# Patient Record
Sex: FEMALE | Race: WHITE | NOT HISPANIC OR LATINO | Employment: OTHER | URBAN - METROPOLITAN AREA
[De-identification: names, ages, dates, MRNs, and addresses within clinical notes are randomized per-mention and may not be internally consistent; named-entity substitution may affect disease eponyms.]

---

## 2017-01-20 RX ORDER — LORAZEPAM 0.5 MG/1
TABLET ORAL
Qty: 30 TABLET | Refills: 0 | Status: SHIPPED | OUTPATIENT
Start: 2017-01-20 | End: 2017-04-27 | Stop reason: SDUPTHER

## 2017-02-15 ENCOUNTER — OUTSIDE FACILITY SERVICE (OUTPATIENT)
Dept: FAMILY MEDICINE CLINIC | Facility: CLINIC | Age: 82
End: 2017-02-15

## 2017-02-15 PROCEDURE — 99308 SBSQ NF CARE LOW MDM 20: CPT | Performed by: NURSE PRACTITIONER

## 2017-03-29 RX ORDER — HYDROCODONE BITARTRATE AND ACETAMINOPHEN 5; 325 MG/1; MG/1
1 TABLET ORAL EVERY 12 HOURS
Qty: 60 TABLET | Refills: 0 | Status: SHIPPED | OUTPATIENT
Start: 2017-03-29 | End: 2017-04-27 | Stop reason: SDUPTHER

## 2017-04-06 ENCOUNTER — OUTSIDE FACILITY SERVICE (OUTPATIENT)
Dept: FAMILY MEDICINE CLINIC | Facility: CLINIC | Age: 82
End: 2017-04-06

## 2017-04-06 PROCEDURE — 99308 SBSQ NF CARE LOW MDM 20: CPT | Performed by: FAMILY MEDICINE

## 2017-04-19 ENCOUNTER — OUTSIDE FACILITY SERVICE (OUTPATIENT)
Dept: FAMILY MEDICINE CLINIC | Facility: CLINIC | Age: 82
End: 2017-04-19

## 2017-04-19 PROCEDURE — 99308 SBSQ NF CARE LOW MDM 20: CPT | Performed by: NURSE PRACTITIONER

## 2017-04-28 RX ORDER — HYDROCODONE BITARTRATE AND ACETAMINOPHEN 5; 325 MG/1; MG/1
1 TABLET ORAL EVERY 12 HOURS
Qty: 60 TABLET | Refills: 0 | Status: SHIPPED | OUTPATIENT
Start: 2017-04-28 | End: 2017-05-12

## 2017-04-28 RX ORDER — LORAZEPAM 0.5 MG/1
TABLET ORAL
Qty: 30 TABLET | Refills: 0 | Status: SHIPPED | OUTPATIENT
Start: 2017-04-28 | End: 2017-05-12 | Stop reason: SDUPTHER

## 2017-05-12 RX ORDER — HYDROCODONE BITARTRATE AND ACETAMINOPHEN 5; 325 MG/1; MG/1
TABLET ORAL
Qty: 360 TABLET | Refills: 0 | Status: SHIPPED | OUTPATIENT
Start: 2017-05-12 | End: 2017-07-12 | Stop reason: SDUPTHER

## 2017-05-12 RX ORDER — LORAZEPAM 0.5 MG/1
TABLET ORAL
Qty: 30 TABLET | Refills: 5 | Status: SHIPPED | OUTPATIENT
Start: 2017-05-12 | End: 2017-09-05 | Stop reason: SDUPTHER

## 2017-06-07 ENCOUNTER — OUTSIDE FACILITY SERVICE (OUTPATIENT)
Dept: FAMILY MEDICINE CLINIC | Facility: CLINIC | Age: 82
End: 2017-06-07

## 2017-06-07 PROCEDURE — 99307 SBSQ NF CARE SF MDM 10: CPT | Performed by: NURSE PRACTITIONER

## 2017-06-08 ENCOUNTER — OUTSIDE FACILITY SERVICE (OUTPATIENT)
Dept: FAMILY MEDICINE CLINIC | Facility: CLINIC | Age: 82
End: 2017-06-08

## 2017-06-08 PROCEDURE — 99308 SBSQ NF CARE LOW MDM 20: CPT | Performed by: FAMILY MEDICINE

## 2017-07-04 ENCOUNTER — APPOINTMENT (OUTPATIENT)
Dept: CT IMAGING | Facility: HOSPITAL | Age: 82
End: 2017-07-04

## 2017-07-04 ENCOUNTER — APPOINTMENT (OUTPATIENT)
Dept: GENERAL RADIOLOGY | Facility: HOSPITAL | Age: 82
End: 2017-07-04

## 2017-07-04 ENCOUNTER — HOSPITAL ENCOUNTER (EMERGENCY)
Facility: HOSPITAL | Age: 82
Discharge: HOME | End: 2017-07-04
Attending: FAMILY MEDICINE | Admitting: FAMILY MEDICINE

## 2017-07-04 VITALS
OXYGEN SATURATION: 95 % | WEIGHT: 145 LBS | BODY MASS INDEX: 23.3 KG/M2 | RESPIRATION RATE: 18 BRPM | DIASTOLIC BLOOD PRESSURE: 94 MMHG | HEART RATE: 100 BPM | TEMPERATURE: 97.9 F | HEIGHT: 66 IN | SYSTOLIC BLOOD PRESSURE: 179 MMHG

## 2017-07-04 DIAGNOSIS — R07.89 ATYPICAL CHEST PAIN: Primary | ICD-10-CM

## 2017-07-04 LAB
ALBUMIN SERPL-MCNC: 4.4 G/DL (ref 3.5–5.2)
ALBUMIN/GLOB SERPL: 1.4 G/DL
ALP SERPL-CCNC: 83 U/L (ref 39–117)
ALT SERPL W P-5'-P-CCNC: 23 U/L (ref 1–33)
ANION GAP SERPL CALCULATED.3IONS-SCNC: 13.6 MMOL/L
AST SERPL-CCNC: 25 U/L (ref 1–32)
BACTERIA UR QL AUTO: NORMAL /HPF
BASOPHILS # BLD AUTO: 0.04 10*3/MM3 (ref 0–0.2)
BASOPHILS NFR BLD AUTO: 0.5 % (ref 0–1.5)
BILIRUB SERPL-MCNC: 0.5 MG/DL (ref 0.1–1.2)
BILIRUB UR QL STRIP: NEGATIVE
BUN BLD-MCNC: 14 MG/DL (ref 8–23)
BUN/CREAT SERPL: 15.6 (ref 7–25)
CALCIUM SPEC-SCNC: 10.1 MG/DL (ref 8.6–10.5)
CHLORIDE SERPL-SCNC: 97 MMOL/L (ref 98–107)
CLARITY UR: CLEAR
CO2 SERPL-SCNC: 25.4 MMOL/L (ref 22–29)
COLOR UR: YELLOW
CREAT BLD-MCNC: 0.9 MG/DL (ref 0.57–1)
DEPRECATED RDW RBC AUTO: 44.4 FL (ref 37–54)
EOSINOPHIL # BLD AUTO: 0.45 10*3/MM3 (ref 0–0.7)
EOSINOPHIL NFR BLD AUTO: 5.8 % (ref 0.3–6.2)
ERYTHROCYTE [DISTWIDTH] IN BLOOD BY AUTOMATED COUNT: 12.8 % (ref 11.7–13)
GFR SERPL CREATININE-BSD FRML MDRD: 59 ML/MIN/1.73
GLOBULIN UR ELPH-MCNC: 3.1 GM/DL
GLUCOSE BLD-MCNC: 230 MG/DL (ref 65–99)
GLUCOSE UR STRIP-MCNC: ABNORMAL MG/DL
HCT VFR BLD AUTO: 42.2 % (ref 35.6–45.5)
HGB BLD-MCNC: 14.1 G/DL (ref 11.9–15.5)
HGB UR QL STRIP.AUTO: ABNORMAL
HOLD SPECIMEN: NORMAL
HOLD SPECIMEN: NORMAL
HYALINE CASTS UR QL AUTO: NORMAL /LPF
IMM GRANULOCYTES # BLD: 0.02 10*3/MM3 (ref 0–0.03)
IMM GRANULOCYTES NFR BLD: 0.3 % (ref 0–0.5)
KETONES UR QL STRIP: NEGATIVE
LEUKOCYTE ESTERASE UR QL STRIP.AUTO: NEGATIVE
LYMPHOCYTES # BLD AUTO: 1.91 10*3/MM3 (ref 0.9–4.8)
LYMPHOCYTES NFR BLD AUTO: 24.5 % (ref 19.6–45.3)
MCH RBC QN AUTO: 31.6 PG (ref 26.9–32)
MCHC RBC AUTO-ENTMCNC: 33.4 G/DL (ref 32.4–36.3)
MCV RBC AUTO: 94.6 FL (ref 80.5–98.2)
MONOCYTES # BLD AUTO: 0.55 10*3/MM3 (ref 0.2–1.2)
MONOCYTES NFR BLD AUTO: 7.1 % (ref 5–12)
NEUTROPHILS # BLD AUTO: 4.83 10*3/MM3 (ref 1.9–8.1)
NEUTROPHILS NFR BLD AUTO: 61.8 % (ref 42.7–76)
NITRITE UR QL STRIP: NEGATIVE
PH UR STRIP.AUTO: 5.5 [PH] (ref 5–8)
PLATELET # BLD AUTO: 221 10*3/MM3 (ref 140–500)
PMV BLD AUTO: 9.9 FL (ref 6–12)
POTASSIUM BLD-SCNC: 4.4 MMOL/L (ref 3.5–5.2)
PROT SERPL-MCNC: 7.5 G/DL (ref 6–8.5)
PROT UR QL STRIP: NEGATIVE
RBC # BLD AUTO: 4.46 10*6/MM3 (ref 3.9–5.2)
RBC # UR: NORMAL /HPF
REF LAB TEST METHOD: NORMAL
SODIUM BLD-SCNC: 136 MMOL/L (ref 136–145)
SP GR UR STRIP: 1.01 (ref 1–1.03)
SQUAMOUS #/AREA URNS HPF: NORMAL /HPF
TROPONIN T SERPL-MCNC: 0.02 NG/ML (ref 0–0.03)
UROBILINOGEN UR QL STRIP: ABNORMAL
WBC NRBC COR # BLD: 7.8 10*3/MM3 (ref 4.5–10.7)
WBC UR QL AUTO: NORMAL /HPF
WHOLE BLOOD HOLD SPECIMEN: NORMAL
WHOLE BLOOD HOLD SPECIMEN: NORMAL

## 2017-07-04 PROCEDURE — 81001 URINALYSIS AUTO W/SCOPE: CPT | Performed by: FAMILY MEDICINE

## 2017-07-04 PROCEDURE — 93010 ELECTROCARDIOGRAM REPORT: CPT | Performed by: INTERNAL MEDICINE

## 2017-07-04 PROCEDURE — 85025 COMPLETE CBC W/AUTO DIFF WBC: CPT | Performed by: FAMILY MEDICINE

## 2017-07-04 PROCEDURE — 71020 HC CHEST PA AND LATERAL: CPT

## 2017-07-04 PROCEDURE — 96374 THER/PROPH/DIAG INJ IV PUSH: CPT

## 2017-07-04 PROCEDURE — 99284 EMERGENCY DEPT VISIT MOD MDM: CPT

## 2017-07-04 PROCEDURE — 80053 COMPREHEN METABOLIC PANEL: CPT | Performed by: FAMILY MEDICINE

## 2017-07-04 PROCEDURE — 25010000002 LORAZEPAM PER 2 MG: Performed by: FAMILY MEDICINE

## 2017-07-04 PROCEDURE — 93005 ELECTROCARDIOGRAM TRACING: CPT | Performed by: FAMILY MEDICINE

## 2017-07-04 PROCEDURE — 84484 ASSAY OF TROPONIN QUANT: CPT | Performed by: FAMILY MEDICINE

## 2017-07-04 PROCEDURE — 0 IOPAMIDOL PER 1 ML: Performed by: FAMILY MEDICINE

## 2017-07-04 PROCEDURE — 71275 CT ANGIOGRAPHY CHEST: CPT

## 2017-07-04 RX ORDER — LORAZEPAM 2 MG/ML
0.5 INJECTION INTRAMUSCULAR ONCE
Status: COMPLETED | OUTPATIENT
Start: 2017-07-04 | End: 2017-07-04

## 2017-07-04 RX ORDER — SODIUM CHLORIDE 0.9 % (FLUSH) 0.9 %
10 SYRINGE (ML) INJECTION AS NEEDED
Status: DISCONTINUED | OUTPATIENT
Start: 2017-07-04 | End: 2017-07-05 | Stop reason: HOSPADM

## 2017-07-04 RX ORDER — ASPIRIN 325 MG
325 TABLET ORAL ONCE
Status: COMPLETED | OUTPATIENT
Start: 2017-07-04 | End: 2017-07-04

## 2017-07-04 RX ORDER — FOLIC ACID/VIT BCOMP,C/CU/ZINC 5-1.5-25MG
1 TABLET ORAL DAILY
COMMUNITY
End: 2017-11-06 | Stop reason: HOSPADM

## 2017-07-04 RX ORDER — DONEPEZIL HYDROCHLORIDE 5 MG/1
5 TABLET, FILM COATED ORAL NIGHTLY
COMMUNITY
End: 2017-11-01 | Stop reason: DRUGHIGH

## 2017-07-04 RX ORDER — MIRTAZAPINE 15 MG/1
15 TABLET, FILM COATED ORAL NIGHTLY
COMMUNITY
End: 2018-01-01 | Stop reason: HOSPADM

## 2017-07-04 RX ADMIN — IOPAMIDOL 95 ML: 755 INJECTION, SOLUTION INTRAVENOUS at 20:33

## 2017-07-04 RX ADMIN — LORAZEPAM 0.5 MG: 2 INJECTION INTRAMUSCULAR; INTRAVENOUS at 20:23

## 2017-07-04 RX ADMIN — ASPIRIN 325 MG: 325 TABLET ORAL at 18:06

## 2017-07-04 NOTE — ED PROVIDER NOTES
EMERGENCY DEPARTMENT ENCOUNTER    CHIEF COMPLAINT  Chief Complaint: chest pain  History given by: patient  History limited by: Dementia  Room Number: 28/28  PMD: Richard Stinson MD      HPI:  Pt is a 86 y.o. female who presents complaining of constant chest pain under her left breast for the past several weeks. Pain does not radiate and is nonpleuritic. Pt reports SOB, mild dysuria, and dry cough. Pt denies N/V/D, headache, confusion, and fever. Pt underwent a bypass surgery years ago.     Duration:  Multiple weeks  Onset: gradual  Timing: constant  Location: Under left breast  Radiation: none  Quality: pain  Intensity/Severity: moderate  Progression: unchanged  Associated Symptoms: SOB, mild dysuria, dry cough  Aggravating Factors: uknown  Alleviating Factors: uknown  Previous Episodes: uknown  Treatment before arrival: none reported     PAST MEDICAL HISTORY  Active Ambulatory Problems     Diagnosis Date Noted   • Acute respiratory failure with hypoxia 08/08/2016   • Essential hypertension 08/12/2016     Resolved Ambulatory Problems     Diagnosis Date Noted   • No Resolved Ambulatory Problems     Past Medical History:   Diagnosis Date   • Alzheimer disease    • Atherosclerotic heart disease    • Bronchitis    • CHF (congestive heart failure)    • COPD (chronic obstructive pulmonary disease)    • Dementia    • Depression    • Diabetes mellitus    • GERD (gastroesophageal reflux disease)    • HTN (hypertension)    • Hypokalemia    • Insomnia    • Metabolic encephalopathy    • Neuropathy    • OA (osteoarthritis)    • Pneumonia    • Renal disorder    • TIA (transient ischemic attack)    • Upper respiratory infection        PAST SURGICAL HISTORY  Past Surgical History:   Procedure Laterality Date   • CARDIAC SURGERY         FAMILY HISTORY  History reviewed. No pertinent family history.    SOCIAL HISTORY  Social History     Social History   • Marital status: Single     Spouse name: N/A   • Number of children: 3   • Years  of education: N/A     Occupational History   •       [employer]     Social History Main Topics   • Smoking status: Former Smoker     Quit date: 1980   • Smokeless tobacco: Not on file      Comment: unable to assess   • Alcohol use No   • Drug use: No   • Sexual activity: Defer     Other Topics Concern   • Not on file     Social History Narrative   • No narrative on file       ALLERGIES  Codeine; Latex; Other; Soma [carisoprodol]; and Sulfa antibiotics    REVIEW OF SYSTEMS  Review of Systems   Unable to perform ROS: Dementia   Eyes: Negative.    Respiratory: Positive for cough and shortness of breath.    Cardiovascular: Positive for chest pain.   Genitourinary: Positive for dysuria (mild).       PHYSICAL EXAM  ED Triage Vitals   Temp Heart Rate Resp BP SpO2   07/04/17 1631 07/04/17 1631 07/04/17 1631 07/04/17 1631 07/04/17 1631   97.9 °F (36.6 °C) 89 16 167/94 94 %      Temp src Heart Rate Source Patient Position BP Location FiO2 (%)   07/04/17 1631 -- -- -- --   Tympanic           Physical Exam   Constitutional: She is well-developed, well-nourished, and in no distress. No distress.   HENT:   Head: Normocephalic and atraumatic.   Eyes: EOM are normal. Pupils are equal, round, and reactive to light.   Neck: Normal range of motion. Neck supple.   Cardiovascular: Normal rate, regular rhythm and normal heart sounds.    Pulmonary/Chest: Effort normal. No respiratory distress. She has decreased breath sounds. She has wheezes (mild).   Bilateral breast implants. No tenderness and no sign of cellulitis.    Abdominal: Soft. There is no tenderness. There is no rebound and no guarding.   Musculoskeletal: Normal range of motion. She exhibits no edema.   Neurological: She is alert. She has normal sensation and normal strength.   Skin: Skin is warm and dry. No rash noted.   Old surgical incision on left leg with scattered excoration. Minimal edema   Psychiatric: Mood and affect normal.   Nursing note and vitals  reviewed.      LAB RESULTS  Lab Results (last 24 hours)     Procedure Component Value Units Date/Time    CBC & Differential [[id number]] Collected:  07/04/17 1653    Specimen:  Blood Updated:  07/04/17 1715    Narrative:       The following orders were created for panel order CBC & Differential.  Procedure                               Abnormality         Status                     ---------                               -----------         ------                     CBC Auto Differential[[id number]]        Normal              Final result                 Please view results for these tests on the individual orders.    Comprehensive Metabolic Panel [[id number]]  (Abnormal) Collected:  07/04/17 1653    Specimen:  Blood Updated:  07/04/17 1732     Glucose 230 (H) mg/dL      BUN 14 mg/dL      Creatinine 0.90 mg/dL      Sodium 136 mmol/L      Potassium 4.4 mmol/L      Chloride 97 (L) mmol/L      CO2 25.4 mmol/L      Calcium 10.1 mg/dL      Total Protein 7.5 g/dL      Albumin 4.40 g/dL      ALT (SGPT) 23 U/L      AST (SGOT) 25 U/L      Alkaline Phosphatase 83 U/L      Total Bilirubin 0.5 mg/dL      eGFR Non African Amer 59 (L) mL/min/1.73      Globulin 3.1 gm/dL      A/G Ratio 1.4 g/dL      BUN/Creatinine Ratio 15.6     Anion Gap 13.6 mmol/L     Narrative:       The MDRD GFR formula is only valid for adults with stable renal function between ages 18 and 70.    Troponin [[id number]]  (Normal) Collected:  07/04/17 1653    Specimen:  Blood Updated:  07/04/17 1732     Troponin T 0.017 ng/mL     Narrative:       Troponin T Reference Ranges:  Less than 0.03 ng/mL:    Negative for AMI  0.03 to 0.09 ng/mL:      Indeterminant for AMI  Greater than 0.09 ng/mL: Positive for AMI    CBC Auto Differential [[id number]]  (Normal) Collected:  07/04/17 1653    Specimen:  Blood Updated:  07/04/17 1715     WBC 7.80 10*3/mm3      RBC 4.46 10*6/mm3      Hemoglobin 14.1 g/dL      Hematocrit 42.2 %      MCV 94.6 fL      MCH 31.6 pg      MCHC 33.4 g/dL       RDW 12.8 %      RDW-SD 44.4 fl      MPV 9.9 fL      Platelets 221 10*3/mm3      Neutrophil % 61.8 %      Lymphocyte % 24.5 %      Monocyte % 7.1 %      Eosinophil % 5.8 %      Basophil % 0.5 %      Immature Grans % 0.3 %      Neutrophils, Absolute 4.83 10*3/mm3      Lymphocytes, Absolute 1.91 10*3/mm3      Monocytes, Absolute 0.55 10*3/mm3      Eosinophils, Absolute 0.45 10*3/mm3      Basophils, Absolute 0.04 10*3/mm3      Immature Grans, Absolute 0.02 10*3/mm3     Urinalysis With / Culture If Indicated [[id number]]  (Abnormal) Collected:  07/04/17 1732    Specimen:  Urine from Urine, Catheter Updated:  07/04/17 1756     Color, UA Yellow     Appearance, UA Clear     pH, UA 5.5     Specific Gravity, UA 1.009     Glucose,  mg/dL (1+) (A)     Ketones, UA Negative     Bilirubin, UA Negative     Blood, UA Trace (A)     Protein, UA Negative     Leuk Esterase, UA Negative     Nitrite, UA Negative     Urobilinogen, UA 0.2 E.U./dL    Urinalysis, Microscopic Only [[id number]] Collected:  07/04/17 1732    Specimen:  Urine from Urine, Catheter Updated:  07/04/17 1756     RBC, UA 0-2 /HPF      WBC, UA 0-2 /HPF      Bacteria, UA None Seen /HPF      Squamous Epithelial Cells, UA 0-2 /HPF      Hyaline Casts, UA None Seen /LPF      Methodology Automated Microscopy          I ordered the above labs and reviewed the results    RADIOLOGY  CT Angiogram Chest With Contrast   Final Result   1. Minimal basilar fibrosis, no active airspace disease.   2. Motion limits evaluation for pulmonary embolism, no central or large   proximal embolus       This report was finalized on 7/4/2017 8:59 PM by Michael Tang MD.          XR Chest 2 View   Final Result   No focal pulmonary consolidation. Borderline heart size.   Follow-up as clinical indications persist.       This report was finalized on 7/4/2017 5:32 PM by Dr. Micky Olson MD.               I ordered the above noted radiological studies. Interpreted by radiologist.   Reviewed by me in PACS.       PROCEDURES  Procedures  EKG           EKG time: 1642  Rhythm/Rate: NSR  P waves and TX: 90s  QRS, axis: LBBB, PRWP   ST and T waves: nonspecific ST chances     Interpreted Contemporaneously by me, independently viewed  unchanged compared to prior 9/21/16      PROGRESS AND CONSULTS  ED Course   1635  Ordered EKG, CXR, Troponin and blood work for further evaluation.    1844  Rechecked pt and discussed negative imaging results. Informed pt that I will order CT. Checked blister on left heel which is a mild early decubitus.  Asked nurse for padded footies. Complains of persistent pain under her left breast and wants to go home.    1847  Ordered Chest CT Angiogram to r/o PE.     2006  Rechecked pt. Pt desires to be discharged but I encouraged her to stay so we can obtain a chest CT. .    Her pain is pain is constant, not pleuritic and extends for a long period of time. Her work up here is negative so she can be discharged home.     2017  Ordered Ativan for anxiety. Her pain sounds atypical for cardiac and she insists on discharge.  Cautioned she needs close follow up.    MEDICAL DECISION MAKING  Results were reviewed/discussed with the patient and they were also made aware of online access. Pt also made aware that some labs, such as cultures, will not be resulted during ER visit and follow up with PMD is necessary.     MDM  Number of Diagnoses or Management Options     Amount and/or Complexity of Data Reviewed  Clinical lab tests: ordered and reviewed (Troponin is 0.017)  Tests in the radiology section of CPT®: ordered and reviewed (CXR shows no acute disease  CTA Chest: minimal basilar fibrosis, no active airspace disease. Motion limits evaluation for pulmonary embolism, no central or large proximal embolus  )  Tests in the medicine section of CPT®: ordered and reviewed (See procedure EKG notes)  Decide to obtain previous medical records or to obtain history from someone other than the  patient: yes  Review and summarize past medical records: yes  Independent visualization of images, tracings, or specimens: yes    Patient Progress  Patient progress: stable         DIAGNOSIS  Final diagnoses:   Atypical chest pain       DISPOSITION  DISCHARGE    Patient discharged in stable condition.    Reviewed implications of results, diagnosis, meds, responsibility to follow up, warning signs and symptoms of possible worsening, potential complications and reasons to return to the ED.    Patient/Family voiced understanding of above instructions.    Discussed plan for discharge, as there is no emergent indication for admission.  Pt/family is agreeable and understands need for follow up and repeat testing.  Pt is aware that discharge does not mean that nothing is wrong but it indicates no emergency is present that requires admission and they must continue care with follow-up as given below or physician of their choice.     FOLLOW-UP  Richard Stinson MD  [address] [id number] [phone]      We want you rechecked in 48 hours if not better         Medication List      Stop          SITagliptin 50 MG tablet   Commonly known as:  JANUVIA               Latest Documented Vital Signs:  As of 9:45 PM  BP- 179/94 HR- 100 Temp- 97.9 °F (36.6 °C) (Tympanic) O2 sat- 95%    --  Documentation assistance provided by dayday Myles and Zayra Hawk for Dr. Fuller.  Information recorded by the dayday was done at my direction and has been verified and validated by me.        Zayra Hawk  07/04/17 2119       Ambar Myles  07/04/17 5450       Ronald Fuller MD  07/04/17 5533

## 2017-07-05 NOTE — ED NOTES
Report called to Joana at [facility].  Sent to POD 4 to hold for raphael Cabrera RN  07/04/17 9979

## 2017-07-05 NOTE — ED NOTES
"Called [facility] to notify IV access needed to be removed.  Registered nurse stated \"I am taking it out now\". [facility] was called prior and notified.   Charge nurse notified.      Mady Hall RN  07/04/17 3002    "

## 2017-07-05 NOTE — ED NOTES
Changed pt linens and adult briefs due to incontinence. Pt tolerated well. Pt requested to drink soda. Pt  requested to give it to her.     Mady Hall RN  07/04/17 2332

## 2017-07-05 NOTE — ED NOTES
Pt left with [facility].  Pt able to communicate, pt breathing room air.   Denied any sob or chest pain.     Mady Hall RN  07/04/17 1094    Pt had been discharged by Alida LECHUGA.     Mady Hall RN  07/04/17 3533

## 2017-07-06 ENCOUNTER — OUTSIDE FACILITY SERVICE (OUTPATIENT)
Dept: FAMILY MEDICINE CLINIC | Facility: CLINIC | Age: 82
End: 2017-07-06

## 2017-07-06 PROCEDURE — 99307 SBSQ NF CARE SF MDM 10: CPT | Performed by: FAMILY MEDICINE

## 2017-07-12 RX ORDER — HYDROCODONE BITARTRATE AND ACETAMINOPHEN 5; 325 MG/1; MG/1
TABLET ORAL
Qty: 360 TABLET | Refills: 0 | Status: SHIPPED | OUTPATIENT
Start: 2017-07-12 | End: 2017-09-13 | Stop reason: SDUPTHER

## 2017-07-21 RX ORDER — GABAPENTIN 300 MG/1
300 CAPSULE ORAL 2 TIMES DAILY
Qty: 60 CAPSULE | Refills: 5 | Status: SHIPPED | OUTPATIENT
Start: 2017-07-21 | End: 2017-11-06 | Stop reason: HOSPADM

## 2017-08-09 ENCOUNTER — OUTSIDE FACILITY SERVICE (OUTPATIENT)
Dept: FAMILY MEDICINE CLINIC | Facility: CLINIC | Age: 82
End: 2017-08-09

## 2017-08-09 PROCEDURE — 99308 SBSQ NF CARE LOW MDM 20: CPT | Performed by: NURSE PRACTITIONER

## 2017-09-05 RX ORDER — LORAZEPAM 0.5 MG/1
TABLET ORAL
Qty: 30 TABLET | Refills: 5 | Status: SHIPPED | OUTPATIENT
Start: 2017-09-05 | End: 2017-09-06 | Stop reason: SDUPTHER

## 2017-09-06 RX ORDER — LORAZEPAM 0.5 MG/1
TABLET ORAL
Qty: 30 TABLET | Refills: 5 | Status: SHIPPED | OUTPATIENT
Start: 2017-09-06 | End: 2017-11-01 | Stop reason: SDUPTHER

## 2017-09-07 RX ORDER — LORAZEPAM 2 MG/ML
INJECTION INTRAMUSCULAR
Qty: 60 ML | Refills: 5 | OUTPATIENT
Start: 2017-09-07 | End: 2017-11-01 | Stop reason: SDUPTHER

## 2017-09-13 RX ORDER — HYDROCODONE BITARTRATE AND ACETAMINOPHEN 5; 325 MG/1; MG/1
TABLET ORAL
Qty: 360 TABLET | Refills: 0 | Status: SHIPPED | OUTPATIENT
Start: 2017-09-13 | End: 2017-10-23 | Stop reason: SDUPTHER

## 2017-09-14 ENCOUNTER — OUTSIDE FACILITY SERVICE (OUTPATIENT)
Dept: FAMILY MEDICINE CLINIC | Facility: CLINIC | Age: 82
End: 2017-09-14

## 2017-09-14 PROCEDURE — 99309 SBSQ NF CARE MODERATE MDM 30: CPT | Performed by: NURSE PRACTITIONER

## 2017-09-21 ENCOUNTER — OUTSIDE FACILITY SERVICE (OUTPATIENT)
Dept: FAMILY MEDICINE CLINIC | Facility: CLINIC | Age: 82
End: 2017-09-21

## 2017-09-21 PROCEDURE — 99307 SBSQ NF CARE SF MDM 10: CPT | Performed by: FAMILY MEDICINE

## 2017-09-28 ENCOUNTER — OUTSIDE FACILITY SERVICE (OUTPATIENT)
Dept: FAMILY MEDICINE CLINIC | Facility: CLINIC | Age: 82
End: 2017-09-28

## 2017-09-28 PROCEDURE — 99307 SBSQ NF CARE SF MDM 10: CPT | Performed by: FAMILY MEDICINE

## 2017-10-20 ENCOUNTER — OUTSIDE FACILITY SERVICE (OUTPATIENT)
Dept: FAMILY MEDICINE CLINIC | Facility: CLINIC | Age: 82
End: 2017-10-20

## 2017-10-20 PROCEDURE — 99308 SBSQ NF CARE LOW MDM 20: CPT | Performed by: NURSE PRACTITIONER

## 2017-10-23 RX ORDER — HYDROCODONE BITARTRATE AND ACETAMINOPHEN 5; 325 MG/1; MG/1
TABLET ORAL
Qty: 60 TABLET | Refills: 0 | Status: SHIPPED | OUTPATIENT
Start: 2017-10-23 | End: 2017-11-01 | Stop reason: SDUPTHER

## 2017-11-01 ENCOUNTER — HOSPITAL ENCOUNTER (INPATIENT)
Facility: HOSPITAL | Age: 82
LOS: 5 days | Discharge: NURSING, REHAB OR LONG-TERM CARE | End: 2017-11-06
Attending: FAMILY MEDICINE | Admitting: HOSPITALIST

## 2017-11-01 ENCOUNTER — APPOINTMENT (OUTPATIENT)
Dept: GENERAL RADIOLOGY | Facility: HOSPITAL | Age: 82
End: 2017-11-01

## 2017-11-01 DIAGNOSIS — N39.0 ACUTE UTI: ICD-10-CM

## 2017-11-01 DIAGNOSIS — R77.8 ELEVATED TROPONIN: ICD-10-CM

## 2017-11-01 DIAGNOSIS — R55 SYNCOPE AND COLLAPSE: Primary | ICD-10-CM

## 2017-11-01 DIAGNOSIS — R94.31 ABNORMAL EKG: ICD-10-CM

## 2017-11-01 LAB
ALBUMIN SERPL-MCNC: 3.5 G/DL (ref 3.5–5.2)
ALBUMIN/GLOB SERPL: 1.1 G/DL
ALP SERPL-CCNC: 85 U/L (ref 39–117)
ALT SERPL W P-5'-P-CCNC: 13 U/L (ref 1–33)
ANION GAP SERPL CALCULATED.3IONS-SCNC: 14.4 MMOL/L
AST SERPL-CCNC: 15 U/L (ref 1–32)
BACTERIA UR QL AUTO: ABNORMAL /HPF
BASOPHILS # BLD AUTO: 0.02 10*3/MM3 (ref 0–0.2)
BASOPHILS NFR BLD AUTO: 0.1 % (ref 0–1.5)
BILIRUB SERPL-MCNC: 0.4 MG/DL (ref 0.1–1.2)
BILIRUB UR QL STRIP: NEGATIVE
BUN BLD-MCNC: 17 MG/DL (ref 8–23)
BUN/CREAT SERPL: 14.5 (ref 7–25)
CALCIUM SPEC-SCNC: 9.8 MG/DL (ref 8.6–10.5)
CHLORIDE SERPL-SCNC: 101 MMOL/L (ref 98–107)
CLARITY UR: ABNORMAL
CO2 SERPL-SCNC: 25.6 MMOL/L (ref 22–29)
COLOR UR: YELLOW
CREAT BLD-MCNC: 1.17 MG/DL (ref 0.57–1)
DEPRECATED RDW RBC AUTO: 46.4 FL (ref 37–54)
EOSINOPHIL # BLD AUTO: 0.06 10*3/MM3 (ref 0–0.7)
EOSINOPHIL NFR BLD AUTO: 0.4 % (ref 0.3–6.2)
ERYTHROCYTE [DISTWIDTH] IN BLOOD BY AUTOMATED COUNT: 13.3 % (ref 11.7–13)
GFR SERPL CREATININE-BSD FRML MDRD: 44 ML/MIN/1.73
GLOBULIN UR ELPH-MCNC: 3.1 GM/DL
GLUCOSE BLD-MCNC: 211 MG/DL (ref 65–99)
GLUCOSE BLDC GLUCOMTR-MCNC: 218 MG/DL (ref 70–130)
GLUCOSE BLDC GLUCOMTR-MCNC: 284 MG/DL (ref 70–130)
GLUCOSE UR STRIP-MCNC: ABNORMAL MG/DL
HCT VFR BLD AUTO: 39.9 % (ref 35.6–45.5)
HGB BLD-MCNC: 12.7 G/DL (ref 11.9–15.5)
HGB UR QL STRIP.AUTO: ABNORMAL
HYALINE CASTS UR QL AUTO: ABNORMAL /LPF
IMM GRANULOCYTES # BLD: 0.1 10*3/MM3 (ref 0–0.03)
IMM GRANULOCYTES NFR BLD: 0.6 % (ref 0–0.5)
KETONES UR QL STRIP: NEGATIVE
LEUKOCYTE ESTERASE UR QL STRIP.AUTO: ABNORMAL
LYMPHOCYTES # BLD AUTO: 2.12 10*3/MM3 (ref 0.9–4.8)
LYMPHOCYTES NFR BLD AUTO: 13.5 % (ref 19.6–45.3)
MCH RBC QN AUTO: 30.6 PG (ref 26.9–32)
MCHC RBC AUTO-ENTMCNC: 31.8 G/DL (ref 32.4–36.3)
MCV RBC AUTO: 96.1 FL (ref 80.5–98.2)
MONOCYTES # BLD AUTO: 1.17 10*3/MM3 (ref 0.2–1.2)
MONOCYTES NFR BLD AUTO: 7.4 % (ref 5–12)
NEUTROPHILS # BLD AUTO: 12.27 10*3/MM3 (ref 1.9–8.1)
NEUTROPHILS NFR BLD AUTO: 78 % (ref 42.7–76)
NITRITE UR QL STRIP: POSITIVE
PH UR STRIP.AUTO: 6 [PH] (ref 5–8)
PLATELET # BLD AUTO: 246 10*3/MM3 (ref 140–500)
PMV BLD AUTO: 9.6 FL (ref 6–12)
POTASSIUM BLD-SCNC: 3.8 MMOL/L (ref 3.5–5.2)
PROT SERPL-MCNC: 6.6 G/DL (ref 6–8.5)
PROT UR QL STRIP: NEGATIVE
RBC # BLD AUTO: 4.15 10*6/MM3 (ref 3.9–5.2)
RBC # UR: ABNORMAL /HPF
REF LAB TEST METHOD: ABNORMAL
SODIUM BLD-SCNC: 141 MMOL/L (ref 136–145)
SP GR UR STRIP: 1.01 (ref 1–1.03)
SQUAMOUS #/AREA URNS HPF: ABNORMAL /HPF
TROPONIN T SERPL-MCNC: 0.05 NG/ML (ref 0–0.03)
TROPONIN T SERPL-MCNC: 0.08 NG/ML (ref 0–0.03)
UROBILINOGEN UR QL STRIP: ABNORMAL
WBC NRBC COR # BLD: 15.74 10*3/MM3 (ref 4.5–10.7)
WBC UR QL AUTO: ABNORMAL /HPF

## 2017-11-01 PROCEDURE — 87086 URINE CULTURE/COLONY COUNT: CPT | Performed by: FAMILY MEDICINE

## 2017-11-01 PROCEDURE — 80053 COMPREHEN METABOLIC PANEL: CPT | Performed by: FAMILY MEDICINE

## 2017-11-01 PROCEDURE — 99285 EMERGENCY DEPT VISIT HI MDM: CPT

## 2017-11-01 PROCEDURE — 25010000002 LORAZEPAM PER 2 MG: Performed by: FAMILY MEDICINE

## 2017-11-01 PROCEDURE — 25810000003 SODIUM CHLORIDE 0.9 % WITH KCL 20 MEQ 20-0.9 MEQ/L-% SOLUTION: Performed by: HOSPITALIST

## 2017-11-01 PROCEDURE — 85025 COMPLETE CBC W/AUTO DIFF WBC: CPT | Performed by: FAMILY MEDICINE

## 2017-11-01 PROCEDURE — 63710000001 INSULIN ASPART PER 5 UNITS: Performed by: HOSPITALIST

## 2017-11-01 PROCEDURE — 84484 ASSAY OF TROPONIN QUANT: CPT | Performed by: FAMILY MEDICINE

## 2017-11-01 PROCEDURE — 25010000002 CEFTRIAXONE PER 250 MG: Performed by: HOSPITALIST

## 2017-11-01 PROCEDURE — 82962 GLUCOSE BLOOD TEST: CPT

## 2017-11-01 PROCEDURE — 87186 SC STD MICRODIL/AGAR DIL: CPT | Performed by: FAMILY MEDICINE

## 2017-11-01 PROCEDURE — 93010 ELECTROCARDIOGRAM REPORT: CPT | Performed by: INTERNAL MEDICINE

## 2017-11-01 PROCEDURE — 81001 URINALYSIS AUTO W/SCOPE: CPT | Performed by: FAMILY MEDICINE

## 2017-11-01 PROCEDURE — 84484 ASSAY OF TROPONIN QUANT: CPT | Performed by: HOSPITALIST

## 2017-11-01 PROCEDURE — 71010 HC CHEST PA OR AP: CPT

## 2017-11-01 PROCEDURE — 93005 ELECTROCARDIOGRAM TRACING: CPT | Performed by: FAMILY MEDICINE

## 2017-11-01 RX ORDER — AZELASTINE 1 MG/ML
2 SPRAY, METERED NASAL 2 TIMES DAILY
Status: ON HOLD | COMMUNITY
End: 2017-11-02

## 2017-11-01 RX ORDER — ESCITALOPRAM OXALATE 10 MG/1
20 TABLET ORAL DAILY
COMMUNITY
End: 2018-01-01 | Stop reason: HOSPADM

## 2017-11-01 RX ORDER — ACETAMINOPHEN 500 MG
500 TABLET ORAL EVERY 6 HOURS PRN
Status: ON HOLD | COMMUNITY
End: 2017-11-02

## 2017-11-01 RX ORDER — DEXTROSE MONOHYDRATE 25 G/50ML
25 INJECTION, SOLUTION INTRAVENOUS
Status: DISCONTINUED | OUTPATIENT
Start: 2017-11-01 | End: 2017-11-02

## 2017-11-01 RX ORDER — MENTHOL AND ZINC OXIDE .44; 20.625 G/100G; G/100G
1 OINTMENT TOPICAL
Status: ON HOLD | COMMUNITY
End: 2017-11-02

## 2017-11-01 RX ORDER — DONEPEZIL HYDROCHLORIDE 10 MG/1
10 TABLET, FILM COATED ORAL
COMMUNITY
End: 2018-01-01 | Stop reason: HOSPADM

## 2017-11-01 RX ORDER — LORAZEPAM 2 MG/ML
1 CONCENTRATE ORAL EVERY 12 HOURS PRN
Status: ON HOLD | COMMUNITY
End: 2017-11-02

## 2017-11-01 RX ORDER — ASPIRIN 325 MG
325 TABLET ORAL ONCE
Status: DISCONTINUED | OUTPATIENT
Start: 2017-11-01 | End: 2017-11-02

## 2017-11-01 RX ORDER — SODIUM CHLORIDE 9 MG/ML
125 INJECTION, SOLUTION INTRAVENOUS CONTINUOUS
Status: DISCONTINUED | OUTPATIENT
Start: 2017-11-01 | End: 2017-11-02

## 2017-11-01 RX ORDER — CEFTRIAXONE SODIUM 1 G/50ML
1 INJECTION, SOLUTION INTRAVENOUS EVERY 24 HOURS
Status: DISCONTINUED | OUTPATIENT
Start: 2017-11-01 | End: 2017-11-03

## 2017-11-01 RX ORDER — LORAZEPAM 2 MG/ML
0.25 INJECTION INTRAMUSCULAR ONCE
Status: COMPLETED | OUTPATIENT
Start: 2017-11-01 | End: 2017-11-01

## 2017-11-01 RX ORDER — NICOTINE POLACRILEX 4 MG
15 LOZENGE BUCCAL
Status: DISCONTINUED | OUTPATIENT
Start: 2017-11-01 | End: 2017-11-02

## 2017-11-01 RX ORDER — PANTOPRAZOLE SODIUM 40 MG/1
40 TABLET, DELAYED RELEASE ORAL
Status: DISCONTINUED | OUTPATIENT
Start: 2017-11-02 | End: 2017-11-06 | Stop reason: HOSPADM

## 2017-11-01 RX ORDER — DOCUSATE SODIUM 250 MG
250 CAPSULE ORAL DAILY
COMMUNITY

## 2017-11-01 RX ORDER — HYDROCODONE BITARTRATE AND ACETAMINOPHEN 5; 325 MG/1; MG/1
1 TABLET ORAL DAILY PRN
Status: ON HOLD | COMMUNITY
End: 2017-11-02

## 2017-11-01 RX ORDER — FLUTICASONE PROPIONATE 50 MCG
1 SPRAY, SUSPENSION (ML) NASAL DAILY
COMMUNITY
End: 2017-11-06 | Stop reason: HOSPADM

## 2017-11-01 RX ORDER — MEMANTINE HYDROCHLORIDE 28 MG/1
28 CAPSULE, EXTENDED RELEASE ORAL DAILY
COMMUNITY
End: 2018-01-01 | Stop reason: HOSPADM

## 2017-11-01 RX ORDER — LORAZEPAM 2 MG/ML
0.5 INJECTION INTRAMUSCULAR EVERY 4 HOURS PRN
Status: DISCONTINUED | OUTPATIENT
Start: 2017-11-01 | End: 2017-11-06

## 2017-11-01 RX ORDER — ASPIRIN 81 MG/1
81 TABLET, CHEWABLE ORAL DAILY
Status: DISCONTINUED | OUTPATIENT
Start: 2017-11-02 | End: 2017-11-02

## 2017-11-01 RX ORDER — SODIUM CHLORIDE AND POTASSIUM CHLORIDE 150; 900 MG/100ML; MG/100ML
50 INJECTION, SOLUTION INTRAVENOUS CONTINUOUS
Status: DISCONTINUED | OUTPATIENT
Start: 2017-11-01 | End: 2017-11-03

## 2017-11-01 RX ORDER — LORAZEPAM 0.5 MG/1
0.5 TABLET ORAL
Status: ON HOLD | COMMUNITY
End: 2017-11-02

## 2017-11-01 RX ADMIN — LORAZEPAM 0.25 MG: 2 INJECTION, SOLUTION INTRAMUSCULAR; INTRAVENOUS at 16:53

## 2017-11-01 RX ADMIN — SODIUM CHLORIDE 125 ML/HR: 9 INJECTION, SOLUTION INTRAVENOUS at 13:23

## 2017-11-01 RX ADMIN — METOPROLOL TARTRATE 25 MG: 25 TABLET ORAL at 22:08

## 2017-11-01 RX ADMIN — INSULIN ASPART 4 UNITS: 100 INJECTION, SOLUTION INTRAVENOUS; SUBCUTANEOUS at 22:10

## 2017-11-01 RX ADMIN — SODIUM CHLORIDE 500 ML: 9 INJECTION, SOLUTION INTRAVENOUS at 13:23

## 2017-11-01 RX ADMIN — CEFTRIAXONE SODIUM 1 G: 1 INJECTION, SOLUTION INTRAVENOUS at 22:08

## 2017-11-01 RX ADMIN — POTASSIUM CHLORIDE AND SODIUM CHLORIDE 75 ML/HR: 900; 150 INJECTION, SOLUTION INTRAVENOUS at 23:11

## 2017-11-01 NOTE — ED NOTES
"Pt stated \"I peed on the bed and I want a warm blanket\". RN attempted to change pt adult depends pt refused. Pt stated \"I am too cold no\". Rn attempted to convince pt but pt refused.     Mady Hall RN  11/01/17 1526    "

## 2017-11-01 NOTE — PROGRESS NOTES
Clinical Pharmacy Services: Medication History    Bernardo Cox is a 86 y.o. female presenting to [facility] for   Chief Complaint   Patient presents with   • Syncope     while joy the toilet this morning.        She  has a past medical history of Alzheimer disease; Atherosclerotic heart disease; Bronchitis; CHF (congestive heart failure); COPD (chronic obstructive pulmonary disease); Dementia; Depression; Diabetes mellitus; GERD (gastroesophageal reflux disease); HTN (hypertension); Hypokalemia; Insomnia; Metabolic encephalopathy; Neuropathy; OA (osteoarthritis); Pneumonia; Renal disorder; TIA (transient ischemic attack); and Upper respiratory infection.    Allergies as of 11/01/2017 - Lex as Reviewed 11/01/2017   Allergen Reaction Noted   • Codeine  08/07/2016   • Latex  08/07/2016   • Other  08/07/2016   • Soma [carisoprodol]  07/04/2017   • Sulfa antibiotics  08/07/2016       Medication information was obtained from: [facility]  Pharmacy and Phone Number:     Prior to Admission Medications     Prescriptions Last Dose Informant Patient Reported? Taking?    acetaminophen (TYLENOL) 500 MG tablet  Nursing Home Yes Yes    Take 500 mg by mouth Every 6 (Six) Hours As Needed for Mild Pain .    atorvastatin (LIPITOR) 40 MG tablet  Nursing Home Yes No    Take 40 mg by mouth every night at bedtime.    azelastine (ASTELIN) 0.1 % nasal spray  Nursing Home Yes Yes    2 sprays by Each Nare route 2 (Two) Times a Day. Use in each nostril as directed    B-Complex-C-Biotin-Minerals-FA (FOLBEE PLUS CZ) 5 MG tablet tablet  Nursing Home Yes No    Take 1 tablet by mouth Daily.    budesonide (PULMICORT) 0.5 MG/2ML nebulizer solution  Nursing Home Yes No    Take 0.5 mg by nebulization 2 (two) times a day.    calcium carbonate (TUMS) 500 MG chewable tablet  Nursing Home Yes No    Chew 1 tablet every 6 (six) hours as needed for indigestion or heartburn.    Calcium Carbonate-Vitamin D (CALCIUM-VITAMIN D) 500-200  MG-UNIT tablet per tablet  Nursing Home No No    Take 1 tablet by mouth 2 (two) times a day.    carvedilol (COREG) 25 MG tablet  Nursing Home Yes No    Take 25 mg by mouth 2 (two) times a day with meals. Hold for SBP <100    cloNIDine (CATAPRES) 0.2 MG tablet  Nursing Home No No    Take 1 tablet by mouth daily.    clopidogrel (PLAVIX) 75 MG tablet  Nursing Home Yes No    Take 75 mg by mouth daily.    Cranberry Extract 250 MG tablet  Nursing Home Yes No    Take 1 capsule by mouth daily.    dextromethorphan-guaifenesin (ROBITUSSIN-DM)  MG/5ML syrup  Nursing Home Yes No    Take 10 mL by mouth every 12 (twelve) hours as needed.    diphenhydrAMINE (BENADRYL) 25 mg capsule  Nursing Home Yes No    Take 25 mg by mouth every 6 (six) hours as needed for itching.    docusate sodium (COLACE) 250 MG capsule  Nursing Home Yes Yes    Take 250 mg by mouth Daily.    donepezil (ARICEPT) 10 MG tablet  Nursing Home Yes Yes    Take 10 mg by mouth every night at bedtime.    escitalopram (LEXAPRO) 10 MG tablet  Nursing Home Yes Yes    Take 10 mg by mouth Daily.    fluticasone (FLONASE) 50 MCG/ACT nasal spray  Nursing Home Yes Yes    1 spray by Each Nare route Daily.    furosemide (LASIX) 20 MG tablet  Nursing Home Yes No    Take 20 mg by mouth daily.    gabapentin (NEURONTIN) 300 MG capsule  Nursing Home No No    Take 1 capsule by mouth 2 (Two) Times a Day. [place]    glucagon (GLUCAGEN) 1 MG injection  Nursing Home Yes Yes    Inject 1 mg under the skin 1 (One) Time As Needed for Low Blood Sugar.    HYDROcodone-acetaminophen (NORCO) 5-325 MG per tablet  Nursing Home Yes Yes    Take 1 tablet by mouth Daily As Needed.    insulin aspart (novoLOG) 100 UNIT/ML injection  Nursing Home Yes Yes    Inject  under the skin 4 (Four) Times a Day Before Meals & at Bedtime. Per ssi: 0-200=0, 201-299=5, 300-399=10, 400-500=15    insulin glargine (LANTUS) 100 UNIT/ML injection  Nursing Home Yes No    Inject 30 Units under the skin daily.     ipratropium-albuterol (DUO-NEB) 0.5-2.5 mg/mL nebulizer  Nursing Home Yes No    Take 3 mL by nebulization every 3 (three) hours as needed for wheezing.    loratadine (CLARITIN) 10 MG tablet  Nursing Home Yes No    Take 10 mg by mouth daily.    LORazepam (ATIVAN) 0.5 MG tablet  Nursing Home Yes Yes    Take 0.5 mg by mouth every night at bedtime.    LORazepam (ATIVAN) 2 MG/ML concentrated solution  Nursing Home Yes Yes    Take 1 mg by mouth Every 12 (Twelve) Hours As Needed for Anxiety.    losartan (COZAAR) 50 MG tablet  Nursing Home Yes No    Take 50 mg by mouth daily.    magnesium hydroxide (MILK OF MAGNESIA) 400 MG/5ML suspension  Nursing Home Yes No    Take 30 mL by mouth Every Other Day As Needed.    memantine (NAMENDA XR) 28 MG capsule sustained-release 24 hr extended release capsule  Nursing Home Yes Yes    Take 28 mg by mouth Daily.    menthol-zinc oxide (RISAMINE) 0.44-20.625 % ointment ointment  Nursing Home Yes Yes    Apply 1 application topically Every 1 (One) Hour As Needed (apply to buttocks for redness).    mirtazapine (REMERON) 15 MG tablet  Nursing Home Yes No    Take 15 mg by mouth Every Night.    Multiple Vitamins-Minerals (MULTIVITAMIN & MINERAL PO)  Nursing Home Yes Yes    Take 1 tablet by mouth Daily.    nitroglycerin (NITROSTAT) 0.4 MG SL tablet  Nursing Home Yes No    Place 0.4 mg under the tongue every 5 (five) minutes as needed for chest pain. Take no more than 3 doses in 15 minutes.    polyethylene glycol (MIRALAX) packet  Nursing Home Yes No    Take 17 g by mouth Every 72 (Seventy-Two) Hours As Needed.    potassium chloride (K-DUR,KLOR-CON) 20 MEQ CR tablet  Nursing Home Yes No    Take 20 mEq by mouth 2 (two) times a day.            Medication notes: Removed:  Norvasc, Z-Pack, Ferrous Sulfate, Florastor, Januvia, Veramyst per facility records.  Added:  Astelin, Flonase, Glucagon, Novolog, Risamine    This medication list is complete to the best of my knowledge as of 11/1/2017    Please call  if questions.    Calli Covarrubias, Medication History Technician  11/1/2017 4:49 PM

## 2017-11-01 NOTE — ED TRIAGE NOTES
Ems called to [facility] for syncopal episode while sitting on toilet. Pt. Fell forward and struck her head. Hx of dementia. Alert and oriented x4 at this time. Reports no pain.

## 2017-11-01 NOTE — ED NOTES
"Pt stated \"I passed out because I have diabetes, they took me without my purse, I need my purse\". Pt denies any pain or any injury while syncopal episode.       Mady Hall RN  11/01/17 5862    "

## 2017-11-01 NOTE — ED NOTES
Called [facility]N spoke with Claudia LECHUGA and notified IV team said call when pt gets up to room.     Mady Hall RN  11/01/17 0111

## 2017-11-01 NOTE — ED NOTES
Pt currently eating boxed lunch per MD request. Pt tolerating well.     Mady Hall RN  11/01/17 6072

## 2017-11-01 NOTE — ED NOTES
Report called and given to Claudia LECHUGA on [facility] [phone], updated on pt current status, discussed completed Er orders and admitting diagnosis.   Notified RN that [facility] has been called and still have not been able to speak to pt caretaker RN. Pharmacy has called twice to obtain medication list and has not received fax.   Notified Rn pt pulled IV access out and pt agitated.  RN had no further questions at this time.     Mady Hall RN  11/01/17 3443

## 2017-11-01 NOTE — ED NOTES
Transportation came to get pt but pt refused to go up. Charge nurse in room with pt at this time. Pt placed on gown. Pt talking to charge nurse.     Mady Hall RN  11/01/17 1834

## 2017-11-01 NOTE — ED PROVIDER NOTES
EMERGENCY DEPARTMENT ENCOUNTER    CHIEF COMPLAINT  Chief Complaint: syncope   History given by: pt  History limited by: dementia   Room Number: 32/32  PMD: Richard Stinson MD      HPI:  Pt is a 86 y.o. female who presents via EMS complaining of a syncopal episode that occurred while pt was sitting on the toilet PTA. PT denies injuring herself during syncopal episode and denies any current pain at this time. Pt states she has experienced previous syncopal episodes and has seen her PCP for these episodes. PT has a hx of diabetes and is currently taking Lantis and Novolex for her diabetes. Pt states her blood glucose was low this morning and this is why pt experienced LOC. Pt has a hx of chronic dementia and presents from a nursing facility. Hx is limited due to pt's dementia and there actually was no low blood sugar.    Duration:  PTA  Onset: sudden  Timing: unknown   Location: N/A  Radiation: N/A  Quality: LOC  Intensity/Severity: moderate   Progression: improved, pt is currently conscious   Associated Symptoms: None reported   Aggravating Factors: Pt states it was her low blood glucose that caused her syncope   Alleviating Factors: None reported  Previous Episodes: PT states she has experienced previous similar episodes.   Treatment before arrival: PT is currently taking Novolex and Lantis for pt's diabetes.     PAST MEDICAL HISTORY  Active Ambulatory Problems     Diagnosis Date Noted   • Acute respiratory failure with hypoxia 08/08/2016   • Essential hypertension 08/12/2016     Resolved Ambulatory Problems     Diagnosis Date Noted   • No Resolved Ambulatory Problems     Past Medical History:   Diagnosis Date   • Alzheimer disease    • Atherosclerotic heart disease    • Bronchitis    • CHF (congestive heart failure)    • COPD (chronic obstructive pulmonary disease)    • Dementia    • Depression    • Diabetes mellitus    • GERD (gastroesophageal reflux disease)    • HTN (hypertension)    • Hypokalemia    • Insomnia    •  Metabolic encephalopathy    • Neuropathy    • OA (osteoarthritis)    • Pneumonia    • Renal disorder    • TIA (transient ischemic attack)    • Upper respiratory infection        PAST SURGICAL HISTORY  Past Surgical History:   Procedure Laterality Date   • CARDIAC SURGERY         FAMILY HISTORY  History reviewed. No pertinent family history.    SOCIAL HISTORY  Social History     Social History   • Marital status: Single     Spouse name: N/A   • Number of children: 3   • Years of education: N/A     Occupational History   •       [employer]     Social History Main Topics   • Smoking status: Former Smoker     Quit date: 1980   • Smokeless tobacco: Not on file      Comment: unable to assess   • Alcohol use No   • Drug use: No   • Sexual activity: Defer     Other Topics Concern   • Not on file     Social History Narrative       ALLERGIES  Codeine; Latex; Other; Soma [carisoprodol]; and Sulfa antibiotics    REVIEW OF SYSTEMS  Review of Systems   Unable to perform ROS: Dementia   Neurological: Positive for syncope.       PHYSICAL EXAM  ED Triage Vitals   Temp Heart Rate Resp BP SpO2   11/01/17 1158 11/01/17 1158 11/01/17 1158 11/01/17 1158 11/01/17 1158   96.6 °F (35.9 °C) 73 17 125/59 97 %      Temp src Heart Rate Source Patient Position BP Location FiO2 (%)   11/01/17 1158 11/01/17 1158 11/01/17 1158 11/01/17 1158 --   Tympanic Monitor Sitting Right arm        Physical Exam   Constitutional: She is well-developed, well-nourished, and in no distress. No distress.   HENT:   Head: Normocephalic and atraumatic.   Eyes: EOM are normal. Pupils are equal, round, and reactive to light.   Neck: Normal range of motion. Neck supple.   Cardiovascular: Normal rate, regular rhythm and normal heart sounds.    Pulmonary/Chest: Effort normal and breath sounds normal. No respiratory distress.   Abdominal: Soft. There is no tenderness. There is no rebound and no guarding.   Musculoskeletal: Normal range of motion. She exhibits no edema.    PT's legs and ankles bilaterally are wrapped.    Neurological: She is alert. She has normal sensation and normal strength.   Maybe mildly confused    Skin: Skin is warm and dry. No rash noted.   excoriated lesions to the face    Psychiatric: Mood and affect normal.   Nursing note and vitals reviewed.      LAB RESULTS  Lab Results (last 24 hours)     Procedure Component Value Units Date/Time    POC Glucose Fingerstick [[id number]]  (Abnormal) Collected:  11/01/17 1241    Specimen:  Blood Updated:  11/01/17 1243     Glucose 218 (H) mg/dL     Narrative:       Meter: [id number] : [id number] Siva Mauricioncfarooq Woodall    Comprehensive Metabolic Panel [[id number]]  (Abnormal) Collected:  11/01/17 1320    Specimen:  Blood Updated:  11/01/17 1352     Glucose 211 (H) mg/dL      BUN 17 mg/dL      Creatinine 1.17 (H) mg/dL      Sodium 141 mmol/L      Potassium 3.8 mmol/L      Chloride 101 mmol/L      CO2 25.6 mmol/L      Calcium 9.8 mg/dL      Total Protein 6.6 g/dL      Albumin 3.50 g/dL      ALT (SGPT) 13 U/L      AST (SGOT) 15 U/L      Alkaline Phosphatase 85 U/L      Total Bilirubin 0.4 mg/dL      eGFR Non African Amer 44 (L) mL/min/1.73      Globulin 3.1 gm/dL      A/G Ratio 1.1 g/dL      BUN/Creatinine Ratio 14.5     Anion Gap 14.4 mmol/L     Narrative:       The MDRD GFR formula is only valid for adults with stable renal function between ages 18 and 70.    CBC & Differential [[id number]] Collected:  11/01/17 1320    Specimen:  Blood Updated:  11/01/17 1335    Narrative:       The following orders were created for panel order CBC & Differential.  Procedure                               Abnormality         Status                     ---------                               -----------         ------                     CBC Auto Differential[[id number]]        Abnormal            Final result                 Please view results for these tests on the individual orders.    Troponin [[id number]]  (Abnormal) Collected:  11/01/17  1320    Specimen:  Blood Updated:  11/01/17 1352     Troponin T 0.084 (H) ng/mL     Narrative:       Troponin T Reference Ranges:  Less than 0.03 ng/mL:    Negative for AMI  0.03 to 0.09 ng/mL:      Indeterminant for AMI  Greater than 0.09 ng/mL: Positive for AMI    CBC Auto Differential [[id number]]  (Abnormal) Collected:  11/01/17 1320    Specimen:  Blood Updated:  11/01/17 1335     WBC 15.74 (H) 10*3/mm3      RBC 4.15 10*6/mm3      Hemoglobin 12.7 g/dL      Hematocrit 39.9 %      MCV 96.1 fL      MCH 30.6 pg      MCHC 31.8 (L) g/dL      RDW 13.3 (H) %      RDW-SD 46.4 fl      MPV 9.6 fL      Platelets 246 10*3/mm3      Neutrophil % 78.0 (H) %      Lymphocyte % 13.5 (L) %      Monocyte % 7.4 %      Eosinophil % 0.4 %      Basophil % 0.1 %      Immature Grans % 0.6 (H) %      Neutrophils, Absolute 12.27 (H) 10*3/mm3      Lymphocytes, Absolute 2.12 10*3/mm3      Monocytes, Absolute 1.17 10*3/mm3      Eosinophils, Absolute 0.06 10*3/mm3      Basophils, Absolute 0.02 10*3/mm3      Immature Grans, Absolute 0.10 (H) 10*3/mm3     Urinalysis With / Culture If Indicated - Urine, Catheter [[id number]]  (Abnormal) Collected:  11/01/17 1332    Specimen:  Urine from Urine, Catheter Updated:  11/01/17 1405     Color, UA Yellow     Appearance, UA Cloudy (A)     pH, UA 6.0     Specific Gravity, UA 1.010     Glucose,  mg/dL (Trace) (A)     Ketones, UA Negative     Bilirubin, UA Negative     Blood, UA Trace (A)     Protein, UA Negative     Leuk Esterase, UA Large (3+) (A)     Nitrite, UA Positive (A)     Urobilinogen, UA 0.2 E.U./dL    Urinalysis, Microscopic Only - Urine, Clean Catch [[id number]]  (Abnormal) Collected:  11/01/17 1332    Specimen:  Urine from Urine, Catheter Updated:  11/01/17 1406     RBC, UA 0-2 /HPF      WBC, UA 13-20 (A) /HPF       Wbc clumps noted        Bacteria, UA 2+ (A) /HPF      Squamous Epithelial Cells, UA 13-20 (A) /HPF      Hyaline Casts, UA None Seen /LPF      Methodology Manual Light Microscopy     Urine Culture - Urine, Urine, Clean Catch [[id number]] Collected:  11/01/17 1332    Specimen:  Urine from Urine, Catheter Updated:  11/01/17 1342          I ordered the above labs and reviewed the results    RADIOLOGY  XR Chest 1 View   Final Result   The lungs are well-expanded and clear. The heart is borderline enlarged  with sternal wires from previous cardiac surgery and no acute  abnormality is seen.        I ordered the above noted radiological studies. Interpreted by radiologist. Reviewed by me in PACS.       PROCEDURES  Procedures  EKG           EKG time: 1242  Rhythm/Rate: sinus rhythm, 72  P waves and DC: normal  QRS, axis: NIVCD    ST and T waves: Nonspecific T wave inversion     Interpreted Contemporaneously by me, independently viewed  T waves flipped in V3, V4, and V5 that are new compared to prior 7/4/17      PROGRESS AND CONSULTS  ED Course     1250  Discussed pt's case with Nursing facility. Prodrome light-headedness on the toilet then passed out. PT did not have any trauma. PT was lowered to the floor. PT has never had a seizure. Pt's initial BP was 80 systolic. Pt regained consciousness after a few minutes. PT's morning blood glucose was over 300.     1252  Ordered labs and Chest XR for further evaluation.     1516  [facility]     1524  Rechecked pt,  is currently at bedside. Inquired if pt has experienced any CP recently due to slightly abnormal EKG and slightly elevated heart enzyme levels. Discussed plan to admit pt for treatment of potential UTI and observation. PT understands and agrees to plan, all questions addressed at time.     1527  Dicussed pt's case with Dr. Tello [[facility]] who agrees to admit.     1528  Calling nursing home to inquire if pt is a full code or not.     1535  Nursing facility reports that pt is a DNR.     MEDICAL DECISION MAKING  Results were reviewed/discussed with the patient and they were also made aware of online access. Pt also made aware that  some labs, such as cultures, will not be resulted during ER visit and follow up with PMD is necessary.     MDM  Number of Diagnoses or Management Options     Amount and/or Complexity of Data Reviewed  Clinical lab tests: ordered and reviewed (UA WBC = 13-20. UA Bacteria = 2+, Squamous Epithelium = 13-20, UA Blood = trace, Leuk Esterase UA = large, UA Nitrite = positive, Creatinine = 1.17, WBC = 15.74, troponin = 0.084  )  Tests in the radiology section of CPT®: ordered and reviewed (Chest XR  The lungs are well-expanded and clear. The heart is borderline enlarged with sternal wires from previous cardiac surgery and no acute abnormality is seen.)  Tests in the medicine section of CPT®: ordered and reviewed (Refer to procedure )  Discussion of test results with the performing providers: yes (Dr. Tello ([facility]))  Decide to obtain previous medical records or to obtain history from someone other than the patient: yes  Independent visualization of images, tracings, or specimens: yes           DIAGNOSIS  Final diagnoses:   Syncope and collapse   Acute UTI   Elevated troponin   Abnormal EKG       DISPOSITION  ADMISSION    Discussed treatment plan and reason for admission with pt/family and admitting physician.  Pt/family voiced understanding of the plan for admission for further testing/treatment as needed.         Latest Documented Vital Signs:  As of 3:40 PM  BP- 123/73 HR- 87 Temp- 96.6 °F (35.9 °C) (Tympanic) O2 sat- 97%    --  Documentation assistance provided by dayday Baca for Dr. Fuller.  Information recorded by the scribe was done at my direction and has been verified and validated by me.     Jhoan Baca  11/01/17 1311       Jhoan Baca  11/01/17 1540       Ronald Fuller MD  11/01/17 6982

## 2017-11-01 NOTE — ED NOTES
"Rn went into pt room to medicate pt. Pt had taken heart monitor lead off, pulled IV access out, and was attempting to dress herself. Pt stated \"I don't want to take anything or stay here\". Pt really agitated attempting to get out of bed.   ER MD notified, charge nurse Jakub notified. Pt currently not listening to this RN.     Mady Hall RN  11/01/17 8501    "

## 2017-11-01 NOTE — ED NOTES
Rn attempted to call twice to speak to pt RN at [facility] [place] unsuccessful, This RN spoke with Elaine at [facility], [place].         Mady Hall RN  11/01/17 2651

## 2017-11-02 LAB
ANION GAP SERPL CALCULATED.3IONS-SCNC: 11.1 MMOL/L
BASOPHILS # BLD AUTO: 0.02 10*3/MM3 (ref 0–0.2)
BASOPHILS NFR BLD AUTO: 0.2 % (ref 0–1.5)
BUN BLD-MCNC: 18 MG/DL (ref 8–23)
BUN/CREAT SERPL: 19.6 (ref 7–25)
CALCIUM SPEC-SCNC: 8.9 MG/DL (ref 8.6–10.5)
CHLORIDE SERPL-SCNC: 101 MMOL/L (ref 98–107)
CO2 SERPL-SCNC: 27.9 MMOL/L (ref 22–29)
CREAT BLD-MCNC: 0.92 MG/DL (ref 0.57–1)
DEPRECATED RDW RBC AUTO: 46.8 FL (ref 37–54)
EOSINOPHIL # BLD AUTO: 0.21 10*3/MM3 (ref 0–0.7)
EOSINOPHIL NFR BLD AUTO: 2.2 % (ref 0.3–6.2)
ERYTHROCYTE [DISTWIDTH] IN BLOOD BY AUTOMATED COUNT: 13.1 % (ref 11.7–13)
GFR SERPL CREATININE-BSD FRML MDRD: 58 ML/MIN/1.73
GLUCOSE BLD-MCNC: 210 MG/DL (ref 65–99)
GLUCOSE BLDC GLUCOMTR-MCNC: 161 MG/DL (ref 70–130)
GLUCOSE BLDC GLUCOMTR-MCNC: 220 MG/DL (ref 70–130)
GLUCOSE BLDC GLUCOMTR-MCNC: 304 MG/DL (ref 70–130)
HCT VFR BLD AUTO: 34.9 % (ref 35.6–45.5)
HGB BLD-MCNC: 11 G/DL (ref 11.9–15.5)
IMM GRANULOCYTES # BLD: 0.05 10*3/MM3 (ref 0–0.03)
IMM GRANULOCYTES NFR BLD: 0.5 % (ref 0–0.5)
LYMPHOCYTES # BLD AUTO: 2.72 10*3/MM3 (ref 0.9–4.8)
LYMPHOCYTES NFR BLD AUTO: 28.2 % (ref 19.6–45.3)
MCH RBC QN AUTO: 30.8 PG (ref 26.9–32)
MCHC RBC AUTO-ENTMCNC: 31.5 G/DL (ref 32.4–36.3)
MCV RBC AUTO: 97.8 FL (ref 80.5–98.2)
MONOCYTES # BLD AUTO: 0.88 10*3/MM3 (ref 0.2–1.2)
MONOCYTES NFR BLD AUTO: 9.1 % (ref 5–12)
NEUTROPHILS # BLD AUTO: 5.77 10*3/MM3 (ref 1.9–8.1)
NEUTROPHILS NFR BLD AUTO: 59.8 % (ref 42.7–76)
PLATELET # BLD AUTO: 201 10*3/MM3 (ref 140–500)
PMV BLD AUTO: 9.7 FL (ref 6–12)
POTASSIUM BLD-SCNC: 4.3 MMOL/L (ref 3.5–5.2)
RBC # BLD AUTO: 3.57 10*6/MM3 (ref 3.9–5.2)
SODIUM BLD-SCNC: 140 MMOL/L (ref 136–145)
TROPONIN T SERPL-MCNC: 0.05 NG/ML (ref 0–0.03)
WBC NRBC COR # BLD: 9.65 10*3/MM3 (ref 4.5–10.7)

## 2017-11-02 PROCEDURE — 97161 PT EVAL LOW COMPLEX 20 MIN: CPT

## 2017-11-02 PROCEDURE — 97110 THERAPEUTIC EXERCISES: CPT

## 2017-11-02 PROCEDURE — 99222 1ST HOSP IP/OBS MODERATE 55: CPT | Performed by: INTERNAL MEDICINE

## 2017-11-02 PROCEDURE — 25010000002 CEFTRIAXONE PER 250 MG: Performed by: HOSPITALIST

## 2017-11-02 PROCEDURE — 94640 AIRWAY INHALATION TREATMENT: CPT

## 2017-11-02 PROCEDURE — 82962 GLUCOSE BLOOD TEST: CPT

## 2017-11-02 PROCEDURE — 25810000003 SODIUM CHLORIDE 0.9 % WITH KCL 20 MEQ 20-0.9 MEQ/L-% SOLUTION: Performed by: HOSPITALIST

## 2017-11-02 PROCEDURE — 84484 ASSAY OF TROPONIN QUANT: CPT | Performed by: HOSPITALIST

## 2017-11-02 PROCEDURE — 93010 ELECTROCARDIOGRAM REPORT: CPT | Performed by: INTERNAL MEDICINE

## 2017-11-02 PROCEDURE — 63710000001 INSULIN ASPART PER 5 UNITS: Performed by: HOSPITALIST

## 2017-11-02 PROCEDURE — 93005 ELECTROCARDIOGRAM TRACING: CPT | Performed by: HOSPITALIST

## 2017-11-02 PROCEDURE — 94799 UNLISTED PULMONARY SVC/PX: CPT

## 2017-11-02 PROCEDURE — 25010000002 LORAZEPAM PER 2 MG: Performed by: HOSPITALIST

## 2017-11-02 PROCEDURE — 80048 BASIC METABOLIC PNL TOTAL CA: CPT | Performed by: HOSPITALIST

## 2017-11-02 PROCEDURE — 63710000001 INSULIN DETEMER PER 5 UNITS: Performed by: HOSPITALIST

## 2017-11-02 PROCEDURE — 85025 COMPLETE CBC W/AUTO DIFF WBC: CPT | Performed by: HOSPITALIST

## 2017-11-02 PROCEDURE — 25010000002 ENOXAPARIN PER 10 MG: Performed by: HOSPITALIST

## 2017-11-02 RX ORDER — ASPIRIN 81 MG/1
81 TABLET, CHEWABLE ORAL DAILY
Status: DISCONTINUED | OUTPATIENT
Start: 2017-11-03 | End: 2017-11-06 | Stop reason: HOSPADM

## 2017-11-02 RX ORDER — DOCUSATE SODIUM 250 MG
250 CAPSULE ORAL DAILY
Status: DISCONTINUED | OUTPATIENT
Start: 2017-11-02 | End: 2017-11-06 | Stop reason: HOSPADM

## 2017-11-02 RX ORDER — CLONIDINE HYDROCHLORIDE 0.1 MG/1
0.2 TABLET ORAL DAILY
Status: DISCONTINUED | OUTPATIENT
Start: 2017-11-02 | End: 2017-11-02

## 2017-11-02 RX ORDER — POLYETHYLENE GLYCOL 3350 17 G/17G
17 POWDER, FOR SOLUTION ORAL DAILY
Status: DISCONTINUED | OUTPATIENT
Start: 2017-11-02 | End: 2017-11-06 | Stop reason: HOSPADM

## 2017-11-02 RX ORDER — ATORVASTATIN CALCIUM 10 MG/1
10 TABLET, FILM COATED ORAL NIGHTLY
Status: DISCONTINUED | OUTPATIENT
Start: 2017-11-02 | End: 2017-11-06 | Stop reason: HOSPADM

## 2017-11-02 RX ORDER — IPRATROPIUM BROMIDE AND ALBUTEROL SULFATE 2.5; .5 MG/3ML; MG/3ML
3 SOLUTION RESPIRATORY (INHALATION)
Status: DISCONTINUED | OUTPATIENT
Start: 2017-11-02 | End: 2017-11-06 | Stop reason: HOSPADM

## 2017-11-02 RX ORDER — FOLIC ACID/VIT BCOMP,C/CU/ZINC 5-1.5-25MG
1 TABLET ORAL DAILY
Status: DISCONTINUED | OUTPATIENT
Start: 2017-11-02 | End: 2017-11-02

## 2017-11-02 RX ORDER — ATORVASTATIN CALCIUM 20 MG/1
40 TABLET, FILM COATED ORAL DAILY
Status: DISCONTINUED | OUTPATIENT
Start: 2017-11-02 | End: 2017-11-02

## 2017-11-02 RX ORDER — IPRATROPIUM BROMIDE AND ALBUTEROL SULFATE 2.5; .5 MG/3ML; MG/3ML
3 SOLUTION RESPIRATORY (INHALATION)
Status: DISCONTINUED | OUTPATIENT
Start: 2017-11-02 | End: 2017-11-02

## 2017-11-02 RX ORDER — HYDROCODONE BITARTRATE AND ACETAMINOPHEN 5; 325 MG/1; MG/1
1 TABLET ORAL EVERY 6 HOURS PRN
Status: DISCONTINUED | OUTPATIENT
Start: 2017-11-02 | End: 2017-11-06

## 2017-11-02 RX ORDER — CLOPIDOGREL BISULFATE 75 MG/1
75 TABLET ORAL DAILY
Status: DISCONTINUED | OUTPATIENT
Start: 2017-11-02 | End: 2017-11-06 | Stop reason: HOSPADM

## 2017-11-02 RX ORDER — MEMANTINE HYDROCHLORIDE 10 MG/1
10 TABLET ORAL EVERY 12 HOURS SCHEDULED
Status: DISCONTINUED | OUTPATIENT
Start: 2017-11-02 | End: 2017-11-06 | Stop reason: HOSPADM

## 2017-11-02 RX ORDER — ESCITALOPRAM OXALATE 10 MG/1
10 TABLET ORAL DAILY
Status: DISCONTINUED | OUTPATIENT
Start: 2017-11-02 | End: 2017-11-06 | Stop reason: HOSPADM

## 2017-11-02 RX ORDER — GABAPENTIN 300 MG/1
300 CAPSULE ORAL 2 TIMES DAILY
Status: DISCONTINUED | OUTPATIENT
Start: 2017-11-02 | End: 2017-11-06 | Stop reason: HOSPADM

## 2017-11-02 RX ORDER — FLUTICASONE PROPIONATE 50 MCG
1 SPRAY, SUSPENSION (ML) NASAL DAILY
Status: DISCONTINUED | OUTPATIENT
Start: 2017-11-02 | End: 2017-11-02

## 2017-11-02 RX ORDER — MIRTAZAPINE 15 MG/1
15 TABLET, FILM COATED ORAL NIGHTLY
Status: DISCONTINUED | OUTPATIENT
Start: 2017-11-02 | End: 2017-11-06 | Stop reason: HOSPADM

## 2017-11-02 RX ORDER — ATORVASTATIN CALCIUM 10 MG/1
10 TABLET, FILM COATED ORAL DAILY
Status: DISCONTINUED | OUTPATIENT
Start: 2017-11-02 | End: 2017-11-02

## 2017-11-02 RX ORDER — LORAZEPAM 2 MG/ML
0.25 INJECTION INTRAMUSCULAR ONCE
Status: DISCONTINUED | OUTPATIENT
Start: 2017-11-02 | End: 2017-11-02

## 2017-11-02 RX ORDER — ISOSORBIDE MONONITRATE 30 MG/1
30 TABLET, EXTENDED RELEASE ORAL
Status: DISCONTINUED | OUTPATIENT
Start: 2017-11-02 | End: 2017-11-04

## 2017-11-02 RX ORDER — DONEPEZIL HYDROCHLORIDE 10 MG/1
10 TABLET, FILM COATED ORAL NIGHTLY
Status: DISCONTINUED | OUTPATIENT
Start: 2017-11-02 | End: 2017-11-06 | Stop reason: HOSPADM

## 2017-11-02 RX ORDER — LOSARTAN POTASSIUM 50 MG/1
50 TABLET ORAL DAILY
Status: DISCONTINUED | OUTPATIENT
Start: 2017-11-02 | End: 2017-11-04

## 2017-11-02 RX ORDER — MULTIPLE VITAMINS W/ MINERALS TAB 9MG-400MCG
1 TAB ORAL DAILY
Status: DISCONTINUED | OUTPATIENT
Start: 2017-11-02 | End: 2017-11-02

## 2017-11-02 RX ORDER — CARVEDILOL 25 MG/1
25 TABLET ORAL 2 TIMES DAILY WITH MEALS
Status: DISCONTINUED | OUTPATIENT
Start: 2017-11-02 | End: 2017-11-06 | Stop reason: HOSPADM

## 2017-11-02 RX ORDER — ASPIRIN 81 MG/1
324 TABLET, CHEWABLE ORAL DAILY
Status: DISCONTINUED | OUTPATIENT
Start: 2017-11-03 | End: 2017-11-02

## 2017-11-02 RX ORDER — BUDESONIDE 0.5 MG/2ML
0.5 INHALANT ORAL 2 TIMES DAILY
Status: DISCONTINUED | OUTPATIENT
Start: 2017-11-02 | End: 2017-11-06 | Stop reason: HOSPADM

## 2017-11-02 RX ADMIN — DONEPEZIL HYDROCHLORIDE 10 MG: 10 TABLET, FILM COATED ORAL at 20:55

## 2017-11-02 RX ADMIN — DOCUSATE SODIUM 250 MG: 250 CAPSULE, LIQUID FILLED ORAL at 14:57

## 2017-11-02 RX ADMIN — PANTOPRAZOLE SODIUM 40 MG: 40 TABLET, DELAYED RELEASE ORAL at 06:57

## 2017-11-02 RX ADMIN — HYDROCODONE BITARTRATE AND ACETAMINOPHEN 1 TABLET: 5; 325 TABLET ORAL at 02:45

## 2017-11-02 RX ADMIN — LOSARTAN POTASSIUM 50 MG: 50 TABLET, FILM COATED ORAL at 18:07

## 2017-11-02 RX ADMIN — BUDESONIDE 0.5 MG: 0.5 INHALANT RESPIRATORY (INHALATION) at 19:30

## 2017-11-02 RX ADMIN — MEMANTINE HYDROCHLORIDE 10 MG: 10 TABLET, FILM COATED ORAL at 14:56

## 2017-11-02 RX ADMIN — INSULIN DETEMIR 30 UNITS: 100 INJECTION, SOLUTION SUBCUTANEOUS at 22:34

## 2017-11-02 RX ADMIN — ISOSORBIDE MONONITRATE 30 MG: 30 TABLET ORAL at 14:57

## 2017-11-02 RX ADMIN — METOPROLOL TARTRATE 25 MG: 25 TABLET ORAL at 09:09

## 2017-11-02 RX ADMIN — POLYETHYLENE GLYCOL 3350 17 G: 17 POWDER, FOR SOLUTION ORAL at 13:25

## 2017-11-02 RX ADMIN — ASPIRIN 81 MG: 81 TABLET, CHEWABLE ORAL at 12:40

## 2017-11-02 RX ADMIN — POTASSIUM CHLORIDE AND SODIUM CHLORIDE 75 ML/HR: 900; 150 INJECTION, SOLUTION INTRAVENOUS at 12:29

## 2017-11-02 RX ADMIN — LORAZEPAM 0.5 MG: 2 INJECTION INTRAMUSCULAR; INTRAVENOUS at 17:33

## 2017-11-02 RX ADMIN — INSULIN ASPART 4 UNITS: 100 INJECTION, SOLUTION INTRAVENOUS; SUBCUTANEOUS at 12:23

## 2017-11-02 RX ADMIN — ESCITALOPRAM 10 MG: 10 TABLET, FILM COATED ORAL at 14:57

## 2017-11-02 RX ADMIN — CLOPIDOGREL 75 MG: 75 TABLET, FILM COATED ORAL at 14:56

## 2017-11-02 RX ADMIN — LORAZEPAM 0.5 MG: 2 INJECTION INTRAMUSCULAR; INTRAVENOUS at 04:01

## 2017-11-02 RX ADMIN — IPRATROPIUM BROMIDE AND ALBUTEROL SULFATE 3 ML: .5; 3 SOLUTION RESPIRATORY (INHALATION) at 14:27

## 2017-11-02 RX ADMIN — IPRATROPIUM BROMIDE AND ALBUTEROL SULFATE 3 ML: .5; 3 SOLUTION RESPIRATORY (INHALATION) at 19:30

## 2017-11-02 RX ADMIN — ENOXAPARIN SODIUM 70 MG: 80 INJECTION SUBCUTANEOUS at 16:01

## 2017-11-02 RX ADMIN — INSULIN ASPART 2 UNITS: 100 INJECTION, SOLUTION INTRAVENOUS; SUBCUTANEOUS at 17:20

## 2017-11-02 RX ADMIN — CARVEDILOL 25 MG: 25 TABLET, FILM COATED ORAL at 17:20

## 2017-11-02 RX ADMIN — ATORVASTATIN CALCIUM 10 MG: 10 TABLET, FILM COATED ORAL at 20:55

## 2017-11-02 RX ADMIN — GABAPENTIN 300 MG: 300 CAPSULE ORAL at 17:20

## 2017-11-02 RX ADMIN — CEFTRIAXONE SODIUM 1 G: 1 INJECTION, SOLUTION INTRAVENOUS at 20:55

## 2017-11-02 RX ADMIN — IPRATROPIUM BROMIDE AND ALBUTEROL SULFATE 3 ML: .5; 3 SOLUTION RESPIRATORY (INHALATION) at 23:45

## 2017-11-02 RX ADMIN — INSULIN ASPART 3 UNITS: 100 INJECTION, SOLUTION INTRAVENOUS; SUBCUTANEOUS at 21:04

## 2017-11-02 RX ADMIN — MEMANTINE HYDROCHLORIDE 10 MG: 10 TABLET, FILM COATED ORAL at 20:55

## 2017-11-02 RX ADMIN — HYDROCODONE BITARTRATE AND ACETAMINOPHEN 1 TABLET: 5; 325 TABLET ORAL at 12:40

## 2017-11-02 RX ADMIN — MIRTAZAPINE 15 MG: 15 TABLET, FILM COATED ORAL at 20:55

## 2017-11-02 NOTE — PLAN OF CARE
Problem: Patient Care Overview (Adult)  Goal: Plan of Care Review    11/02/17 1333   Coping/Psychosocial Response Interventions   Plan Of Care Reviewed With patient   Outcome Evaluation   Outcome Summary/Follow up Plan Pt admitted from ECF on 11/1 w/ syncope + collapse; w/ h/o dementia and reporting some R ankle pain during mobility. Able to sit edge of bed w/ maximal assist, declined further activity. Recommend return to ECF/[facility].          Problem: Inpatient Physical Therapy  Goal: Bed Mobility Goal LTG- PT    11/02/17 1333   Bed Mobility PT LTG   Bed Mobility PT LTG, Date Established 11/02/17   Bed Mobility PT LTG, Time to Achieve 1 wk   Bed Mobility PT LTG, Activity Type supine to sit/sit to supine   Bed Mobility PT LTG, Hardeman Level moderate assist (50% patient effort)       Goal: Transfer Training Goal 1 LTG- PT    11/02/17 1333   Transfer Training PT LTG   Transfer Training PT LTG, Date Established 11/02/17   Transfer Training PT LTG, Time to Achieve 1 wk   Transfer Training PT LTG, Activity Type bed to chair /chair to bed   Transfer Training PT LTG, Hardeman Level moderate assist (50% patient effort)

## 2017-11-02 NOTE — PLAN OF CARE
Problem: Patient Care Overview (Adult)  Goal: Plan of Care Review  Outcome: Ongoing (interventions implemented as appropriate)    11/02/17 1518   Coping/Psychosocial Response Interventions   Plan Of Care Reviewed With patient   Outcome Evaluation   Outcome Summary/Follow up Plan c/o right foot pain lortab given with good relief, confused reoiented often, tries to get out of bed wants to go home to [facility], bed alarm in place, scd in place, isolation maintained, consult ordered with ID, will continue to monitor        Goal: Adult Individualization and Mutuality  Outcome: Ongoing (interventions implemented as appropriate)  Goal: Discharge Needs Assessment  Outcome: Ongoing (interventions implemented as appropriate)    Problem: Fall Risk (Adult)  Goal: Identify Related Risk Factors and Signs and Symptoms  Outcome: Ongoing (interventions implemented as appropriate)    Problem: Pain, Acute (Adult)  Goal: Identify Related Risk Factors and Signs and Symptoms  Outcome: Ongoing (interventions implemented as appropriate)  Goal: Acceptable Pain Control/Comfort Level  Outcome: Ongoing (interventions implemented as appropriate)  Goal: Identify Related Risk Factors and Signs and Symptoms  Outcome: Ongoing (interventions implemented as appropriate)  Goal: Acceptable Pain Control/Comfort Level  Outcome: Ongoing (interventions implemented as appropriate)

## 2017-11-02 NOTE — PLAN OF CARE
Problem: Patient Care Overview (Adult)  Goal: Plan of Care Review  Outcome: Ongoing (interventions implemented as appropriate)    11/02/17 0501   Coping/Psychosocial Response Interventions   Plan Of Care Reviewed With patient   Outcome Evaluation   Outcome Summary/Follow up Plan Patient resting well at this time post Ativan dose. Patient woke up ( remained) confuse , trying to get out of the bed and go home   per patient. Reoriented patient . Called Dr. Tello for right foot pain (unable to bear weight) ,order for Norco received. Patient less anxious post Ativan dose. Awaiting cardiology to see patient. SCDS and accumax in use. Fall precautions enforced. Monitored.       Goal: Adult Individualization and Mutuality  Outcome: Ongoing (interventions implemented as appropriate)  Goal: Discharge Needs Assessment  Outcome: Ongoing (interventions implemented as appropriate)    Problem: Fall Risk (Adult)  Goal: Identify Related Risk Factors and Signs and Symptoms  Outcome: Ongoing (interventions implemented as appropriate)  Goal: Absence of Falls  Outcome: Outcome(s) achieved Date Met:  11/02/17

## 2017-11-02 NOTE — NURSING NOTE
Called [facility] to complete admission data but staff on duty said to call later this am because she is unable to answer  the questions for she does not know the patient.

## 2017-11-02 NOTE — NURSING NOTE
Per Day time  RN, patient  refuse tele/heart monitor and pulse oximetry monitoring since she was admitted to  the floor. Patient only oriented to self.

## 2017-11-02 NOTE — NURSING NOTE
Lab called Troponin a little bit down 0.052. Patient resting well no complaints of chest pain. Vitals stable and on O2. Cardiology consulted. Monitored.

## 2017-11-02 NOTE — NURSING NOTE
Informed Dr. Tello that patient wants pain med for right foot pain (patient cant bear weight  Due to pain). Order received.

## 2017-11-02 NOTE — SIGNIFICANT NOTE
11/02/17 1011   Rehab Treatment   Discipline physical therapist   Rehab Evaluation   Evaluation Not Performed patient/family declined evaluation  (Despte repeated encouragement and education, pt refused to get up with PT. She did allow PT to examine painful R ankle, but refused further evaluation at this time. PT will check back at a later time. RN aware.)   Recommendation   PT - Next Appointment 11/02/17

## 2017-11-02 NOTE — PROGRESS NOTES
Discharge Planning Assessment  [facility]     Patient Name: Bernardo Cox  MRN: [id number]  Today's Date: 11/2/2017    Admit Date: 11/1/2017          Discharge Needs Assessment       11/02/17 1704    Living Environment    Lives With facility resident    Living Arrangements extended care facility    Home Accessibility no concerns    Type of Financial/Environmental Concern none    Transportation Available ambulance    Living Environment    Provides Primary Care For no one    Quality Of Family Relationships supportive    Able to Return to Prior Living Arrangements yes    Discharge Needs Assessment    Concerns To Be Addressed discharge planning concerns    Equipment Currently Used at Home walker, rolling    Discharge Facility/Level Of Care Needs nursing facility, skilled    Discharge Disposition skilled nursing facility    Discharge Planning Comments [facility] via ambulance            Discharge Plan       11/02/17 1705    Case Management/Social Work Plan    Plan [facility] via ambulance    Patient/Family In Agreement With Plan yes    Additional Comments CCP awaiting return contact from Irma Keen) to verify pt's bed status at [facility]. CCP spoke with pt's POA (Bereket Licona, [phone]) who states pt is from [facility] where she is a long-term care resident and will return when medically stable. Pt to transport by ambulance; insurance disclaimer given to POA. IMM letter given. Pt is ambulatory via a walker at baseline, but has frequent falls. Pt is also often confused. CCP to follow for bed status and assist with d/c planning needs. Jacklyn Ulloa LCSW        Discharge Placement     Facility/Agency Request Status Selected? Address Phone Number Fax Number    [facility] [place] Pending - Request Sent     [address] [facility] [facility] [address] [phone]                Demographic Summary       11/02/17 1703    Referral Information    Admission Type inpatient     Arrived From admitted as an inpatient    Referral Source nursing;physician    Reason For Consult discharge planning    Record Reviewed medical record    Primary Care Physician Information    Name Richard Stinson MD            Functional Status       11/02/17 9928    Functional Status Current    Ambulation 3-->assistive equipment and person    Transferring 2-->assistive person    Toileting 2-->assistive person    Bathing 2-->assistive person    Dressing 2-->assistive person    Eating 0-->independent    Communication 0-->understands/communicates without difficulty    Swallowing (if score 2 or more for any item, consult Rehab Services) 0-->swallows foods/liquids without difficulty    Functional Status Prior    Ambulation 1-->assistive equipment    Transferring 2-->assistive person    Toileting 2-->assistive person    Bathing 2-->assistive person    Dressing 2-->assistive person    Eating 0-->independent    Communication 0-->understands/communicates without difficulty    Swallowing 0-->swallows foods/liquids without difficulty    Cognitive/Perceptual/Developmental    Current Mental Status/Cognitive Functioning unable to assess            Psychosocial     None            Abuse/Neglect     None            Legal     None            Substance Abuse     None            Patient Forms     None          Jacklyn Ulloa LCSW

## 2017-11-02 NOTE — NURSING NOTE
"Consult received regarding scabs throughout, primarily to forehead and bilateral legs.  Pt is minimally cooperative with exam.  She denies scratching at them, though scabs to have appearance of being picked at.  None have signs of infection.  No topical treatment necessary.  If patient is seen to be picking at skin, she should be encouraged to stop though she reports to me \"I am an old woman and I'll do what I want with my body\".  No pressure injury to coccyx.  Heels are red and blanchable. Accumax pump in place.  Heels should be off loaded at all times.   "

## 2017-11-02 NOTE — H&P
History and physical    Primary care physician  Dr. Stinson    Chief complaint  Syncopal episode    History of present illness  86-year-old white female with history of dementia coronary artery disease congestive heart failure COPD anxiety depression diabetes gastroesophageal reflux disease hypertension hyperlipidemia encephalopathy osteoarthritis who is a nursing home resident brought to the emergency room after the syncopal episode.  Patient denies any chest pain shortness of breath abdominal pain nausea vomiting diarrhea.  Patient workup in ER revealed UTI also have slightly elevated troponin level with no chest pain admitted for management.  No fever chills no shortness of breath no other complaint.      PAST MEDICAL HISTORY    • Alzheimer disease     • Atherosclerotic heart disease     • Bronchitis     • CHF (congestive heart failure)     • COPD (chronic obstructive pulmonary disease)     • Dementia     • Depression     • Diabetes mellitus     • GERD (gastroesophageal reflux disease)     • HTN (hypertension)     • Hypokalemia     • Insomnia     • Metabolic encephalopathy     • Neuropathy     • OA (osteoarthritis)     • Pneumonia     • Renal disorder     • TIA (transient ischemic attack)     • Upper respiratory infection           PAST SURGICAL HISTORY   Surgical History          Past Surgical History:   Procedure Laterality Date   • CARDIAC SURGERY                FAMILY HISTORY  History reviewed. No pertinent family history.     SOCIAL HISTORY   Social History    Social History            Social History   • Marital status: Single       Spouse name: N/A   • Number of children: 3   • Years of education: N/A            Occupational History   •           Ret[employer]             Social History Main Topics   • Smoking status: Former Smoker       Quit date: 1980   • Smokeless tobacco: Not on file         Comment: unable to assess   • Alcohol use No   • Drug use: No   • Sexual activity: Defer           Other Topics  "Concern   • Not on file      Social History Narrative            ALLERGIES  Codeine; Latex; Other; Soma [carisoprodol]; and Sulfa antibiotics    Home medications reviewed     REVIEW OF SYSTEMS  Review of Systems   Unable to perform ROS: Dementia   Neurological: Positive for syncope.         PHYSICAL EXAM  Blood pressure 142/82, pulse 84, temperature 99.4 °F (37.4 °C), temperature source Oral, resp. rate 18, height 66\" (167.6 cm), weight 143 lb (64.9 kg), SpO2 97 %.    Constitutional: She is well-developed, well-nourished, and in no distress. No distress.   HENT:   Head: Normocephalic and atraumatic.   Eyes: EOM are normal. Pupils are equal, round, and reactive to light.   Neck: Normal range of motion. Neck supple.   Cardiovascular: Normal rate, regular rhythm and normal heart sounds.    Pulmonary/Chest: Effort normal and breath sounds normal. No respiratory distress.   Abdominal: Soft. There is no tenderness. There is no rebound and no guarding.   Musculoskeletal: Normal range of motion. She exhibits no edema.   PT's legs and ankles bilaterally are wrapped.    Neurological: She is alert. She has normal sensation and normal strength.   Maybe mildly confused    Skin: Skin is warm and dry. No rash noted.   excoriated lesions to the face    Psychiatric: Mood and affect normal.      LAB RESULTS  Lab Results (last 24 hours)     Procedure Component Value Units Date/Time    CBC & Differential [[id number]] Collected:  11/01/17 1320    Specimen:  Blood Updated:  11/01/17 1335    Narrative:       The following orders were created for panel order CBC & Differential.  Procedure                               Abnormality         Status                     ---------                               -----------         ------                     CBC Auto Differential[[id number]]        Abnormal            Final result                 Please view results for these tests on the individual orders.    CBC Auto Differential [[id number]]  " (Abnormal) Collected:  11/01/17 1320    Specimen:  Blood Updated:  11/01/17 1335     WBC 15.74 (H) 10*3/mm3      RBC 4.15 10*6/mm3      Hemoglobin 12.7 g/dL      Hematocrit 39.9 %      MCV 96.1 fL      MCH 30.6 pg      MCHC 31.8 (L) g/dL      RDW 13.3 (H) %      RDW-SD 46.4 fl      MPV 9.6 fL      Platelets 246 10*3/mm3      Neutrophil % 78.0 (H) %      Lymphocyte % 13.5 (L) %      Monocyte % 7.4 %      Eosinophil % 0.4 %      Basophil % 0.1 %      Immature Grans % 0.6 (H) %      Neutrophils, Absolute 12.27 (H) 10*3/mm3      Lymphocytes, Absolute 2.12 10*3/mm3      Monocytes, Absolute 1.17 10*3/mm3      Eosinophils, Absolute 0.06 10*3/mm3      Basophils, Absolute 0.02 10*3/mm3      Immature Grans, Absolute 0.10 (H) 10*3/mm3     Comprehensive Metabolic Panel [[id number]]  (Abnormal) Collected:  11/01/17 1320    Specimen:  Blood Updated:  11/01/17 1352     Glucose 211 (H) mg/dL      BUN 17 mg/dL      Creatinine 1.17 (H) mg/dL      Sodium 141 mmol/L      Potassium 3.8 mmol/L      Chloride 101 mmol/L      CO2 25.6 mmol/L      Calcium 9.8 mg/dL      Total Protein 6.6 g/dL      Albumin 3.50 g/dL      ALT (SGPT) 13 U/L      AST (SGOT) 15 U/L      Alkaline Phosphatase 85 U/L      Total Bilirubin 0.4 mg/dL      eGFR Non African Amer 44 (L) mL/min/1.73      Globulin 3.1 gm/dL      A/G Ratio 1.1 g/dL      BUN/Creatinine Ratio 14.5     Anion Gap 14.4 mmol/L     Narrative:       The MDRD GFR formula is only valid for adults with stable renal function between ages 18 and 70.    Troponin [[id number]]  (Abnormal) Collected:  11/01/17 1320    Specimen:  Blood Updated:  11/01/17 1352     Troponin T 0.084 (H) ng/mL     Narrative:       Troponin T Reference Ranges:  Less than 0.03 ng/mL:    Negative for AMI  0.03 to 0.09 ng/mL:      Indeterminant for AMI  Greater than 0.09 ng/mL: Positive for AMI    Urinalysis With / Culture If Indicated - Urine, Catheter [[id number]]  (Abnormal) Collected:  11/01/17 1332    Specimen:  Urine from Urine,  Catheter Updated:  11/01/17 1405     Color, UA Yellow     Appearance, UA Cloudy (A)     pH, UA 6.0     Specific Gravity, UA 1.010     Glucose,  mg/dL (Trace) (A)     Ketones, UA Negative     Bilirubin, UA Negative     Blood, UA Trace (A)     Protein, UA Negative     Leuk Esterase, UA Large (3+) (A)     Nitrite, UA Positive (A)     Urobilinogen, UA 0.2 E.U./dL    Urinalysis, Microscopic Only - Urine, Clean Catch [[id number]]  (Abnormal) Collected:  11/01/17 1332    Specimen:  Urine from Urine, Catheter Updated:  11/01/17 1406     RBC, UA 0-2 /HPF      WBC, UA 13-20 (A) /HPF       Wbc clumps noted        Bacteria, UA 2+ (A) /HPF      Squamous Epithelial Cells, UA 13-20 (A) /HPF      Hyaline Casts, UA None Seen /LPF      Methodology Manual Light Microscopy    POC Glucose Fingerstick [[id number]]  (Abnormal) Collected:  11/01/17 2153    Specimen:  Blood Updated:  11/01/17 2155     Glucose 284 (H) mg/dL     Narrative:       Meter: [id number] : [id number] Randy Martinez RN    Troponin [[id number]]  (Abnormal) Collected:  11/01/17 2105    Specimen:  Blood Updated:  11/01/17 2203     Troponin T 0.054 (H) ng/mL     Narrative:       Troponin T Reference Ranges:  Less than 0.03 ng/mL:    Negative for AMI  0.03 to 0.09 ng/mL:      Indeterminant for AMI  Greater than 0.09 ng/mL: Positive for AMI    CBC & Differential [[id number]] Collected:  11/02/17 0302    Specimen:  Blood Updated:  11/02/17 0333    Narrative:       The following orders were created for panel order CBC & Differential.  Procedure                               Abnormality         Status                     ---------                               -----------         ------                     CBC Auto Differential[[id number]]        Abnormal            Final result                 Please view results for these tests on the individual orders.    CBC Auto Differential [[id number]]  (Abnormal) Collected:  11/02/17 0302    Specimen:  Blood Updated:  11/02/17 0333      WBC 9.65 10*3/mm3      RBC 3.57 (L) 10*6/mm3      Hemoglobin 11.0 (L) g/dL      Hematocrit 34.9 (L) %      MCV 97.8 fL      MCH 30.8 pg      MCHC 31.5 (L) g/dL      RDW 13.1 (H) %      RDW-SD 46.8 fl      MPV 9.7 fL      Platelets 201 10*3/mm3      Neutrophil % 59.8 %      Lymphocyte % 28.2 %      Monocyte % 9.1 %      Eosinophil % 2.2 %      Basophil % 0.2 %      Immature Grans % 0.5 %      Neutrophils, Absolute 5.77 10*3/mm3      Lymphocytes, Absolute 2.72 10*3/mm3      Monocytes, Absolute 0.88 10*3/mm3      Eosinophils, Absolute 0.21 10*3/mm3      Basophils, Absolute 0.02 10*3/mm3      Immature Grans, Absolute 0.05 (H) 10*3/mm3     Basic Metabolic Panel [[id number]]  (Abnormal) Collected:  11/02/17 0302    Specimen:  Blood Updated:  11/02/17 0356     Glucose 210 (H) mg/dL      BUN 18 mg/dL      Creatinine 0.92 mg/dL      Sodium 140 mmol/L      Potassium 4.3 mmol/L      Chloride 101 mmol/L      CO2 27.9 mmol/L      Calcium 8.9 mg/dL      eGFR Non African Amer 58 (L) mL/min/1.73      BUN/Creatinine Ratio 19.6     Anion Gap 11.1 mmol/L     Narrative:       The MDRD GFR formula is only valid for adults with stable renal function between ages 18 and 70.    Troponin [[id number]]  (Abnormal) Collected:  11/02/17 0302    Specimen:  Blood Updated:  11/02/17 0418     Troponin T 0.052 (H) ng/mL     Narrative:       Troponin T Reference Ranges:  Less than 0.03 ng/mL:    Negative for AMI  0.03 to 0.09 ng/mL:      Indeterminant for AMI  Greater than 0.09 ng/mL: Positive for AMI    Urine Culture - Urine, Urine, Clean Catch [[id number]]  (Abnormal) Collected:  11/01/17 1332    Specimen:  Urine from Urine, Catheter Updated:  11/02/17 0752     Urine Culture --      >100,000 CFU/mL Escherichia coli (A)    POC Glucose Fingerstick [[id number]]  (Abnormal) Collected:  11/02/17 0956    Specimen:  Blood Updated:  11/02/17 0959     Glucose 304 (H) mg/dL     Narrative:       Meter: [id number] : [id number] Kay         Imaging Results (last 72 hours)     Procedure Component Value Units Date/Time    XR Chest 1 View [[id number]] Collected:  11/01/17 1433     Updated:  11/01/17 1441    Narrative:       ONE VIEW PORTABLE CHEST     HISTORY: Syncope. Chest pain.     The lungs are well-expanded and clear. The heart is borderline enlarged  with sternal wires from previous cardiac surgery and no acute  abnormality is seen.     This report was finalized on 11/1/2017 2:38 PM by Dr. Kwadwo Plummer MD.           .  EKG                                                  Rhythm/Rate: sinus rhythm, 72  P waves and DC: normal  QRS, axis: NIVCD    ST and T waves: Nonspecific T wave inversion       Current Facility-Administered Medications:   •  [START ON 11/3/2017] aspirin chewable tablet 324 mg, 324 mg, Oral, Daily, Elvin Tello MD  •  atorvastatin (LIPITOR) tablet 10 mg, 10 mg, Oral, Daily, Elvin Tello MD  •  budesonide (PULMICORT) nebulizer solution 0.5 mg, 0.5 mg, Nebulization, BID, Elvin Tello MD  •  calcium-vitamin D 500-200 MG-UNIT tablet 500 mg, 500 mg, Oral, BID, Elvin Tello MD  •  carvedilol (COREG) tablet 25 mg, 25 mg, Oral, BID With Meals, Elvin Tello MD  •  cefTRIAXone (ROCEPHIN) IVPB 1 g, 1 g, Intravenous, Q24H, Elvin Tello MD, Last Rate: 100 mL/hr at 11/01/17 2208, 1 g at 11/01/17 2208  •  clopidogrel (PLAVIX) tablet 75 mg, 75 mg, Oral, Daily, Elvin Tello MD  •  docusate sodium (COLACE) capsule 250 mg, 250 mg, Oral, Daily, Elvin Tello MD  •  donepezil (ARICEPT) tablet 10 mg, 10 mg, Oral, Nightly, Elvin Tello MD  •  escitalopram (LEXAPRO) tablet 10 mg, 10 mg, Oral, Daily, Elvin Tello MD  •  gabapentin (NEURONTIN) capsule 300 mg, 300 mg, Oral, BID, Elvin Tello MD  •  HYDROcodone-acetaminophen (NORCO) 5-325 MG per tablet 1 tablet, 1 tablet, Oral, Q6H PRN, Elvin Tello MD, 1 tablet at 11/02/17 1240  •  Influenza Vac Subunit Quad (FLUCELVAX) injection 0.5 mL, 0.5 mL, Intramuscular, During Hospitalization, Elvin Tello MD  •   insulin aspart (novoLOG) injection 0-7 Units, 0-7 Units, Subcutaneous, 4x Daily With Meals & Nightly, Elvin Tello MD, 4 Units at 11/02/17 1223  •  insulin detemir (LEVEMIR) injection 30 Units, 30 Units, Subcutaneous, Nightly, Elvin Tello MD  •  ipratropium-albuterol (DUO-NEB) nebulizer solution 3 mL, 3 mL, Nebulization, Q3H PRN, Elvin Tello MD  •  isosorbide mononitrate (IMDUR) 24 hr tablet 30 mg, 30 mg, Oral, Q24H, Elvin Tello MD  •  LORazepam (ATIVAN) injection 0.5 mg, 0.5 mg, Intravenous, Q4H PRN, Elvin Tello MD, 0.5 mg at 11/02/17 0401  •  losartan (COZAAR) tablet 50 mg, 50 mg, Oral, Daily, Elvin Tello MD  •  memantine (NAMENDA) tablet 10 mg, 10 mg, Oral, Q12H, Elvin Tello MD  •  mirtazapine (REMERON) tablet 15 mg, 15 mg, Oral, Nightly, Elvin Tello MD  •  multivitamin with minerals 1 tablet, 1 tablet, Oral, Daily, Elvin Tello MD  •  pantoprazole (PROTONIX) EC tablet 40 mg, 40 mg, Oral, Q AM, Elvin Tello MD, 40 mg at 11/02/17 0657  •  polyethylene glycol (MIRALAX) powder 17 g, 17 g, Oral, Daily, Elvin Tello MD  •  sodium chloride 0.9 % with KCl 20 mEq/L infusion, 75 mL/hr, Intravenous, Continuous, Elvin Tello MD, Last Rate: 75 mL/hr at 11/02/17 1229, 75 mL/hr at 11/02/17 1229     ASSESSMENT  Syncopal episode questionable etiology  Acute UTI with Escherichia coli  Indeterminant troponin level with known coronary artery disease but no chest pain consistent with non-ST elevation MI  COPD  History of congestive heart failure  Hypertension  Hyperlipidemia  Anxiety disorder  Depression  Dementia  Gastroesophageal reflux disease  Uncontrolled insulin-dependent diabetes mellitus mellitus  DNR    PLAN  Admit  IV antibiotics  Serial enzymes and EKG  Cardiology consult  Oxygen nitroglycerin aspirin beta blocker and Lovenox  Continue home medications and adjust doses and stop unnecessary medications  Supportive care  Stress ulcer DVT prophylaxis  Check 2-D echo  DNR  Follow closely further recommendation  according to hospital course    QUEENIE WRIGHT MD

## 2017-11-02 NOTE — NURSING NOTE
Protonix rescheduled. Patient sleeping right now post ativan dose after patient trying   To get out bed , because she wants to go home,anxious (patient was confuse).

## 2017-11-02 NOTE — PROGRESS NOTES
"Pharmacy Consult - Lovenox    [facility] Cox has been consulted for pharmacy to dose enoxaparin for full anticoagulation, NSTEMI  per Dr Ho's request.     Relevant clinical data and objective history reviewed:  86 y.o. female 66\" (167.6 cm) 143 lb (64.9 kg)       Past Medical History:   Diagnosis Date   • Alzheimer disease    • Atherosclerotic heart disease    • Bronchitis    • CHF (congestive heart failure)     EF = 25%   • COPD (chronic obstructive pulmonary disease)    • Dementia    • Depression    • Diabetes mellitus    • GERD (gastroesophageal reflux disease)    • HTN (hypertension)    • Hypokalemia    • Insomnia    • Metabolic encephalopathy    • Neuropathy    • OA (osteoarthritis)    • Pneumonia    • Renal disorder     stage three   • TIA (transient ischemic attack)    • Upper respiratory infection      is allergic to codeine; latex; other; soma [carisoprodol]; and sulfa antibiotics.    Lab Results   Component Value Date    WBC 9.65 11/02/2017    HGB 11.0 (L) 11/02/2017    HCT 34.9 (L) 11/02/2017    MCV 97.8 11/02/2017     11/02/2017     Lab Results   Component Value Date    GLUCOSE 210 (H) 11/02/2017    CALCIUM 8.9 11/02/2017     11/02/2017    K 4.3 11/02/2017    CO2 27.9 11/02/2017     11/02/2017    BUN 18 11/02/2017    CREATININE 0.92 11/02/2017    EGFRIFAFRI  09/21/2016      Comment:      <15 Indicative of kidney failure.    EGFRIFNONA 58 (L) 11/02/2017    BCR 19.6 11/02/2017    ANIONGAP 11.1 11/02/2017       Estimated Creatinine Clearance: 45 mL/min (by C-G formula based on Cr of 0.92).         Assessment/Plan    Will start patient on 70 mg (1 mg/kg) subcutaneous every 12 hours, adjusted for renal function. Labs to be ordered: scr . Pharmacy will continue to follow.     Suzanne Hernandez [facility]    "

## 2017-11-02 NOTE — CONSULTS
[facility]  Consult Note                                                                              11/2/2017  Elvin Tello MD    Patient Identification:  Bernardo Cox:   86 y.o.  female  4/26/1931     Date of Admission:11/1/2017    CC: Consulted for elevated troponin and syncope.     History of Present Illness: Ms. Cox is a 86 year old female that last seen Dr. Jones in 2013. Patient has a documented history of CHF, LBBB, syncope, and ICMO, CAD/MI/PCI -> s/p CABG times 6- 1995 (LIMA to the LAD and SVG's to the LAD, RCA, PDA, OM1 and OM2) then BILLY to SVG to the LAD. She also has a history of dementia, DM, HTN, HLD, TIA and PVD. Patient last cardiac cath was in 2010 that showed a normal LVSF, atretic LIMA (known), 4/4 SVG patent and 90 % in the SVG to the LAD. Patient received a 4.0 x 12 Promus BILLY- post-dilated to 4.5. His last echo was in 2014 that showed an EF of 50 % with mild MR. Patient has not been seen since 2013. Resides in a nursing facility.      She was in the ED on 7/4 with constant chest pain under the left breast. BP was 167/94, HR 89 (SR). Troponin 0.017. CTA of the chest negative for PE. Patient was discharged home with no follow up.     Patient presented to the ED yesterday with the complaint of syncope while sitting on the toilet. Upon presentation to the ED his BP was 125/89, HR 73 (SR), afebrile and SPO2 97 % RA.   Chest xray showed no acute process. ED workup found patient is positive for UTI and has an elevated troponin (0.084->0.054->0.052). Patient was admitted for further eval and treat. Patient is unable to tell me any history of why she is here. She feels she is still at the nursing facility. All she c/o is being cold. Denies any CP or SOA.               Past Medical History:  Past Medical History:   Diagnosis Date   • Alzheimer disease    • Atherosclerotic heart disease    • Bronchitis    • CHF (congestive heart failure)     EF = 25%   • COPD (chronic obstructive pulmonary  disease)    • Dementia    • Depression    • Diabetes mellitus    • GERD (gastroesophageal reflux disease)    • HTN (hypertension)    • Hypokalemia    • Insomnia    • Metabolic encephalopathy    • Neuropathy    • OA (osteoarthritis)    • Pneumonia    • Renal disorder     stage three   • TIA (transient ischemic attack)    • Upper respiratory infection        Past Surgical History:  Past Surgical History:   Procedure Laterality Date   • CARDIAC SURGERY         Allergies:  Allergies   Allergen Reactions   • Codeine    • Latex    • Other      Plastic tape, canned fish   • Soma [Carisoprodol]    • Sulfa Antibiotics        Home Meds:  Prescriptions Prior to Admission   Medication Sig Dispense Refill Last Dose   • acetaminophen (TYLENOL) 500 MG tablet Take 500 mg by mouth Every 6 (Six) Hours As Needed for Mild Pain .      • azelastine (ASTELIN) 0.1 % nasal spray 2 sprays by Each Nare route 2 (Two) Times a Day. Use in each nostril as directed      • docusate sodium (COLACE) 250 MG capsule Take 250 mg by mouth Daily.      • donepezil (ARICEPT) 10 MG tablet Take 10 mg by mouth every night at bedtime.      • escitalopram (LEXAPRO) 10 MG tablet Take 10 mg by mouth Daily.      • fluticasone (FLONASE) 50 MCG/ACT nasal spray 1 spray by Each Nare route Daily.      • glucagon (GLUCAGEN) 1 MG injection Inject 1 mg under the skin 1 (One) Time As Needed for Low Blood Sugar.      • HYDROcodone-acetaminophen (NORCO) 5-325 MG per tablet Take 1 tablet by mouth Daily As Needed.      • insulin aspart (novoLOG) 100 UNIT/ML injection Inject  under the skin 4 (Four) Times a Day Before Meals & at Bedtime. Per ssi: 0-200=0, 201-299=5, 300-399=10, 400-500=15      • LORazepam (ATIVAN) 0.5 MG tablet Take 0.5 mg by mouth every night at bedtime.      • LORazepam (ATIVAN) 2 MG/ML concentrated solution Take 1 mg by mouth Every 12 (Twelve) Hours As Needed for Anxiety.      • memantine (NAMENDA XR) 28 MG capsule sustained-release 24 hr extended release  capsule Take 28 mg by mouth Daily.      • menthol-zinc oxide (RISAMINE) 0.44-20.625 % ointment ointment Apply 1 application topically Every 1 (One) Hour As Needed (apply to buttocks for redness).      • Multiple Vitamins-Minerals (MULTIVITAMIN & MINERAL PO) Take 1 tablet by mouth Daily.      • atorvastatin (LIPITOR) 40 MG tablet Take 40 mg by mouth every night at bedtime.   Unknown at Unknown time   • B-Complex-C-Biotin-Minerals-FA (FOLBEE PLUS CZ) 5 MG tablet tablet Take 1 tablet by mouth Daily.      • budesonide (PULMICORT) 0.5 MG/2ML nebulizer solution Take 0.5 mg by nebulization 2 (two) times a day.      • calcium carbonate (TUMS) 500 MG chewable tablet Chew 1 tablet every 6 (six) hours as needed for indigestion or heartburn.      • Calcium Carbonate-Vitamin D (CALCIUM-VITAMIN D) 500-200 MG-UNIT tablet per tablet Take 1 tablet by mouth 2 (two) times a day.      • carvedilol (COREG) 25 MG tablet Take 25 mg by mouth 2 (two) times a day with meals. Hold for SBP <100      • cloNIDine (CATAPRES) 0.2 MG tablet Take 1 tablet by mouth daily.      • clopidogrel (PLAVIX) 75 MG tablet Take 75 mg by mouth daily.      • Cranberry Extract 250 MG tablet Take 1 capsule by mouth daily.      • dextromethorphan-guaifenesin (ROBITUSSIN-DM)  MG/5ML syrup Take 10 mL by mouth every 12 (twelve) hours as needed.      • diphenhydrAMINE (BENADRYL) 25 mg capsule Take 25 mg by mouth every 6 (six) hours as needed for itching.      • furosemide (LASIX) 20 MG tablet Take 20 mg by mouth daily.   Unknown at Unknown time   • gabapentin (NEURONTIN) 300 MG capsule Take 1 capsule by mouth 2 (Two) Times a Day. [place] 60 capsule 5    • insulin glargine (LANTUS) 100 UNIT/ML injection Inject 30 Units under the skin daily.   Unknown at Unknown time   • ipratropium-albuterol (DUO-NEB) 0.5-2.5 mg/mL nebulizer Take 3 mL by nebulization every 3 (three) hours as needed for wheezing.      • loratadine (CLARITIN) 10 MG tablet Take 10 mg by mouth daily.    Unknown at Unknown time   • losartan (COZAAR) 50 MG tablet Take 50 mg by mouth daily.   Unknown at Unknown time   • magnesium hydroxide (MILK OF MAGNESIA) 400 MG/5ML suspension Take 30 mL by mouth Every Other Day As Needed.   Unknown at Unknown time   • mirtazapine (REMERON) 15 MG tablet Take 15 mg by mouth Every Night.      • nitroglycerin (NITROSTAT) 0.4 MG SL tablet Place 0.4 mg under the tongue every 5 (five) minutes as needed for chest pain. Take no more than 3 doses in 15 minutes.      • polyethylene glycol (MIRALAX) packet Take 17 g by mouth Every 72 (Seventy-Two) Hours As Needed.      • potassium chloride (K-DUR,KLOR-CON) 20 MEQ CR tablet Take 20 mEq by mouth 2 (two) times a day.          Current Meds  Scheduled Meds:  aspirin 81 mg Oral Daily   aspirin 325 mg Oral Once   ceftriaxone 1 g Intravenous Q24H   insulin aspart 0-7 Units Subcutaneous 4x Daily With Meals & Nightly   metoprolol tartrate 25 mg Oral Q12H   nitroglycerin 0.5 inch Topical Once   pantoprazole 40 mg Oral Q AM     Continuous Infusions:  sodium chloride 125 mL/hr Last Rate: Stopped (11/01/17 1551)   sodium chloride 0.9 % with KCl 20 mEq 75 mL/hr Last Rate: 75 mL/hr (11/01/17 2311)     PRN Meds:.dextrose  •  dextrose  •  glucagon (human recombinant)  •  HYDROcodone-acetaminophen  •  influenza vaccine  •  LORazepam    Social History:   Social History     Social History   • Marital status: Single     Spouse name: N/A   • Number of children: 3   • Years of education: N/A     Occupational History   •       [employer]     Social History Main Topics   • Smoking status: Former Smoker     Quit date: 1980   • Smokeless tobacco: Not on file      Comment: unable to assess   • Alcohol use No   • Drug use: No   • Sexual activity: Defer     Other Topics Concern   • Not on file     Social History Narrative       Family History:  History reviewed. No pertinent family history.    REVIEW OF SYSTEMS:   CONSTITUTIONAL: No weight loss, fever, chills, weakness  "or fatigue.   HEENT: Eyes: No visual loss, blurred vision, double vision or yellow sclerae. Ears, Nose, Throat: No hearing loss, sneezing, congestion, runny nose or sore throat.   SKIN: No rash or itching.     RESPIRATORY: No shortness of breath, hemoptysis, cough or sputum.   GASTROINTESTINAL: No anorexia, nausea, vomiting or diarrhea. No abdominal pain, bright red blood per rectum or melena.  GENITOURINARY: No burning on urination, hematuria or increased frequency.  NEUROLOGICAL: No headache, dizziness, syncope, paralysis, ataxia, numbness or tingling in the extremities. No change in bowel or bladder control.   MUSCULOSKELETAL: No muscle, back pain, joint pain or stiffness.   HEMATOLOGIC: No anemia, bleeding or bruising.   LYMPHATICS: No enlarged nodes. No history of splenectomy.   PSYCHIATRIC: No history of depression, anxiety, hallucinations.   ENDOCRINOLOGIC: No reports of sweating, cold or heat intolerance. No polyuria or polydipsia.     Physical Exam    /82 (BP Location: Left arm, Patient Position: Lying)  Pulse 84  Temp 99.4 °F (37.4 °C) (Oral)   Resp 18  Ht 66\" (167.6 cm)  Wt 143 lb (64.9 kg)  SpO2 97%  BMI 23.08 kg/m2    General Appearance Well developed, cooperative and well nourished and no acute distress   Head Normocephalic, without abnormality, atraumatic   Ears Ears appear intact with no abnormalities noted   Throat No oral lesions, no thrush, oral mucosa moist   Neck No adenopathy, supple, trachea midline, no thyromegaly, no carotid bruit, no JVD   Back No skin lesions, erythema or scars, no tenderness to percussion or palptaion,range of motion is normal   Lungs Clear to auscultation,respirations regular, even and unlabored   Heart Regular rhythm and normal rate, normal S1 and S2, no murmur, no gallop, no rub, no click   Chest wall No abnormalities observed   Abdomen Normal bowel sounds, no masses, no hepatomegaly,    Extremities Moves all extremities well, no edema, no cyanosis, no " redness   Pulses Pulses palpable and equal bilaterally. Normal radial, carotid, femoral, dorsalis pedis and posterior tibial pulses bilaterally. Normal abdominal aorta   Skin No bleeding, bruising or rash   Psyhiatric Alert and oriented x 3, normal mood and affect      Results from last 7 days  Lab Units 11/02/17  0302 11/01/17  1320   SODIUM mmol/L 140 141   POTASSIUM mmol/L 4.3 3.8   CHLORIDE mmol/L 101 101   CO2 mmol/L 27.9 25.6   BUN mg/dL 18 17   CREATININE mg/dL 0.92 1.17*   CALCIUM mg/dL 8.9 9.8   BILIRUBIN mg/dL  --  0.4   ALK PHOS U/L  --  85   ALT (SGPT) U/L  --  13   AST (SGOT) U/L  --  15   GLUCOSE mg/dL 210* 211*       Results from last 7 days  Lab Units 11/02/17  0302 11/01/17  2105 11/01/17  1320   TROPONIN T ng/mL 0.052* 0.054* 0.084*     )  Results from last 7 days  Lab Units 11/02/17  0302 11/01/17  1320   WBC 10*3/mm3 9.65 15.74*   HEMOGLOBIN g/dL 11.0* 12.7   HEMATOCRIT % 34.9* 39.9   PLATELETS 10*3/mm3 201 246                  Previous:         I personally viewed and interpreted the patient's EKG/Telemetry data    Assessment and Plan  1.  Syncope.  I talked to the nurse at the facility and it is unclear if she truly had syncope or was just fell to the floor.  Apparently when the nursing staff found her she was awake.  She has been very agitated here but when I was with her she was able to give a history of syncope though she attributes this to her diabetes.  She denies any chest pain or palpitations.  Continue telemetry and check an echocardiogram.  Troponin is elevated but given comorbidities would treat medically.  2.  Elevated troponin, as above  3.  Urinary tract infection on ceftriaxone  4.  Coronary artery disease with history of coronary bypass grafting and stenting  5.  Dementia  6.  Rash with questionable scabies.  On isolation    Mini Fields  11/2/2017  11:50 AM    60min spent in reviewing records, discussion and examination of the patient and discussion with other members of the  patient's medical team.     Dictated utilizing Dragon dictation

## 2017-11-03 ENCOUNTER — APPOINTMENT (OUTPATIENT)
Dept: CARDIOLOGY | Facility: HOSPITAL | Age: 82
End: 2017-11-03
Attending: HOSPITALIST

## 2017-11-03 LAB
ALBUMIN SERPL-MCNC: 2.9 G/DL (ref 3.5–5.2)
ALBUMIN/GLOB SERPL: 1.1 G/DL
ALP SERPL-CCNC: 67 U/L (ref 39–117)
ALT SERPL W P-5'-P-CCNC: 11 U/L (ref 1–33)
ANION GAP SERPL CALCULATED.3IONS-SCNC: 10.4 MMOL/L
AST SERPL-CCNC: 13 U/L (ref 1–32)
BACTERIA SPEC AEROBE CULT: ABNORMAL
BACTERIA SPEC AEROBE CULT: ABNORMAL
BASOPHILS # BLD AUTO: 0.04 10*3/MM3 (ref 0–0.2)
BASOPHILS NFR BLD AUTO: 0.5 % (ref 0–1.5)
BH CV ECHO MEAS - ACS: 1.6 CM
BH CV ECHO MEAS - AO MEAN PG (FULL): 7 MMHG
BH CV ECHO MEAS - AO MEAN PG: 9 MMHG
BH CV ECHO MEAS - AO ROOT AREA (BSA CORRECTED): 1.7
BH CV ECHO MEAS - AO ROOT AREA: 6.6 CM^2
BH CV ECHO MEAS - AO ROOT DIAM: 2.9 CM
BH CV ECHO MEAS - AO V2 MEAN: 138 CM/SEC
BH CV ECHO MEAS - AO V2 VTI: 41.2 CM
BH CV ECHO MEAS - AVA(I,A): 1.5 CM^2
BH CV ECHO MEAS - AVA(I,D): 1.5 CM^2
BH CV ECHO MEAS - BSA(HAYCOCK): 1.7 M^2
BH CV ECHO MEAS - BSA: 1.7 M^2
BH CV ECHO MEAS - BZI_BMI: 23.1 KILOGRAMS/M^2
BH CV ECHO MEAS - BZI_METRIC_HEIGHT: 167.6 CM
BH CV ECHO MEAS - BZI_METRIC_WEIGHT: 64.9 KG
BH CV ECHO MEAS - CONTRAST EF (2CH): 57 ML/M^2
BH CV ECHO MEAS - CONTRAST EF 4CH: 56.6 ML/M^2
BH CV ECHO MEAS - EDV(CUBED): 103.8 ML
BH CV ECHO MEAS - EDV(MOD-SP2): 142 ML
BH CV ECHO MEAS - EDV(MOD-SP4): 99 ML
BH CV ECHO MEAS - EDV(TEICH): 102.4 ML
BH CV ECHO MEAS - EF(CUBED): 51.2 %
BH CV ECHO MEAS - EF(MOD-SP2): 57 %
BH CV ECHO MEAS - EF(MOD-SP4): 56.6 %
BH CV ECHO MEAS - EF(TEICH): 43.2 %
BH CV ECHO MEAS - ESV(CUBED): 50.7 ML
BH CV ECHO MEAS - ESV(MOD-SP2): 61 ML
BH CV ECHO MEAS - ESV(MOD-SP4): 43 ML
BH CV ECHO MEAS - ESV(TEICH): 58.1 ML
BH CV ECHO MEAS - FS: 21.3 %
BH CV ECHO MEAS - IVS/LVPW: 1.1
BH CV ECHO MEAS - IVSD: 1.2 CM
BH CV ECHO MEAS - LAT PEAK E' VEL: 6.6 CM/SEC
BH CV ECHO MEAS - LV DIASTOLIC VOL/BSA (35-75): 57.1 ML/M^2
BH CV ECHO MEAS - LV MASS(C)D: 199.6 GRAMS
BH CV ECHO MEAS - LV MASS(C)DI: 115.1 GRAMS/M^2
BH CV ECHO MEAS - LV MEAN PG: 2 MMHG
BH CV ECHO MEAS - LV SYSTOLIC VOL/BSA (12-30): 24.8 ML/M^2
BH CV ECHO MEAS - LV V1 MEAN: 61.2 CM/SEC
BH CV ECHO MEAS - LV V1 VTI: 20 CM
BH CV ECHO MEAS - LVIDD: 4.7 CM
BH CV ECHO MEAS - LVIDS: 3.7 CM
BH CV ECHO MEAS - LVLD AP2: 7.8 CM
BH CV ECHO MEAS - LVLD AP4: 7.6 CM
BH CV ECHO MEAS - LVLS AP2: 6.9 CM
BH CV ECHO MEAS - LVLS AP4: 7 CM
BH CV ECHO MEAS - LVOT AREA (M): 3.1 CM^2
BH CV ECHO MEAS - LVOT AREA: 3.1 CM^2
BH CV ECHO MEAS - LVOT DIAM: 2 CM
BH CV ECHO MEAS - LVPWD: 1.1 CM
BH CV ECHO MEAS - MED PEAK E' VEL: 3.8 CM/SEC
BH CV ECHO MEAS - MR ALIAS VEL: 30.8 CM/SEC
BH CV ECHO MEAS - MR ERO: 0.11 CM^2
BH CV ECHO MEAS - MR FLOW RATE: 48.4 CM^3/SEC
BH CV ECHO MEAS - MR MAX PG: 83.5 MMHG
BH CV ECHO MEAS - MR MAX VEL: 456.7 CM/SEC
BH CV ECHO MEAS - MR PISA RADIUS: 0.5 CM
BH CV ECHO MEAS - MR PISA: 1.6 CM^2
BH CV ECHO MEAS - MV A DUR: 0.16 SEC
BH CV ECHO MEAS - MV A MAX VEL: 92.8 CM/SEC
BH CV ECHO MEAS - MV DEC SLOPE: 639 CM/SEC^2
BH CV ECHO MEAS - MV DEC TIME: 0.13 SEC
BH CV ECHO MEAS - MV E MAX VEL: 128 CM/SEC
BH CV ECHO MEAS - MV E/A: 1.4
BH CV ECHO MEAS - MV MEAN PG: 4 MMHG
BH CV ECHO MEAS - MV P1/2T MAX VEL: 145 CM/SEC
BH CV ECHO MEAS - MV P1/2T: 66.5 MSEC
BH CV ECHO MEAS - MV V2 MEAN: 87.8 CM/SEC
BH CV ECHO MEAS - MV V2 VTI: 30.5 CM
BH CV ECHO MEAS - MVA P1/2T LCG: 1.5 CM^2
BH CV ECHO MEAS - MVA(P1/2T): 3.3 CM^2
BH CV ECHO MEAS - MVA(VTI): 2.1 CM^2
BH CV ECHO MEAS - PA ACC SLOPE: 783 CM/SEC^2
BH CV ECHO MEAS - PA ACC TIME: 0.12 SEC
BH CV ECHO MEAS - PA MAX PG (FULL): 2.1 MMHG
BH CV ECHO MEAS - PA MAX PG: 3.5 MMHG
BH CV ECHO MEAS - PA PR(ACCEL): 26.8 MMHG
BH CV ECHO MEAS - PA V2 MAX: 93.7 CM/SEC
BH CV ECHO MEAS - PULM A REVS DUR: 0.14 SEC
BH CV ECHO MEAS - PULM A REVS VEL: 22.2 CM/SEC
BH CV ECHO MEAS - PULM DIAS VEL: 46.4 CM/SEC
BH CV ECHO MEAS - PULM S/D: 0.63
BH CV ECHO MEAS - PULM SYS VEL: 29.1 CM/SEC
BH CV ECHO MEAS - PVA(V,A): 2.6 CM^2
BH CV ECHO MEAS - PVA(V,D): 2.6 CM^2
BH CV ECHO MEAS - QP/QS: 0.79
BH CV ECHO MEAS - RAP SYSTOLE: 3 MMHG
BH CV ECHO MEAS - RV MAX PG: 1.4 MMHG
BH CV ECHO MEAS - RV MEAN PG: 1 MMHG
BH CV ECHO MEAS - RV V1 MAX: 59.4 CM/SEC
BH CV ECHO MEAS - RV V1 MEAN: 43.8 CM/SEC
BH CV ECHO MEAS - RV V1 VTI: 12 CM
BH CV ECHO MEAS - RVOT AREA: 4.2 CM^2
BH CV ECHO MEAS - RVOT DIAM: 2.3 CM
BH CV ECHO MEAS - RVSP: 20 MMHG
BH CV ECHO MEAS - SI(AO): 156.9 ML/M^2
BH CV ECHO MEAS - SI(CUBED): 30.7 ML/M^2
BH CV ECHO MEAS - SI(LVOT): 36.2 ML/M^2
BH CV ECHO MEAS - SI(MOD-SP2): 46.7 ML/M^2
BH CV ECHO MEAS - SI(MOD-SP4): 32.3 ML/M^2
BH CV ECHO MEAS - SI(TEICH): 25.5 ML/M^2
BH CV ECHO MEAS - SV(AO): 272.1 ML
BH CV ECHO MEAS - SV(CUBED): 53.2 ML
BH CV ECHO MEAS - SV(LVOT): 62.8 ML
BH CV ECHO MEAS - SV(MOD-SP2): 81 ML
BH CV ECHO MEAS - SV(MOD-SP4): 56 ML
BH CV ECHO MEAS - SV(RVOT): 49.9 ML
BH CV ECHO MEAS - SV(TEICH): 44.2 ML
BH CV ECHO MEAS - TAPSE (>1.6): 1.3 CM2
BH CV ECHO MEAS - TR MAX VEL: 204 CM/SEC
BH CV XLRA - RV BASE: 3.2 CM
BH CV XLRA - RV LENGTH: 6.4 CM
BH CV XLRA - RV MID: 2.5 CM
BH CV XLRA - TDI S': 7 CM/SEC
BILIRUB SERPL-MCNC: 0.2 MG/DL (ref 0.1–1.2)
BUN BLD-MCNC: 12 MG/DL (ref 8–23)
BUN/CREAT SERPL: 15.2 (ref 7–25)
CALCIUM SPEC-SCNC: 8.5 MG/DL (ref 8.6–10.5)
CHLORIDE SERPL-SCNC: 104 MMOL/L (ref 98–107)
CHOLEST SERPL-MCNC: 102 MG/DL (ref 0–200)
CO2 SERPL-SCNC: 26.6 MMOL/L (ref 22–29)
CREAT BLD-MCNC: 0.79 MG/DL (ref 0.57–1)
DEPRECATED RDW RBC AUTO: 48.3 FL (ref 37–54)
E/E' RATIO: 26.5
EOSINOPHIL # BLD AUTO: 0.32 10*3/MM3 (ref 0–0.7)
EOSINOPHIL NFR BLD AUTO: 3.7 % (ref 0.3–6.2)
ERYTHROCYTE [DISTWIDTH] IN BLOOD BY AUTOMATED COUNT: 13.4 % (ref 11.7–13)
GFR SERPL CREATININE-BSD FRML MDRD: 69 ML/MIN/1.73
GLOBULIN UR ELPH-MCNC: 2.7 GM/DL
GLUCOSE BLD-MCNC: 103 MG/DL (ref 65–99)
GLUCOSE BLD-MCNC: 61 MG/DL (ref 65–99)
GLUCOSE BLDC GLUCOMTR-MCNC: 110 MG/DL (ref 70–130)
GLUCOSE BLDC GLUCOMTR-MCNC: 127 MG/DL (ref 70–130)
GLUCOSE BLDC GLUCOMTR-MCNC: 218 MG/DL (ref 70–130)
GLUCOSE BLDC GLUCOMTR-MCNC: 226 MG/DL (ref 70–130)
GLUCOSE BLDC GLUCOMTR-MCNC: 270 MG/DL (ref 70–130)
GLUCOSE BLDC GLUCOMTR-MCNC: 56 MG/DL (ref 70–130)
GLUCOSE BLDC GLUCOMTR-MCNC: 66 MG/DL (ref 70–130)
GLUCOSE BLDC GLUCOMTR-MCNC: 67 MG/DL (ref 70–130)
HBA1C MFR BLD: 8.2 % (ref 4.8–5.6)
HCT VFR BLD AUTO: 35.7 % (ref 35.6–45.5)
HDLC SERPL-MCNC: 48 MG/DL (ref 40–60)
HGB BLD-MCNC: 11 G/DL (ref 11.9–15.5)
IMM GRANULOCYTES # BLD: 0.04 10*3/MM3 (ref 0–0.03)
IMM GRANULOCYTES NFR BLD: 0.5 % (ref 0–0.5)
LDLC SERPL CALC-MCNC: 32 MG/DL (ref 0–100)
LDLC/HDLC SERPL: 0.67 {RATIO}
LEFT ATRIUM VOLUME INDEX: 52 ML/M2
LV EF 2D ECHO EST: 57 %
LYMPHOCYTES # BLD AUTO: 1.41 10*3/MM3 (ref 0.9–4.8)
LYMPHOCYTES NFR BLD AUTO: 16.3 % (ref 19.6–45.3)
MAXIMAL PREDICTED HEART RATE: 134 BPM
MCH RBC QN AUTO: 30.3 PG (ref 26.9–32)
MCHC RBC AUTO-ENTMCNC: 30.8 G/DL (ref 32.4–36.3)
MCV RBC AUTO: 98.3 FL (ref 80.5–98.2)
MONOCYTES # BLD AUTO: 0.73 10*3/MM3 (ref 0.2–1.2)
MONOCYTES NFR BLD AUTO: 8.4 % (ref 5–12)
NEUTROPHILS # BLD AUTO: 6.11 10*3/MM3 (ref 1.9–8.1)
NEUTROPHILS NFR BLD AUTO: 70.6 % (ref 42.7–76)
NT-PROBNP SERPL-MCNC: ABNORMAL PG/ML (ref 0–1800)
PLATELET # BLD AUTO: 206 10*3/MM3 (ref 140–500)
PMV BLD AUTO: 9.8 FL (ref 6–12)
POTASSIUM BLD-SCNC: 4.1 MMOL/L (ref 3.5–5.2)
PROT SERPL-MCNC: 5.6 G/DL (ref 6–8.5)
RBC # BLD AUTO: 3.63 10*6/MM3 (ref 3.9–5.2)
SODIUM BLD-SCNC: 141 MMOL/L (ref 136–145)
STRESS TARGET HR: 114 BPM
TRIGL SERPL-MCNC: 109 MG/DL (ref 0–150)
TROPONIN T SERPL-MCNC: 0.02 NG/ML (ref 0–0.03)
TSH SERPL DL<=0.05 MIU/L-ACNC: 0.91 MIU/ML (ref 0.27–4.2)
VLDLC SERPL-MCNC: 21.8 MG/DL (ref 5–40)
WBC NRBC COR # BLD: 8.65 10*3/MM3 (ref 4.5–10.7)

## 2017-11-03 PROCEDURE — 93005 ELECTROCARDIOGRAM TRACING: CPT | Performed by: HOSPITALIST

## 2017-11-03 PROCEDURE — 83036 HEMOGLOBIN GLYCOSYLATED A1C: CPT | Performed by: HOSPITALIST

## 2017-11-03 PROCEDURE — 25010000002 PERFLUTREN (DEFINITY) 8.476 MG IN SODIUM CHLORIDE 0.9 % 10 ML INJECTION: Performed by: HOSPITALIST

## 2017-11-03 PROCEDURE — 84484 ASSAY OF TROPONIN QUANT: CPT | Performed by: HOSPITALIST

## 2017-11-03 PROCEDURE — 25010000002 LORAZEPAM PER 2 MG: Performed by: HOSPITALIST

## 2017-11-03 PROCEDURE — 94799 UNLISTED PULMONARY SVC/PX: CPT

## 2017-11-03 PROCEDURE — 82962 GLUCOSE BLOOD TEST: CPT

## 2017-11-03 PROCEDURE — 99233 SBSQ HOSP IP/OBS HIGH 50: CPT | Performed by: INTERNAL MEDICINE

## 2017-11-03 PROCEDURE — 93306 TTE W/DOPPLER COMPLETE: CPT | Performed by: INTERNAL MEDICINE

## 2017-11-03 PROCEDURE — 80061 LIPID PANEL: CPT | Performed by: HOSPITALIST

## 2017-11-03 PROCEDURE — 93010 ELECTROCARDIOGRAM REPORT: CPT | Performed by: INTERNAL MEDICINE

## 2017-11-03 PROCEDURE — 99223 1ST HOSP IP/OBS HIGH 75: CPT | Performed by: INTERNAL MEDICINE

## 2017-11-03 PROCEDURE — 25010000002 FUROSEMIDE PER 20 MG: Performed by: INTERNAL MEDICINE

## 2017-11-03 PROCEDURE — 80053 COMPREHEN METABOLIC PANEL: CPT | Performed by: HOSPITALIST

## 2017-11-03 PROCEDURE — 83880 ASSAY OF NATRIURETIC PEPTIDE: CPT | Performed by: HOSPITALIST

## 2017-11-03 PROCEDURE — 82947 ASSAY GLUCOSE BLOOD QUANT: CPT | Performed by: HOSPITALIST

## 2017-11-03 PROCEDURE — 63710000001 INSULIN ASPART PER 5 UNITS: Performed by: HOSPITALIST

## 2017-11-03 PROCEDURE — 93306 TTE W/DOPPLER COMPLETE: CPT

## 2017-11-03 PROCEDURE — 25010000002 ENOXAPARIN PER 10 MG: Performed by: HOSPITALIST

## 2017-11-03 PROCEDURE — 85025 COMPLETE CBC W/AUTO DIFF WBC: CPT | Performed by: HOSPITALIST

## 2017-11-03 PROCEDURE — 84443 ASSAY THYROID STIM HORMONE: CPT | Performed by: HOSPITALIST

## 2017-11-03 RX ORDER — FUROSEMIDE 10 MG/ML
40 INJECTION INTRAMUSCULAR; INTRAVENOUS ONCE
Status: COMPLETED | OUTPATIENT
Start: 2017-11-03 | End: 2017-11-03

## 2017-11-03 RX ORDER — FUROSEMIDE 10 MG/ML
40 INJECTION INTRAMUSCULAR; INTRAVENOUS ONCE
Status: DISCONTINUED | OUTPATIENT
Start: 2017-11-03 | End: 2017-11-03

## 2017-11-03 RX ORDER — LORAZEPAM 0.5 MG/1
TABLET ORAL
Qty: 15 TABLET | Refills: 5 | OUTPATIENT
Start: 2017-11-03 | End: 2017-11-06 | Stop reason: HOSPADM

## 2017-11-03 RX ADMIN — FUROSEMIDE 40 MG: 10 INJECTION, SOLUTION INTRAMUSCULAR; INTRAVENOUS at 09:09

## 2017-11-03 RX ADMIN — LORAZEPAM 0.5 MG: 2 INJECTION INTRAMUSCULAR; INTRAVENOUS at 21:04

## 2017-11-03 RX ADMIN — LORAZEPAM 0.5 MG: 2 INJECTION INTRAMUSCULAR; INTRAVENOUS at 09:26

## 2017-11-03 RX ADMIN — GABAPENTIN 300 MG: 300 CAPSULE ORAL at 08:00

## 2017-11-03 RX ADMIN — PERFLUTREN 2 ML: 6.52 INJECTION, SUSPENSION INTRAVENOUS at 12:51

## 2017-11-03 RX ADMIN — ENOXAPARIN SODIUM 70 MG: 80 INJECTION SUBCUTANEOUS at 13:06

## 2017-11-03 RX ADMIN — DOCUSATE SODIUM 250 MG: 250 CAPSULE, LIQUID FILLED ORAL at 08:00

## 2017-11-03 RX ADMIN — CARVEDILOL 25 MG: 25 TABLET, FILM COATED ORAL at 07:54

## 2017-11-03 RX ADMIN — ISOSORBIDE MONONITRATE 30 MG: 30 TABLET ORAL at 08:01

## 2017-11-03 RX ADMIN — MEMANTINE HYDROCHLORIDE 10 MG: 10 TABLET, FILM COATED ORAL at 08:00

## 2017-11-03 RX ADMIN — LORAZEPAM 0.5 MG: 2 INJECTION INTRAMUSCULAR; INTRAVENOUS at 17:18

## 2017-11-03 RX ADMIN — LOSARTAN POTASSIUM 50 MG: 50 TABLET, FILM COATED ORAL at 08:01

## 2017-11-03 RX ADMIN — ENOXAPARIN SODIUM 70 MG: 80 INJECTION SUBCUTANEOUS at 01:05

## 2017-11-03 RX ADMIN — CLOPIDOGREL 75 MG: 75 TABLET, FILM COATED ORAL at 08:00

## 2017-11-03 RX ADMIN — INSULIN ASPART 3 UNITS: 100 INJECTION, SOLUTION INTRAVENOUS; SUBCUTANEOUS at 17:18

## 2017-11-03 RX ADMIN — ASPIRIN 81 MG: 81 TABLET, CHEWABLE ORAL at 08:01

## 2017-11-03 RX ADMIN — POLYETHYLENE GLYCOL 3350 17 G: 17 POWDER, FOR SOLUTION ORAL at 08:00

## 2017-11-03 RX ADMIN — GABAPENTIN 300 MG: 300 CAPSULE ORAL at 17:18

## 2017-11-03 RX ADMIN — BUDESONIDE 0.5 MG: 0.5 INHALANT RESPIRATORY (INHALATION) at 07:58

## 2017-11-03 RX ADMIN — PANTOPRAZOLE SODIUM 40 MG: 40 TABLET, DELAYED RELEASE ORAL at 05:16

## 2017-11-03 RX ADMIN — ESCITALOPRAM 10 MG: 10 TABLET, FILM COATED ORAL at 08:01

## 2017-11-03 RX ADMIN — INSULIN ASPART 4 UNITS: 100 INJECTION, SOLUTION INTRAVENOUS; SUBCUTANEOUS at 13:06

## 2017-11-03 RX ADMIN — CARVEDILOL 25 MG: 25 TABLET, FILM COATED ORAL at 17:17

## 2017-11-03 RX ADMIN — IPRATROPIUM BROMIDE AND ALBUTEROL SULFATE 3 ML: .5; 3 SOLUTION RESPIRATORY (INHALATION) at 07:58

## 2017-11-03 NOTE — PLAN OF CARE
Problem: Patient Care Overview (Adult)  Goal: Plan of Care Review  Outcome: Ongoing (interventions implemented as appropriate)    11/03/17 7032   Coping/Psychosocial Response Interventions   Plan Of Care Reviewed With patient   Outcome Evaluation   Outcome Summary/Follow up Plan no c/o, c/o anxiety ativan given with good relief, confused reoiented often, tries to get out of bed to go home to [facility], bed alarm in place, isolation for esbl and ecoli, scd in place, will continue to monitor   Patient Care Overview   Progress improving       Goal: Adult Individualization and Mutuality  Outcome: Ongoing (interventions implemented as appropriate)  Goal: Discharge Needs Assessment  Outcome: Ongoing (interventions implemented as appropriate)    Problem: Fall Risk (Adult)  Goal: Identify Related Risk Factors and Signs and Symptoms  Outcome: Ongoing (interventions implemented as appropriate)    Problem: Pain, Acute (Adult)  Goal: Identify Related Risk Factors and Signs and Symptoms  Outcome: Ongoing (interventions implemented as appropriate)  Goal: Acceptable Pain Control/Comfort Level  Outcome: Ongoing (interventions implemented as appropriate)  Goal: Identify Related Risk Factors and Signs and Symptoms  Outcome: Ongoing (interventions implemented as appropriate)  Goal: Acceptable Pain Control/Comfort Level  Outcome: Ongoing (interventions implemented as appropriate)    Problem: Pressure Ulcer Risk (Gilles Scale) (Adult,Obstetrics,Pediatric)  Goal: Identify Related Risk Factors and Signs and Symptoms  Outcome: Ongoing (interventions implemented as appropriate)  Goal: Skin Integrity  Outcome: Ongoing (interventions implemented as appropriate)

## 2017-11-03 NOTE — PROGRESS NOTES
Discharge Planning Assessment  [facility]     Patient Name: Bernardo Cox  MRN: [id number]  Today's Date: 11/3/2017    Admit Date: 11/1/2017    Discharge Plan       11/03/17 1557    Case Management/Social Work Plan    Plan [facility]     Additional Comments Called and spoke with Ronaldo, pt is from a LTC medicaid bed with a bedhold. Pkt on chart. CCP to follow.         Discharge Placement     Facility/Agency Request Status Selected? Address Phone Number Fax Number    [facility] Accepted    Yes [address] [place] [facility] [id number] [phone]                Sarah Sibley RN

## 2017-11-03 NOTE — CONSULTS
"Referring Provider: Elvin Tello MD  [address] [address]    Reason for Consultation: scabies    History of present illness:  Mrs Cox is a 87 YO who I am asked to evaluate and give opinion for possible scabies. History is obtained from the patient, RN, and review of the old medical records which I summarize/synthesize as follows: She says she passed out at home. Other notes state it was while urinating. She denies dysuria, suprapubic pain or fever. She says she has passed out previously without an underlying cause found. In the ER she was found to have an elevated BNP and due to syncope cardiology was consulted. A TTE is pending. A UA was done that showed 13-20 WBCs and 13-20 squamous cells. She was started on ceftriaxone. Her RN noticed some excoriations on her legs and the possibility of scabies was mentioned. Patient has no crusted areas. She says the areas do not itch. She does not know how the appeared. She thinks she probably does pick at them though. She has not seen a dermatologist for this problem. They are not painful.    PMH:  Alzheimers dementia  sCHF with EF 25%  COPD  Depression  DM2  GERD  HTN  CKD 3  TIA  CAD s/p CABG    Social History:  \"I worked at a hospital\"  Recently moved from [place] to [place]    Family History:  Mom: \" of old age\"    Allergies:    1. Sulfa - \"I don't remember\"    Medications:    Current Facility-Administered Medications:   •  aspirin chewable tablet 81 mg, 81 mg, Oral, Daily, Elvin Tello MD, 81 mg at 17 0801  •  atorvastatin (LIPITOR) tablet 10 mg, 10 mg, Oral, Nightly, Elvin Tello MD, 10 mg at 17  •  budesonide (PULMICORT) nebulizer solution 0.5 mg, 0.5 mg, Nebulization, BID, Elvin Tello MD, 0.5 mg at 17 075  •  carvedilol (COREG) tablet 25 mg, 25 mg, Oral, BID With Meals, Elvin Tello MD, 25 mg at 17 0758  •  cefTRIAXone (ROCEPHIN) IVPB 1 g, 1 g, Intravenous, Q24H, Elvin MD Elisha, Last Rate: 100 mL/hr at 17 " 2055, 1 g at 11/02/17 2055  •  clopidogrel (PLAVIX) tablet 75 mg, 75 mg, Oral, Daily, Elvin Tello MD, 75 mg at 11/03/17 0800  •  docusate sodium (COLACE) capsule 250 mg, 250 mg, Oral, Daily, Elvin Tello MD, 250 mg at 11/03/17 0800  •  donepezil (ARICEPT) tablet 10 mg, 10 mg, Oral, Nightly, Elvin Tello MD, 10 mg at 11/02/17 2055  •  enoxaparin (LOVENOX) syringe 70 mg, 70 mg, Subcutaneous, Q12H, Elvin Tello MD, 70 mg at 11/03/17 0105  •  escitalopram (LEXAPRO) tablet 10 mg, 10 mg, Oral, Daily, Elvin Tello MD, 10 mg at 11/03/17 0801  •  gabapentin (NEURONTIN) capsule 300 mg, 300 mg, Oral, BID, Elvin Tello MD, 300 mg at 11/03/17 0800  •  HYDROcodone-acetaminophen (NORCO) 5-325 MG per tablet 1 tablet, 1 tablet, Oral, Q6H PRN, Elvin Tello MD, 1 tablet at 11/02/17 1240  •  Influenza Vac Subunit Quad (FLUCELVAX) injection 0.5 mL, 0.5 mL, Intramuscular, During Hospitalization, Elvin Tello MD  •  insulin aspart (novoLOG) injection 0-7 Units, 0-7 Units, Subcutaneous, 4x Daily With Meals & Nightly, Elvin Tello MD, 3 Units at 11/02/17 2104  •  ipratropium-albuterol (DUO-NEB) nebulizer solution 3 mL, 3 mL, Nebulization, Q4H - RT, Elvin Tello MD, 3 mL at 11/03/17 0758  •  isosorbide mononitrate (IMDUR) 24 hr tablet 30 mg, 30 mg, Oral, Q24H, Elvin Tello MD, 30 mg at 11/03/17 0801  •  LORazepam (ATIVAN) injection 0.5 mg, 0.5 mg, Intravenous, Q4H PRN, Elvin Tello MD, 0.5 mg at 11/03/17 0926  •  losartan (COZAAR) tablet 50 mg, 50 mg, Oral, Daily, Elvin Tello MD, 50 mg at 11/03/17 0801  •  memantine (NAMENDA) tablet 10 mg, 10 mg, Oral, Q12H, Elvin Tello MD, 10 mg at 11/03/17 0800  •  mirtazapine (REMERON) tablet 15 mg, 15 mg, Oral, Nightly, Elvin Tello MD, 15 mg at 11/02/17 2055  •  pantoprazole (PROTONIX) EC tablet 40 mg, 40 mg, Oral, Q AM, Elvin Tello MD, 40 mg at 11/03/17 0516  •  Pharmacy to Dose enoxaparin (LOVENOX), , Does not apply, Continuous PRN, Elvin Tello MD  •  polyethylene glycol (MIRALAX) powder 17 g, 17  g, Oral, Daily, Elvin Tello MD, 17 g at 11/03/17 0800      Review of Systems  All systems were reviewed and are negative unless otherwise stated above in the HPI    Objective   Vital Signs   Temp:  [97.2 °F (36.2 °C)-99.4 °F (37.4 °C)] 97.2 °F (36.2 °C)  Heart Rate:  [63-99] 94  Resp:  [16-18] 16  BP: (142-209)/() 195/88    Physical Exam:   General: awake, alert, chronically ill appearing  Head: small laceration/excoriation on scalp  Eyes: PERRL, EOMI, no scleral icterus, ++ conjunctival pallor, no conjunctival hemorrhages.   ENT: MM dry, OP clear, no thrush. Poor dentition.   Neck: Supple, no visible thyromegaly  Cardiovascular: sternotomy healed, NR, RR, 1/6 BLAYNE at RUSB; trace LE edema  Respiratory: Lungs are clear to ascultation bilaterally, no rales or wheezing; normal work of breathing on ambient air  GI: Abdomen is soft, non-tender, non-distended, normal bowel sounds in all four quadrants; no hepatosplenomegaly, no masses palpated  : no Murguia catheter present  Musculoskeletal: no joint abnormalities, normal musculature  Skin: few excoriations and scabs on LEs; non-pruritic; nothing in webs of fingers or toe; no crusted lesions  Neurological: Alert and oriented x 3, cranial nerves 2-12 grossly intact, motor strength 4/5 in all four extremities  Psychiatric: Normal mood and affect   Lymph: no pre-auricular, post-auricular, submandibular, cervical, supraclavicular  LAD  Vasc: no cyanosis; PIV w/o erythema    Labs:     Lab Results   Component Value Date    WBC 8.65 11/03/2017    HGB 11.0 (L) 11/03/2017    HCT 35.7 11/03/2017    MCV 98.3 (H) 11/03/2017     11/03/2017       Lab Results   Component Value Date    GLUCOSE 61 (L) 11/03/2017    BUN 12 11/03/2017    CREATININE 0.79 11/03/2017    EGFRIFNONA 69 11/03/2017    EGFRIFAFRI  09/21/2016      Comment:      <15 Indicative of kidney failure.    BCR 15.2 11/03/2017    CO2 26.6 11/03/2017    CALCIUM 8.5 (L) 11/03/2017    ALBUMIN 2.90 (L) 11/03/2017     LABIL2 1.1 11/03/2017    AST 13 11/03/2017    ALT 11 11/03/2017     Lab Results   Component Value Date    HGBA1C 8.20 (H) 11/03/2017     UA 13-20 WBCs, 13-20 squamous cells  BNP 14,755  TSH 0.9    Microbiology:  11/1 UCx: 100k ESBL (sensitive to amikacin, erta, NTF, pip-tazo, tetracycline)    Radiology (personally reviewed images/report):  CXR negative for PNA    Assessment/Plan   1. Asymptomatic bacteriuria  -asymptomatic bacteriuria is extremely common on octogenarian women  -the patient did not present with urinary symptoms but rather syncope; ideally a UA/UCx would not be checked in this context  -UA only has 13-20 WBCs and 13-20 squamous cells which is a poor sample  -she has no systemic signs of infection  -stop antibiotics so as not to lead to adverse effects such as C diff or breeding further resistance    2. Lower extremity excoriation  -this is not scabies by appearance or by history  -she could see derm as an outpatient as they are the skin experts    3. Dm2 - A1c slightly above goal for age at 8.2%. However, her most recently Bld Glc was 61. Insulin management per primary team    4. Syncope  -cardiology following; TTE pending    5. CAD s/p CABG    Thank you for this consult. Please call ID at [phone] if further questions.

## 2017-11-03 NOTE — SIGNIFICANT NOTE
11/03/17 1239   Rehab Treatment   Discipline occupational therapist   Rehab Evaluation   Evaluation Not Performed patient unavailable for evaluation

## 2017-11-03 NOTE — PROGRESS NOTES
"CC: Syncope    Interval History:   Meds got out of breath even evening and low blood sugar today but it's been intermittently low.    Vital Signs  Temp:  [97.5 °F (36.4 °C)-99.4 °F (37.4 °C)] 97.5 °F (36.4 °C)  Heart Rate:  [63-89] 63  Resp:  [18] 18  BP: (142-166)/(75-84) 166/75    Intake/Output Summary (Last 24 hours) at 11/03/17 0741  Last data filed at 11/03/17 0523   Gross per 24 hour   Intake              820 ml   Output                0 ml   Net              820 ml     Flowsheet Rows         First Filed Value    Admission Height  66\" (167.6 cm) Documented at 11/01/2017 1158    Admission Weight  146 lb (66.2 kg) Documented at 11/01/2017 1158          PHYSICAL EXAM:  General: No acute distress  Resp:NL Rate, unlabored, Decreased in the bases  CV:NL rate and rhythm, NL PMI, Nl S1 and S2, no Mumur, no gallop, no rub, No JVD. Normal pedal pulses  ABD:Nl sounds, no masses or tenderness, nondistended, no quarding or rebound  Neuro: alert,cooperative and Not sure she's really oriented  Extr: No edema or cyanosis, moves all extremities      Results Review:      Results from last 7 days  Lab Units 11/03/17  0510   SODIUM mmol/L 141   POTASSIUM mmol/L 4.1   CHLORIDE mmol/L 104   CO2 mmol/L 26.6   BUN mg/dL 12   CREATININE mg/dL 0.79   GLUCOSE mg/dL 103*   CALCIUM mg/dL 8.5*       Results from last 7 days  Lab Units 11/03/17  0510 11/02/17  0302 11/01/17  2105   TROPONIN T ng/mL 0.023 0.052* 0.054*       Results from last 7 days  Lab Units 11/03/17  0510   WBC 10*3/mm3 8.65   HEMOGLOBIN g/dL 11.0*   HEMATOCRIT % 35.7   PLATELETS 10*3/mm3 206           Results from last 7 days  Lab Units 11/03/17  0510   CHOLESTEROL mg/dL 102           Results from last 7 days  Lab Units 11/03/17  0510   CHOLESTEROL mg/dL 102   TRIGLYCERIDES mg/dL 109   HDL CHOL mg/dL 48     @LABRCNT(bnp)@  I reviewed the patient's new clinical results.  I personally viewed and interpreted the patient's EKG/Telemetry data        Medication Review:   Meds " reviewed      Pharmacy to Dose enoxaparin (LOVENOX)        Assessment/Plan  1.  Syncope. Echocardiogram is pending.  She's had no events on her monitor she's really not a candidate for any type of intervention.  2.  Elevated troponin, as above  3.  Urinary tract infection on ceftriaxone  4.  Coronary artery disease with history of coronary bypass grafting and stenting  5.  Dementia  6.  Rash with questionable scabies.  On isolation  7.  Dyspnea proBNP is markedly elevated renal function is normal.  Again await her echo may need diuretics        Kem Rodriguez MD  11/03/17  7:41 AM

## 2017-11-03 NOTE — PROGRESS NOTES
"Daily progress note    Chief complaint  Doing better  Denies any chest pain    History of present illness  86-year-old white female with history of dementia coronary artery disease congestive heart failure COPD anxiety depression diabetes gastroesophageal reflux disease hypertension hyperlipidemia encephalopathy osteoarthritis who is a nursing home resident brought to the emergency room after the syncopal episode.  Patient denies any chest pain shortness of breath abdominal pain nausea vomiting diarrhea.  Patient workup in ER revealed UTI also have slightly elevated troponin level with no chest pain admitted for management.  No fever chills no shortness of breath no other complaint.     REVIEW OF SYSTEMS  Review of Systems   Unable to perform ROS: Dementia   Neurological: Positive for syncope.      PHYSICAL EXAM  Blood pressure 150/79, pulse 80, temperature 98.5 °F (36.9 °C), resp. rate 16, height 66\" (167.6 cm), weight 143 lb (64.9 kg), SpO2 94 %.    Constitutional: She is well-developed, well-nourished, and in no distress. No distress.   Head: Normocephalic and atraumatic.   Eyes: EOM are normal. Pupils are equal, round, and reactive to light.   Neck: Normal range of motion. Neck supple.   Cardiovascular: Normal rate, regular rhythm and normal heart sounds.    Pulmonary/Chest: Effort normal and breath sounds normal. No respiratory distress.   Abdominal: Soft. There is no tenderness. There is no rebound and no guarding.   Musculoskeletal: Normal range of motion. She exhibits no edema.   PT's legs and ankles bilaterally are wrapped.    Neurological: She is alert. She has normal sensation and normal strength.   Maybe mildly confused    Skin: Skin is warm and dry. No rash noted.   excoriated lesions to the face    Psychiatric: Mood and affect normal.      LAB RESULTS  Lab Results (last 24 hours)     Procedure Component Value Units Date/Time    POC Glucose Fingerstick [[id number]]  (Abnormal) Collected:  11/02/17 1652 "    Specimen:  Blood Updated:  11/02/17 1702     Glucose 161 (H) mg/dL     Narrative:       Meter: [id number] : [id number] Sahil Kay    POC Glucose Fingerstick [[id number]]  (Abnormal) Collected:  11/02/17 2057    Specimen:  Blood Updated:  11/02/17 2059     Glucose 220 (H) mg/dL     Narrative:       Meter: [id number] : [id number] Daisy Elexicia NA    POC Glucose Fingerstick [[id number]]  (Abnormal) Collected:  11/03/17 0336    Specimen:  Blood Updated:  11/03/17 0338     Glucose 67 (L) mg/dL     Narrative:       Meter: [id number] : [id number] Daisy Elexicia NA    POC Glucose Fingerstick [[id number]]  (Normal) Collected:  11/03/17 0420    Specimen:  Blood Updated:  11/03/17 0421     Glucose 110 mg/dL     Narrative:       Meter: [id number] : [id number] Daisy Bocanegraxicia NA    Hemoglobin A1c [[id number]]  (Abnormal) Collected:  11/03/17 0510    Specimen:  Blood Updated:  11/03/17 0605     Hemoglobin A1C 8.20 (H) %     Narrative:       Hemoglobin A1C Ranges:    Increased Risk for Diabetes  5.7% to 6.4%  Diabetes                     >= 6.5%  Diabetic Goal                < 7.0%    CBC & Differential [[id number]] Collected:  11/03/17 0510    Specimen:  Blood Updated:  11/03/17 0607    Narrative:       The following orders were created for panel order CBC & Differential.  Procedure                               Abnormality         Status                     ---------                               -----------         ------                     CBC Auto Differential[[id number]]        Abnormal            Final result                 Please view results for these tests on the individual orders.    CBC Auto Differential [[id number]]  (Abnormal) Collected:  11/03/17 0510    Specimen:  Blood Updated:  11/03/17 0607     WBC 8.65 10*3/mm3      RBC 3.63 (L) 10*6/mm3      Hemoglobin 11.0 (L) g/dL      Hematocrit 35.7 %      MCV 98.3 (H) fL      MCH 30.3 pg      MCHC 30.8 (L) g/dL      RDW 13.4 (H) %      RDW-SD 48.3 fl       MPV 9.8 fL      Platelets 206 10*3/mm3      Neutrophil % 70.6 %      Lymphocyte % 16.3 (L) %      Monocyte % 8.4 %      Eosinophil % 3.7 %      Basophil % 0.5 %      Immature Grans % 0.5 %      Neutrophils, Absolute 6.11 10*3/mm3      Lymphocytes, Absolute 1.41 10*3/mm3      Monocytes, Absolute 0.73 10*3/mm3      Eosinophils, Absolute 0.32 10*3/mm3      Basophils, Absolute 0.04 10*3/mm3      Immature Grans, Absolute 0.04 (H) 10*3/mm3     Lipid Panel [[id number]] Collected:  11/03/17 0510    Specimen:  Blood Updated:  11/03/17 0627     Total Cholesterol 102 mg/dL      Triglycerides 109 mg/dL      HDL Cholesterol 48 mg/dL      LDL Cholesterol  32 mg/dL      VLDL Cholesterol 21.8 mg/dL      LDL/HDL Ratio 0.67    Narrative:       Cholesterol Reference Ranges  (U.S. Department of Health and Human Services ATP III Classifications)    Desirable          <200 mg/dL  Borderline High    200-239 mg/dL  High Risk          >240 mg/dL      Triglyceride Reference Ranges  (U.S. Department of Health and Human Services ATP III Classifications)    Normal           <150 mg/dL  Borderline High  150-199 mg/dL  High             200-499 mg/dL  Very High        >500 mg/dL    HDL Reference Ranges  (U.S. Department of Health and Human Services ATP III Classifcations)    Low     <40 mg/dl (major risk factor for CHD)  High    >60 mg/dl ('negative' risk factor for CHD)        LDL Reference Ranges  (U.S. Department of Health and Human Services ATP III Classifcations)    Optimal          <100 mg/dL  Near Optimal     100-129 mg/dL  Borderline High  130-159 mg/dL  High             160-189 mg/dL  Very High        >189 mg/dL    BNP [[id number]]  (Abnormal) Collected:  11/03/17 0510    Specimen:  Blood Updated:  11/03/17 0637     proBNP 76555.0 (H) pg/mL     Narrative:       Among patients with dyspnea, NT-proBNP is highly sensitive for the detection of acute congestive heart failure. In addition NT-proBNP of <300 pg/ml effectively rules out acute  congestive heart failure with 99% negative predictive value.    TSH [[id number]]  (Normal) Collected:  11/03/17 0510    Specimen:  Blood Updated:  11/03/17 0637     TSH 0.912 mIU/mL     Troponin [[id number]]  (Normal) Collected:  11/03/17 0510    Specimen:  Blood Updated:  11/03/17 0637     Troponin T 0.023 ng/mL     Narrative:       Troponin T Reference Ranges:  Less than 0.03 ng/mL:    Negative for AMI  0.03 to 0.09 ng/mL:      Indeterminant for AMI  Greater than 0.09 ng/mL: Positive for AMI    Comprehensive Metabolic Panel [[id number]]  (Abnormal) Collected:  11/03/17 0510    Specimen:  Blood Updated:  11/03/17 0641     Glucose 103 (H) mg/dL      BUN 12 mg/dL      Creatinine 0.79 mg/dL      Sodium 141 mmol/L      Potassium 4.1 mmol/L      Chloride 104 mmol/L      CO2 26.6 mmol/L      Calcium 8.5 (L) mg/dL      Total Protein 5.6 (L) g/dL      Albumin 2.90 (L) g/dL      ALT (SGPT) 11 U/L      AST (SGOT) 13 U/L      Alkaline Phosphatase 67 U/L      Total Bilirubin 0.2 mg/dL      eGFR Non African Amer 69 mL/min/1.73      Globulin 2.7 gm/dL      A/G Ratio 1.1 g/dL      BUN/Creatinine Ratio 15.2     Anion Gap 10.4 mmol/L     Narrative:       The MDRD GFR formula is only valid for adults with stable renal function between ages 18 and 70.    Urine Culture - Urine, Urine, Clean Catch [[id number]]  (Abnormal)  (Susceptibility) Collected:  11/01/17 1332    Specimen:  Urine from Urine, Catheter Updated:  11/03/17 0646     Urine Culture --      >100,000 CFU/mL Escherichia coli ESBL (C)        Consider infectious disease consult.  Susceptibility results may not correlate to clinical outcomes.       Susceptibility      Escherichia coli ESBL     ARTHUR     Amikacin <=2 ug/ml Susceptible     Ampicillin >=32 ug/ml Resistant     Ampicillin + Sulbactam >=32 ug/ml Resistant     Cefazolin >=64 ug/ml Resistant     Cefepime 2 ug/ml Resistant     Ceftriaxone 32 ug/ml Resistant     Ciprofloxacin >=4 ug/ml Resistant     Ertapenem <=0.5 ug/ml  Susceptible     Gentamicin >=16 ug/ml Resistant     Levofloxacin >=8 ug/ml Resistant     Nitrofurantoin <=16 ug/ml Susceptible     Piperacillin + Tazobactam <=4 ug/ml Susceptible     Tetracycline <=1 ug/ml Susceptible     Trimethoprim + Sulfamethoxazole >=320 ug/ml Resistant                    POC Glucose Fingerstick [[id number]]  (Abnormal) Collected:  11/03/17 0730    Specimen:  Blood Updated:  11/03/17 0733     Glucose 56 (L) mg/dL     Narrative:       Meter: [id number] : [id number] Josette Thuy G    POC Glucose Fingerstick [[id number]]  (Abnormal) Collected:  11/03/17 0746    Specimen:  Blood Updated:  11/03/17 0749     Glucose 66 (L) mg/dL     Narrative:       Meter: [id number] : [id number] Josette Thuy G    POC Glucose Fingerstick [[id number]]  (Normal) Collected:  11/03/17 0815    Specimen:  Blood Updated:  11/03/17 0816     Glucose 127 mg/dL     Narrative:       Meter: [id number] : [id number] Josette Thuy G    Glucose, Random [[id number]]  (Abnormal) Collected:  11/03/17 0737    Specimen:  Blood Updated:  11/03/17 0948     Glucose 61 (L) mg/dL     POC Glucose Fingerstick [[id number]]  (Abnormal) Collected:  11/03/17 1256    Specimen:  Blood Updated:  11/03/17 1257     Glucose 270 (H) mg/dL     Narrative:       Meter: [id number] : [id number] Josette Thuy G        Imaging Results (last 72 hours)     Procedure Component Value Units Date/Time    XR Chest 1 View [[id number]] Collected:  11/01/17 1433     Updated:  11/01/17 1441    Narrative:       ONE VIEW PORTABLE CHEST     HISTORY: Syncope. Chest pain.     The lungs are well-expanded and clear. The heart is borderline enlarged  with sternal wires from previous cardiac surgery and no acute  abnormality is seen.     This report was finalized on 11/1/2017 2:38 PM by Dr. Kwadwo Plummer MD.           .  EKG                                                  Rhythm/Rate: sinus rhythm, 72  P waves and MA: normal  QRS, axis: NIVCD    ST and T waves:  Nonspecific T wave inversion       Current Facility-Administered Medications:   •  aspirin chewable tablet 81 mg, 81 mg, Oral, Daily, Elvin Tello MD, 81 mg at 11/03/17 0801  •  atorvastatin (LIPITOR) tablet 10 mg, 10 mg, Oral, Nightly, Elvin Tello MD, 10 mg at 11/02/17 2055  •  budesonide (PULMICORT) nebulizer solution 0.5 mg, 0.5 mg, Nebulization, BID, Elvin Tello MD, 0.5 mg at 11/03/17 0758  •  carvedilol (COREG) tablet 25 mg, 25 mg, Oral, BID With Meals, Elvin Tello MD, 25 mg at 11/03/17 0754  •  clopidogrel (PLAVIX) tablet 75 mg, 75 mg, Oral, Daily, Elvin Tello MD, 75 mg at 11/03/17 0800  •  docusate sodium (COLACE) capsule 250 mg, 250 mg, Oral, Daily, Elvin Tello MD, 250 mg at 11/03/17 0800  •  donepezil (ARICEPT) tablet 10 mg, 10 mg, Oral, Nightly, Elvin Tello MD, 10 mg at 11/02/17 2055  •  enoxaparin (LOVENOX) syringe 70 mg, 70 mg, Subcutaneous, Q12H, Elvin Tello MD, 70 mg at 11/03/17 1306  •  escitalopram (LEXAPRO) tablet 10 mg, 10 mg, Oral, Daily, Elvin Tello MD, 10 mg at 11/03/17 0801  •  gabapentin (NEURONTIN) capsule 300 mg, 300 mg, Oral, BID, Elvin Tello MD, 300 mg at 11/03/17 0800  •  HYDROcodone-acetaminophen (NORCO) 5-325 MG per tablet 1 tablet, 1 tablet, Oral, Q6H PRN, Elvin Tello MD, 1 tablet at 11/02/17 1240  •  Influenza Vac Subunit Quad (FLUCELVAX) injection 0.5 mL, 0.5 mL, Intramuscular, During Hospitalization, Elvin Tello MD  •  insulin aspart (novoLOG) injection 0-7 Units, 0-7 Units, Subcutaneous, 4x Daily With Meals & Nightly, Elvin Tello MD, 4 Units at 11/03/17 1306  •  ipratropium-albuterol (DUO-NEB) nebulizer solution 3 mL, 3 mL, Nebulization, Q4H - RT, Elvin Tello MD, 3 mL at 11/03/17 0758  •  isosorbide mononitrate (IMDUR) 24 hr tablet 30 mg, 30 mg, Oral, Q24H, Elvin Tello MD, 30 mg at 11/03/17 0801  •  LORazepam (ATIVAN) injection 0.5 mg, 0.5 mg, Intravenous, Q4H PRN, Elvin Tello MD, 0.5 mg at 11/03/17 0926  •  losartan (COZAAR) tablet 50 mg, 50 mg, Oral, Daily, Elvin  MD Elisha, 50 mg at 11/03/17 0801  •  memantine (NAMENDA) tablet 10 mg, 10 mg, Oral, Q12H, Queenie Tello MD, 10 mg at 11/03/17 0800  •  mirtazapine (REMERON) tablet 15 mg, 15 mg, Oral, Nightly, Queenie Tello MD, 15 mg at 11/02/17 2055  •  pantoprazole (PROTONIX) EC tablet 40 mg, 40 mg, Oral, Q AM, Queenie Tello MD, 40 mg at 11/03/17 0516  •  Pharmacy to Dose enoxaparin (LOVENOX), , Does not apply, Continuous PRN, Queenie Tello MD  •  polyethylene glycol (MIRALAX) powder 17 g, 17 g, Oral, Daily, Queenie Tello MD, 17 g at 11/03/17 0800     ASSESSMENT  Syncopal episode questionable etiology  UTI with ESBL Escherichia coli colonization NO treatment  Indeterminant troponin level with known coronary artery disease but no chest pain   COPD  History of congestive heart failure  Hypertension  Hyperlipidemia  Anxiety disorder  Depression  Dementia  Gastroesophageal reflux disease  Uncontrolled insulin-dependent diabetes mellitus mellitus  DNR    PLAN  CPM  Off antibiotics  Cardiology consult appreciated  Oxygen nitroglycerin aspirin beta blocker and Lovenox  Continue home medications   Supportive care  Stress ulcer DVT prophylaxis  DNR  Follow closely further recommendation according to hospital course    QUEENIE TELLO MD

## 2017-11-04 LAB
ANION GAP SERPL CALCULATED.3IONS-SCNC: 11.9 MMOL/L
BASOPHILS # BLD AUTO: 0.02 10*3/MM3 (ref 0–0.2)
BASOPHILS NFR BLD AUTO: 0.3 % (ref 0–1.5)
BUN BLD-MCNC: 13 MG/DL (ref 8–23)
BUN/CREAT SERPL: 16.5 (ref 7–25)
CALCIUM SPEC-SCNC: 8.6 MG/DL (ref 8.6–10.5)
CHLORIDE SERPL-SCNC: 96 MMOL/L (ref 98–107)
CO2 SERPL-SCNC: 29.1 MMOL/L (ref 22–29)
CREAT BLD-MCNC: 0.79 MG/DL (ref 0.57–1)
DEPRECATED RDW RBC AUTO: 45 FL (ref 37–54)
EOSINOPHIL # BLD AUTO: 0.25 10*3/MM3 (ref 0–0.7)
EOSINOPHIL NFR BLD AUTO: 3.9 % (ref 0.3–6.2)
ERYTHROCYTE [DISTWIDTH] IN BLOOD BY AUTOMATED COUNT: 12.9 % (ref 11.7–13)
GFR SERPL CREATININE-BSD FRML MDRD: 69 ML/MIN/1.73
GLUCOSE BLD-MCNC: 344 MG/DL (ref 65–99)
GLUCOSE BLDC GLUCOMTR-MCNC: 298 MG/DL (ref 70–130)
GLUCOSE BLDC GLUCOMTR-MCNC: 347 MG/DL (ref 70–130)
GLUCOSE BLDC GLUCOMTR-MCNC: 349 MG/DL (ref 70–130)
GLUCOSE BLDC GLUCOMTR-MCNC: 367 MG/DL (ref 70–130)
HCT VFR BLD AUTO: 38.1 % (ref 35.6–45.5)
HGB BLD-MCNC: 12 G/DL (ref 11.9–15.5)
IMM GRANULOCYTES # BLD: 0.02 10*3/MM3 (ref 0–0.03)
IMM GRANULOCYTES NFR BLD: 0.3 % (ref 0–0.5)
LYMPHOCYTES # BLD AUTO: 1.79 10*3/MM3 (ref 0.9–4.8)
LYMPHOCYTES NFR BLD AUTO: 27.9 % (ref 19.6–45.3)
MCH RBC QN AUTO: 30.2 PG (ref 26.9–32)
MCHC RBC AUTO-ENTMCNC: 31.5 G/DL (ref 32.4–36.3)
MCV RBC AUTO: 96 FL (ref 80.5–98.2)
MONOCYTES # BLD AUTO: 0.61 10*3/MM3 (ref 0.2–1.2)
MONOCYTES NFR BLD AUTO: 9.5 % (ref 5–12)
NEUTROPHILS # BLD AUTO: 3.72 10*3/MM3 (ref 1.9–8.1)
NEUTROPHILS NFR BLD AUTO: 58.1 % (ref 42.7–76)
PLATELET # BLD AUTO: 202 10*3/MM3 (ref 140–500)
PMV BLD AUTO: 9.6 FL (ref 6–12)
POTASSIUM BLD-SCNC: 4.3 MMOL/L (ref 3.5–5.2)
RBC # BLD AUTO: 3.97 10*6/MM3 (ref 3.9–5.2)
SODIUM BLD-SCNC: 137 MMOL/L (ref 136–145)
WBC NRBC COR # BLD: 6.41 10*3/MM3 (ref 4.5–10.7)

## 2017-11-04 PROCEDURE — 63710000001 INSULIN ASPART PER 5 UNITS: Performed by: HOSPITALIST

## 2017-11-04 PROCEDURE — 99232 SBSQ HOSP IP/OBS MODERATE 35: CPT | Performed by: INTERNAL MEDICINE

## 2017-11-04 PROCEDURE — 94799 UNLISTED PULMONARY SVC/PX: CPT

## 2017-11-04 PROCEDURE — 80048 BASIC METABOLIC PNL TOTAL CA: CPT | Performed by: HOSPITALIST

## 2017-11-04 PROCEDURE — 85025 COMPLETE CBC W/AUTO DIFF WBC: CPT | Performed by: HOSPITALIST

## 2017-11-04 PROCEDURE — 63710000001 INSULIN DETEMER PER 5 UNITS: Performed by: HOSPITALIST

## 2017-11-04 PROCEDURE — 25010000002 ENOXAPARIN PER 10 MG: Performed by: HOSPITALIST

## 2017-11-04 PROCEDURE — 82962 GLUCOSE BLOOD TEST: CPT

## 2017-11-04 RX ORDER — LOSARTAN POTASSIUM 50 MG/1
100 TABLET ORAL DAILY
Status: DISCONTINUED | OUTPATIENT
Start: 2017-11-05 | End: 2017-11-06 | Stop reason: HOSPADM

## 2017-11-04 RX ADMIN — DONEPEZIL HYDROCHLORIDE 10 MG: 10 TABLET, FILM COATED ORAL at 21:47

## 2017-11-04 RX ADMIN — INSULIN ASPART 4 UNITS: 100 INJECTION, SOLUTION INTRAVENOUS; SUBCUTANEOUS at 21:47

## 2017-11-04 RX ADMIN — IPRATROPIUM BROMIDE AND ALBUTEROL SULFATE 3 ML: .5; 3 SOLUTION RESPIRATORY (INHALATION) at 03:20

## 2017-11-04 RX ADMIN — PANTOPRAZOLE SODIUM 40 MG: 40 TABLET, DELAYED RELEASE ORAL at 05:47

## 2017-11-04 RX ADMIN — DOCUSATE SODIUM 250 MG: 250 CAPSULE, LIQUID FILLED ORAL at 08:20

## 2017-11-04 RX ADMIN — MIRTAZAPINE 15 MG: 15 TABLET, FILM COATED ORAL at 21:47

## 2017-11-04 RX ADMIN — INSULIN ASPART 5 UNITS: 100 INJECTION, SOLUTION INTRAVENOUS; SUBCUTANEOUS at 08:18

## 2017-11-04 RX ADMIN — CLOPIDOGREL 75 MG: 75 TABLET, FILM COATED ORAL at 08:19

## 2017-11-04 RX ADMIN — POLYETHYLENE GLYCOL 3350 17 G: 17 POWDER, FOR SOLUTION ORAL at 08:17

## 2017-11-04 RX ADMIN — ESCITALOPRAM 10 MG: 10 TABLET, FILM COATED ORAL at 08:19

## 2017-11-04 RX ADMIN — ENOXAPARIN SODIUM 70 MG: 80 INJECTION SUBCUTANEOUS at 05:17

## 2017-11-04 RX ADMIN — MEMANTINE HYDROCHLORIDE 10 MG: 10 TABLET, FILM COATED ORAL at 21:47

## 2017-11-04 RX ADMIN — GABAPENTIN 300 MG: 300 CAPSULE ORAL at 17:41

## 2017-11-04 RX ADMIN — HYDROCODONE BITARTRATE AND ACETAMINOPHEN 1 TABLET: 5; 325 TABLET ORAL at 05:47

## 2017-11-04 RX ADMIN — IPRATROPIUM BROMIDE AND ALBUTEROL SULFATE 3 ML: .5; 3 SOLUTION RESPIRATORY (INHALATION) at 07:53

## 2017-11-04 RX ADMIN — CARVEDILOL 25 MG: 25 TABLET, FILM COATED ORAL at 17:41

## 2017-11-04 RX ADMIN — LOSARTAN POTASSIUM 50 MG: 50 TABLET, FILM COATED ORAL at 08:19

## 2017-11-04 RX ADMIN — CARVEDILOL 25 MG: 25 TABLET, FILM COATED ORAL at 08:20

## 2017-11-04 RX ADMIN — MEMANTINE HYDROCHLORIDE 10 MG: 10 TABLET, FILM COATED ORAL at 08:20

## 2017-11-04 RX ADMIN — INSULIN DETEMIR 10 UNITS: 100 INJECTION, SOLUTION SUBCUTANEOUS at 21:44

## 2017-11-04 RX ADMIN — GABAPENTIN 300 MG: 300 CAPSULE ORAL at 08:20

## 2017-11-04 RX ADMIN — BUDESONIDE 0.5 MG: 0.5 INHALANT RESPIRATORY (INHALATION) at 07:53

## 2017-11-04 RX ADMIN — IPRATROPIUM BROMIDE AND ALBUTEROL SULFATE 3 ML: .5; 3 SOLUTION RESPIRATORY (INHALATION) at 15:45

## 2017-11-04 RX ADMIN — ENOXAPARIN SODIUM 70 MG: 80 INJECTION SUBCUTANEOUS at 13:50

## 2017-11-04 RX ADMIN — ISOSORBIDE MONONITRATE 30 MG: 30 TABLET ORAL at 08:19

## 2017-11-04 RX ADMIN — INSULIN ASPART 5 UNITS: 100 INJECTION, SOLUTION INTRAVENOUS; SUBCUTANEOUS at 11:48

## 2017-11-04 RX ADMIN — ATORVASTATIN CALCIUM 10 MG: 10 TABLET, FILM COATED ORAL at 21:47

## 2017-11-04 RX ADMIN — ASPIRIN 81 MG: 81 TABLET, CHEWABLE ORAL at 08:19

## 2017-11-04 RX ADMIN — INSULIN ASPART 6 UNITS: 100 INJECTION, SOLUTION INTRAVENOUS; SUBCUTANEOUS at 17:40

## 2017-11-04 NOTE — PLAN OF CARE
Problem: Patient Care Overview (Adult)  Goal: Plan of Care Review  Outcome: Ongoing (interventions implemented as appropriate)    11/04/17 0319   Coping/Psychosocial Response Interventions   Plan Of Care Reviewed With patient   Outcome Evaluation   Outcome Summary/Follow up Plan pt remains disoriented x4, mood varies; refused all medications earlier in shift due to confusion, paranoia despite efforts to reattempt,reorient,& different staff; pt attempted to get oob numerous times during the beginning of the shift; required one dose of prn iv ativan which was effective and pt has been resting well; retimed 2am lovenox to 6am due to sleeping soundly & risk of agitation & previous combative behavior with insulin injection which was not given due to refusal/combative behavior; scattered abrasions open due to self infliction causing bleeding; applied JARRETT and bandaides to few open areas as pt allowed; accumax with pump in place to bed; refusing telemetry, pulse ox probe,& SCD's as pt consistently removed earlier in shift; plan for return to [facility] snf when appropriate.   Patient Care Overview   Progress improving       Goal: Adult Individualization and Mutuality  Outcome: Ongoing (interventions implemented as appropriate)    Problem: Fall Risk (Adult)  Goal: Identify Related Risk Factors and Signs and Symptoms  Outcome: Outcome(s) achieved Date Met:  11/04/17  Goal: Absence of Falls  Outcome: Ongoing (interventions implemented as appropriate)    Problem: Pain, Acute (Adult)  Goal: Identify Related Risk Factors and Signs and Symptoms  Outcome: Outcome(s) achieved Date Met:  11/04/17  Goal: Acceptable Pain Control/Comfort Level  Outcome: Ongoing (interventions implemented as appropriate)    Problem: Pressure Ulcer Risk (Gilles Scale) (Adult,Obstetrics,Pediatric)  Goal: Identify Related Risk Factors and Signs and Symptoms  Outcome: Outcome(s) achieved Date Met:  11/04/17  Goal: Skin Integrity  Outcome: Ongoing  (interventions implemented as appropriate)    Problem: Skin Integrity Impairment, Risk/Actual (Adult)  Goal: Identify Related Risk Factors and Signs and Symptoms  Outcome: Outcome(s) achieved Date Met:  11/04/17  Goal: Skin Integrity/Wound Healing  Outcome: Ongoing (interventions implemented as appropriate)

## 2017-11-04 NOTE — PROGRESS NOTES
"Daily progress note    Chief complaint  Doing better  No new complaints    History of present illness  86-year-old white female with history of dementia coronary artery disease congestive heart failure COPD anxiety depression diabetes gastroesophageal reflux disease hypertension hyperlipidemia encephalopathy osteoarthritis who is a nursing home resident brought to the emergency room after the syncopal episode.  Patient denies any chest pain shortness of breath abdominal pain nausea vomiting diarrhea.  Patient workup in ER revealed UTI also have slightly elevated troponin level with no chest pain admitted for management.  No fever chills no shortness of breath no other complaint.     REVIEW OF SYSTEMS  Review of Systems   Unable to perform ROS: Dementia   Neurological: Positive for syncope.      PHYSICAL EXAM  Blood pressure 163/81, pulse 74, temperature 96.9 °F (36.1 °C), temperature source Oral, resp. rate 16, height 66\" (167.6 cm), weight 143 lb 12.8 oz (65.2 kg), SpO2 94 %.    Constitutional: She is well-developed, well-nourished, and in no distress. No distress.   Head: Normocephalic and atraumatic.   Eyes: EOM are normal. Pupils are equal, round, and reactive to light.   Neck: Normal range of motion. Neck supple.   Cardiovascular: Normal rate, regular rhythm and normal heart sounds.    Pulmonary/Chest: Effort normal and breath sounds normal. No respiratory distress.   Abdominal: Soft. There is no tenderness. There is no rebound and no guarding.   Musculoskeletal: Normal range of motion. She exhibits no edema.   PT's legs and ankles bilaterally are wrapped.    Neurological: She is alert. She has normal sensation and normal strength.   Maybe mildly confused    Skin: Skin is warm and dry. No rash noted.   excoriated lesions to the face    Psychiatric: Mood and affect normal.      LAB RESULTS  Lab Results (last 24 hours)     Procedure Component Value Units Date/Time    POC Glucose Fingerstick [[id number]]  " (Abnormal) Collected:  11/03/17 1622    Specimen:  Blood Updated:  11/03/17 1623     Glucose 226 (H) mg/dL     Narrative:       Meter: [id number] : [id number] [employer] G    POC Glucose Fingerstick [[id number]]  (Abnormal) Collected:  11/03/17 1928    Specimen:  Blood Updated:  11/03/17 1929     Glucose 218 (H) mg/dL     Narrative:       Meter: [id number] : [id number] Christi LOPEZ    CBC & Differential [[id number]] Collected:  11/04/17 0518    Specimen:  Blood Updated:  11/04/17 0543    Narrative:       The following orders were created for panel order CBC & Differential.  Procedure                               Abnormality         Status                     ---------                               -----------         ------                     CBC Auto Differential[[id number]]        Abnormal            Final result                 Please view results for these tests on the individual orders.    CBC Auto Differential [[id number]]  (Abnormal) Collected:  11/04/17 0518    Specimen:  Blood Updated:  11/04/17 0543     WBC 6.41 10*3/mm3      RBC 3.97 10*6/mm3      Hemoglobin 12.0 g/dL      Hematocrit 38.1 %      MCV 96.0 fL      MCH 30.2 pg      MCHC 31.5 (L) g/dL      RDW 12.9 %      RDW-SD 45.0 fl      MPV 9.6 fL      Platelets 202 10*3/mm3      Neutrophil % 58.1 %      Lymphocyte % 27.9 %      Monocyte % 9.5 %      Eosinophil % 3.9 %      Basophil % 0.3 %      Immature Grans % 0.3 %      Neutrophils, Absolute 3.72 10*3/mm3      Lymphocytes, Absolute 1.79 10*3/mm3      Monocytes, Absolute 0.61 10*3/mm3      Eosinophils, Absolute 0.25 10*3/mm3      Basophils, Absolute 0.02 10*3/mm3      Immature Grans, Absolute 0.02 10*3/mm3     Basic Metabolic Panel [[id number]]  (Abnormal) Collected:  11/04/17 0518    Specimen:  Blood Updated:  11/04/17 0617     Glucose 344 (H) mg/dL      BUN 13 mg/dL      Creatinine 0.79 mg/dL      Sodium 137 mmol/L      Potassium 4.3 mmol/L      Chloride 96 (L) mmol/L      CO2 29.1 (H)  mmol/L      Calcium 8.6 mg/dL      eGFR Non African Amer 69 mL/min/1.73      BUN/Creatinine Ratio 16.5     Anion Gap 11.9 mmol/L     Narrative:       The MDRD GFR formula is only valid for adults with stable renal function between ages 18 and 70.    POC Glucose Fingerstick [[id number]]  (Abnormal) Collected:  11/04/17 0734    Specimen:  Blood Updated:  11/04/17 0738     Glucose 349 (H) mg/dL     Narrative:       Meter: [id number] : [id number] Gonzales Akbar    POC Glucose Fingerstick [[id number]]  (Abnormal) Collected:  11/04/17 1046    Specimen:  Blood Updated:  11/04/17 1048     Glucose 347 (H) mg/dL     Narrative:       Meter: [id number] : [id number] Gonzales Akbar        Imaging Results (last 72 hours)     Procedure Component Value Units Date/Time    XR Chest 1 View [[id number]] Collected:  11/01/17 1433     Updated:  11/01/17 1441    Narrative:       ONE VIEW PORTABLE CHEST     HISTORY: Syncope. Chest pain.     The lungs are well-expanded and clear. The heart is borderline enlarged  with sternal wires from previous cardiac surgery and no acute  abnormality is seen.     This report was finalized on 11/1/2017 2:38 PM by Dr. Kwadwo Plummer MD.           .  EKG                                                  Rhythm/Rate: sinus rhythm, 72  P waves and NE: normal  QRS, axis: NIVCD    ST and T waves: Nonspecific T wave inversion       Current Facility-Administered Medications:   •  aspirin chewable tablet 81 mg, 81 mg, Oral, Daily, Elvin Tello MD, 81 mg at 11/04/17 0819  •  atorvastatin (LIPITOR) tablet 10 mg, 10 mg, Oral, Nightly, Elvin Tello MD, 10 mg at 11/02/17 2055  •  budesonide (PULMICORT) nebulizer solution 0.5 mg, 0.5 mg, Nebulization, BID, Elvin Tello MD, 0.5 mg at 11/04/17 0753  •  carvedilol (COREG) tablet 25 mg, 25 mg, Oral, BID With Meals, Elvin Tello MD, 25 mg at 11/04/17 0820  •  clopidogrel (PLAVIX) tablet 75 mg, 75 mg, Oral, Daily, Elvin MD Elisha, 75 mg at 11/04/17 0819  •   docusate sodium (COLACE) capsule 250 mg, 250 mg, Oral, Daily, Elvin Tello MD, 250 mg at 11/04/17 0820  •  donepezil (ARICEPT) tablet 10 mg, 10 mg, Oral, Nightly, Elvin Tello MD, 10 mg at 11/02/17 2055  •  enoxaparin (LOVENOX) syringe 70 mg, 70 mg, Subcutaneous, Q12H, Elvin Tello MD, 70 mg at 11/04/17 1350  •  escitalopram (LEXAPRO) tablet 10 mg, 10 mg, Oral, Daily, Elvin Tello MD, 10 mg at 11/04/17 0819  •  gabapentin (NEURONTIN) capsule 300 mg, 300 mg, Oral, BID, Elvin Tello MD, 300 mg at 11/04/17 0820  •  HYDROcodone-acetaminophen (NORCO) 5-325 MG per tablet 1 tablet, 1 tablet, Oral, Q6H PRN, Elvin Tello MD, 1 tablet at 11/04/17 0547  •  Influenza Vac Subunit Quad (FLUCELVAX) injection 0.5 mL, 0.5 mL, Intramuscular, During Hospitalization, Elvin Tello MD  •  insulin aspart (novoLOG) injection 0-7 Units, 0-7 Units, Subcutaneous, 4x Daily With Meals & Nightly, Elvin Tello MD, 5 Units at 11/04/17 1148  •  ipratropium-albuterol (DUO-NEB) nebulizer solution 3 mL, 3 mL, Nebulization, Q4H - RT, Elvin Tello MD, 3 mL at 11/04/17 0753  •  isosorbide mononitrate (IMDUR) 24 hr tablet 30 mg, 30 mg, Oral, Q24H, Elvin Tello MD, 30 mg at 11/04/17 0819  •  LORazepam (ATIVAN) injection 0.5 mg, 0.5 mg, Intravenous, Q4H PRN, Elvin Tello MD, 0.5 mg at 11/03/17 2104  •  losartan (COZAAR) tablet 50 mg, 50 mg, Oral, Daily, Elvin Tello MD, 50 mg at 11/04/17 0819  •  memantine (NAMENDA) tablet 10 mg, 10 mg, Oral, Q12H, Elvin Tello MD, 10 mg at 11/04/17 0820  •  mirtazapine (REMERON) tablet 15 mg, 15 mg, Oral, Nightly, Elvin Tello MD, 15 mg at 11/02/17 2055  •  pantoprazole (PROTONIX) EC tablet 40 mg, 40 mg, Oral, Q AM, Elvin Tello MD, 40 mg at 11/04/17 0547  •  Pharmacy to Dose enoxaparin (LOVENOX), , Does not apply, Continuous PRN, Elvin Tello MD  •  polyethylene glycol (MIRALAX) powder 17 g, 17 g, Oral, Daily, Elvin Tello MD, 17 g at 11/04/17 0817     ASSESSMENT  Syncopal episode questionable etiology  UTI with ESBL  Escherichia coli colonization NO treatment  Indeterminant troponin level with known coronary artery disease but NO ACS  COPD  History of congestive heart failure  Hypertension  Hyperlipidemia  Anxiety disorder  Depression  Dementia  Gastroesophageal reflux disease  Uncontrolled insulin-dependent diabetes mellitus mellitus  DNR    PLAN  CPM  Off antibiotics  Adjust medications  Supportive care  Stress ulcer DVT prophylaxis  DNR  PT/OT  Discharge soon    QUEENIE WRIGHT MD

## 2017-11-04 NOTE — PLAN OF CARE
Problem: Patient Care Overview (Adult)  Goal: Plan of Care Review  Outcome: Ongoing (interventions implemented as appropriate)    11/04/17 5400   Coping/Psychosocial Response Interventions   Plan Of Care Reviewed With patient   Outcome Evaluation   Outcome Summary/Follow up Plan disoriented and anxious at times, paranioa at times noted, multiple scabbed areas that she pickes at and causes to bleed at times, blood sugars remained high this shift, covered per sliding scale, refuses telemetry at times and scds all the time          Problem: Fall Risk (Adult)  Goal: Absence of Falls  Outcome: Ongoing (interventions implemented as appropriate)    Problem: Pain, Acute (Adult)  Goal: Acceptable Pain Control/Comfort Level  Outcome: Ongoing (interventions implemented as appropriate)  Goal: Identify Related Risk Factors and Signs and Symptoms  Outcome: Ongoing (interventions implemented as appropriate)  Goal: Acceptable Pain Control/Comfort Level  Outcome: Ongoing (interventions implemented as appropriate)

## 2017-11-04 NOTE — PROGRESS NOTES
"CC: Syncope    Interval History:   Unable to give a history.    Vital Signs  Temp:  [96.9 °F (36.1 °C)-98.9 °F (37.2 °C)] 96.9 °F (36.1 °C)  Heart Rate:  [76-94] 84  Resp:  [16-20] 16  BP: (146-195)/(58-88) 163/81    Intake/Output Summary (Last 24 hours) at 11/04/17 0741  Last data filed at 11/04/17 0548   Gross per 24 hour   Intake              820 ml   Output                0 ml   Net              820 ml     Flowsheet Rows         First Filed Value    Admission Height  66\" (167.6 cm) Documented at 11/01/2017 1158    Admission Weight  146 lb (66.2 kg) Documented at 11/01/2017 1158          PHYSICAL EXAM:  General: No acute distress  Resp:NL Rate, unlabored, Decreased in the bases  CV:NL rate and rhythm, NL PMI, Nl S1 and S2, no Mumur, no gallop, no rub, No JVD. Normal pedal pulses  ABD:Nl sounds, no masses or tenderness, nondistended, no quarding or rebound  Neuro: alert,cooperative and Not sure she's really oriented  Extr: No edema or cyanosis, moves all extremities      Results Review:      Results from last 7 days  Lab Units 11/04/17  0518   SODIUM mmol/L 137   POTASSIUM mmol/L 4.3   CHLORIDE mmol/L 96*   CO2 mmol/L 29.1*   BUN mg/dL 13   CREATININE mg/dL 0.79   GLUCOSE mg/dL 344*   CALCIUM mg/dL 8.6       Results from last 7 days  Lab Units 11/03/17  0510 11/02/17  0302 11/01/17  2105   TROPONIN T ng/mL 0.023 0.052* 0.054*       Results from last 7 days  Lab Units 11/04/17  0518   WBC 10*3/mm3 6.41   HEMOGLOBIN g/dL 12.0   HEMATOCRIT % 38.1   PLATELETS 10*3/mm3 202           Results from last 7 days  Lab Units 11/03/17  0510   CHOLESTEROL mg/dL 102           Results from last 7 days  Lab Units 11/03/17  0510   CHOLESTEROL mg/dL 102   TRIGLYCERIDES mg/dL 109   HDL CHOL mg/dL 48     @LABRCNT(bnp)@  I reviewed the patient's new clinical results.  I personally viewed and interpreted the patient's EKG/Telemetry data        Medication Review:   Meds reviewed      Pharmacy to Dose enoxaparin (LOVENOX)  "       Assessment/Plan  1.  Syncope. Echocardiogram Preserved ejection fraction but segmental wall motion abnormality however not sure that any type of aggressive or further evaluation would be in the patient's best interest we'll continue the same.  We will see when necessary  2. history of coronary artery disease with coronary bypass grafting and stenting.  As above   5.  Dementia  6.  Rash with questionable scabies.  On isolation  7.   acute diastolic heart failure would continue low-dose diuretics.  As above we will see when necessary         Kem Rodriguez MD  11/04/17  7:41 AM

## 2017-11-05 LAB
ANION GAP SERPL CALCULATED.3IONS-SCNC: 13.2 MMOL/L
BUN BLD-MCNC: 11 MG/DL (ref 8–23)
BUN/CREAT SERPL: 14.1 (ref 7–25)
CALCIUM SPEC-SCNC: 8.8 MG/DL (ref 8.6–10.5)
CHLORIDE SERPL-SCNC: 96 MMOL/L (ref 98–107)
CO2 SERPL-SCNC: 27.8 MMOL/L (ref 22–29)
CREAT BLD-MCNC: 0.78 MG/DL (ref 0.57–1)
GFR SERPL CREATININE-BSD FRML MDRD: 70 ML/MIN/1.73
GLUCOSE BLD-MCNC: 332 MG/DL (ref 65–99)
GLUCOSE BLDC GLUCOMTR-MCNC: 335 MG/DL (ref 70–130)
GLUCOSE BLDC GLUCOMTR-MCNC: 335 MG/DL (ref 70–130)
GLUCOSE BLDC GLUCOMTR-MCNC: 337 MG/DL (ref 70–130)
GLUCOSE BLDC GLUCOMTR-MCNC: 395 MG/DL (ref 70–130)
POTASSIUM BLD-SCNC: 4.4 MMOL/L (ref 3.5–5.2)
SODIUM BLD-SCNC: 137 MMOL/L (ref 136–145)

## 2017-11-05 PROCEDURE — 94799 UNLISTED PULMONARY SVC/PX: CPT

## 2017-11-05 PROCEDURE — 63710000001 INSULIN ASPART PER 5 UNITS: Performed by: HOSPITALIST

## 2017-11-05 PROCEDURE — 82962 GLUCOSE BLOOD TEST: CPT

## 2017-11-05 PROCEDURE — 25010000002 LORAZEPAM PER 2 MG: Performed by: HOSPITALIST

## 2017-11-05 PROCEDURE — 25010000002 ENOXAPARIN PER 10 MG: Performed by: HOSPITALIST

## 2017-11-05 PROCEDURE — 80048 BASIC METABOLIC PNL TOTAL CA: CPT | Performed by: HOSPITALIST

## 2017-11-05 PROCEDURE — 63710000001 INSULIN DETEMER PER 5 UNITS: Performed by: HOSPITALIST

## 2017-11-05 RX ADMIN — LOSARTAN POTASSIUM 100 MG: 50 TABLET, FILM COATED ORAL at 09:19

## 2017-11-05 RX ADMIN — GABAPENTIN 300 MG: 300 CAPSULE ORAL at 17:46

## 2017-11-05 RX ADMIN — IPRATROPIUM BROMIDE AND ALBUTEROL SULFATE 3 ML: .5; 3 SOLUTION RESPIRATORY (INHALATION) at 20:02

## 2017-11-05 RX ADMIN — INSULIN DETEMIR 20 UNITS: 100 INJECTION, SOLUTION SUBCUTANEOUS at 21:01

## 2017-11-05 RX ADMIN — MEMANTINE HYDROCHLORIDE 10 MG: 10 TABLET, FILM COATED ORAL at 21:00

## 2017-11-05 RX ADMIN — INSULIN ASPART 5 UNITS: 100 INJECTION, SOLUTION INTRAVENOUS; SUBCUTANEOUS at 09:20

## 2017-11-05 RX ADMIN — ATORVASTATIN CALCIUM 10 MG: 10 TABLET, FILM COATED ORAL at 21:00

## 2017-11-05 RX ADMIN — HYDROCODONE BITARTRATE AND ACETAMINOPHEN 1 TABLET: 5; 325 TABLET ORAL at 21:03

## 2017-11-05 RX ADMIN — ENOXAPARIN SODIUM 40 MG: 40 INJECTION SUBCUTANEOUS at 15:24

## 2017-11-05 RX ADMIN — ASPIRIN 81 MG: 81 TABLET, CHEWABLE ORAL at 09:20

## 2017-11-05 RX ADMIN — POLYETHYLENE GLYCOL 3350 17 G: 17 POWDER, FOR SOLUTION ORAL at 11:46

## 2017-11-05 RX ADMIN — INSULIN ASPART 5 UNITS: 100 INJECTION, SOLUTION INTRAVENOUS; SUBCUTANEOUS at 11:46

## 2017-11-05 RX ADMIN — IPRATROPIUM BROMIDE AND ALBUTEROL SULFATE 3 ML: .5; 3 SOLUTION RESPIRATORY (INHALATION) at 07:08

## 2017-11-05 RX ADMIN — ESCITALOPRAM 10 MG: 10 TABLET, FILM COATED ORAL at 09:20

## 2017-11-05 RX ADMIN — HYDROCODONE BITARTRATE AND ACETAMINOPHEN 1 TABLET: 5; 325 TABLET ORAL at 15:24

## 2017-11-05 RX ADMIN — INSULIN ASPART 5 UNITS: 100 INJECTION, SOLUTION INTRAVENOUS; SUBCUTANEOUS at 17:46

## 2017-11-05 RX ADMIN — GABAPENTIN 300 MG: 300 CAPSULE ORAL at 09:19

## 2017-11-05 RX ADMIN — BUDESONIDE 0.5 MG: 0.5 INHALANT RESPIRATORY (INHALATION) at 07:08

## 2017-11-05 RX ADMIN — HYDROCODONE BITARTRATE AND ACETAMINOPHEN 1 TABLET: 5; 325 TABLET ORAL at 09:36

## 2017-11-05 RX ADMIN — INSULIN ASPART 6 UNITS: 100 INJECTION, SOLUTION INTRAVENOUS; SUBCUTANEOUS at 21:00

## 2017-11-05 RX ADMIN — CARVEDILOL 25 MG: 25 TABLET, FILM COATED ORAL at 17:46

## 2017-11-05 RX ADMIN — DONEPEZIL HYDROCHLORIDE 10 MG: 10 TABLET, FILM COATED ORAL at 21:00

## 2017-11-05 RX ADMIN — BUDESONIDE 0.5 MG: 0.5 INHALANT RESPIRATORY (INHALATION) at 20:02

## 2017-11-05 RX ADMIN — IPRATROPIUM BROMIDE AND ALBUTEROL SULFATE 3 ML: .5; 3 SOLUTION RESPIRATORY (INHALATION) at 14:53

## 2017-11-05 RX ADMIN — CARVEDILOL 25 MG: 25 TABLET, FILM COATED ORAL at 09:20

## 2017-11-05 RX ADMIN — MEMANTINE HYDROCHLORIDE 10 MG: 10 TABLET, FILM COATED ORAL at 09:20

## 2017-11-05 RX ADMIN — LORAZEPAM 0.5 MG: 2 INJECTION INTRAMUSCULAR; INTRAVENOUS at 22:19

## 2017-11-05 RX ADMIN — CLOPIDOGREL 75 MG: 75 TABLET, FILM COATED ORAL at 09:20

## 2017-11-05 RX ADMIN — MIRTAZAPINE 15 MG: 15 TABLET, FILM COATED ORAL at 21:00

## 2017-11-05 RX ADMIN — DOCUSATE SODIUM 250 MG: 250 CAPSULE, LIQUID FILLED ORAL at 09:20

## 2017-11-05 NOTE — PROGRESS NOTES
"Daily progress note    Chief complaint  Doing better  No new complaints    History of present illness  86-year-old white female with history of dementia coronary artery disease congestive heart failure COPD anxiety depression diabetes gastroesophageal reflux disease hypertension hyperlipidemia encephalopathy osteoarthritis who is a nursing home resident brought to the emergency room after the syncopal episode.  Patient denies any chest pain shortness of breath abdominal pain nausea vomiting diarrhea.  Patient workup in ER revealed UTI also have slightly elevated troponin level with no chest pain admitted for management.  No fever chills no shortness of breath no other complaint.     REVIEW OF SYSTEMS  Review of Systems   Unable to perform ROS: Dementia   Neurological: Positive for syncope.      PHYSICAL EXAM  Blood pressure 110/70, pulse 51, temperature 98.2 °F (36.8 °C), temperature source Oral, resp. rate 20, height 66\" (167.6 cm), weight 142 lb 9.6 oz (64.7 kg), SpO2 94 %.    Constitutional: She is well-developed, well-nourished, and in no distress. No distress.   Head: Normocephalic and atraumatic.   Eyes: EOM are normal. Pupils are equal, round, and reactive to light.   Neck: Normal range of motion. Neck supple.   Cardiovascular: Normal rate, regular rhythm and normal heart sounds.    Pulmonary/Chest: Effort normal and breath sounds normal. No respiratory distress.   Abdominal: Soft. There is no tenderness. There is no rebound and no guarding.   Musculoskeletal: Normal range of motion. She exhibits no edema.   PT's legs and ankles bilaterally are wrapped.    Neurological: She is alert. She has normal sensation and normal strength.   Maybe mildly confused    Skin: Skin is warm and dry. No rash noted.   excoriated lesions to the face    Psychiatric: Mood and affect normal.      LAB RESULTS  Lab Results (last 24 hours)     Procedure Component Value Units Date/Time    POC Glucose Fingerstick [[id number]]  (Abnormal) " Collected:  11/04/17 1609    Specimen:  Blood Updated:  11/04/17 1613     Glucose 367 (H) mg/dL     Narrative:       Meter: [id number] : [id number] Angelique Mccurdy    POC Glucose Fingerstick [[id number]]  (Abnormal) Collected:  11/04/17 2122    Specimen:  Blood Updated:  11/04/17 2125     Glucose 298 (H) mg/dL     Narrative:       INFORMED Meter: [id number] : [id number] Elisha Eloise NA    Basic Metabolic Panel [[id number]]  (Abnormal) Collected:  11/05/17 0540    Specimen:  Blood Updated:  11/05/17 0618     Glucose 332 (H) mg/dL      BUN 11 mg/dL      Creatinine 0.78 mg/dL      Sodium 137 mmol/L      Potassium 4.4 mmol/L      Chloride 96 (L) mmol/L      CO2 27.8 mmol/L      Calcium 8.8 mg/dL      eGFR Non African Amer 70 mL/min/1.73      BUN/Creatinine Ratio 14.1     Anion Gap 13.2 mmol/L     Narrative:       The MDRD GFR formula is only valid for adults with stable renal function between ages 18 and 70.    POC Glucose Fingerstick [[id number]]  (Abnormal) Collected:  11/05/17 0731    Specimen:  Blood Updated:  11/05/17 0738     Glucose 335 (H) mg/dL     Narrative:       Meter: [id number] : [id number] Omayra MASTERS    POC Glucose Fingerstick [[id number]]  (Abnormal) Collected:  11/05/17 1116    Specimen:  Blood Updated:  11/05/17 1123     Glucose 337 (H) mg/dL     Narrative:       Meter: [id number] : [id number] Omayra MASTERS        Imaging Results (last 72 hours)     Procedure Component Value Units Date/Time    XR Chest 1 View [[id number]] Collected:  11/01/17 1433     Updated:  11/01/17 1441    Narrative:       ONE VIEW PORTABLE CHEST     HISTORY: Syncope. Chest pain.     The lungs are well-expanded and clear. The heart is borderline enlarged  with sternal wires from previous cardiac surgery and no acute  abnormality is seen.     This report was finalized on 11/1/2017 2:38 PM by Dr. Kwadwo Plummer MD.           .  EKG                                                  Rhythm/Rate: sinus  rhythm, 72  P waves and [place]: normal  QRS, axis: NIVCD    ST and T waves: Nonspecific T wave inversion       Current Facility-Administered Medications:   •  aspirin chewable tablet 81 mg, 81 mg, Oral, Daily, Elvin Tello MD, 81 mg at 11/05/17 0920  •  atorvastatin (LIPITOR) tablet 10 mg, 10 mg, Oral, Nightly, Elvin Tello MD, 10 mg at 11/04/17 2147  •  budesonide (PULMICORT) nebulizer solution 0.5 mg, 0.5 mg, Nebulization, BID, Elvin Tello MD, 0.5 mg at 11/05/17 0708  •  carvedilol (COREG) tablet 25 mg, 25 mg, Oral, BID With Meals, Elvin Tello MD, 25 mg at 11/05/17 0920  •  clopidogrel (PLAVIX) tablet 75 mg, 75 mg, Oral, Daily, Elvin Tello MD, 75 mg at 11/05/17 0920  •  docusate sodium (COLACE) capsule 250 mg, 250 mg, Oral, Daily, Elvin Tello MD, 250 mg at 11/05/17 0920  •  donepezil (ARICEPT) tablet 10 mg, 10 mg, Oral, Nightly, Elvin Tello MD, 10 mg at 11/04/17 2147  •  enoxaparin (LOVENOX) syringe 40 mg, 40 mg, Subcutaneous, Q24H, Elvin Tello MD  •  escitalopram (LEXAPRO) tablet 10 mg, 10 mg, Oral, Daily, Elvin Tello MD, 10 mg at 11/05/17 0920  •  gabapentin (NEURONTIN) capsule 300 mg, 300 mg, Oral, BID, Elvin Tello MD, 300 mg at 11/05/17 0919  •  HYDROcodone-acetaminophen (NORCO) 5-325 MG per tablet 1 tablet, 1 tablet, Oral, Q6H PRN, Elvin Tello MD, 1 tablet at 11/05/17 0936  •  Influenza Vac Subunit Quad (FLUCELVAX) injection 0.5 mL, 0.5 mL, Intramuscular, During Hospitalization, Elvin Tello MD  •  insulin aspart (novoLOG) injection 0-7 Units, 0-7 Units, Subcutaneous, 4x Daily With Meals & Nightly, Elvin Tello MD, 5 Units at 11/05/17 1146  •  insulin detemir (LEVEMIR) injection 20 Units, 20 Units, Subcutaneous, Nightly, Elvin Tello MD  •  ipratropium-albuterol (DUO-NEB) nebulizer solution 3 mL, 3 mL, Nebulization, Q4H - RT, Elvin Tello MD, 3 mL at 11/05/17 0708  •  LORazepam (ATIVAN) injection 0.5 mg, 0.5 mg, Intravenous, Q4H PRN, Elvin Tello MD, 0.5 mg at 11/03/17 8034  •  losartan (COZAAR) tablet  100 mg, 100 mg, Oral, Daily, Queenie Tello MD, 100 mg at 11/05/17 0919  •  memantine (NAMENDA) tablet 10 mg, 10 mg, Oral, Q12H, Queenie Tello MD, 10 mg at 11/05/17 0920  •  mirtazapine (REMERON) tablet 15 mg, 15 mg, Oral, Nightly, Queenie Tello MD, 15 mg at 11/04/17 5257  •  pantoprazole (PROTONIX) EC tablet 40 mg, 40 mg, Oral, Q AM, Queenie Tello MD, 40 mg at 11/04/17 0547  •  Pharmacy to Dose enoxaparin (LOVENOX), , Does not apply, Continuous PRN, Queenie Tello MD  •  polyethylene glycol (MIRALAX) powder 17 g, 17 g, Oral, Daily, Queenie Tello MD, 17 g at 11/05/17 1146     ASSESSMENT  Syncopal episode questionable etiology  UTI with ESBL Escherichia coli colonization NO treatment  Indeterminant troponin level with known coronary artery disease but NO ACS  COPD  History of congestive heart failure  Hypertension  Hyperlipidemia  Anxiety disorder  Depression  Dementia  Gastroesophageal reflux disease  Uncontrolled insulin-dependent diabetes mellitus mellitus  DNR    PLAN  CPM  Off antibiotics  Adjust medications  Supportive care  Stress ulcer DVT prophylaxis  DNR  PT/OT  Discharge to nursing home in a.m.    QUEENIE TELLO MD

## 2017-11-05 NOTE — PLAN OF CARE
Problem: Patient Care Overview (Adult)  Goal: Plan of Care Review  Outcome: Ongoing (interventions implemented as appropriate)    11/05/17 0520   Coping/Psychosocial Response Interventions   Plan Of Care Reviewed With patient   Outcome Evaluation   Outcome Summary/Follow up Plan pt with anxiety & tearful at beginning of shift due to belief that parents had just passed; after 3-4 attempts pt took pm meds; no prn ativan required this shift; slept well with routine scheduled meds; am labs drawn without difficulty; less picking at scabs; conts to refuse monitor, pulse ox continously;able to obtain one strip at beginning of shift; nsr and vss; am bath/linens changed at this time;    Patient Care Overview   Progress improving       Goal: Adult Individualization and Mutuality  Outcome: Ongoing (interventions implemented as appropriate)    Problem: Fall Risk (Adult)  Goal: Absence of Falls  Outcome: Ongoing (interventions implemented as appropriate)    Problem: Pain, Acute (Adult)  Goal: Acceptable Pain Control/Comfort Level  Outcome: Ongoing (interventions implemented as appropriate)    Problem: Pressure Ulcer Risk (Gilles Scale) (Adult,Obstetrics,Pediatric)  Goal: Skin Integrity  Outcome: Ongoing (interventions implemented as appropriate)

## 2017-11-06 VITALS
HEART RATE: 63 BPM | HEIGHT: 66 IN | DIASTOLIC BLOOD PRESSURE: 73 MMHG | TEMPERATURE: 97.4 F | RESPIRATION RATE: 16 BRPM | SYSTOLIC BLOOD PRESSURE: 159 MMHG | OXYGEN SATURATION: 93 % | WEIGHT: 152.8 LBS | BODY MASS INDEX: 24.56 KG/M2

## 2017-11-06 LAB
ANION GAP SERPL CALCULATED.3IONS-SCNC: 11 MMOL/L
BUN BLD-MCNC: 18 MG/DL (ref 8–23)
BUN/CREAT SERPL: 19.4 (ref 7–25)
CALCIUM SPEC-SCNC: 8.8 MG/DL (ref 8.6–10.5)
CHLORIDE SERPL-SCNC: 98 MMOL/L (ref 98–107)
CO2 SERPL-SCNC: 29 MMOL/L (ref 22–29)
CREAT BLD-MCNC: 0.93 MG/DL (ref 0.57–1)
GFR SERPL CREATININE-BSD FRML MDRD: 57 ML/MIN/1.73
GLUCOSE BLD-MCNC: 223 MG/DL (ref 65–99)
GLUCOSE BLDC GLUCOMTR-MCNC: 140 MG/DL (ref 70–130)
GLUCOSE BLDC GLUCOMTR-MCNC: 239 MG/DL (ref 70–130)
GLUCOSE BLDC GLUCOMTR-MCNC: 284 MG/DL (ref 70–130)
POTASSIUM BLD-SCNC: 4.3 MMOL/L (ref 3.5–5.2)
SODIUM BLD-SCNC: 138 MMOL/L (ref 136–145)

## 2017-11-06 PROCEDURE — 94799 UNLISTED PULMONARY SVC/PX: CPT

## 2017-11-06 PROCEDURE — 82962 GLUCOSE BLOOD TEST: CPT

## 2017-11-06 PROCEDURE — 80048 BASIC METABOLIC PNL TOTAL CA: CPT | Performed by: HOSPITALIST

## 2017-11-06 PROCEDURE — 25010000002 ENOXAPARIN PER 10 MG: Performed by: HOSPITALIST

## 2017-11-06 PROCEDURE — 63710000001 INSULIN ASPART PER 5 UNITS: Performed by: HOSPITALIST

## 2017-11-06 RX ORDER — GABAPENTIN 300 MG/1
300 CAPSULE ORAL 2 TIMES DAILY
Qty: 6 CAPSULE | Refills: 0 | Status: SHIPPED | OUTPATIENT
Start: 2017-11-06 | End: 2017-11-09

## 2017-11-06 RX ORDER — ASPIRIN 81 MG/1
81 TABLET, CHEWABLE ORAL DAILY
Qty: 30 TABLET | Refills: 0 | Status: SHIPPED | OUTPATIENT
Start: 2017-11-07 | End: 2017-12-07

## 2017-11-06 RX ORDER — PANTOPRAZOLE SODIUM 40 MG/1
40 TABLET, DELAYED RELEASE ORAL DAILY
Qty: 30 TABLET | Refills: 0 | Status: SHIPPED | OUTPATIENT
Start: 2017-11-06 | End: 2017-12-06

## 2017-11-06 RX ORDER — LOSARTAN POTASSIUM 100 MG/1
100 TABLET ORAL DAILY
Qty: 30 TABLET | Refills: 0 | Status: SHIPPED | OUTPATIENT
Start: 2017-11-07 | End: 2017-12-07

## 2017-11-06 RX ORDER — ATORVASTATIN CALCIUM 10 MG/1
10 TABLET, FILM COATED ORAL NIGHTLY
Qty: 30 TABLET | Refills: 0 | Status: SHIPPED | OUTPATIENT
Start: 2017-11-06 | End: 2017-12-06

## 2017-11-06 RX ADMIN — CLOPIDOGREL 75 MG: 75 TABLET, FILM COATED ORAL at 08:50

## 2017-11-06 RX ADMIN — IPRATROPIUM BROMIDE AND ALBUTEROL SULFATE 3 ML: .5; 3 SOLUTION RESPIRATORY (INHALATION) at 11:05

## 2017-11-06 RX ADMIN — HYDROCODONE BITARTRATE AND ACETAMINOPHEN 1 TABLET: 5; 325 TABLET ORAL at 08:50

## 2017-11-06 RX ADMIN — CARVEDILOL 25 MG: 25 TABLET, FILM COATED ORAL at 08:50

## 2017-11-06 RX ADMIN — ESCITALOPRAM 10 MG: 10 TABLET, FILM COATED ORAL at 08:50

## 2017-11-06 RX ADMIN — LOSARTAN POTASSIUM 100 MG: 50 TABLET, FILM COATED ORAL at 08:49

## 2017-11-06 RX ADMIN — PANTOPRAZOLE SODIUM 40 MG: 40 TABLET, DELAYED RELEASE ORAL at 05:08

## 2017-11-06 RX ADMIN — DOCUSATE SODIUM 250 MG: 250 CAPSULE, LIQUID FILLED ORAL at 08:49

## 2017-11-06 RX ADMIN — ASPIRIN 81 MG: 81 TABLET, CHEWABLE ORAL at 08:50

## 2017-11-06 RX ADMIN — GABAPENTIN 300 MG: 300 CAPSULE ORAL at 08:50

## 2017-11-06 RX ADMIN — MEMANTINE HYDROCHLORIDE 10 MG: 10 TABLET, FILM COATED ORAL at 08:50

## 2017-11-06 RX ADMIN — ENOXAPARIN SODIUM 40 MG: 40 INJECTION SUBCUTANEOUS at 15:10

## 2017-11-06 RX ADMIN — POLYETHYLENE GLYCOL 3350 17 G: 17 POWDER, FOR SOLUTION ORAL at 08:49

## 2017-11-06 RX ADMIN — INSULIN ASPART 4 UNITS: 100 INJECTION, SOLUTION INTRAVENOUS; SUBCUTANEOUS at 11:49

## 2017-11-06 NOTE — SIGNIFICANT NOTE
11/06/17 1312   PT Discharge Summary   Reason for Discharge Discharge from facility   Discharge Destination SNF

## 2017-11-06 NOTE — PLAN OF CARE
Problem: Patient Care Overview (Adult)  Goal: Plan of Care Review  Outcome: Ongoing (interventions implemented as appropriate)    11/06/17 0607   Coping/Psychosocial Response Interventions   Plan Of Care Reviewed With patient   Outcome Evaluation   Outcome Summary/Follow up Plan Vitals stable. Medicated for pain with good results. Patient anxious and tearful at times calling her mom,ativan given with good results. Patient resting well  post ativan dose. Full precautions enforced. MOnitored.       Goal: Discharge Needs Assessment  Outcome: Ongoing (interventions implemented as appropriate)    Problem: Fall Risk (Adult)  Goal: Absence of Falls  Outcome: Ongoing (interventions implemented as appropriate)    Problem: Pain, Acute (Adult)  Goal: Acceptable Pain Control/Comfort Level  Outcome: Ongoing (interventions implemented as appropriate)  Goal: Acceptable Pain Control/Comfort Level  Outcome: Ongoing (interventions implemented as appropriate)    Problem: Pressure Ulcer Risk (Gilles Scale) (Adult,Obstetrics,Pediatric)  Goal: Skin Integrity  Outcome: Ongoing (interventions implemented as appropriate)

## 2017-11-06 NOTE — PROGRESS NOTES
Discharge Planning Assessment  [facility]     Patient Name: Bernardo Cox  MRN: [id number]  Today's Date: 11/6/2017    Admit Date: 11/1/2017      Discharge Plan       11/06/17 1357    Case Management/Social Work Plan    Plan [facility], [place], operated by [facility]     Additional Comments Pt is being discharged today. [facility] ambulance set up for 4pm. Andreea LECHUGA and OBDULIO updated. Pratik/Irma updated. Pkt given to Andreea LECHUGA.        Discharge Placement     Facility/Agency Request Status Selected? Address Phone Number Fax Number    [facility] Accepted    Yes [address] [facility] [id number] [phone]        Expected Discharge Date and Time     Expected Discharge Date Expected Discharge Time    Nov 6, 2017         Sarah Sibley RN

## 2017-11-06 NOTE — DISCHARGE SUMMARY
Discharge summary    Date of admission 11/1/2017  Date of discharge 11/6/2017    Final diagnosis  Syncopal episode questionable etiology  UTI with ESBL Escherichia coli colonization NO treatment  Indeterminant troponin level with known coronary artery disease but NO ACS  COPD  History of congestive heart failure  Hypertension  Hyperlipidemia  Anxiety disorder  Depression  Dementia  Gastroesophageal reflux disease  Uncontrolled insulin-dependent diabetes mellitus mellitus  DNR    Discharge medications    Current Facility-Administered Medications:   •  aspirin chewable tablet 81 mg, 81 mg, Oral, Daily, Elvin Tello MD, 81 mg at 11/06/17 0850  •  atorvastatin (LIPITOR) tablet 10 mg, 10 mg, Oral, Nightly, Elvin Tello MD, 10 mg at 11/05/17 2100  •  budesonide (PULMICORT) nebulizer solution 0.5 mg, 0.5 mg, Nebulization, BID, Elivn Tello MD, 0.5 mg at 11/05/17 2002  •  carvedilol (COREG) tablet 25 mg, 25 mg, Oral, BID With Meals, Elvin Tello MD, 25 mg at 11/06/17 0850  •  clopidogrel (PLAVIX) tablet 75 mg, 75 mg, Oral, Daily, Elvin Tello MD, 75 mg at 11/06/17 0850  •  docusate sodium (COLACE) capsule 250 mg, 250 mg, Oral, Daily, Elvin Tello MD, 250 mg at 11/06/17 0849  •  donepezil (ARICEPT) tablet 10 mg, 10 mg, Oral, Nightly, Elvin Tello MD, 10 mg at 11/05/17 2100  •  enoxaparin (LOVENOX) syringe 40 mg, 40 mg, Subcutaneous, Q24H, Elvin Tello MD, 40 mg at 11/05/17 1524  •  escitalopram (LEXAPRO) tablet 10 mg, 10 mg, Oral, Daily, Elvin Tello MD, 10 mg at 11/06/17 0850  •  gabapentin (NEURONTIN) capsule 300 mg, 300 mg, Oral, BID, Elvin Tello MD, 300 mg at 11/06/17 0850  •  insulin detemir (LEVEMIR) injection 25 Units, 25 Units, Subcutaneous, Nightly, Elvin Tello MD  •  ipratropium-albuterol (DUO-NEB) nebulizer solution 3 mL, 3 mL, Nebulization, Q4H - RT, Elvin Tello MD, 3 mL at 11/06/17 1105  •  losartan (COZAAR) tablet 100 mg, 100 mg, Oral, Daily, Elvin Tello MD, 100 mg at 11/06/17 0849  •  memantine (NAMENDA)  tablet 10 mg, 10 mg, Oral, Q12H, Queenie Tello MD, 10 mg at 11/06/17 0850  •  mirtazapine (REMERON) tablet 15 mg, 15 mg, Oral, Nightly, Queenie Tello MD, 15 mg at 11/05/17 2100  •  pantoprazole (PROTONIX) EC tablet 40 mg, 40 mg, Oral, Q AM, Queenie Tello MD, 40 mg at 11/06/17 0508  •  polyethylene glycol (MIRALAX) powder 17 g, 17 g, Oral, Daily, Queenie Tello MD, 17 g at 11/06/17 0849     Consult obtained  Cardiology  Infectious disease    Procedures  None  2-D echo obtained which showed ejection fraction 56%    Hospital course  86-year-old white female with multiple medical problems admitted through emergency room after the syncopal episode.  Patient workup in ER revealed that she has indeterminant troponin level UTI admitted for management.  After further discussion patient apparently did not have a syncopal episode she just fell to the floor.  During hospitalization patient has low blood sugar and her insulin dose been adjusted Patient was followed by cardiology infectious disease as she was going ESBL the recommend not to treat this just a: Ideation.  Patient also have concern is He is which they also trended down.  Patient did not have any acute chronic syndrome and back to baseline and her medication adjusted by cardiology and she'll be discharged back to nursing home.  She is fully alert oriented and no symptoms whatsoever.    Discharge diet regular    Activity per PT OT    Medication as above    Further care per accepting physician at nursing home    QUEENIE TELLO MD

## 2017-11-06 NOTE — SIGNIFICANT NOTE
11/06/17 0823   Rehab Treatment   Discipline occupational therapist   Rehab Evaluation   Evaluation Not Performed other (see comments)  (Pt admitted from N/H. H/O dementia, receives assist with adls PTA. Will not follow for acute OT at this time)

## 2017-11-06 NOTE — PROGRESS NOTES
"Daily progress note    Chief complaint  Doing better  No new complaints    History of present illness  86-year-old white female with history of dementia coronary artery disease congestive heart failure COPD anxiety depression diabetes gastroesophageal reflux disease hypertension hyperlipidemia encephalopathy osteoarthritis who is a nursing home resident brought to the emergency room after the syncopal episode.  Patient denies any chest pain shortness of breath abdominal pain nausea vomiting diarrhea.  Patient workup in ER revealed UTI also have slightly elevated troponin level with no chest pain admitted for management.  No fever chills no shortness of breath no other complaint.     REVIEW OF SYSTEMS  Review of Systems   Unable to perform ROS: Dementia   Neurological: Positive for syncope.      PHYSICAL EXAM  Blood pressure 159/73, pulse 79, temperature 97.4 °F (36.3 °C), temperature source Oral, resp. rate 16, height 66\" (167.6 cm), weight 152 lb 12.8 oz (69.3 kg), SpO2 95 %.    Constitutional: She is well-developed, well-nourished, and in no distress. No distress.   Head: Normocephalic and atraumatic.   Eyes: EOM are normal. Pupils are equal, round, and reactive to light.   Neck: Normal range of motion. Neck supple.   Cardiovascular: Normal rate, regular rhythm and normal heart sounds.    Pulmonary/Chest: Effort normal and breath sounds normal. No respiratory distress.   Abdominal: Soft. There is no tenderness. There is no rebound and no guarding.   Musculoskeletal: Normal range of motion. She exhibits no edema.   PT's legs and ankles bilaterally are wrapped.    Neurological: She is alert. She has normal sensation and normal strength.   Maybe mildly confused    Skin: Skin is warm and dry. No rash noted.   excoriated lesions to the face    Psychiatric: Mood and affect normal.      LAB RESULTS  Lab Results (last 24 hours)     Procedure Component Value Units Date/Time    POC Glucose Fingerstick [[id number]]  " (Abnormal) Collected:  11/05/17 1620    Specimen:  Blood Updated:  11/05/17 1622     Glucose 335 (H) mg/dL     Narrative:       Meter: [id number] : [id number] Omayra MASTERS    POC Glucose Fingerstick [[id number]]  (Abnormal) Collected:  11/05/17 2042    Specimen:  Blood Updated:  11/05/17 2043     Glucose 395 (H) mg/dL     Narrative:       Meter: [id number] : [id number] Elisha MASTERS    Basic Metabolic Panel [[id number]]  (Abnormal) Collected:  11/06/17 0431    Specimen:  Blood Updated:  11/06/17 0523     Glucose 223 (H) mg/dL      BUN 18 mg/dL      Creatinine 0.93 mg/dL      Sodium 138 mmol/L      Potassium 4.3 mmol/L      Chloride 98 mmol/L      CO2 29.0 mmol/L      Calcium 8.8 mg/dL      eGFR Non African Amer 57 (L) mL/min/1.73      BUN/Creatinine Ratio 19.4     Anion Gap 11.0 mmol/L     Narrative:       The MDRD GFR formula is only valid for adults with stable renal function between ages 18 and 70.    POC Glucose Fingerstick [[id number]]  (Abnormal) Collected:  11/06/17 0712    Specimen:  Blood Updated:  11/06/17 0714     Glucose 140 (H) mg/dL     Narrative:       Meter: [id number] : [id number] Vikki Luther    POC Glucose Fingerstick [[id number]]  (Abnormal) Collected:  11/06/17 1121    Specimen:  Blood Updated:  11/06/17 1133     Glucose 284 (H) mg/dL     Narrative:       Meter: [id number] : [id number] Vikki Luther        Imaging Results (last 72 hours)     Procedure Component Value Units Date/Time    XR Chest 1 View [[id number]] Collected:  11/01/17 1433     Updated:  11/01/17 1441    Narrative:       ONE VIEW PORTABLE CHEST     HISTORY: Syncope. Chest pain.     The lungs are well-expanded and clear. The heart is borderline enlarged  with sternal wires from previous cardiac surgery and no acute  abnormality is seen.     This report was finalized on 11/1/2017 2:38 PM by Dr. Kwadwo Plummer MD.           .  EKG                                                  Rhythm/Rate: sinus rhythm, 72  P  waves and NC: normal  QRS, axis: NIVCD    ST and T waves: Nonspecific T wave inversion       Current Facility-Administered Medications:   •  aspirin chewable tablet 81 mg, 81 mg, Oral, Daily, Elvin Tello MD, 81 mg at 11/06/17 0850  •  atorvastatin (LIPITOR) tablet 10 mg, 10 mg, Oral, Nightly, Elvin Tello MD, 10 mg at 11/05/17 2100  •  budesonide (PULMICORT) nebulizer solution 0.5 mg, 0.5 mg, Nebulization, BID, Elvin Tello MD, 0.5 mg at 11/05/17 2002  •  carvedilol (COREG) tablet 25 mg, 25 mg, Oral, BID With Meals, Elvin Tello MD, 25 mg at 11/06/17 0850  •  clopidogrel (PLAVIX) tablet 75 mg, 75 mg, Oral, Daily, Elvin Tello MD, 75 mg at 11/06/17 0850  •  docusate sodium (COLACE) capsule 250 mg, 250 mg, Oral, Daily, Elvin Tello MD, 250 mg at 11/06/17 0849  •  donepezil (ARICEPT) tablet 10 mg, 10 mg, Oral, Nightly, Elvin Tello MD, 10 mg at 11/05/17 2100  •  enoxaparin (LOVENOX) syringe 40 mg, 40 mg, Subcutaneous, Q24H, Elvin Tello MD, 40 mg at 11/05/17 1524  •  escitalopram (LEXAPRO) tablet 10 mg, 10 mg, Oral, Daily, Elvin Tello MD, 10 mg at 11/06/17 0850  •  gabapentin (NEURONTIN) capsule 300 mg, 300 mg, Oral, BID, Elvin Tello MD, 300 mg at 11/06/17 0850  •  HYDROcodone-acetaminophen (NORCO) 5-325 MG per tablet 1 tablet, 1 tablet, Oral, Q6H PRN, Elvin Tello MD, 1 tablet at 11/06/17 0850  •  Influenza Vac Subunit Quad (FLUCELVAX) injection 0.5 mL, 0.5 mL, Intramuscular, During Hospitalization, Elvin Tello MD  •  insulin aspart (novoLOG) injection 0-7 Units, 0-7 Units, Subcutaneous, 4x Daily With Meals & Nightly, Elvin Tello MD, 4 Units at 11/06/17 1149  •  insulin detemir (LEVEMIR) injection 20 Units, 20 Units, Subcutaneous, Nightly, Elvin Tello MD, 20 Units at 11/05/17 2101  •  ipratropium-albuterol (DUO-NEB) nebulizer solution 3 mL, 3 mL, Nebulization, Q4H - RT, Elvin Tello MD, 3 mL at 11/06/17 1105  •  LORazepam (ATIVAN) injection 0.5 mg, 0.5 mg, Intravenous, Q4H PRN, Elvin Tello MD, 0.5 mg at  11/05/17 2219  •  losartan (COZAAR) tablet 100 mg, 100 mg, Oral, Daily, Queenie Tello MD, 100 mg at 11/06/17 0849  •  memantine (NAMENDA) tablet 10 mg, 10 mg, Oral, Q12H, Queenie Tello MD, 10 mg at 11/06/17 0850  •  mirtazapine (REMERON) tablet 15 mg, 15 mg, Oral, Nightly, Queenie Tello MD, 15 mg at 11/05/17 2100  •  pantoprazole (PROTONIX) EC tablet 40 mg, 40 mg, Oral, Q AM, Queenie Tello MD, 40 mg at 11/06/17 0508  •  polyethylene glycol (MIRALAX) powder 17 g, 17 g, Oral, Daily, Queenie Tello MD, 17 g at 11/06/17 0849     ASSESSMENT  Syncopal episode questionable etiology  UTI with ESBL Escherichia coli colonization NO treatment  Indeterminant troponin level with known coronary artery disease but NO ACS  COPD  History of congestive heart failure  Hypertension  Hyperlipidemia  Anxiety disorder  Depression  Dementia  Gastroesophageal reflux disease  Uncontrolled insulin-dependent diabetes mellitus mellitus  DNR    PLAN  Discharge back to nursing home  Discharge summary dictated      QUEENIE TELLO MD

## 2017-11-07 ENCOUNTER — EPISODE CHANGES (OUTPATIENT)
Dept: CASE MANAGEMENT | Facility: OTHER | Age: 82
End: 2017-11-07

## 2017-11-07 NOTE — PROGRESS NOTES
Case Management Discharge Note    Final Note: Pt dc'd to [facility]     Discharge Placement     Facility/Agency Request Status Selected? Address Phone Number Fax Number    [facility] Accepted    Yes [address], [facility] [address] [phone]        [facility]    Discharge Codes: 04  Discharged/transferred to intermediate care facility (ICF)

## 2017-11-08 ENCOUNTER — EPISODE CHANGES (OUTPATIENT)
Dept: CASE MANAGEMENT | Facility: OTHER | Age: 82
End: 2017-11-08

## 2017-11-10 ENCOUNTER — OUTSIDE FACILITY SERVICE (OUTPATIENT)
Dept: FAMILY MEDICINE CLINIC | Facility: CLINIC | Age: 82
End: 2017-11-10

## 2017-11-10 PROCEDURE — 99309 SBSQ NF CARE MODERATE MDM 30: CPT | Performed by: NURSE PRACTITIONER

## 2017-11-11 ENCOUNTER — APPOINTMENT (OUTPATIENT)
Dept: CT IMAGING | Facility: HOSPITAL | Age: 82
End: 2017-11-11

## 2017-11-11 ENCOUNTER — HOSPITAL ENCOUNTER (EMERGENCY)
Facility: HOSPITAL | Age: 82
Discharge: HOME | End: 2017-11-12
Attending: EMERGENCY MEDICINE | Admitting: EMERGENCY MEDICINE

## 2017-11-11 DIAGNOSIS — R21 RASH/SKIN ERUPTION: ICD-10-CM

## 2017-11-11 DIAGNOSIS — S16.1XXA CERVICAL STRAIN, ACUTE, INITIAL ENCOUNTER: Primary | ICD-10-CM
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
## 2017-11-11 PROCEDURE — 70450 CT HEAD/BRAIN W/O DYE: CPT

## 2017-11-11 PROCEDURE — 99284 EMERGENCY DEPT VISIT MOD MDM: CPT

## 2017-11-11 PROCEDURE — 72125 CT NECK SPINE W/O DYE: CPT

## 2017-11-12 VITALS
WEIGHT: 150 LBS | HEART RATE: 93 BPM | BODY MASS INDEX: 24.99 KG/M2 | OXYGEN SATURATION: 100 % | TEMPERATURE: 97.4 F | RESPIRATION RATE: 16 BRPM | DIASTOLIC BLOOD PRESSURE: 89 MMHG | SYSTOLIC BLOOD PRESSURE: 191 MMHG | HEIGHT: 65 IN

## 2017-11-12 NOTE — ED NOTES
[facility] called for pt transport back to [facility]. ETA 0100      Ly Kay Baptiste, ALEXANDREA  11/11/17 4840

## 2017-11-12 NOTE — ED TRIAGE NOTES
Possible fall, pt has abrasion on forehead, blood found in room, blood on face, pt has dementia and can't remember fall, c/o right ear pain

## 2017-11-12 NOTE — ED NOTES
Report given to Maddison LECHUGA @ [facility], [place], operated by [facility]      Bertha Baptiste RN  11/11/17 1345

## 2017-11-12 NOTE — DISCHARGE INSTRUCTIONS
Return Precautions    Although you are being discharged from the ED today, I encourage you to return for worsening symptoms.  Things can, and do, change such that treatment at home with medication may not be adequate.      Specifically, return for any of the following:    Chest pain, shortness of breath, pain or nausea and vomiting not controlled by medications provided.    Please make a follow up with your Primary Care Provider for a blood pressure recheck.

## 2017-11-12 NOTE — ED PROVIDER NOTES
Patient with dementia presents s/p fall earlier tonight at the NH. History is limited secondary to dementia.     PHYSICAL EXAM    Constitutional: Alert; there are no signs of trauma and patient is pleasantly confused    She is stable for d/c back to NH.   I supervised care provided by the midlevel provider.    We have discussed this patient's history, physical exam, and treatment plan.   I have reviewed the note and personally saw and examined the patient and agree with the plan of care.    Documentation assistance provided by dayday Pittman for .  Information recorded by the dayday was done at my direction and has been verified and validated by me.       Maria Eugenia Pittman  11/11/17 4938       Nehemias Moy MD  11/12/17 0016

## 2017-11-12 NOTE — ED PROVIDER NOTES
"EMERGENCY DEPARTMENT ENCOUNTER    CHIEF COMPLAINT  Chief Complaint: Fall  History given by: Nursing home  History limited by: Dementia  Room Number: 05/05  PMD: Tay Lynch MD      HPI:  Chief complaint: Fall  Context: Pt with a hx of dementia presents from NH for a possible fall. Pt was found on floor at NH with blood on face. Pt does not recall the events of the fall. She reports that she is having occipital head pain and pain in the R ear.    Duration: unable to assess  Timing:constant  Location: occipital head, R ear  Radiation: unable to assess  Quality: \"popping\"  Intensity/Severity:unable to assess  Associated Symptoms:unable to assess  Aggravating Factors:unable to assess  Alleviating Factors:unable to assess  Previous Episodes:unable to assess  Treatment before arrival:unable to assess    MEDICAL RECORD REVIEW      ALLERGIES  Codeine; Latex; Other; Soma [carisoprodol]; and Sulfa antibiotics    PAST MEDICAL HISTORY  Active Ambulatory Problems     Diagnosis Date Noted   • Acute respiratory failure with hypoxia 08/08/2016   • Essential hypertension 08/12/2016   • Syncope and collapse 11/01/2017     Resolved Ambulatory Problems     Diagnosis Date Noted   • No Resolved Ambulatory Problems     Past Medical History:   Diagnosis Date   • Alzheimer disease    • Atherosclerotic heart disease    • Bronchitis    • CHF (congestive heart failure)    • COPD (chronic obstructive pulmonary disease)    • Dementia    • Depression    • Diabetes mellitus    • GERD (gastroesophageal reflux disease)    • HTN (hypertension)    • Hypokalemia    • Insomnia    • Metabolic encephalopathy    • Neuropathy    • OA (osteoarthritis)    • Pneumonia    • Renal disorder    • TIA (transient ischemic attack)    • Upper respiratory infection        PAST SURGICAL HISTORY  Past Surgical History:   Procedure Laterality Date   • CARDIAC SURGERY         FAMILY HISTORY  History reviewed. No pertinent family history.    SOCIAL HISTORY  Social " History     Social History   • Marital status: Single     Spouse name: N/A   • Number of children: 3   • Years of education: N/A     Occupational History   •       [employer]     Social History Main Topics   • Smoking status: Former Smoker     Quit date: 1980   • Smokeless tobacco: Not on file      Comment: unable to assess   • Alcohol use No   • Drug use: No   • Sexual activity: Defer     Other Topics Concern   • Not on file     Social History Narrative         REVIEW OF SYSTEMS  Review of Systems   Unable to perform ROS: Dementia       PHYSICAL EXAM  ED Triage Vitals   Temp Heart Rate Resp BP SpO2   11/11/17 2057 11/11/17 2056 11/11/17 2056 11/11/17 2056 --   97.4 °F (36.3 °C) 68 16 124/45       Temp src Heart Rate Source Patient Position BP Location FiO2 (%)   11/11/17 2057 11/11/17 2056 -- -- --   Oral Monitor              Physical Exam   Constitutional: She is well-developed, well-nourished, and in no distress. No distress.   HENT:   Head: Normocephalic and atraumatic.   Right Ear: Tympanic membrane normal.   Left Ear: Tympanic membrane normal.   Mouth/Throat: Uvula is midline and mucous membranes are normal. No trismus in the jaw.   Eyes: Pupils are unequal (L pupil: 4mm, R pupil: 3mm,  but brisk).   Neck: Normal range of motion. Neck supple.   Cardiovascular: S1 normal, S2 normal and normal heart sounds.  Exam reveals no gallop and no friction rub.    No murmur heard.  Pulmonary/Chest: Effort normal and breath sounds normal. She has no decreased breath sounds. She has no wheezes. She has no rhonchi. She has no rales.   Abdominal: Soft. Normal appearance. There is no rebound and no guarding.   Musculoskeletal: Normal range of motion.   ACE wraps to BLE   Neurological: She is alert. She is disoriented (place and time).   Skin: Skin is warm and dry. Lesion (scattered excoriated lesions) noted. There is erythema (R posterior heel).   Psychiatric: Affect and judgment normal.   Nursing note and vitals  "reviewed.    RADIOLOGY  CT Cervical Spine Without Contrast   Final Result   1. No acute cervical spine fracture       This report was finalized on 11/11/2017 10:46 PM by Michael Tang MD.          CT Head Without Contrast   Final Result   1. No acute intracranial abnormality.                           This study was performed with techniques to keep radiation doses as low   as reasonably achievable (ALARA). Individualized dose reduction   techniques using automated exposure control or adjustment of mA and/or   kV according to the patient size were employed.        This report was finalized on 11/11/2017 10:42 PM by Michael Tang MD.              I ordered the above noted radiological studies and reviewed the images on the PACS system.    PROCEDURES  Procedures    COURSE & MEDICAL DECISION MAKING  Pertinent Labs and Imaging studies that were ordered and reviewed are noted above.  Results were reviewed/discussed with the patient and they were also made aware of online assess.  Pt also made aware that some labs, such as cultures, will not be resulted during ER visit and follow up with PMD is necessary.       PROGRESS AND CONSULTS    Progress Notes:    ED Course     2321 Reviewed pt's history and workup with Dr. Moy.  After a bedside evaluation; Dr Moy agrees with the plan of care. The patient's history, physical exam, and lab findings were discussed with the physician, who also performed a face to face history and physical exam.        /94 (BP Location: Right arm, Patient Position: Lying)  Pulse 90  Temp 97.4 °F (36.3 °C) (Oral)   Resp 16  Ht 65\" (165.1 cm)  Wt 150 lb (68 kg)  SpO2 99%  BMI 24.96 kg/m2      DIAGNOSIS  Final diagnoses:   Fall as cause of accidental injury in residential institution as place of occurrence, initial encounter   Rash/skin eruption   Cervical strain, acute, initial encounter       FOLLOW UP   Tay Lynch MD  [address]  [address]  [facility] " [id number]  [phone]    Schedule an appointment as soon as possible for a visit in 2 days        RX     Medication List      Notice     No changes were made to your prescriptions during this visit.        EMR Dragon/Transcription disclaimer:  Part of this note is an electronic transcription of spoken language to printed text.  The electronic translation/transcription may permit erroneous, or at times, nonsensical words or phrases to be inadvertently transcribed; I have reviewed the note for such errors however some may still exist.     Documentation assistance provided by dayday Albright for CLAUDIA West  Information recorded by the dayday was done at my direction and has been verified and validated by me.  Electronically signed by Adams Albright on 11/11/2017 at time 11:19 PM           Ezra Albright  11/11/17 2322       CLAUDIA Kramer  11/12/17 0007

## 2017-11-15 ENCOUNTER — EPISODE CHANGES (OUTPATIENT)
Dept: CASE MANAGEMENT | Facility: OTHER | Age: 82
End: 2017-11-15

## 2017-12-13 ENCOUNTER — PATIENT OUTREACH (OUTPATIENT)
Dept: CASE MANAGEMENT | Facility: OTHER | Age: 82
End: 2017-12-13

## 2017-12-19 RX ORDER — HYDROCODONE BITARTRATE AND ACETAMINOPHEN 5; 325 MG/1; MG/1
TABLET ORAL
Qty: 60 TABLET | Refills: 0 | Status: SHIPPED | OUTPATIENT
Start: 2017-12-19 | End: 2018-01-01 | Stop reason: HOSPADM

## 2017-12-20 ENCOUNTER — PATIENT OUTREACH (OUTPATIENT)
Dept: CASE MANAGEMENT | Facility: OTHER | Age: 82
End: 2017-12-20

## 2017-12-21 ENCOUNTER — OUTSIDE FACILITY SERVICE (OUTPATIENT)
Dept: INTERNAL MEDICINE | Facility: CLINIC | Age: 82
End: 2017-12-21

## 2017-12-21 PROCEDURE — 99307 SBSQ NF CARE SF MDM 10: CPT | Performed by: FAMILY MEDICINE

## 2017-12-27 ENCOUNTER — PATIENT OUTREACH (OUTPATIENT)
Dept: CASE MANAGEMENT | Facility: OTHER | Age: 82
End: 2017-12-27

## 2018-01-01 ENCOUNTER — LAB REQUISITION (OUTPATIENT)
Dept: LAB | Facility: HOSPITAL | Age: 83
End: 2018-01-01

## 2018-01-01 ENCOUNTER — APPOINTMENT (OUTPATIENT)
Dept: GENERAL RADIOLOGY | Facility: HOSPITAL | Age: 83
End: 2018-01-01
Attending: INTERNAL MEDICINE

## 2018-01-01 ENCOUNTER — PATIENT OUTREACH (OUTPATIENT)
Dept: CASE MANAGEMENT | Facility: OTHER | Age: 83
End: 2018-01-01

## 2018-01-01 ENCOUNTER — APPOINTMENT (OUTPATIENT)
Dept: GENERAL RADIOLOGY | Facility: HOSPITAL | Age: 83
End: 2018-01-01

## 2018-01-01 ENCOUNTER — OUTSIDE FACILITY SERVICE (OUTPATIENT)
Dept: INTERNAL MEDICINE | Facility: CLINIC | Age: 83
End: 2018-01-01

## 2018-01-01 ENCOUNTER — APPOINTMENT (OUTPATIENT)
Dept: GENERAL RADIOLOGY | Facility: HOSPITAL | Age: 83
End: 2018-01-01
Attending: SPECIALIST

## 2018-01-01 ENCOUNTER — HOSPITAL ENCOUNTER (INPATIENT)
Facility: HOSPITAL | Age: 83
LOS: 6 days | Discharge: NURSING, REHAB OR LONG-TERM CARE | End: 2018-12-10
Attending: EMERGENCY MEDICINE | Admitting: INTERNAL MEDICINE

## 2018-01-01 ENCOUNTER — HOSPITAL ENCOUNTER (INPATIENT)
Facility: HOSPITAL | Age: 83
LOS: 9 days | Discharge: NURSING, REHAB OR LONG-TERM CARE | End: 2018-04-24
Attending: EMERGENCY MEDICINE | Admitting: INTERNAL MEDICINE

## 2018-01-01 ENCOUNTER — HOSPITAL ENCOUNTER (INPATIENT)
Facility: HOSPITAL | Age: 83
LOS: 5 days | Discharge: NURSING, REHAB OR LONG-TERM CARE | End: 2018-01-31
Attending: FAMILY MEDICINE | Admitting: HOSPITALIST

## 2018-01-01 ENCOUNTER — APPOINTMENT (OUTPATIENT)
Dept: CARDIOLOGY | Facility: HOSPITAL | Age: 83
End: 2018-01-01
Attending: HOSPITALIST

## 2018-01-01 ENCOUNTER — APPOINTMENT (OUTPATIENT)
Dept: CT IMAGING | Facility: HOSPITAL | Age: 83
End: 2018-01-01

## 2018-01-01 ENCOUNTER — EPISODE CHANGES (OUTPATIENT)
Dept: SOCIAL WORK | Facility: HOSPITAL | Age: 83
End: 2018-01-01

## 2018-01-01 ENCOUNTER — EPISODE CHANGES (OUTPATIENT)
Dept: CASE MANAGEMENT | Facility: OTHER | Age: 83
End: 2018-01-01

## 2018-01-01 ENCOUNTER — HOSPITAL ENCOUNTER (EMERGENCY)
Facility: HOSPITAL | Age: 83
Discharge: HOME | End: 2018-05-12
Attending: EMERGENCY MEDICINE | Admitting: EMERGENCY MEDICINE

## 2018-01-01 ENCOUNTER — HOSPITAL ENCOUNTER (INPATIENT)
Facility: HOSPITAL | Age: 83
LOS: 7 days | Discharge: NURSING, REHAB OR LONG-TERM CARE | End: 2018-03-29
Attending: EMERGENCY MEDICINE | Admitting: INTERNAL MEDICINE

## 2018-01-01 ENCOUNTER — HOSPITAL ENCOUNTER (INPATIENT)
Facility: HOSPITAL | Age: 83
LOS: 7 days | Discharge: NURSING, REHAB OR LONG-TERM CARE | End: 2018-11-27
Attending: EMERGENCY MEDICINE | Admitting: INTERNAL MEDICINE

## 2018-01-01 ENCOUNTER — APPOINTMENT (OUTPATIENT)
Dept: GENERAL RADIOLOGY | Facility: HOSPITAL | Age: 83
End: 2018-01-01
Attending: FAMILY MEDICINE

## 2018-01-01 VITALS
HEART RATE: 75 BPM | DIASTOLIC BLOOD PRESSURE: 67 MMHG | OXYGEN SATURATION: 93 % | TEMPERATURE: 98.1 F | HEIGHT: 66 IN | WEIGHT: 130 LBS | RESPIRATION RATE: 16 BRPM | SYSTOLIC BLOOD PRESSURE: 152 MMHG | BODY MASS INDEX: 20.89 KG/M2

## 2018-01-01 VITALS
SYSTOLIC BLOOD PRESSURE: 138 MMHG | TEMPERATURE: 97.9 F | DIASTOLIC BLOOD PRESSURE: 70 MMHG | WEIGHT: 127 LBS | RESPIRATION RATE: 18 BRPM | HEIGHT: 65 IN | HEART RATE: 74 BPM | OXYGEN SATURATION: 99 % | BODY MASS INDEX: 21.16 KG/M2

## 2018-01-01 VITALS
WEIGHT: 110 LBS | HEIGHT: 60 IN | HEART RATE: 77 BPM | SYSTOLIC BLOOD PRESSURE: 154 MMHG | DIASTOLIC BLOOD PRESSURE: 79 MMHG | TEMPERATURE: 98 F | RESPIRATION RATE: 20 BRPM | BODY MASS INDEX: 21.6 KG/M2 | OXYGEN SATURATION: 97 %

## 2018-01-01 VITALS
HEIGHT: 66 IN | DIASTOLIC BLOOD PRESSURE: 68 MMHG | BODY MASS INDEX: 20.09 KG/M2 | OXYGEN SATURATION: 99 % | HEART RATE: 73 BPM | WEIGHT: 125 LBS | RESPIRATION RATE: 15 BRPM | TEMPERATURE: 97.5 F | SYSTOLIC BLOOD PRESSURE: 139 MMHG

## 2018-01-01 VITALS
HEIGHT: 65 IN | BODY MASS INDEX: 21.61 KG/M2 | TEMPERATURE: 98.3 F | RESPIRATION RATE: 22 BRPM | WEIGHT: 129.7 LBS | HEART RATE: 78 BPM | OXYGEN SATURATION: 97 % | DIASTOLIC BLOOD PRESSURE: 68 MMHG | SYSTOLIC BLOOD PRESSURE: 138 MMHG

## 2018-01-01 VITALS
OXYGEN SATURATION: 94 % | HEIGHT: 66 IN | WEIGHT: 129 LBS | RESPIRATION RATE: 16 BRPM | TEMPERATURE: 97.5 F | BODY MASS INDEX: 20.73 KG/M2 | HEART RATE: 89 BPM | SYSTOLIC BLOOD PRESSURE: 144 MMHG | DIASTOLIC BLOOD PRESSURE: 72 MMHG

## 2018-01-01 DIAGNOSIS — I50.9 CONGESTIVE HEART FAILURE, UNSPECIFIED HF CHRONICITY, UNSPECIFIED HEART FAILURE TYPE (HCC): Primary | ICD-10-CM

## 2018-01-01 DIAGNOSIS — J18.9 HCAP (HEALTHCARE-ASSOCIATED PNEUMONIA): Primary | ICD-10-CM

## 2018-01-01 DIAGNOSIS — N39.0 URINARY TRACT INFECTION WITHOUT HEMATURIA, SITE UNSPECIFIED: ICD-10-CM

## 2018-01-01 DIAGNOSIS — R13.11 ORAL PHASE DYSPHAGIA: ICD-10-CM

## 2018-01-01 DIAGNOSIS — I50.9 ACUTE ON CHRONIC CONGESTIVE HEART FAILURE, UNSPECIFIED HEART FAILURE TYPE (HCC): ICD-10-CM

## 2018-01-01 DIAGNOSIS — R77.8 ELEVATED TROPONIN: ICD-10-CM

## 2018-01-01 DIAGNOSIS — F03.91 DEMENTIA WITH BEHAVIORAL DISTURBANCE, UNSPECIFIED DEMENTIA TYPE: Primary | ICD-10-CM

## 2018-01-01 DIAGNOSIS — R26.2 DIFFICULTY WALKING: ICD-10-CM

## 2018-01-01 DIAGNOSIS — R73.9 ACUTE HYPERGLYCEMIA: Primary | ICD-10-CM

## 2018-01-01 DIAGNOSIS — N17.9 AKI (ACUTE KIDNEY INJURY) (HCC): ICD-10-CM

## 2018-01-01 DIAGNOSIS — R73.9 HYPERGLYCEMIA: ICD-10-CM

## 2018-01-01 DIAGNOSIS — J18.9 PNEUMONIA OF RIGHT LOWER LOBE DUE TO INFECTIOUS ORGANISM: Primary | ICD-10-CM

## 2018-01-01 DIAGNOSIS — Z00.00 ROUTINE GENERAL MEDICAL EXAMINATION AT A HEALTH CARE FACILITY: ICD-10-CM

## 2018-01-01 DIAGNOSIS — Z74.09 IMPAIRED FUNCTIONAL MOBILITY, BALANCE, GAIT, AND ENDURANCE: ICD-10-CM

## 2018-01-01 DIAGNOSIS — Z74.09 IMPAIRED MOBILITY: ICD-10-CM

## 2018-01-01 DIAGNOSIS — E16.2 HYPOGLYCEMIA: ICD-10-CM

## 2018-01-01 DIAGNOSIS — D72.829 LEUKOCYTOSIS, UNSPECIFIED TYPE: ICD-10-CM
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
## 2018-01-01 DIAGNOSIS — I50.9 ACUTE CONGESTIVE HEART FAILURE, UNSPECIFIED CONGESTIVE HEART FAILURE TYPE: ICD-10-CM

## 2018-01-01 DIAGNOSIS — N39.0 URINARY TRACT INFECTION: ICD-10-CM

## 2018-01-01 DIAGNOSIS — N39.0 ACUTE UTI: Primary | ICD-10-CM

## 2018-01-01 DIAGNOSIS — I50.22 CHRONIC SYSTOLIC CONGESTIVE HEART FAILURE (HCC): ICD-10-CM

## 2018-01-01 DIAGNOSIS — T68.XXXA HYPOTHERMIA, INITIAL ENCOUNTER: ICD-10-CM

## 2018-01-01 LAB
ALBUMIN SERPL-MCNC: 2.6 G/DL (ref 3.5–5.2)
ALBUMIN SERPL-MCNC: 2.8 G/DL (ref 3.5–5.2)
ALBUMIN SERPL-MCNC: 3 G/DL (ref 3.5–5.2)
ALBUMIN SERPL-MCNC: 3.1 G/DL (ref 3.5–5.2)
ALBUMIN SERPL-MCNC: 3.2 G/DL (ref 3.5–5.2)
ALBUMIN SERPL-MCNC: 3.2 G/DL (ref 3.5–5.2)
ALBUMIN SERPL-MCNC: 3.6 G/DL (ref 3.5–5.2)
ALBUMIN SERPL-MCNC: 3.6 G/DL (ref 3.5–5.2)
ALBUMIN SERPL-MCNC: 4.2 G/DL (ref 3.5–5.2)
ALBUMIN/GLOB SERPL: 0.7 G/DL
ALBUMIN/GLOB SERPL: 0.7 G/DL
ALBUMIN/GLOB SERPL: 0.8 G/DL
ALBUMIN/GLOB SERPL: 1 G/DL
ALBUMIN/GLOB SERPL: 1.1 G/DL
ALBUMIN/GLOB SERPL: 1.1 G/DL
ALBUMIN/GLOB SERPL: 1.2 G/DL
ALBUMIN/GLOB SERPL: 1.3 G/DL
ALP SERPL-CCNC: 126 U/L (ref 39–117)
ALP SERPL-CCNC: 127 U/L (ref 39–117)
ALP SERPL-CCNC: 151 U/L (ref 39–117)
ALP SERPL-CCNC: 62 U/L (ref 39–117)
ALP SERPL-CCNC: 69 U/L (ref 39–117)
ALP SERPL-CCNC: 76 U/L (ref 39–117)
ALP SERPL-CCNC: 81 U/L (ref 39–117)
ALP SERPL-CCNC: 81 U/L (ref 39–117)
ALT SERPL W P-5'-P-CCNC: 10 U/L (ref 1–33)
ALT SERPL W P-5'-P-CCNC: 10 U/L (ref 1–33)
ALT SERPL W P-5'-P-CCNC: 11 U/L (ref 1–33)
ALT SERPL W P-5'-P-CCNC: 11 U/L (ref 1–33)
ALT SERPL W P-5'-P-CCNC: 18 U/L (ref 1–33)
ALT SERPL W P-5'-P-CCNC: 6 U/L (ref 1–33)
ALT SERPL W P-5'-P-CCNC: 9 U/L (ref 1–33)
ALT SERPL W P-5'-P-CCNC: 9 U/L (ref 1–33)
AMORPH URATE CRY URNS QL MICRO: ABNORMAL /HPF
ANION GAP SERPL CALCULATED.3IONS-SCNC: 10.6 MMOL/L
ANION GAP SERPL CALCULATED.3IONS-SCNC: 10.7 MMOL/L
ANION GAP SERPL CALCULATED.3IONS-SCNC: 11 MMOL/L
ANION GAP SERPL CALCULATED.3IONS-SCNC: 11 MMOL/L
ANION GAP SERPL CALCULATED.3IONS-SCNC: 11.6 MMOL/L
ANION GAP SERPL CALCULATED.3IONS-SCNC: 11.7 MMOL/L
ANION GAP SERPL CALCULATED.3IONS-SCNC: 11.9 MMOL/L
ANION GAP SERPL CALCULATED.3IONS-SCNC: 11.9 MMOL/L
ANION GAP SERPL CALCULATED.3IONS-SCNC: 12 MMOL/L
ANION GAP SERPL CALCULATED.3IONS-SCNC: 12 MMOL/L
ANION GAP SERPL CALCULATED.3IONS-SCNC: 12.2 MMOL/L
ANION GAP SERPL CALCULATED.3IONS-SCNC: 12.3 MMOL/L
ANION GAP SERPL CALCULATED.3IONS-SCNC: 12.3 MMOL/L
ANION GAP SERPL CALCULATED.3IONS-SCNC: 12.4 MMOL/L
ANION GAP SERPL CALCULATED.3IONS-SCNC: 12.6 MMOL/L
ANION GAP SERPL CALCULATED.3IONS-SCNC: 12.7 MMOL/L
ANION GAP SERPL CALCULATED.3IONS-SCNC: 12.9 MMOL/L
ANION GAP SERPL CALCULATED.3IONS-SCNC: 13.1 MMOL/L
ANION GAP SERPL CALCULATED.3IONS-SCNC: 13.1 MMOL/L
ANION GAP SERPL CALCULATED.3IONS-SCNC: 13.2 MMOL/L
ANION GAP SERPL CALCULATED.3IONS-SCNC: 13.3 MMOL/L
ANION GAP SERPL CALCULATED.3IONS-SCNC: 13.4 MMOL/L
ANION GAP SERPL CALCULATED.3IONS-SCNC: 13.5 MMOL/L
ANION GAP SERPL CALCULATED.3IONS-SCNC: 13.5 MMOL/L
ANION GAP SERPL CALCULATED.3IONS-SCNC: 13.9 MMOL/L
ANION GAP SERPL CALCULATED.3IONS-SCNC: 14 MMOL/L
ANION GAP SERPL CALCULATED.3IONS-SCNC: 14.2 MMOL/L
ANION GAP SERPL CALCULATED.3IONS-SCNC: 14.3 MMOL/L
ANION GAP SERPL CALCULATED.3IONS-SCNC: 14.6 MMOL/L
ANION GAP SERPL CALCULATED.3IONS-SCNC: 15.4 MMOL/L
ANION GAP SERPL CALCULATED.3IONS-SCNC: 15.5 MMOL/L
ANION GAP SERPL CALCULATED.3IONS-SCNC: 15.6 MMOL/L
ANION GAP SERPL CALCULATED.3IONS-SCNC: 15.8 MMOL/L
ANION GAP SERPL CALCULATED.3IONS-SCNC: 16.4 MMOL/L
ANION GAP SERPL CALCULATED.3IONS-SCNC: 16.7 MMOL/L
ANION GAP SERPL CALCULATED.3IONS-SCNC: 17.1 MMOL/L
ANION GAP SERPL CALCULATED.3IONS-SCNC: 17.7 MMOL/L
ANION GAP SERPL CALCULATED.3IONS-SCNC: 18 MMOL/L
ANION GAP SERPL CALCULATED.3IONS-SCNC: 21 MMOL/L
ANION GAP SERPL CALCULATED.3IONS-SCNC: 9.5 MMOL/L
ANION GAP SERPL CALCULATED.3IONS-SCNC: 9.7 MMOL/L
AORTIC DIMENSIONLESS INDEX: 0.5 (DI)
APTT PPP: 31.7 SECONDS (ref 22.7–35.4)
AST SERPL-CCNC: 10 U/L (ref 1–32)
AST SERPL-CCNC: 10 U/L (ref 1–32)
AST SERPL-CCNC: 11 U/L (ref 1–32)
AST SERPL-CCNC: 12 U/L (ref 1–32)
AST SERPL-CCNC: 14 U/L (ref 1–32)
AST SERPL-CCNC: 16 U/L (ref 1–32)
AST SERPL-CCNC: 24 U/L (ref 1–32)
AST SERPL-CCNC: 8 U/L (ref 1–32)
B PERT DNA SPEC QL NAA+PROBE: NOT DETECTED
BACTERIA BLD CULT: ABNORMAL
BACTERIA SPEC AEROBE CULT: ABNORMAL
BACTERIA SPEC AEROBE CULT: NORMAL
BACTERIA UR QL AUTO: ABNORMAL /HPF
BACTERIA UR QL AUTO: NORMAL /HPF
BASOPHILS # BLD AUTO: 0.01 10*3/MM3 (ref 0–0.2)
BASOPHILS # BLD AUTO: 0.02 10*3/MM3 (ref 0–0.2)
BASOPHILS # BLD AUTO: 0.03 10*3/MM3 (ref 0–0.2)
BASOPHILS # BLD AUTO: 0.03 10*3/MM3 (ref 0–0.2)
BASOPHILS # BLD AUTO: 0.04 10*3/MM3 (ref 0–0.2)
BASOPHILS # BLD AUTO: 0.05 10*3/MM3 (ref 0–0.2)
BASOPHILS NFR BLD AUTO: 0.1 % (ref 0–1.5)
BASOPHILS NFR BLD AUTO: 0.2 % (ref 0–1.5)
BASOPHILS NFR BLD AUTO: 0.3 % (ref 0–1.5)
BASOPHILS NFR BLD AUTO: 0.4 % (ref 0–1.5)
BASOPHILS NFR BLD AUTO: 0.6 % (ref 0–1.5)
BASOPHILS NFR BLD AUTO: 0.7 % (ref 0–1.5)
BH CV ECHO MEAS - ACS: 1.4 CM
BH CV ECHO MEAS - AO MEAN PG (FULL): 6 MMHG
BH CV ECHO MEAS - AO MEAN PG: 10 MMHG
BH CV ECHO MEAS - AO ROOT AREA (BSA CORRECTED): 1.6
BH CV ECHO MEAS - AO ROOT AREA: 5.3 CM^2
BH CV ECHO MEAS - AO ROOT DIAM: 2.6 CM
BH CV ECHO MEAS - AO V2 MEAN: 152 CM/SEC
BH CV ECHO MEAS - AO V2 VTI: 49.6 CM
BH CV ECHO MEAS - AVA(I,A): 1.7 CM^2
BH CV ECHO MEAS - AVA(I,D): 1.7 CM^2
BH CV ECHO MEAS - BSA(HAYCOCK): 1.6 M^2
BH CV ECHO MEAS - BSA: 1.7 M^2
BH CV ECHO MEAS - BZI_BMI: 20.8 KILOGRAMS/M^2
BH CV ECHO MEAS - BZI_METRIC_HEIGHT: 167.6 CM
BH CV ECHO MEAS - BZI_METRIC_WEIGHT: 58.5 KG
BH CV ECHO MEAS - CONTRAST EF (2CH): 55.5 ML/M^2
BH CV ECHO MEAS - CONTRAST EF 4CH: 66.7 ML/M^2
BH CV ECHO MEAS - EDV(CUBED): 91.1 ML
BH CV ECHO MEAS - EDV(MOD-SP2): 110 ML
BH CV ECHO MEAS - EDV(MOD-SP4): 126 ML
BH CV ECHO MEAS - EDV(TEICH): 92.4 ML
BH CV ECHO MEAS - EF(CUBED): 60.6 %
BH CV ECHO MEAS - EF(MOD-SP2): 55.5 %
BH CV ECHO MEAS - EF(MOD-SP4): 66.7 %
BH CV ECHO MEAS - EF(TEICH): 52.3 %
BH CV ECHO MEAS - ESV(CUBED): 35.9 ML
BH CV ECHO MEAS - ESV(MOD-SP2): 49 ML
BH CV ECHO MEAS - ESV(MOD-SP4): 42 ML
BH CV ECHO MEAS - ESV(TEICH): 44.1 ML
BH CV ECHO MEAS - FS: 26.7 %
BH CV ECHO MEAS - IVS/LVPW: 1
BH CV ECHO MEAS - IVSD: 1.1 CM
BH CV ECHO MEAS - LA DIMENSION: 4.6 CM
BH CV ECHO MEAS - LA/AO: 1.8
BH CV ECHO MEAS - LAT PEAK E' VEL: 11.1 CM/SEC
BH CV ECHO MEAS - LV DIASTOLIC VOL/BSA (35-75): 75.9 ML/M^2
BH CV ECHO MEAS - LV MASS(C)D: 175 GRAMS
BH CV ECHO MEAS - LV MASS(C)DI: 105.4 GRAMS/M^2
BH CV ECHO MEAS - LV MEAN PG: 4 MMHG
BH CV ECHO MEAS - LV SYSTOLIC VOL/BSA (12-30): 25.3 ML/M^2
BH CV ECHO MEAS - LV V1 MEAN: 91.3 CM/SEC
BH CV ECHO MEAS - LV V1 VTI: 26.3 CM
BH CV ECHO MEAS - LVIDD: 4.5 CM
BH CV ECHO MEAS - LVIDS: 3.3 CM
BH CV ECHO MEAS - LVLD AP2: 7.6 CM
BH CV ECHO MEAS - LVLD AP4: 7.2 CM
BH CV ECHO MEAS - LVLS AP2: 6.9 CM
BH CV ECHO MEAS - LVLS AP4: 6.7 CM
BH CV ECHO MEAS - LVOT AREA (M): 3.1 CM^2
BH CV ECHO MEAS - LVOT AREA: 3.1 CM^2
BH CV ECHO MEAS - LVOT DIAM: 2 CM
BH CV ECHO MEAS - LVPWD: 1.1 CM
BH CV ECHO MEAS - MED PEAK E' VEL: 6.3 CM/SEC
BH CV ECHO MEAS - MR ALIAS VEL: 30.8 CM/SEC
BH CV ECHO MEAS - MR ERO: 0.15 CM^2
BH CV ECHO MEAS - MR FLOW RATE: 94.8 CM^3/SEC
BH CV ECHO MEAS - MR MAX PG: 154 MMHG
BH CV ECHO MEAS - MR MAX VEL: 617.7 CM/SEC
BH CV ECHO MEAS - MR PISA RADIUS: 0.7 CM
BH CV ECHO MEAS - MR PISA: 3.1 CM^2
BH CV ECHO MEAS - MV A DUR: 0.14 SEC
BH CV ECHO MEAS - MV A MAX VEL: 90.5 CM/SEC
BH CV ECHO MEAS - MV DEC SLOPE: 810 CM/SEC^2
BH CV ECHO MEAS - MV DEC TIME: 0.1 SEC
BH CV ECHO MEAS - MV E MAX VEL: 165 CM/SEC
BH CV ECHO MEAS - MV E/A: 1.8
BH CV ECHO MEAS - MV MEAN PG: 6 MMHG
BH CV ECHO MEAS - MV P1/2T MAX VEL: 191 CM/SEC
BH CV ECHO MEAS - MV P1/2T: 69.1 MSEC
BH CV ECHO MEAS - MV V2 MEAN: 114 CM/SEC
BH CV ECHO MEAS - MV V2 VTI: 31.8 CM
BH CV ECHO MEAS - MVA P1/2T LCG: 1.2 CM^2
BH CV ECHO MEAS - MVA(P1/2T): 3.2 CM^2
BH CV ECHO MEAS - MVA(VTI): 2.6 CM^2
BH CV ECHO MEAS - PA ACC SLOPE: 588 CM/SEC^2
BH CV ECHO MEAS - PA ACC TIME: 0.15 SEC
BH CV ECHO MEAS - PA MAX PG (FULL): 2 MMHG
BH CV ECHO MEAS - PA MAX PG: 3.2 MMHG
BH CV ECHO MEAS - PA PR(ACCEL): 11.1 MMHG
BH CV ECHO MEAS - PA V2 MAX: 90.1 CM/SEC
BH CV ECHO MEAS - PULM A REVS DUR: 0.12 SEC
BH CV ECHO MEAS - PULM A REVS VEL: 28.6 CM/SEC
BH CV ECHO MEAS - PULM DIAS VEL: 45.9 CM/SEC
BH CV ECHO MEAS - PULM S/D: 0.92
BH CV ECHO MEAS - PULM SYS VEL: 42.4 CM/SEC
BH CV ECHO MEAS - PVA(V,A): 1.9 CM^2
BH CV ECHO MEAS - PVA(V,D): 1.9 CM^2
BH CV ECHO MEAS - QP/QS: 0.48
BH CV ECHO MEAS - RAP SYSTOLE: 3 MMHG
BH CV ECHO MEAS - RV MAX PG: 1.2 MMHG
BH CV ECHO MEAS - RV MEAN PG: 1 MMHG
BH CV ECHO MEAS - RV V1 MAX: 55.6 CM/SEC
BH CV ECHO MEAS - RV V1 MEAN: 43.2 CM/SEC
BH CV ECHO MEAS - RV V1 VTI: 12.6 CM
BH CV ECHO MEAS - RVOT AREA: 3.1 CM^2
BH CV ECHO MEAS - RVOT DIAM: 2 CM
BH CV ECHO MEAS - RVSP: 38.8 MMHG
BH CV ECHO MEAS - SI(AO): 158.6 ML/M^2
BH CV ECHO MEAS - SI(CUBED): 33.2 ML/M^2
BH CV ECHO MEAS - SI(LVOT): 49.8 ML/M^2
BH CV ECHO MEAS - SI(MOD-SP2): 36.7 ML/M^2
BH CV ECHO MEAS - SI(MOD-SP4): 50.6 ML/M^2
BH CV ECHO MEAS - SI(TEICH): 29.1 ML/M^2
BH CV ECHO MEAS - SV(AO): 263.3 ML
BH CV ECHO MEAS - SV(CUBED): 55.2 ML
BH CV ECHO MEAS - SV(LVOT): 82.6 ML
BH CV ECHO MEAS - SV(MOD-SP2): 61 ML
BH CV ECHO MEAS - SV(MOD-SP4): 84 ML
BH CV ECHO MEAS - SV(RVOT): 39.6 ML
BH CV ECHO MEAS - SV(TEICH): 48.3 ML
BH CV ECHO MEAS - TAPSE (>1.6): 1.3 CM2
BH CV ECHO MEAS - TR MAX VEL: 299 CM/SEC
BH CV XLRA - RV BASE: 3 CM
BH CV XLRA - RV LENGTH: 6.5 CM
BH CV XLRA - RV MID: 2.2 CM
BH CV XLRA - TDI S': 6.4 CM/SEC
BILIRUB SERPL-MCNC: 0.4 MG/DL (ref 0.1–1.2)
BILIRUB SERPL-MCNC: 0.4 MG/DL (ref 0.1–1.2)
BILIRUB SERPL-MCNC: 0.5 MG/DL (ref 0.1–1.2)
BILIRUB SERPL-MCNC: 0.6 MG/DL (ref 0.1–1.2)
BILIRUB SERPL-MCNC: 0.8 MG/DL (ref 0.1–1.2)
BILIRUB UR QL STRIP: NEGATIVE
BUN BLD-MCNC: 10 MG/DL (ref 8–23)
BUN BLD-MCNC: 11 MG/DL (ref 8–23)
BUN BLD-MCNC: 12 MG/DL (ref 8–23)
BUN BLD-MCNC: 13 MG/DL (ref 8–23)
BUN BLD-MCNC: 14 MG/DL (ref 8–23)
BUN BLD-MCNC: 15 MG/DL (ref 8–23)
BUN BLD-MCNC: 15 MG/DL (ref 8–23)
BUN BLD-MCNC: 16 MG/DL (ref 8–23)
BUN BLD-MCNC: 17 MG/DL (ref 8–23)
BUN BLD-MCNC: 18 MG/DL (ref 8–23)
BUN BLD-MCNC: 19 MG/DL (ref 8–23)
BUN BLD-MCNC: 23 MG/DL (ref 8–23)
BUN BLD-MCNC: 23 MG/DL (ref 8–23)
BUN BLD-MCNC: 27 MG/DL (ref 8–23)
BUN BLD-MCNC: 28 MG/DL (ref 8–23)
BUN BLD-MCNC: 30 MG/DL (ref 8–23)
BUN BLD-MCNC: 34 MG/DL (ref 8–23)
BUN/CREAT SERPL: 10.2 (ref 7–25)
BUN/CREAT SERPL: 10.5 (ref 7–25)
BUN/CREAT SERPL: 11.1 (ref 7–25)
BUN/CREAT SERPL: 11.3 (ref 7–25)
BUN/CREAT SERPL: 12.6 (ref 7–25)
BUN/CREAT SERPL: 13 (ref 7–25)
BUN/CREAT SERPL: 13.4 (ref 7–25)
BUN/CREAT SERPL: 13.5 (ref 7–25)
BUN/CREAT SERPL: 13.6 (ref 7–25)
BUN/CREAT SERPL: 13.9 (ref 7–25)
BUN/CREAT SERPL: 14 (ref 7–25)
BUN/CREAT SERPL: 14.5 (ref 7–25)
BUN/CREAT SERPL: 14.6 (ref 7–25)
BUN/CREAT SERPL: 14.7 (ref 7–25)
BUN/CREAT SERPL: 14.7 (ref 7–25)
BUN/CREAT SERPL: 15.1 (ref 7–25)
BUN/CREAT SERPL: 15.3 (ref 7–25)
BUN/CREAT SERPL: 16.3 (ref 7–25)
BUN/CREAT SERPL: 16.5 (ref 7–25)
BUN/CREAT SERPL: 16.7 (ref 7–25)
BUN/CREAT SERPL: 17.1 (ref 7–25)
BUN/CREAT SERPL: 17.6 (ref 7–25)
BUN/CREAT SERPL: 17.9 (ref 7–25)
BUN/CREAT SERPL: 18.5 (ref 7–25)
BUN/CREAT SERPL: 19 (ref 7–25)
BUN/CREAT SERPL: 20 (ref 7–25)
BUN/CREAT SERPL: 20.2 (ref 7–25)
BUN/CREAT SERPL: 20.7 (ref 7–25)
BUN/CREAT SERPL: 21 (ref 7–25)
BUN/CREAT SERPL: 21.9 (ref 7–25)
BUN/CREAT SERPL: 22 (ref 7–25)
BUN/CREAT SERPL: 22.5 (ref 7–25)
BUN/CREAT SERPL: 23 (ref 7–25)
BUN/CREAT SERPL: 23.9 (ref 7–25)
BUN/CREAT SERPL: 23.9 (ref 7–25)
BUN/CREAT SERPL: 24.1 (ref 7–25)
BUN/CREAT SERPL: 24.3 (ref 7–25)
BUN/CREAT SERPL: 25.8 (ref 7–25)
BUN/CREAT SERPL: 26.8 (ref 7–25)
BUN/CREAT SERPL: 27.6 (ref 7–25)
BUN/CREAT SERPL: 29.2 (ref 7–25)
C DIFF TOX GENS STL QL NAA+PROBE: NEGATIVE
C DIFF TOX GENS STL QL NAA+PROBE: NEGATIVE
C PNEUM DNA NPH QL NAA+NON-PROBE: NOT DETECTED
CALCIUM SPEC-SCNC: 8.1 MG/DL (ref 8.6–10.5)
CALCIUM SPEC-SCNC: 8.1 MG/DL (ref 8.6–10.5)
CALCIUM SPEC-SCNC: 8.2 MG/DL (ref 8.6–10.5)
CALCIUM SPEC-SCNC: 8.3 MG/DL (ref 8.6–10.5)
CALCIUM SPEC-SCNC: 8.3 MG/DL (ref 8.6–10.5)
CALCIUM SPEC-SCNC: 8.5 MG/DL (ref 8.6–10.5)
CALCIUM SPEC-SCNC: 8.5 MG/DL (ref 8.6–10.5)
CALCIUM SPEC-SCNC: 8.6 MG/DL (ref 8.6–10.5)
CALCIUM SPEC-SCNC: 8.7 MG/DL (ref 8.6–10.5)
CALCIUM SPEC-SCNC: 8.8 MG/DL (ref 8.6–10.5)
CALCIUM SPEC-SCNC: 8.9 MG/DL (ref 8.6–10.5)
CALCIUM SPEC-SCNC: 9 MG/DL (ref 8.6–10.5)
CALCIUM SPEC-SCNC: 9.1 MG/DL (ref 8.6–10.5)
CALCIUM SPEC-SCNC: 9.2 MG/DL (ref 8.6–10.5)
CALCIUM SPEC-SCNC: 9.3 MG/DL (ref 8.6–10.5)
CALCIUM SPEC-SCNC: 9.4 MG/DL (ref 8.6–10.5)
CALCIUM SPEC-SCNC: 9.6 MG/DL (ref 8.6–10.5)
CALCIUM SPEC-SCNC: 9.7 MG/DL (ref 8.6–10.5)
CALCIUM SPEC-SCNC: 9.9 MG/DL (ref 8.6–10.5)
CHLORIDE SERPL-SCNC: 100 MMOL/L (ref 98–107)
CHLORIDE SERPL-SCNC: 100 MMOL/L (ref 98–107)
CHLORIDE SERPL-SCNC: 102 MMOL/L (ref 98–107)
CHLORIDE SERPL-SCNC: 103 MMOL/L (ref 98–107)
CHLORIDE SERPL-SCNC: 103 MMOL/L (ref 98–107)
CHLORIDE SERPL-SCNC: 105 MMOL/L (ref 98–107)
CHLORIDE SERPL-SCNC: 87 MMOL/L (ref 98–107)
CHLORIDE SERPL-SCNC: 88 MMOL/L (ref 98–107)
CHLORIDE SERPL-SCNC: 90 MMOL/L (ref 98–107)
CHLORIDE SERPL-SCNC: 91 MMOL/L (ref 98–107)
CHLORIDE SERPL-SCNC: 92 MMOL/L (ref 98–107)
CHLORIDE SERPL-SCNC: 93 MMOL/L (ref 98–107)
CHLORIDE SERPL-SCNC: 94 MMOL/L (ref 98–107)
CHLORIDE SERPL-SCNC: 95 MMOL/L (ref 98–107)
CHLORIDE SERPL-SCNC: 95 MMOL/L (ref 98–107)
CHLORIDE SERPL-SCNC: 96 MMOL/L (ref 98–107)
CHLORIDE SERPL-SCNC: 97 MMOL/L (ref 98–107)
CHLORIDE SERPL-SCNC: 98 MMOL/L (ref 98–107)
CHLORIDE SERPL-SCNC: 98 MMOL/L (ref 98–107)
CHLORIDE SERPL-SCNC: 99 MMOL/L (ref 98–107)
CHOLEST SERPL-MCNC: 148 MG/DL (ref 0–200)
CK SERPL-CCNC: 28 U/L (ref 20–180)
CLARITY UR: ABNORMAL
CLARITY UR: CLEAR
CO2 SERPL-SCNC: 23.5 MMOL/L (ref 22–29)
CO2 SERPL-SCNC: 23.8 MMOL/L (ref 22–29)
CO2 SERPL-SCNC: 24 MMOL/L (ref 22–29)
CO2 SERPL-SCNC: 24 MMOL/L (ref 22–29)
CO2 SERPL-SCNC: 24.5 MMOL/L (ref 22–29)
CO2 SERPL-SCNC: 25 MMOL/L (ref 22–29)
CO2 SERPL-SCNC: 25.1 MMOL/L (ref 22–29)
CO2 SERPL-SCNC: 25.1 MMOL/L (ref 22–29)
CO2 SERPL-SCNC: 25.5 MMOL/L (ref 22–29)
CO2 SERPL-SCNC: 25.9 MMOL/L (ref 22–29)
CO2 SERPL-SCNC: 26.1 MMOL/L (ref 22–29)
CO2 SERPL-SCNC: 26.3 MMOL/L (ref 22–29)
CO2 SERPL-SCNC: 26.3 MMOL/L (ref 22–29)
CO2 SERPL-SCNC: 26.4 MMOL/L (ref 22–29)
CO2 SERPL-SCNC: 26.6 MMOL/L (ref 22–29)
CO2 SERPL-SCNC: 26.6 MMOL/L (ref 22–29)
CO2 SERPL-SCNC: 26.7 MMOL/L (ref 22–29)
CO2 SERPL-SCNC: 27.2 MMOL/L (ref 22–29)
CO2 SERPL-SCNC: 27.4 MMOL/L (ref 22–29)
CO2 SERPL-SCNC: 27.8 MMOL/L (ref 22–29)
CO2 SERPL-SCNC: 28 MMOL/L (ref 22–29)
CO2 SERPL-SCNC: 28.4 MMOL/L (ref 22–29)
CO2 SERPL-SCNC: 28.6 MMOL/L (ref 22–29)
CO2 SERPL-SCNC: 28.7 MMOL/L (ref 22–29)
CO2 SERPL-SCNC: 28.9 MMOL/L (ref 22–29)
CO2 SERPL-SCNC: 29.3 MMOL/L (ref 22–29)
CO2 SERPL-SCNC: 29.3 MMOL/L (ref 22–29)
CO2 SERPL-SCNC: 29.4 MMOL/L (ref 22–29)
CO2 SERPL-SCNC: 29.6 MMOL/L (ref 22–29)
CO2 SERPL-SCNC: 29.8 MMOL/L (ref 22–29)
CO2 SERPL-SCNC: 30 MMOL/L (ref 22–29)
CO2 SERPL-SCNC: 30.5 MMOL/L (ref 22–29)
CO2 SERPL-SCNC: 30.7 MMOL/L (ref 22–29)
CO2 SERPL-SCNC: 31 MMOL/L (ref 22–29)
CO2 SERPL-SCNC: 31 MMOL/L (ref 22–29)
CO2 SERPL-SCNC: 31.3 MMOL/L (ref 22–29)
CO2 SERPL-SCNC: 31.3 MMOL/L (ref 22–29)
CO2 SERPL-SCNC: 32.1 MMOL/L (ref 22–29)
CO2 SERPL-SCNC: 32.7 MMOL/L (ref 22–29)
CO2 SERPL-SCNC: 32.9 MMOL/L (ref 22–29)
CO2 SERPL-SCNC: 33.4 MMOL/L (ref 22–29)
COLOR UR: ABNORMAL
COLOR UR: YELLOW
CREAT BLD-MCNC: 0.71 MG/DL (ref 0.57–1)
CREAT BLD-MCNC: 0.71 MG/DL (ref 0.57–1)
CREAT BLD-MCNC: 0.74 MG/DL (ref 0.57–1)
CREAT BLD-MCNC: 0.74 MG/DL (ref 0.57–1)
CREAT BLD-MCNC: 0.76 MG/DL (ref 0.57–1)
CREAT BLD-MCNC: 0.76 MG/DL (ref 0.57–1)
CREAT BLD-MCNC: 0.8 MG/DL (ref 0.57–1)
CREAT BLD-MCNC: 0.8 MG/DL (ref 0.57–1)
CREAT BLD-MCNC: 0.81 MG/DL (ref 0.57–1)
CREAT BLD-MCNC: 0.82 MG/DL (ref 0.57–1)
CREAT BLD-MCNC: 0.82 MG/DL (ref 0.57–1)
CREAT BLD-MCNC: 0.84 MG/DL (ref 0.57–1)
CREAT BLD-MCNC: 0.85 MG/DL (ref 0.57–1)
CREAT BLD-MCNC: 0.87 MG/DL (ref 0.57–1)
CREAT BLD-MCNC: 0.89 MG/DL (ref 0.57–1)
CREAT BLD-MCNC: 0.89 MG/DL (ref 0.57–1)
CREAT BLD-MCNC: 0.9 MG/DL (ref 0.57–1)
CREAT BLD-MCNC: 0.91 MG/DL (ref 0.57–1)
CREAT BLD-MCNC: 0.92 MG/DL (ref 0.57–1)
CREAT BLD-MCNC: 0.92 MG/DL (ref 0.57–1)
CREAT BLD-MCNC: 0.93 MG/DL (ref 0.57–1)
CREAT BLD-MCNC: 0.95 MG/DL (ref 0.57–1)
CREAT BLD-MCNC: 0.96 MG/DL (ref 0.57–1)
CREAT BLD-MCNC: 0.96 MG/DL (ref 0.57–1)
CREAT BLD-MCNC: 0.98 MG/DL (ref 0.57–1)
CREAT BLD-MCNC: 1 MG/DL (ref 0.57–1)
CREAT BLD-MCNC: 1.01 MG/DL (ref 0.57–1)
CREAT BLD-MCNC: 1.02 MG/DL (ref 0.57–1)
CREAT BLD-MCNC: 1.03 MG/DL (ref 0.57–1)
CREAT BLD-MCNC: 1.05 MG/DL (ref 0.57–1)
CREAT BLD-MCNC: 1.05 MG/DL (ref 0.57–1)
CREAT BLD-MCNC: 1.09 MG/DL (ref 0.57–1)
CREAT BLD-MCNC: 1.12 MG/DL (ref 0.57–1)
CREAT BLD-MCNC: 1.14 MG/DL (ref 0.57–1)
CREAT BLD-MCNC: 1.15 MG/DL (ref 0.57–1)
CREAT BLD-MCNC: 1.16 MG/DL (ref 0.57–1)
CREAT BLD-MCNC: 1.22 MG/DL (ref 0.57–1)
CREAT BLD-MCNC: 1.23 MG/DL (ref 0.57–1)
CREAT BLD-MCNC: 1.23 MG/DL (ref 0.57–1)
CREAT BLD-MCNC: 1.35 MG/DL (ref 0.57–1)
CREAT BLD-MCNC: 1.62 MG/DL (ref 0.57–1)
D-LACTATE SERPL-SCNC: 1.3 MMOL/L (ref 0.5–2)
D-LACTATE SERPL-SCNC: 1.5 MMOL/L (ref 0.5–2)
D-LACTATE SERPL-SCNC: 1.6 MMOL/L (ref 0.5–2)
DEPRECATED RDW RBC AUTO: 43 FL (ref 37–54)
DEPRECATED RDW RBC AUTO: 44 FL (ref 37–54)
DEPRECATED RDW RBC AUTO: 44.6 FL (ref 37–54)
DEPRECATED RDW RBC AUTO: 45 FL (ref 37–54)
DEPRECATED RDW RBC AUTO: 45.1 FL (ref 37–54)
DEPRECATED RDW RBC AUTO: 45.3 FL (ref 37–54)
DEPRECATED RDW RBC AUTO: 45.6 FL (ref 37–54)
DEPRECATED RDW RBC AUTO: 45.7 FL (ref 37–54)
DEPRECATED RDW RBC AUTO: 45.7 FL (ref 37–54)
DEPRECATED RDW RBC AUTO: 45.9 FL (ref 37–54)
DEPRECATED RDW RBC AUTO: 46 FL (ref 37–54)
DEPRECATED RDW RBC AUTO: 46 FL (ref 37–54)
DEPRECATED RDW RBC AUTO: 46.2 FL (ref 37–54)
DEPRECATED RDW RBC AUTO: 46.3 FL (ref 37–54)
DEPRECATED RDW RBC AUTO: 46.5 FL (ref 37–54)
DEPRECATED RDW RBC AUTO: 46.7 FL (ref 37–54)
DEPRECATED RDW RBC AUTO: 47.2 FL (ref 37–54)
DEPRECATED RDW RBC AUTO: 47.2 FL (ref 37–54)
DEPRECATED RDW RBC AUTO: 47.3 FL (ref 37–54)
DEPRECATED RDW RBC AUTO: 47.3 FL (ref 37–54)
DEPRECATED RDW RBC AUTO: 47.4 FL (ref 37–54)
DEPRECATED RDW RBC AUTO: 48.8 FL (ref 37–54)
DEPRECATED RDW RBC AUTO: 50.5 FL (ref 37–54)
DEPRECATED RDW RBC AUTO: 50.8 FL (ref 37–54)
DEPRECATED RDW RBC AUTO: 53.2 FL (ref 37–54)
DEPRECATED RDW RBC AUTO: 53.7 FL (ref 37–54)
DEPRECATED RDW RBC AUTO: 55.2 FL (ref 37–54)
DEPRECATED RDW RBC AUTO: 55.5 FL (ref 37–54)
DEPRECATED RDW RBC AUTO: 55.7 FL (ref 37–54)
DEPRECATED RDW RBC AUTO: 56.9 FL (ref 37–54)
E/E' RATIO: 20.5
EOSINOPHIL # BLD AUTO: 0.01 10*3/MM3 (ref 0–0.7)
EOSINOPHIL # BLD AUTO: 0.03 10*3/MM3 (ref 0–0.7)
EOSINOPHIL # BLD AUTO: 0.04 10*3/MM3 (ref 0–0.7)
EOSINOPHIL # BLD AUTO: 0.05 10*3/MM3 (ref 0–0.7)
EOSINOPHIL # BLD AUTO: 0.07 10*3/MM3 (ref 0–0.7)
EOSINOPHIL # BLD AUTO: 0.08 10*3/MM3 (ref 0–0.7)
EOSINOPHIL # BLD AUTO: 0.11 10*3/MM3 (ref 0–0.7)
EOSINOPHIL # BLD AUTO: 0.12 10*3/MM3 (ref 0–0.7)
EOSINOPHIL # BLD AUTO: 0.13 10*3/MM3 (ref 0–0.7)
EOSINOPHIL # BLD AUTO: 0.15 10*3/MM3 (ref 0–0.7)
EOSINOPHIL # BLD AUTO: 0.19 10*3/MM3 (ref 0–0.7)
EOSINOPHIL # BLD AUTO: 0.22 10*3/MM3 (ref 0–0.7)
EOSINOPHIL # BLD AUTO: 0.23 10*3/MM3 (ref 0–0.7)
EOSINOPHIL # BLD AUTO: 0.31 10*3/MM3 (ref 0–0.7)
EOSINOPHIL # BLD AUTO: 0.34 10*3/MM3 (ref 0–0.7)
EOSINOPHIL # BLD AUTO: 0.36 10*3/MM3 (ref 0–0.7)
EOSINOPHIL NFR BLD AUTO: 0.1 % (ref 0.3–6.2)
EOSINOPHIL NFR BLD AUTO: 0.3 % (ref 0.3–6.2)
EOSINOPHIL NFR BLD AUTO: 0.5 % (ref 0.3–6.2)
EOSINOPHIL NFR BLD AUTO: 0.6 % (ref 0.3–6.2)
EOSINOPHIL NFR BLD AUTO: 0.7 % (ref 0.3–6.2)
EOSINOPHIL NFR BLD AUTO: 0.8 % (ref 0.3–6.2)
EOSINOPHIL NFR BLD AUTO: 1.2 % (ref 0.3–6.2)
EOSINOPHIL NFR BLD AUTO: 1.4 % (ref 0.3–6.2)
EOSINOPHIL NFR BLD AUTO: 1.9 % (ref 0.3–6.2)
EOSINOPHIL NFR BLD AUTO: 2 % (ref 0.3–6.2)
EOSINOPHIL NFR BLD AUTO: 2.4 % (ref 0.3–6.2)
EOSINOPHIL NFR BLD AUTO: 2.4 % (ref 0.3–6.2)
EOSINOPHIL NFR BLD AUTO: 3.1 % (ref 0.3–6.2)
EOSINOPHIL NFR BLD AUTO: 3.2 % (ref 0.3–6.2)
EOSINOPHIL NFR BLD AUTO: 4.2 % (ref 0.3–6.2)
EOSINOPHIL NFR BLD AUTO: 6.5 % (ref 0.3–6.2)
ERYTHROCYTE [DISTWIDTH] IN BLOOD BY AUTOMATED COUNT: 13.1 % (ref 11.7–13)
ERYTHROCYTE [DISTWIDTH] IN BLOOD BY AUTOMATED COUNT: 13.2 % (ref 11.7–13)
ERYTHROCYTE [DISTWIDTH] IN BLOOD BY AUTOMATED COUNT: 13.3 % (ref 11.7–13)
ERYTHROCYTE [DISTWIDTH] IN BLOOD BY AUTOMATED COUNT: 13.4 % (ref 11.7–13)
ERYTHROCYTE [DISTWIDTH] IN BLOOD BY AUTOMATED COUNT: 13.5 % (ref 11.7–13)
ERYTHROCYTE [DISTWIDTH] IN BLOOD BY AUTOMATED COUNT: 13.6 % (ref 11.7–13)
ERYTHROCYTE [DISTWIDTH] IN BLOOD BY AUTOMATED COUNT: 13.7 % (ref 11.7–13)
ERYTHROCYTE [DISTWIDTH] IN BLOOD BY AUTOMATED COUNT: 13.8 % (ref 11.7–13)
ERYTHROCYTE [DISTWIDTH] IN BLOOD BY AUTOMATED COUNT: 13.8 % (ref 11.7–13)
ERYTHROCYTE [DISTWIDTH] IN BLOOD BY AUTOMATED COUNT: 13.9 % (ref 11.7–13)
ERYTHROCYTE [DISTWIDTH] IN BLOOD BY AUTOMATED COUNT: 14.2 % (ref 11.7–13)
ERYTHROCYTE [DISTWIDTH] IN BLOOD BY AUTOMATED COUNT: 14.5 % (ref 11.7–13)
ERYTHROCYTE [DISTWIDTH] IN BLOOD BY AUTOMATED COUNT: 14.6 % (ref 11.7–13)
ERYTHROCYTE [DISTWIDTH] IN BLOOD BY AUTOMATED COUNT: 14.7 % (ref 11.7–13)
ERYTHROCYTE [DISTWIDTH] IN BLOOD BY AUTOMATED COUNT: 14.9 % (ref 11.7–13)
ERYTHROCYTE [DISTWIDTH] IN BLOOD BY AUTOMATED COUNT: 15.9 % (ref 11.7–13)
ERYTHROCYTE [DISTWIDTH] IN BLOOD BY AUTOMATED COUNT: 16.1 % (ref 11.7–13)
ERYTHROCYTE [DISTWIDTH] IN BLOOD BY AUTOMATED COUNT: 17 % (ref 11.7–13)
ERYTHROCYTE [DISTWIDTH] IN BLOOD BY AUTOMATED COUNT: 17.2 % (ref 11.7–13)
ERYTHROCYTE [DISTWIDTH] IN BLOOD BY AUTOMATED COUNT: 17.3 % (ref 11.7–13)
ERYTHROCYTE [DISTWIDTH] IN BLOOD BY AUTOMATED COUNT: 17.3 % (ref 11.7–13)
ERYTHROCYTE [DISTWIDTH] IN BLOOD BY AUTOMATED COUNT: 17.4 % (ref 11.7–13)
ERYTHROCYTE [DISTWIDTH] IN BLOOD BY AUTOMATED COUNT: 17.7 % (ref 11.7–13)
ERYTHROCYTE [DISTWIDTH] IN BLOOD BY AUTOMATED COUNT: 17.7 % (ref 11.7–13)
FLUAV H1 2009 PAND RNA NPH QL NAA+PROBE: NOT DETECTED
FLUAV H1 HA GENE NPH QL NAA+PROBE: NOT DETECTED
FLUAV H3 RNA NPH QL NAA+PROBE: NOT DETECTED
FLUAV SUBTYP SPEC NAA+PROBE: NOT DETECTED
FLUBV RNA ISLT QL NAA+PROBE: NOT DETECTED
GFR SERPL CREATININE-BSD FRML MDRD: 30 ML/MIN/1.73
GFR SERPL CREATININE-BSD FRML MDRD: 37 ML/MIN/1.73
GFR SERPL CREATININE-BSD FRML MDRD: 41 ML/MIN/1.73
GFR SERPL CREATININE-BSD FRML MDRD: 41 ML/MIN/1.73
GFR SERPL CREATININE-BSD FRML MDRD: 42 ML/MIN/1.73
GFR SERPL CREATININE-BSD FRML MDRD: 44 ML/MIN/1.73
GFR SERPL CREATININE-BSD FRML MDRD: 45 ML/MIN/1.73
GFR SERPL CREATININE-BSD FRML MDRD: 45 ML/MIN/1.73
GFR SERPL CREATININE-BSD FRML MDRD: 46 ML/MIN/1.73
GFR SERPL CREATININE-BSD FRML MDRD: 47 ML/MIN/1.73
GFR SERPL CREATININE-BSD FRML MDRD: 50 ML/MIN/1.73
GFR SERPL CREATININE-BSD FRML MDRD: 50 ML/MIN/1.73
GFR SERPL CREATININE-BSD FRML MDRD: 51 ML/MIN/1.73
GFR SERPL CREATININE-BSD FRML MDRD: 51 ML/MIN/1.73
GFR SERPL CREATININE-BSD FRML MDRD: 52 ML/MIN/1.73
GFR SERPL CREATININE-BSD FRML MDRD: 52 ML/MIN/1.73
GFR SERPL CREATININE-BSD FRML MDRD: 54 ML/MIN/1.73
GFR SERPL CREATININE-BSD FRML MDRD: 55 ML/MIN/1.73
GFR SERPL CREATININE-BSD FRML MDRD: 55 ML/MIN/1.73
GFR SERPL CREATININE-BSD FRML MDRD: 56 ML/MIN/1.73
GFR SERPL CREATININE-BSD FRML MDRD: 57 ML/MIN/1.73
GFR SERPL CREATININE-BSD FRML MDRD: 58 ML/MIN/1.73
GFR SERPL CREATININE-BSD FRML MDRD: 58 ML/MIN/1.73
GFR SERPL CREATININE-BSD FRML MDRD: 59 ML/MIN/1.73
GFR SERPL CREATININE-BSD FRML MDRD: 59 ML/MIN/1.73
GFR SERPL CREATININE-BSD FRML MDRD: 60 ML/MIN/1.73
GFR SERPL CREATININE-BSD FRML MDRD: 60 ML/MIN/1.73
GFR SERPL CREATININE-BSD FRML MDRD: 62 ML/MIN/1.73
GFR SERPL CREATININE-BSD FRML MDRD: 63 ML/MIN/1.73
GFR SERPL CREATININE-BSD FRML MDRD: 64 ML/MIN/1.73
GFR SERPL CREATININE-BSD FRML MDRD: 66 ML/MIN/1.73
GFR SERPL CREATININE-BSD FRML MDRD: 66 ML/MIN/1.73
GFR SERPL CREATININE-BSD FRML MDRD: 67 ML/MIN/1.73
GFR SERPL CREATININE-BSD FRML MDRD: 68 ML/MIN/1.73
GFR SERPL CREATININE-BSD FRML MDRD: 68 ML/MIN/1.73
GFR SERPL CREATININE-BSD FRML MDRD: 72 ML/MIN/1.73
GFR SERPL CREATININE-BSD FRML MDRD: 72 ML/MIN/1.73
GFR SERPL CREATININE-BSD FRML MDRD: 74 ML/MIN/1.73
GFR SERPL CREATININE-BSD FRML MDRD: 74 ML/MIN/1.73
GFR SERPL CREATININE-BSD FRML MDRD: 78 ML/MIN/1.73
GFR SERPL CREATININE-BSD FRML MDRD: 78 ML/MIN/1.73
GLOBULIN UR ELPH-MCNC: 2.9 GM/DL
GLOBULIN UR ELPH-MCNC: 2.9 GM/DL
GLOBULIN UR ELPH-MCNC: 3.3 GM/DL
GLOBULIN UR ELPH-MCNC: 3.3 GM/DL
GLOBULIN UR ELPH-MCNC: 3.4 GM/DL
GLOBULIN UR ELPH-MCNC: 3.5 GM/DL
GLOBULIN UR ELPH-MCNC: 3.7 GM/DL
GLOBULIN UR ELPH-MCNC: 3.8 GM/DL
GLUCOSE BLD-MCNC: 108 MG/DL (ref 65–99)
GLUCOSE BLD-MCNC: 116 MG/DL (ref 65–99)
GLUCOSE BLD-MCNC: 120 MG/DL (ref 65–99)
GLUCOSE BLD-MCNC: 124 MG/DL (ref 65–99)
GLUCOSE BLD-MCNC: 127 MG/DL (ref 65–99)
GLUCOSE BLD-MCNC: 140 MG/DL (ref 65–99)
GLUCOSE BLD-MCNC: 144 MG/DL (ref 65–99)
GLUCOSE BLD-MCNC: 152 MG/DL (ref 65–99)
GLUCOSE BLD-MCNC: 160 MG/DL (ref 65–99)
GLUCOSE BLD-MCNC: 166 MG/DL (ref 65–99)
GLUCOSE BLD-MCNC: 175 MG/DL (ref 65–99)
GLUCOSE BLD-MCNC: 176 MG/DL (ref 65–99)
GLUCOSE BLD-MCNC: 178 MG/DL (ref 65–99)
GLUCOSE BLD-MCNC: 186 MG/DL (ref 65–99)
GLUCOSE BLD-MCNC: 208 MG/DL (ref 65–99)
GLUCOSE BLD-MCNC: 209 MG/DL (ref 65–99)
GLUCOSE BLD-MCNC: 210 MG/DL (ref 65–99)
GLUCOSE BLD-MCNC: 217 MG/DL (ref 65–99)
GLUCOSE BLD-MCNC: 221 MG/DL (ref 65–99)
GLUCOSE BLD-MCNC: 233 MG/DL (ref 65–99)
GLUCOSE BLD-MCNC: 240 MG/DL (ref 65–99)
GLUCOSE BLD-MCNC: 246 MG/DL (ref 65–99)
GLUCOSE BLD-MCNC: 254 MG/DL (ref 65–99)
GLUCOSE BLD-MCNC: 283 MG/DL (ref 65–99)
GLUCOSE BLD-MCNC: 285 MG/DL (ref 65–99)
GLUCOSE BLD-MCNC: 285 MG/DL (ref 65–99)
GLUCOSE BLD-MCNC: 288 MG/DL (ref 65–99)
GLUCOSE BLD-MCNC: 290 MG/DL (ref 65–99)
GLUCOSE BLD-MCNC: 301 MG/DL (ref 65–99)
GLUCOSE BLD-MCNC: 305 MG/DL (ref 65–99)
GLUCOSE BLD-MCNC: 312 MG/DL (ref 65–99)
GLUCOSE BLD-MCNC: 318 MG/DL (ref 65–99)
GLUCOSE BLD-MCNC: 37 MG/DL (ref 65–99)
GLUCOSE BLD-MCNC: 380 MG/DL (ref 65–99)
GLUCOSE BLD-MCNC: 422 MG/DL (ref 65–99)
GLUCOSE BLD-MCNC: 474 MG/DL (ref 65–99)
GLUCOSE BLD-MCNC: 476 MG/DL (ref 65–99)
GLUCOSE BLD-MCNC: 50 MG/DL (ref 65–99)
GLUCOSE BLD-MCNC: 505 MG/DL (ref 65–99)
GLUCOSE BLD-MCNC: 65 MG/DL (ref 65–99)
GLUCOSE BLD-MCNC: 81 MG/DL (ref 65–99)
GLUCOSE BLD-MCNC: 94 MG/DL (ref 65–99)
GLUCOSE BLDC GLUCOMTR-MCNC: 100 MG/DL (ref 70–130)
GLUCOSE BLDC GLUCOMTR-MCNC: 106 MG/DL (ref 70–130)
GLUCOSE BLDC GLUCOMTR-MCNC: 114 MG/DL (ref 70–130)
GLUCOSE BLDC GLUCOMTR-MCNC: 114 MG/DL (ref 70–130)
GLUCOSE BLDC GLUCOMTR-MCNC: 119 MG/DL (ref 70–130)
GLUCOSE BLDC GLUCOMTR-MCNC: 119 MG/DL (ref 70–130)
GLUCOSE BLDC GLUCOMTR-MCNC: 120 MG/DL (ref 70–130)
GLUCOSE BLDC GLUCOMTR-MCNC: 122 MG/DL (ref 70–130)
GLUCOSE BLDC GLUCOMTR-MCNC: 123 MG/DL (ref 70–130)
GLUCOSE BLDC GLUCOMTR-MCNC: 123 MG/DL (ref 70–130)
GLUCOSE BLDC GLUCOMTR-MCNC: 126 MG/DL (ref 70–130)
GLUCOSE BLDC GLUCOMTR-MCNC: 127 MG/DL (ref 70–130)
GLUCOSE BLDC GLUCOMTR-MCNC: 128 MG/DL (ref 70–130)
GLUCOSE BLDC GLUCOMTR-MCNC: 130 MG/DL (ref 70–130)
GLUCOSE BLDC GLUCOMTR-MCNC: 140 MG/DL (ref 70–130)
GLUCOSE BLDC GLUCOMTR-MCNC: 141 MG/DL (ref 70–130)
GLUCOSE BLDC GLUCOMTR-MCNC: 145 MG/DL (ref 70–130)
GLUCOSE BLDC GLUCOMTR-MCNC: 146 MG/DL (ref 70–130)
GLUCOSE BLDC GLUCOMTR-MCNC: 148 MG/DL (ref 70–130)
GLUCOSE BLDC GLUCOMTR-MCNC: 149 MG/DL (ref 70–130)
GLUCOSE BLDC GLUCOMTR-MCNC: 150 MG/DL (ref 70–130)
GLUCOSE BLDC GLUCOMTR-MCNC: 153 MG/DL (ref 70–130)
GLUCOSE BLDC GLUCOMTR-MCNC: 155 MG/DL (ref 70–130)
GLUCOSE BLDC GLUCOMTR-MCNC: 155 MG/DL (ref 70–130)
GLUCOSE BLDC GLUCOMTR-MCNC: 161 MG/DL (ref 70–130)
GLUCOSE BLDC GLUCOMTR-MCNC: 162 MG/DL (ref 70–130)
GLUCOSE BLDC GLUCOMTR-MCNC: 164 MG/DL (ref 70–130)
GLUCOSE BLDC GLUCOMTR-MCNC: 171 MG/DL (ref 70–130)
GLUCOSE BLDC GLUCOMTR-MCNC: 175 MG/DL (ref 70–130)
GLUCOSE BLDC GLUCOMTR-MCNC: 176 MG/DL (ref 70–130)
GLUCOSE BLDC GLUCOMTR-MCNC: 182 MG/DL (ref 70–130)
GLUCOSE BLDC GLUCOMTR-MCNC: 184 MG/DL (ref 70–130)
GLUCOSE BLDC GLUCOMTR-MCNC: 184 MG/DL (ref 70–130)
GLUCOSE BLDC GLUCOMTR-MCNC: 185 MG/DL (ref 70–130)
GLUCOSE BLDC GLUCOMTR-MCNC: 187 MG/DL (ref 70–130)
GLUCOSE BLDC GLUCOMTR-MCNC: 188 MG/DL (ref 70–130)
GLUCOSE BLDC GLUCOMTR-MCNC: 189 MG/DL (ref 70–130)
GLUCOSE BLDC GLUCOMTR-MCNC: 190 MG/DL (ref 70–130)
GLUCOSE BLDC GLUCOMTR-MCNC: 191 MG/DL (ref 70–130)
GLUCOSE BLDC GLUCOMTR-MCNC: 191 MG/DL (ref 70–130)
GLUCOSE BLDC GLUCOMTR-MCNC: 194 MG/DL (ref 70–130)
GLUCOSE BLDC GLUCOMTR-MCNC: 196 MG/DL (ref 70–130)
GLUCOSE BLDC GLUCOMTR-MCNC: 198 MG/DL (ref 70–130)
GLUCOSE BLDC GLUCOMTR-MCNC: 198 MG/DL (ref 70–130)
GLUCOSE BLDC GLUCOMTR-MCNC: 199 MG/DL (ref 70–130)
GLUCOSE BLDC GLUCOMTR-MCNC: 199 MG/DL (ref 70–130)
GLUCOSE BLDC GLUCOMTR-MCNC: 200 MG/DL (ref 70–130)
GLUCOSE BLDC GLUCOMTR-MCNC: 201 MG/DL (ref 70–130)
GLUCOSE BLDC GLUCOMTR-MCNC: 203 MG/DL (ref 70–130)
GLUCOSE BLDC GLUCOMTR-MCNC: 205 MG/DL (ref 70–130)
GLUCOSE BLDC GLUCOMTR-MCNC: 208 MG/DL (ref 70–130)
GLUCOSE BLDC GLUCOMTR-MCNC: 209 MG/DL (ref 70–130)
GLUCOSE BLDC GLUCOMTR-MCNC: 213 MG/DL (ref 70–130)
GLUCOSE BLDC GLUCOMTR-MCNC: 213 MG/DL (ref 70–130)
GLUCOSE BLDC GLUCOMTR-MCNC: 214 MG/DL (ref 70–130)
GLUCOSE BLDC GLUCOMTR-MCNC: 215 MG/DL (ref 70–130)
GLUCOSE BLDC GLUCOMTR-MCNC: 220 MG/DL (ref 70–130)
GLUCOSE BLDC GLUCOMTR-MCNC: 220 MG/DL (ref 70–130)
GLUCOSE BLDC GLUCOMTR-MCNC: 221 MG/DL (ref 70–130)
GLUCOSE BLDC GLUCOMTR-MCNC: 222 MG/DL (ref 70–130)
GLUCOSE BLDC GLUCOMTR-MCNC: 224 MG/DL (ref 70–130)
GLUCOSE BLDC GLUCOMTR-MCNC: 227 MG/DL (ref 70–130)
GLUCOSE BLDC GLUCOMTR-MCNC: 228 MG/DL (ref 70–130)
GLUCOSE BLDC GLUCOMTR-MCNC: 233 MG/DL (ref 70–130)
GLUCOSE BLDC GLUCOMTR-MCNC: 235 MG/DL (ref 70–130)
GLUCOSE BLDC GLUCOMTR-MCNC: 235 MG/DL (ref 70–130)
GLUCOSE BLDC GLUCOMTR-MCNC: 236 MG/DL (ref 70–130)
GLUCOSE BLDC GLUCOMTR-MCNC: 237 MG/DL (ref 70–130)
GLUCOSE BLDC GLUCOMTR-MCNC: 239 MG/DL (ref 70–130)
GLUCOSE BLDC GLUCOMTR-MCNC: 243 MG/DL (ref 70–130)
GLUCOSE BLDC GLUCOMTR-MCNC: 244 MG/DL (ref 70–130)
GLUCOSE BLDC GLUCOMTR-MCNC: 244 MG/DL (ref 70–130)
GLUCOSE BLDC GLUCOMTR-MCNC: 245 MG/DL (ref 70–130)
GLUCOSE BLDC GLUCOMTR-MCNC: 245 MG/DL (ref 70–130)
GLUCOSE BLDC GLUCOMTR-MCNC: 246 MG/DL (ref 70–130)
GLUCOSE BLDC GLUCOMTR-MCNC: 247 MG/DL (ref 70–130)
GLUCOSE BLDC GLUCOMTR-MCNC: 250 MG/DL (ref 70–130)
GLUCOSE BLDC GLUCOMTR-MCNC: 252 MG/DL (ref 70–130)
GLUCOSE BLDC GLUCOMTR-MCNC: 254 MG/DL (ref 70–130)
GLUCOSE BLDC GLUCOMTR-MCNC: 255 MG/DL (ref 70–130)
GLUCOSE BLDC GLUCOMTR-MCNC: 256 MG/DL (ref 70–130)
GLUCOSE BLDC GLUCOMTR-MCNC: 258 MG/DL (ref 70–130)
GLUCOSE BLDC GLUCOMTR-MCNC: 262 MG/DL (ref 70–130)
GLUCOSE BLDC GLUCOMTR-MCNC: 268 MG/DL (ref 70–130)
GLUCOSE BLDC GLUCOMTR-MCNC: 269 MG/DL (ref 70–130)
GLUCOSE BLDC GLUCOMTR-MCNC: 270 MG/DL (ref 70–130)
GLUCOSE BLDC GLUCOMTR-MCNC: 270 MG/DL (ref 70–130)
GLUCOSE BLDC GLUCOMTR-MCNC: 273 MG/DL (ref 70–130)
GLUCOSE BLDC GLUCOMTR-MCNC: 273 MG/DL (ref 70–130)
GLUCOSE BLDC GLUCOMTR-MCNC: 275 MG/DL (ref 70–130)
GLUCOSE BLDC GLUCOMTR-MCNC: 276 MG/DL (ref 70–130)
GLUCOSE BLDC GLUCOMTR-MCNC: 280 MG/DL (ref 70–130)
GLUCOSE BLDC GLUCOMTR-MCNC: 280 MG/DL (ref 70–130)
GLUCOSE BLDC GLUCOMTR-MCNC: 284 MG/DL (ref 70–130)
GLUCOSE BLDC GLUCOMTR-MCNC: 286 MG/DL (ref 70–130)
GLUCOSE BLDC GLUCOMTR-MCNC: 287 MG/DL (ref 70–130)
GLUCOSE BLDC GLUCOMTR-MCNC: 289 MG/DL (ref 70–130)
GLUCOSE BLDC GLUCOMTR-MCNC: 290 MG/DL (ref 70–130)
GLUCOSE BLDC GLUCOMTR-MCNC: 290 MG/DL (ref 70–130)
GLUCOSE BLDC GLUCOMTR-MCNC: 292 MG/DL (ref 70–130)
GLUCOSE BLDC GLUCOMTR-MCNC: 297 MG/DL (ref 70–130)
GLUCOSE BLDC GLUCOMTR-MCNC: 299 MG/DL (ref 70–130)
GLUCOSE BLDC GLUCOMTR-MCNC: 304 MG/DL (ref 70–130)
GLUCOSE BLDC GLUCOMTR-MCNC: 305 MG/DL (ref 70–130)
GLUCOSE BLDC GLUCOMTR-MCNC: 307 MG/DL (ref 70–130)
GLUCOSE BLDC GLUCOMTR-MCNC: 308 MG/DL (ref 70–130)
GLUCOSE BLDC GLUCOMTR-MCNC: 311 MG/DL (ref 70–130)
GLUCOSE BLDC GLUCOMTR-MCNC: 314 MG/DL (ref 70–130)
GLUCOSE BLDC GLUCOMTR-MCNC: 315 MG/DL (ref 70–130)
GLUCOSE BLDC GLUCOMTR-MCNC: 319 MG/DL (ref 70–130)
GLUCOSE BLDC GLUCOMTR-MCNC: 322 MG/DL (ref 70–130)
GLUCOSE BLDC GLUCOMTR-MCNC: 323 MG/DL (ref 70–130)
GLUCOSE BLDC GLUCOMTR-MCNC: 324 MG/DL (ref 70–130)
GLUCOSE BLDC GLUCOMTR-MCNC: 325 MG/DL (ref 70–130)
GLUCOSE BLDC GLUCOMTR-MCNC: 332 MG/DL (ref 70–130)
GLUCOSE BLDC GLUCOMTR-MCNC: 338 MG/DL (ref 70–130)
GLUCOSE BLDC GLUCOMTR-MCNC: 339 MG/DL (ref 70–130)
GLUCOSE BLDC GLUCOMTR-MCNC: 343 MG/DL (ref 70–130)
GLUCOSE BLDC GLUCOMTR-MCNC: 350 MG/DL (ref 70–130)
GLUCOSE BLDC GLUCOMTR-MCNC: 351 MG/DL (ref 70–130)
GLUCOSE BLDC GLUCOMTR-MCNC: 359 MG/DL (ref 70–130)
GLUCOSE BLDC GLUCOMTR-MCNC: 36 MG/DL (ref 70–130)
GLUCOSE BLDC GLUCOMTR-MCNC: 362 MG/DL (ref 70–130)
GLUCOSE BLDC GLUCOMTR-MCNC: 368 MG/DL (ref 70–130)
GLUCOSE BLDC GLUCOMTR-MCNC: 37 MG/DL (ref 70–130)
GLUCOSE BLDC GLUCOMTR-MCNC: 378 MG/DL (ref 70–130)
GLUCOSE BLDC GLUCOMTR-MCNC: 387 MG/DL (ref 70–130)
GLUCOSE BLDC GLUCOMTR-MCNC: 389 MG/DL (ref 70–130)
GLUCOSE BLDC GLUCOMTR-MCNC: 39 MG/DL (ref 70–130)
GLUCOSE BLDC GLUCOMTR-MCNC: 40 MG/DL (ref 70–130)
GLUCOSE BLDC GLUCOMTR-MCNC: 403 MG/DL (ref 70–130)
GLUCOSE BLDC GLUCOMTR-MCNC: 410 MG/DL (ref 70–130)
GLUCOSE BLDC GLUCOMTR-MCNC: 415 MG/DL (ref 70–130)
GLUCOSE BLDC GLUCOMTR-MCNC: 418 MG/DL (ref 70–130)
GLUCOSE BLDC GLUCOMTR-MCNC: 42 MG/DL (ref 70–130)
GLUCOSE BLDC GLUCOMTR-MCNC: 420 MG/DL (ref 70–130)
GLUCOSE BLDC GLUCOMTR-MCNC: 449 MG/DL (ref 70–130)
GLUCOSE BLDC GLUCOMTR-MCNC: 45 MG/DL (ref 70–130)
GLUCOSE BLDC GLUCOMTR-MCNC: 452 MG/DL (ref 70–130)
GLUCOSE BLDC GLUCOMTR-MCNC: 465 MG/DL (ref 70–130)
GLUCOSE BLDC GLUCOMTR-MCNC: 468 MG/DL (ref 70–130)
GLUCOSE BLDC GLUCOMTR-MCNC: 488 MG/DL (ref 70–130)
GLUCOSE BLDC GLUCOMTR-MCNC: 53 MG/DL (ref 70–130)
GLUCOSE BLDC GLUCOMTR-MCNC: 544 MG/DL (ref 70–130)
GLUCOSE BLDC GLUCOMTR-MCNC: 56 MG/DL (ref 70–130)
GLUCOSE BLDC GLUCOMTR-MCNC: 60 MG/DL (ref 70–130)
GLUCOSE BLDC GLUCOMTR-MCNC: 63 MG/DL (ref 70–130)
GLUCOSE BLDC GLUCOMTR-MCNC: 65 MG/DL (ref 70–130)
GLUCOSE BLDC GLUCOMTR-MCNC: 69 MG/DL (ref 70–130)
GLUCOSE BLDC GLUCOMTR-MCNC: 70 MG/DL (ref 70–130)
GLUCOSE BLDC GLUCOMTR-MCNC: 74 MG/DL (ref 70–130)
GLUCOSE BLDC GLUCOMTR-MCNC: 76 MG/DL (ref 70–130)
GLUCOSE BLDC GLUCOMTR-MCNC: 76 MG/DL (ref 70–130)
GLUCOSE BLDC GLUCOMTR-MCNC: 77 MG/DL (ref 70–130)
GLUCOSE BLDC GLUCOMTR-MCNC: 82 MG/DL (ref 70–130)
GLUCOSE BLDC GLUCOMTR-MCNC: 84 MG/DL (ref 70–130)
GLUCOSE BLDC GLUCOMTR-MCNC: 86 MG/DL (ref 70–130)
GLUCOSE BLDC GLUCOMTR-MCNC: 87 MG/DL (ref 70–130)
GLUCOSE BLDC GLUCOMTR-MCNC: 89 MG/DL (ref 70–130)
GLUCOSE BLDC GLUCOMTR-MCNC: 96 MG/DL (ref 70–130)
GLUCOSE BLDC GLUCOMTR-MCNC: 97 MG/DL (ref 70–130)
GLUCOSE UR STRIP-MCNC: ABNORMAL MG/DL
GLUCOSE UR STRIP-MCNC: NEGATIVE MG/DL
GRAM STN SPEC: ABNORMAL
HADV DNA SPEC NAA+PROBE: NOT DETECTED
HBA1C MFR BLD: 7.8 % (ref 4.8–5.6)
HBA1C MFR BLD: 7.9 % (ref 4.8–5.6)
HCOV 229E RNA SPEC QL NAA+PROBE: NOT DETECTED
HCOV HKU1 RNA SPEC QL NAA+PROBE: NOT DETECTED
HCOV NL63 RNA SPEC QL NAA+PROBE: NOT DETECTED
HCOV OC43 RNA SPEC QL NAA+PROBE: NOT DETECTED
HCT VFR BLD AUTO: 31.9 % (ref 35.6–45.5)
HCT VFR BLD AUTO: 32 % (ref 35.6–45.5)
HCT VFR BLD AUTO: 32.2 % (ref 35.6–45.5)
HCT VFR BLD AUTO: 33.1 % (ref 35.6–45.5)
HCT VFR BLD AUTO: 33.5 % (ref 35.6–45.5)
HCT VFR BLD AUTO: 33.5 % (ref 35.6–45.5)
HCT VFR BLD AUTO: 33.8 % (ref 35.6–45.5)
HCT VFR BLD AUTO: 34 % (ref 35.6–45.5)
HCT VFR BLD AUTO: 34.1 % (ref 35.6–45.5)
HCT VFR BLD AUTO: 34.3 % (ref 35.6–45.5)
HCT VFR BLD AUTO: 34.6 % (ref 35.6–45.5)
HCT VFR BLD AUTO: 34.9 % (ref 35.6–45.5)
HCT VFR BLD AUTO: 35 % (ref 35.6–45.5)
HCT VFR BLD AUTO: 35.1 % (ref 35.6–45.5)
HCT VFR BLD AUTO: 35.1 % (ref 35.6–45.5)
HCT VFR BLD AUTO: 35.3 % (ref 35.6–45.5)
HCT VFR BLD AUTO: 36.2 % (ref 35.6–45.5)
HCT VFR BLD AUTO: 36.4 % (ref 35.6–45.5)
HCT VFR BLD AUTO: 36.6 % (ref 35.6–45.5)
HCT VFR BLD AUTO: 36.8 % (ref 35.6–45.5)
HCT VFR BLD AUTO: 36.9 % (ref 35.6–45.5)
HCT VFR BLD AUTO: 37.1 % (ref 35.6–45.5)
HCT VFR BLD AUTO: 37.1 % (ref 35.6–45.5)
HCT VFR BLD AUTO: 38 % (ref 35.6–45.5)
HCT VFR BLD AUTO: 38.7 % (ref 35.6–45.5)
HCT VFR BLD AUTO: 39 % (ref 35.6–45.5)
HCT VFR BLD AUTO: 42.6 % (ref 35.6–45.5)
HCT VFR BLD AUTO: 43.4 % (ref 35.6–45.5)
HDLC SERPL-MCNC: 48 MG/DL (ref 40–60)
HGB BLD-MCNC: 10.1 G/DL (ref 11.9–15.5)
HGB BLD-MCNC: 10.1 G/DL (ref 11.9–15.5)
HGB BLD-MCNC: 10.2 G/DL (ref 11.9–15.5)
HGB BLD-MCNC: 10.2 G/DL (ref 11.9–15.5)
HGB BLD-MCNC: 10.3 G/DL (ref 11.9–15.5)
HGB BLD-MCNC: 10.3 G/DL (ref 11.9–15.5)
HGB BLD-MCNC: 10.4 G/DL (ref 11.9–15.5)
HGB BLD-MCNC: 10.5 G/DL (ref 11.9–15.5)
HGB BLD-MCNC: 10.5 G/DL (ref 11.9–15.5)
HGB BLD-MCNC: 10.6 G/DL (ref 11.9–15.5)
HGB BLD-MCNC: 10.8 G/DL (ref 11.9–15.5)
HGB BLD-MCNC: 10.8 G/DL (ref 11.9–15.5)
HGB BLD-MCNC: 10.9 G/DL (ref 11.9–15.5)
HGB BLD-MCNC: 11 G/DL (ref 11.9–15.5)
HGB BLD-MCNC: 11.1 G/DL (ref 11.9–15.5)
HGB BLD-MCNC: 11.2 G/DL (ref 11.9–15.5)
HGB BLD-MCNC: 11.2 G/DL (ref 11.9–15.5)
HGB BLD-MCNC: 11.4 G/DL (ref 11.9–15.5)
HGB BLD-MCNC: 11.4 G/DL (ref 11.9–15.5)
HGB BLD-MCNC: 11.6 G/DL (ref 11.9–15.5)
HGB BLD-MCNC: 11.9 G/DL (ref 11.9–15.5)
HGB BLD-MCNC: 12.1 G/DL (ref 11.9–15.5)
HGB BLD-MCNC: 13.2 G/DL (ref 11.9–15.5)
HGB BLD-MCNC: 13.6 G/DL (ref 11.9–15.5)
HGB BLD-MCNC: 9.4 G/DL (ref 11.9–15.5)
HGB BLD-MCNC: 9.8 G/DL (ref 11.9–15.5)
HGB BLD-MCNC: 9.9 G/DL (ref 11.9–15.5)
HGB BLD-MCNC: 9.9 G/DL (ref 11.9–15.5)
HGB UR QL STRIP.AUTO: ABNORMAL
HGB UR QL STRIP.AUTO: NEGATIVE
HMPV RNA NPH QL NAA+NON-PROBE: NOT DETECTED
HPIV1 RNA SPEC QL NAA+PROBE: NOT DETECTED
HPIV2 RNA SPEC QL NAA+PROBE: NOT DETECTED
HPIV3 RNA NPH QL NAA+PROBE: NOT DETECTED
HPIV4 P GENE NPH QL NAA+PROBE: NOT DETECTED
HYALINE CASTS UR QL AUTO: ABNORMAL /LPF
HYALINE CASTS UR QL AUTO: NORMAL /LPF
IMM GRANULOCYTES # BLD: 0 10*3/MM3 (ref 0–0.03)
IMM GRANULOCYTES # BLD: 0.02 10*3/MM3 (ref 0–0.03)
IMM GRANULOCYTES # BLD: 0.02 10*3/MM3 (ref 0–0.03)
IMM GRANULOCYTES # BLD: 0.03 10*3/MM3 (ref 0–0.03)
IMM GRANULOCYTES # BLD: 0.03 10*3/MM3 (ref 0–0.03)
IMM GRANULOCYTES # BLD: 0.04 10*3/MM3 (ref 0–0.03)
IMM GRANULOCYTES # BLD: 0.05 10*3/MM3 (ref 0–0.03)
IMM GRANULOCYTES # BLD: 0.07 10*3/MM3 (ref 0–0.03)
IMM GRANULOCYTES # BLD: 0.07 10*3/MM3 (ref 0–0.03)
IMM GRANULOCYTES # BLD: 0.3 10*3/MM3 (ref 0–0.03)
IMM GRANULOCYTES NFR BLD: 0 % (ref 0–0.5)
IMM GRANULOCYTES NFR BLD: 0.3 % (ref 0–0.5)
IMM GRANULOCYTES NFR BLD: 0.4 % (ref 0–0.5)
IMM GRANULOCYTES NFR BLD: 0.5 % (ref 0–0.5)
IMM GRANULOCYTES NFR BLD: 0.6 % (ref 0–0.5)
IMM GRANULOCYTES NFR BLD: 0.7 % (ref 0–0.5)
IMM GRANULOCYTES NFR BLD: 0.9 % (ref 0–0.5)
IMM GRANULOCYTES NFR BLD: 0.9 % (ref 0–0.5)
IMM GRANULOCYTES NFR BLD: 2 % (ref 0–0.5)
INR PPP: 1.08 (ref 0.9–1.1)
KETONES UR QL STRIP: ABNORMAL
KETONES UR QL STRIP: NEGATIVE
LDLC SERPL CALC-MCNC: 84 MG/DL (ref 0–100)
LDLC/HDLC SERPL: 1.75 {RATIO}
LEFT ATRIUM VOLUME INDEX: 30 ML/M2
LEUKOCYTE ESTERASE UR QL STRIP.AUTO: ABNORMAL
LEUKOCYTE ESTERASE UR QL STRIP.AUTO: NEGATIVE
LEUKOCYTE ESTERASE UR QL STRIP.AUTO: NEGATIVE
LIPASE SERPL-CCNC: 15 U/L (ref 13–60)
LYMPHOCYTES # BLD AUTO: 0.52 10*3/MM3 (ref 0.9–4.8)
LYMPHOCYTES # BLD AUTO: 0.94 10*3/MM3 (ref 0.9–4.8)
LYMPHOCYTES # BLD AUTO: 0.96 10*3/MM3 (ref 0.9–4.8)
LYMPHOCYTES # BLD AUTO: 1 10*3/MM3 (ref 0.9–4.8)
LYMPHOCYTES # BLD AUTO: 1.14 10*3/MM3 (ref 0.9–4.8)
LYMPHOCYTES # BLD AUTO: 1.17 10*3/MM3 (ref 0.9–4.8)
LYMPHOCYTES # BLD AUTO: 1.21 10*3/MM3 (ref 0.9–4.8)
LYMPHOCYTES # BLD AUTO: 1.32 10*3/MM3 (ref 0.9–4.8)
LYMPHOCYTES # BLD AUTO: 1.33 10*3/MM3 (ref 0.9–4.8)
LYMPHOCYTES # BLD AUTO: 1.34 10*3/MM3 (ref 0.9–4.8)
LYMPHOCYTES # BLD AUTO: 1.39 10*3/MM3 (ref 0.9–4.8)
LYMPHOCYTES # BLD AUTO: 1.49 10*3/MM3 (ref 0.9–4.8)
LYMPHOCYTES # BLD AUTO: 1.51 10*3/MM3 (ref 0.9–4.8)
LYMPHOCYTES # BLD AUTO: 1.53 10*3/MM3 (ref 0.9–4.8)
LYMPHOCYTES # BLD AUTO: 1.68 10*3/MM3 (ref 0.9–4.8)
LYMPHOCYTES # BLD AUTO: 1.99 10*3/MM3 (ref 0.9–4.8)
LYMPHOCYTES NFR BLD AUTO: 10.5 % (ref 19.6–45.3)
LYMPHOCYTES NFR BLD AUTO: 12.7 % (ref 19.6–45.3)
LYMPHOCYTES NFR BLD AUTO: 12.9 % (ref 19.6–45.3)
LYMPHOCYTES NFR BLD AUTO: 13.1 % (ref 19.6–45.3)
LYMPHOCYTES NFR BLD AUTO: 16.6 % (ref 19.6–45.3)
LYMPHOCYTES NFR BLD AUTO: 18.2 % (ref 19.6–45.3)
LYMPHOCYTES NFR BLD AUTO: 18.3 % (ref 19.6–45.3)
LYMPHOCYTES NFR BLD AUTO: 18.5 % (ref 19.6–45.3)
LYMPHOCYTES NFR BLD AUTO: 18.8 % (ref 19.6–45.3)
LYMPHOCYTES NFR BLD AUTO: 19.2 % (ref 19.6–45.3)
LYMPHOCYTES NFR BLD AUTO: 26.2 % (ref 19.6–45.3)
LYMPHOCYTES NFR BLD AUTO: 27.7 % (ref 19.6–45.3)
LYMPHOCYTES NFR BLD AUTO: 6 % (ref 19.6–45.3)
LYMPHOCYTES NFR BLD AUTO: 6.5 % (ref 19.6–45.3)
LYMPHOCYTES NFR BLD AUTO: 9.5 % (ref 19.6–45.3)
LYMPHOCYTES NFR BLD AUTO: 9.8 % (ref 19.6–45.3)
M PNEUMO IGG SER IA-ACNC: NOT DETECTED
MAGNESIUM SERPL-MCNC: 1.5 MG/DL (ref 1.6–2.4)
MAGNESIUM SERPL-MCNC: 1.6 MG/DL (ref 1.6–2.4)
MAGNESIUM SERPL-MCNC: 1.7 MG/DL (ref 1.6–2.4)
MAGNESIUM SERPL-MCNC: 1.7 MG/DL (ref 1.6–2.4)
MAGNESIUM SERPL-MCNC: 1.9 MG/DL (ref 1.6–2.4)
MAGNESIUM SERPL-MCNC: 2 MG/DL (ref 1.6–2.4)
MAGNESIUM SERPL-MCNC: 2.9 MG/DL (ref 1.6–2.4)
MAXIMAL PREDICTED HEART RATE: 134 BPM
MCH RBC QN AUTO: 24.9 PG (ref 26.9–32)
MCH RBC QN AUTO: 24.9 PG (ref 26.9–32)
MCH RBC QN AUTO: 25 PG (ref 26.9–32)
MCH RBC QN AUTO: 25.1 PG (ref 26.9–32)
MCH RBC QN AUTO: 25.4 PG (ref 26.9–32)
MCH RBC QN AUTO: 25.5 PG (ref 26.9–32)
MCH RBC QN AUTO: 25.6 PG (ref 26.9–32)
MCH RBC QN AUTO: 25.7 PG (ref 26.9–32)
MCH RBC QN AUTO: 25.8 PG (ref 26.9–32)
MCH RBC QN AUTO: 25.8 PG (ref 26.9–32)
MCH RBC QN AUTO: 26.1 PG (ref 26.9–32)
MCH RBC QN AUTO: 28.1 PG (ref 26.9–32)
MCH RBC QN AUTO: 28.2 PG (ref 26.9–32)
MCH RBC QN AUTO: 28.4 PG (ref 26.9–32)
MCH RBC QN AUTO: 28.5 PG (ref 26.9–32)
MCH RBC QN AUTO: 28.5 PG (ref 26.9–32)
MCH RBC QN AUTO: 28.7 PG (ref 26.9–32)
MCH RBC QN AUTO: 28.8 PG (ref 26.9–32)
MCH RBC QN AUTO: 28.9 PG (ref 26.9–32)
MCH RBC QN AUTO: 29 PG (ref 26.9–32)
MCH RBC QN AUTO: 29 PG (ref 26.9–32)
MCH RBC QN AUTO: 29.1 PG (ref 26.9–32)
MCH RBC QN AUTO: 29.2 PG (ref 26.9–32)
MCH RBC QN AUTO: 29.5 PG (ref 26.9–32)
MCH RBC QN AUTO: 29.6 PG (ref 26.9–32)
MCH RBC QN AUTO: 29.6 PG (ref 26.9–32)
MCH RBC QN AUTO: 29.8 PG (ref 26.9–32)
MCH RBC QN AUTO: 29.8 PG (ref 26.9–32)
MCHC RBC AUTO-ENTMCNC: 28.2 G/DL (ref 32.4–36.3)
MCHC RBC AUTO-ENTMCNC: 28.5 G/DL (ref 32.4–36.3)
MCHC RBC AUTO-ENTMCNC: 29.1 G/DL (ref 32.4–36.3)
MCHC RBC AUTO-ENTMCNC: 29.1 G/DL (ref 32.4–36.3)
MCHC RBC AUTO-ENTMCNC: 29.2 G/DL (ref 32.4–36.3)
MCHC RBC AUTO-ENTMCNC: 29.5 G/DL (ref 32.4–36.3)
MCHC RBC AUTO-ENTMCNC: 29.5 G/DL (ref 32.4–36.3)
MCHC RBC AUTO-ENTMCNC: 29.6 G/DL (ref 32.4–36.3)
MCHC RBC AUTO-ENTMCNC: 29.6 G/DL (ref 32.4–36.3)
MCHC RBC AUTO-ENTMCNC: 29.8 G/DL (ref 32.4–36.3)
MCHC RBC AUTO-ENTMCNC: 30.1 G/DL (ref 32.4–36.3)
MCHC RBC AUTO-ENTMCNC: 30.3 G/DL (ref 32.4–36.3)
MCHC RBC AUTO-ENTMCNC: 30.4 G/DL (ref 32.4–36.3)
MCHC RBC AUTO-ENTMCNC: 30.7 G/DL (ref 32.4–36.3)
MCHC RBC AUTO-ENTMCNC: 30.8 G/DL (ref 32.4–36.3)
MCHC RBC AUTO-ENTMCNC: 30.8 G/DL (ref 32.4–36.3)
MCHC RBC AUTO-ENTMCNC: 30.9 G/DL (ref 32.4–36.3)
MCHC RBC AUTO-ENTMCNC: 30.9 G/DL (ref 32.4–36.3)
MCHC RBC AUTO-ENTMCNC: 31 G/DL (ref 32.4–36.3)
MCHC RBC AUTO-ENTMCNC: 31 G/DL (ref 32.4–36.3)
MCHC RBC AUTO-ENTMCNC: 31.1 G/DL (ref 32.4–36.3)
MCHC RBC AUTO-ENTMCNC: 31.3 G/DL (ref 32.4–36.3)
MCHC RBC AUTO-ENTMCNC: 31.7 G/DL (ref 32.4–36.3)
MCHC RBC AUTO-ENTMCNC: 31.8 G/DL (ref 32.4–36.3)
MCHC RBC AUTO-ENTMCNC: 32 G/DL (ref 32.4–36.3)
MCV RBC AUTO: 82.1 FL (ref 80.5–98.2)
MCV RBC AUTO: 82.9 FL (ref 80.5–98.2)
MCV RBC AUTO: 83.1 FL (ref 80.5–98.2)
MCV RBC AUTO: 84.6 FL (ref 80.5–98.2)
MCV RBC AUTO: 84.8 FL (ref 80.5–98.2)
MCV RBC AUTO: 85.5 FL (ref 80.5–98.2)
MCV RBC AUTO: 85.7 FL (ref 80.5–98.2)
MCV RBC AUTO: 86.7 FL (ref 80.5–98.2)
MCV RBC AUTO: 86.9 FL (ref 80.5–98.2)
MCV RBC AUTO: 87.4 FL (ref 80.5–98.2)
MCV RBC AUTO: 88.4 FL (ref 80.5–98.2)
MCV RBC AUTO: 88.6 FL (ref 80.5–98.2)
MCV RBC AUTO: 89.8 FL (ref 80.5–98.2)
MCV RBC AUTO: 90.3 FL (ref 80.5–98.2)
MCV RBC AUTO: 90.6 FL (ref 80.5–98.2)
MCV RBC AUTO: 90.9 FL (ref 80.5–98.2)
MCV RBC AUTO: 92.4 FL (ref 80.5–98.2)
MCV RBC AUTO: 92.5 FL (ref 80.5–98.2)
MCV RBC AUTO: 92.6 FL (ref 80.5–98.2)
MCV RBC AUTO: 92.6 FL (ref 80.5–98.2)
MCV RBC AUTO: 92.9 FL (ref 80.5–98.2)
MCV RBC AUTO: 93.5 FL (ref 80.5–98.2)
MCV RBC AUTO: 93.5 FL (ref 80.5–98.2)
MCV RBC AUTO: 93.7 FL (ref 80.5–98.2)
MCV RBC AUTO: 93.8 FL (ref 80.5–98.2)
MCV RBC AUTO: 94.3 FL (ref 80.5–98.2)
MCV RBC AUTO: 94.4 FL (ref 80.5–98.2)
MCV RBC AUTO: 94.4 FL (ref 80.5–98.2)
MCV RBC AUTO: 95.1 FL (ref 80.5–98.2)
MCV RBC AUTO: 96.3 FL (ref 80.5–98.2)
MCV RBC AUTO: 96.8 FL (ref 80.5–98.2)
MCV RBC AUTO: 98 FL (ref 80.5–98.2)
MONOCYTES # BLD AUTO: 0.29 10*3/MM3 (ref 0.2–1.2)
MONOCYTES # BLD AUTO: 0.34 10*3/MM3 (ref 0.2–1.2)
MONOCYTES # BLD AUTO: 0.48 10*3/MM3 (ref 0.2–1.2)
MONOCYTES # BLD AUTO: 0.56 10*3/MM3 (ref 0.2–1.2)
MONOCYTES # BLD AUTO: 0.57 10*3/MM3 (ref 0.2–1.2)
MONOCYTES # BLD AUTO: 0.66 10*3/MM3 (ref 0.2–1.2)
MONOCYTES # BLD AUTO: 0.66 10*3/MM3 (ref 0.2–1.2)
MONOCYTES # BLD AUTO: 0.71 10*3/MM3 (ref 0.2–1.2)
MONOCYTES # BLD AUTO: 0.75 10*3/MM3 (ref 0.2–1.2)
MONOCYTES # BLD AUTO: 0.75 10*3/MM3 (ref 0.2–1.2)
MONOCYTES # BLD AUTO: 0.82 10*3/MM3 (ref 0.2–1.2)
MONOCYTES # BLD AUTO: 0.83 10*3/MM3 (ref 0.2–1.2)
MONOCYTES # BLD AUTO: 0.92 10*3/MM3 (ref 0.2–1.2)
MONOCYTES # BLD AUTO: 0.95 10*3/MM3 (ref 0.2–1.2)
MONOCYTES NFR BLD AUTO: 10.3 % (ref 5–12)
MONOCYTES NFR BLD AUTO: 11.4 % (ref 5–12)
MONOCYTES NFR BLD AUTO: 12 % (ref 5–12)
MONOCYTES NFR BLD AUTO: 4.2 % (ref 5–12)
MONOCYTES NFR BLD AUTO: 4.3 % (ref 5–12)
MONOCYTES NFR BLD AUTO: 4.5 % (ref 5–12)
MONOCYTES NFR BLD AUTO: 5.3 % (ref 5–12)
MONOCYTES NFR BLD AUTO: 5.5 % (ref 5–12)
MONOCYTES NFR BLD AUTO: 5.5 % (ref 5–12)
MONOCYTES NFR BLD AUTO: 6.1 % (ref 5–12)
MONOCYTES NFR BLD AUTO: 7.1 % (ref 5–12)
MONOCYTES NFR BLD AUTO: 7.6 % (ref 5–12)
MONOCYTES NFR BLD AUTO: 8.3 % (ref 5–12)
MONOCYTES NFR BLD AUTO: 8.9 % (ref 5–12)
MONOCYTES NFR BLD AUTO: 9.1 % (ref 5–12)
MONOCYTES NFR BLD AUTO: 9.4 % (ref 5–12)
NEUTROPHILS # BLD AUTO: 12.59 10*3/MM3 (ref 1.9–8.1)
NEUTROPHILS # BLD AUTO: 12.62 10*3/MM3 (ref 1.9–8.1)
NEUTROPHILS # BLD AUTO: 13.95 10*3/MM3 (ref 1.9–8.1)
NEUTROPHILS # BLD AUTO: 2.88 10*3/MM3 (ref 1.9–8.1)
NEUTROPHILS # BLD AUTO: 4.26 10*3/MM3 (ref 1.9–8.1)
NEUTROPHILS # BLD AUTO: 4.39 10*3/MM3 (ref 1.9–8.1)
NEUTROPHILS # BLD AUTO: 4.42 10*3/MM3 (ref 1.9–8.1)
NEUTROPHILS # BLD AUTO: 4.71 10*3/MM3 (ref 1.9–8.1)
NEUTROPHILS # BLD AUTO: 5.18 10*3/MM3 (ref 1.9–8.1)
NEUTROPHILS # BLD AUTO: 5.26 10*3/MM3 (ref 1.9–8.1)
NEUTROPHILS # BLD AUTO: 5.53 10*3/MM3 (ref 1.9–8.1)
NEUTROPHILS # BLD AUTO: 5.69 10*3/MM3 (ref 1.9–8.1)
NEUTROPHILS # BLD AUTO: 7.52 10*3/MM3 (ref 1.9–8.1)
NEUTROPHILS # BLD AUTO: 8.16 10*3/MM3 (ref 1.9–8.1)
NEUTROPHILS # BLD AUTO: 8.67 10*3/MM3 (ref 1.9–8.1)
NEUTROPHILS # BLD AUTO: 9.35 10*3/MM3 (ref 1.9–8.1)
NEUTROPHILS NFR BLD AUTO: 52.2 % (ref 42.7–76)
NEUTROPHILS NFR BLD AUTO: 65.6 % (ref 42.7–76)
NEUTROPHILS NFR BLD AUTO: 66.3 % (ref 42.7–76)
NEUTROPHILS NFR BLD AUTO: 68.6 % (ref 42.7–76)
NEUTROPHILS NFR BLD AUTO: 68.6 % (ref 42.7–76)
NEUTROPHILS NFR BLD AUTO: 69.8 % (ref 42.7–76)
NEUTROPHILS NFR BLD AUTO: 70.7 % (ref 42.7–76)
NEUTROPHILS NFR BLD AUTO: 71 % (ref 42.7–76)
NEUTROPHILS NFR BLD AUTO: 76.7 % (ref 42.7–76)
NEUTROPHILS NFR BLD AUTO: 80 % (ref 42.7–76)
NEUTROPHILS NFR BLD AUTO: 80.5 % (ref 42.7–76)
NEUTROPHILS NFR BLD AUTO: 82 % (ref 42.7–76)
NEUTROPHILS NFR BLD AUTO: 83.4 % (ref 42.7–76)
NEUTROPHILS NFR BLD AUTO: 83.7 % (ref 42.7–76)
NEUTROPHILS NFR BLD AUTO: 85.4 % (ref 42.7–76)
NEUTROPHILS NFR BLD AUTO: 89 % (ref 42.7–76)
NITRITE UR QL STRIP: NEGATIVE
NITRITE UR QL STRIP: POSITIVE
NITRITE UR QL STRIP: POSITIVE
NT-PROBNP SERPL-MCNC: 7900 PG/ML (ref 0–1800)
NT-PROBNP SERPL-MCNC: 9114 PG/ML (ref 0–1800)
NT-PROBNP SERPL-MCNC: ABNORMAL PG/ML (ref 0–1800)
PH UR STRIP.AUTO: 5.5 [PH] (ref 5–8)
PH UR STRIP.AUTO: 6.5 [PH] (ref 5–8)
PH UR STRIP.AUTO: 7.5 [PH] (ref 5–8)
PH UR STRIP.AUTO: <=5 [PH] (ref 5–8)
PHOSPHATE SERPL-MCNC: 2.7 MG/DL (ref 2.5–4.5)
PHOSPHATE SERPL-MCNC: 3.8 MG/DL (ref 2.5–4.5)
PLATELET # BLD AUTO: 188 10*3/MM3 (ref 140–500)
PLATELET # BLD AUTO: 195 10*3/MM3 (ref 140–500)
PLATELET # BLD AUTO: 198 10*3/MM3 (ref 140–500)
PLATELET # BLD AUTO: 211 10*3/MM3 (ref 140–500)
PLATELET # BLD AUTO: 215 10*3/MM3 (ref 140–500)
PLATELET # BLD AUTO: 231 10*3/MM3 (ref 140–500)
PLATELET # BLD AUTO: 247 10*3/MM3 (ref 140–500)
PLATELET # BLD AUTO: 249 10*3/MM3 (ref 140–500)
PLATELET # BLD AUTO: 249 10*3/MM3 (ref 140–500)
PLATELET # BLD AUTO: 252 10*3/MM3 (ref 140–500)
PLATELET # BLD AUTO: 260 10*3/MM3 (ref 140–500)
PLATELET # BLD AUTO: 271 10*3/MM3 (ref 140–500)
PLATELET # BLD AUTO: 272 10*3/MM3 (ref 140–500)
PLATELET # BLD AUTO: 275 10*3/MM3 (ref 140–500)
PLATELET # BLD AUTO: 278 10*3/MM3 (ref 140–500)
PLATELET # BLD AUTO: 278 10*3/MM3 (ref 140–500)
PLATELET # BLD AUTO: 279 10*3/MM3 (ref 140–500)
PLATELET # BLD AUTO: 283 10*3/MM3 (ref 140–500)
PLATELET # BLD AUTO: 283 10*3/MM3 (ref 140–500)
PLATELET # BLD AUTO: 288 10*3/MM3 (ref 140–500)
PLATELET # BLD AUTO: 290 10*3/MM3 (ref 140–500)
PLATELET # BLD AUTO: 295 10*3/MM3 (ref 140–500)
PLATELET # BLD AUTO: 300 10*3/MM3 (ref 140–500)
PLATELET # BLD AUTO: 314 10*3/MM3 (ref 140–500)
PLATELET # BLD AUTO: 316 10*3/MM3 (ref 140–500)
PLATELET # BLD AUTO: 316 10*3/MM3 (ref 140–500)
PLATELET # BLD AUTO: 325 10*3/MM3 (ref 140–500)
PLATELET # BLD AUTO: 325 10*3/MM3 (ref 140–500)
PLATELET # BLD AUTO: 328 10*3/MM3 (ref 140–500)
PLATELET # BLD AUTO: 337 10*3/MM3 (ref 140–500)
PMV BLD AUTO: 10 FL (ref 6–12)
PMV BLD AUTO: 10.3 FL (ref 6–12)
PMV BLD AUTO: 10.5 FL (ref 6–12)
PMV BLD AUTO: 10.7 FL (ref 6–12)
PMV BLD AUTO: 10.8 FL (ref 6–12)
PMV BLD AUTO: 10.8 FL (ref 6–12)
PMV BLD AUTO: 11.1 FL (ref 6–12)
PMV BLD AUTO: 8.9 FL (ref 6–12)
PMV BLD AUTO: 9 FL (ref 6–12)
PMV BLD AUTO: 9.1 FL (ref 6–12)
PMV BLD AUTO: 9.2 FL (ref 6–12)
PMV BLD AUTO: 9.3 FL (ref 6–12)
PMV BLD AUTO: 9.4 FL (ref 6–12)
PMV BLD AUTO: 9.5 FL (ref 6–12)
PMV BLD AUTO: 9.6 FL (ref 6–12)
PMV BLD AUTO: 9.7 FL (ref 6–12)
PMV BLD AUTO: 9.9 FL (ref 6–12)
PMV BLD AUTO: 9.9 FL (ref 6–12)
POTASSIUM BLD-SCNC: 3 MMOL/L (ref 3.5–5.2)
POTASSIUM BLD-SCNC: 3.1 MMOL/L (ref 3.5–5.2)
POTASSIUM BLD-SCNC: 3.2 MMOL/L (ref 3.5–5.2)
POTASSIUM BLD-SCNC: 3.3 MMOL/L (ref 3.5–5.2)
POTASSIUM BLD-SCNC: 3.4 MMOL/L (ref 3.5–5.2)
POTASSIUM BLD-SCNC: 3.4 MMOL/L (ref 3.5–5.2)
POTASSIUM BLD-SCNC: 3.5 MMOL/L (ref 3.5–5.2)
POTASSIUM BLD-SCNC: 3.6 MMOL/L (ref 3.5–5.2)
POTASSIUM BLD-SCNC: 3.7 MMOL/L (ref 3.5–5.2)
POTASSIUM BLD-SCNC: 3.8 MMOL/L (ref 3.5–5.2)
POTASSIUM BLD-SCNC: 3.9 MMOL/L (ref 3.5–5.2)
POTASSIUM BLD-SCNC: 4 MMOL/L (ref 3.5–5.2)
POTASSIUM BLD-SCNC: 4.1 MMOL/L (ref 3.5–5.2)
POTASSIUM BLD-SCNC: 4.3 MMOL/L (ref 3.5–5.2)
POTASSIUM BLD-SCNC: 4.3 MMOL/L (ref 3.5–5.2)
POTASSIUM BLD-SCNC: 4.4 MMOL/L (ref 3.5–5.2)
POTASSIUM BLD-SCNC: 4.5 MMOL/L (ref 3.5–5.2)
POTASSIUM BLD-SCNC: 4.7 MMOL/L (ref 3.5–5.2)
POTASSIUM BLD-SCNC: 4.8 MMOL/L (ref 3.5–5.2)
POTASSIUM BLD-SCNC: 4.9 MMOL/L (ref 3.5–5.2)
PROCALCITONIN SERPL-MCNC: 0.05 NG/ML (ref 0.1–0.25)
PROCALCITONIN SERPL-MCNC: 0.08 NG/ML (ref 0.1–0.25)
PROCALCITONIN SERPL-MCNC: 0.08 NG/ML (ref 0.1–0.25)
PROCALCITONIN SERPL-MCNC: 0.24 NG/ML (ref 0.1–0.25)
PROT SERPL-MCNC: 6.1 G/DL (ref 6–8.5)
PROT SERPL-MCNC: 6.1 G/DL (ref 6–8.5)
PROT SERPL-MCNC: 6.5 G/DL (ref 6–8.5)
PROT SERPL-MCNC: 6.5 G/DL (ref 6–8.5)
PROT SERPL-MCNC: 6.6 G/DL (ref 6–8.5)
PROT SERPL-MCNC: 6.8 G/DL (ref 6–8.5)
PROT SERPL-MCNC: 7 G/DL (ref 6–8.5)
PROT SERPL-MCNC: 7.5 G/DL (ref 6–8.5)
PROT UR QL STRIP: ABNORMAL
PROT UR QL STRIP: NEGATIVE
PROT UR QL STRIP: NEGATIVE
PROTHROMBIN TIME: 13.9 SECONDS (ref 11.7–14.2)
RBC # BLD AUTO: 3.53 10*6/MM3 (ref 3.9–5.2)
RBC # BLD AUTO: 3.55 10*6/MM3 (ref 3.9–5.2)
RBC # BLD AUTO: 3.64 10*6/MM3 (ref 3.9–5.2)
RBC # BLD AUTO: 3.66 10*6/MM3 (ref 3.9–5.2)
RBC # BLD AUTO: 3.69 10*6/MM3 (ref 3.9–5.2)
RBC # BLD AUTO: 3.73 10*6/MM3 (ref 3.9–5.2)
RBC # BLD AUTO: 3.77 10*6/MM3 (ref 3.9–5.2)
RBC # BLD AUTO: 3.79 10*6/MM3 (ref 3.9–5.2)
RBC # BLD AUTO: 3.8 10*6/MM3 (ref 3.9–5.2)
RBC # BLD AUTO: 3.85 10*6/MM3 (ref 3.9–5.2)
RBC # BLD AUTO: 3.87 10*6/MM3 (ref 3.9–5.2)
RBC # BLD AUTO: 3.87 10*6/MM3 (ref 3.9–5.2)
RBC # BLD AUTO: 3.89 10*6/MM3 (ref 3.9–5.2)
RBC # BLD AUTO: 3.92 10*6/MM3 (ref 3.9–5.2)
RBC # BLD AUTO: 3.93 10*6/MM3 (ref 3.9–5.2)
RBC # BLD AUTO: 3.93 10*6/MM3 (ref 3.9–5.2)
RBC # BLD AUTO: 3.95 10*6/MM3 (ref 3.9–5.2)
RBC # BLD AUTO: 3.96 10*6/MM3 (ref 3.9–5.2)
RBC # BLD AUTO: 3.96 10*6/MM3 (ref 3.9–5.2)
RBC # BLD AUTO: 3.99 10*6/MM3 (ref 3.9–5.2)
RBC # BLD AUTO: 4 10*6/MM3 (ref 3.9–5.2)
RBC # BLD AUTO: 4.02 10*6/MM3 (ref 3.9–5.2)
RBC # BLD AUTO: 4.03 10*6/MM3 (ref 3.9–5.2)
RBC # BLD AUTO: 4.04 10*6/MM3 (ref 3.9–5.2)
RBC # BLD AUTO: 4.08 10*6/MM3 (ref 3.9–5.2)
RBC # BLD AUTO: 4.12 10*6/MM3 (ref 3.9–5.2)
RBC # BLD AUTO: 4.21 10*6/MM3 (ref 3.9–5.2)
RBC # BLD AUTO: 4.22 10*6/MM3 (ref 3.9–5.2)
RBC # BLD AUTO: 4.27 10*6/MM3 (ref 3.9–5.2)
RBC # BLD AUTO: 4.41 10*6/MM3 (ref 3.9–5.2)
RBC # BLD AUTO: 4.54 10*6/MM3 (ref 3.9–5.2)
RBC # BLD AUTO: 4.6 10*6/MM3 (ref 3.9–5.2)
RBC # UR: ABNORMAL /HPF
RBC # UR: NORMAL /HPF
REF LAB TEST METHOD: ABNORMAL
REF LAB TEST METHOD: NORMAL
RHINOVIRUS RNA SPEC NAA+PROBE: NOT DETECTED
RSV RNA NPH QL NAA+NON-PROBE: NOT DETECTED
SODIUM BLD-SCNC: 130 MMOL/L (ref 136–145)
SODIUM BLD-SCNC: 131 MMOL/L (ref 136–145)
SODIUM BLD-SCNC: 131 MMOL/L (ref 136–145)
SODIUM BLD-SCNC: 132 MMOL/L (ref 136–145)
SODIUM BLD-SCNC: 133 MMOL/L (ref 136–145)
SODIUM BLD-SCNC: 133 MMOL/L (ref 136–145)
SODIUM BLD-SCNC: 134 MMOL/L (ref 136–145)
SODIUM BLD-SCNC: 135 MMOL/L (ref 136–145)
SODIUM BLD-SCNC: 136 MMOL/L (ref 136–145)
SODIUM BLD-SCNC: 137 MMOL/L (ref 136–145)
SODIUM BLD-SCNC: 138 MMOL/L (ref 136–145)
SODIUM BLD-SCNC: 139 MMOL/L (ref 136–145)
SODIUM BLD-SCNC: 140 MMOL/L (ref 136–145)
SODIUM BLD-SCNC: 141 MMOL/L (ref 136–145)
SODIUM BLD-SCNC: 141 MMOL/L (ref 136–145)
SODIUM BLD-SCNC: 142 MMOL/L (ref 136–145)
SODIUM BLD-SCNC: 143 MMOL/L (ref 136–145)
SODIUM BLD-SCNC: 144 MMOL/L (ref 136–145)
SODIUM BLD-SCNC: 145 MMOL/L (ref 136–145)
SODIUM BLD-SCNC: 145 MMOL/L (ref 136–145)
SP GR UR STRIP: 1.01 (ref 1–1.03)
SP GR UR STRIP: 1.01 (ref 1–1.03)
SP GR UR STRIP: 1.02 (ref 1–1.03)
SP GR UR STRIP: 1.02 (ref 1–1.03)
SP GR UR STRIP: 1.03 (ref 1–1.03)
SP GR UR STRIP: 1.03 (ref 1–1.03)
SQUAMOUS #/AREA URNS HPF: ABNORMAL /HPF
SQUAMOUS #/AREA URNS HPF: NORMAL /HPF
STRESS TARGET HR: 114 BPM
TRIGL SERPL-MCNC: 79 MG/DL (ref 0–150)
TROPONIN T SERPL-MCNC: 0.01 NG/ML (ref 0–0.03)
TROPONIN T SERPL-MCNC: 0.03 NG/ML (ref 0–0.03)
TROPONIN T SERPL-MCNC: 0.04 NG/ML (ref 0–0.03)
TROPONIN T SERPL-MCNC: <0.01 NG/ML (ref 0–0.03)
TSH SERPL DL<=0.05 MIU/L-ACNC: 0.76 MIU/ML (ref 0.27–4.2)
UROBILINOGEN UR QL STRIP: ABNORMAL
VANCOMYCIN SERPL-MCNC: 17.4 MCG/ML (ref 5–40)
VANCOMYCIN SERPL-MCNC: 18.2 MCG/ML (ref 5–40)
VANCOMYCIN SERPL-MCNC: 20 MCG/ML (ref 5–40)
VANCOMYCIN SERPL-MCNC: 22.6 MCG/ML (ref 5–40)
VANCOMYCIN SERPL-MCNC: 23.6 MCG/ML (ref 5–40)
VANCOMYCIN SERPL-MCNC: 8.7 MCG/ML (ref 5–40)
VANCOMYCIN TROUGH SERPL-MCNC: 30.8 MCG/ML (ref 5–20)
VLDLC SERPL-MCNC: 15.8 MG/DL (ref 5–40)
WBC NRBC COR # BLD: 10.16 10*3/MM3 (ref 4.5–10.7)
WBC NRBC COR # BLD: 10.56 10*3/MM3 (ref 4.5–10.7)
WBC NRBC COR # BLD: 10.63 10*3/MM3 (ref 4.5–10.7)
WBC NRBC COR # BLD: 11.19 10*3/MM3 (ref 4.5–10.7)
WBC NRBC COR # BLD: 11.56 10*3/MM3 (ref 4.5–10.7)
WBC NRBC COR # BLD: 14.78 10*3/MM3 (ref 4.5–10.7)
WBC NRBC COR # BLD: 15.63 10*3/MM3 (ref 4.5–10.7)
WBC NRBC COR # BLD: 15.68 10*3/MM3 (ref 4.5–10.7)
WBC NRBC COR # BLD: 4.39 10*3/MM3 (ref 4.5–10.7)
WBC NRBC COR # BLD: 5.28 10*3/MM3 (ref 4.5–10.7)
WBC NRBC COR # BLD: 5.52 10*3/MM3 (ref 4.5–10.7)
WBC NRBC COR # BLD: 6.28 10*3/MM3 (ref 4.5–10.7)
WBC NRBC COR # BLD: 6.38 10*3/MM3 (ref 4.5–10.7)
WBC NRBC COR # BLD: 6.42 10*3/MM3 (ref 4.5–10.7)
WBC NRBC COR # BLD: 6.52 10*3/MM3 (ref 4.5–10.7)
WBC NRBC COR # BLD: 6.58 10*3/MM3 (ref 4.5–10.7)
WBC NRBC COR # BLD: 6.87 10*3/MM3 (ref 4.5–10.7)
WBC NRBC COR # BLD: 6.87 10*3/MM3 (ref 4.5–10.7)
WBC NRBC COR # BLD: 7.05 10*3/MM3 (ref 4.5–10.7)
WBC NRBC COR # BLD: 7.22 10*3/MM3 (ref 4.5–10.7)
WBC NRBC COR # BLD: 7.31 10*3/MM3 (ref 4.5–10.7)
WBC NRBC COR # BLD: 7.33 10*3/MM3 (ref 4.5–10.7)
WBC NRBC COR # BLD: 7.41 10*3/MM3 (ref 4.5–10.7)
WBC NRBC COR # BLD: 8.01 10*3/MM3 (ref 4.5–10.7)
WBC NRBC COR # BLD: 8.01 10*3/MM3 (ref 4.5–10.7)
WBC NRBC COR # BLD: 8.03 10*3/MM3 (ref 4.5–10.7)
WBC NRBC COR # BLD: 8.06 10*3/MM3 (ref 4.5–10.7)
WBC NRBC COR # BLD: 8.07 10*3/MM3 (ref 4.5–10.7)
WBC NRBC COR # BLD: 8.23 10*3/MM3 (ref 4.5–10.7)
WBC NRBC COR # BLD: 8.71 10*3/MM3 (ref 4.5–10.7)
WBC NRBC COR # BLD: 8.75 10*3/MM3 (ref 4.5–10.7)
WBC NRBC COR # BLD: 9.4 10*3/MM3 (ref 4.5–10.7)
WBC UR QL AUTO: ABNORMAL /HPF
WBC UR QL AUTO: NORMAL /HPF

## 2018-01-01 PROCEDURE — 85027 COMPLETE CBC AUTOMATED: CPT | Performed by: INTERNAL MEDICINE

## 2018-01-01 PROCEDURE — 80048 BASIC METABOLIC PNL TOTAL CA: CPT | Performed by: SPECIALIST

## 2018-01-01 PROCEDURE — 83735 ASSAY OF MAGNESIUM: CPT | Performed by: INTERNAL MEDICINE

## 2018-01-01 PROCEDURE — 25010000002 ENOXAPARIN PER 10 MG: Performed by: INTERNAL MEDICINE

## 2018-01-01 PROCEDURE — 71045 X-RAY EXAM CHEST 1 VIEW: CPT

## 2018-01-01 PROCEDURE — 70450 CT HEAD/BRAIN W/O DYE: CPT

## 2018-01-01 PROCEDURE — 94669 MECHANICAL CHEST WALL OSCILL: CPT

## 2018-01-01 PROCEDURE — 80048 BASIC METABOLIC PNL TOTAL CA: CPT | Performed by: NURSE PRACTITIONER

## 2018-01-01 PROCEDURE — 83880 ASSAY OF NATRIURETIC PEPTIDE: CPT | Performed by: INTERNAL MEDICINE

## 2018-01-01 PROCEDURE — 25010000002 CEFTRIAXONE PER 250 MG: Performed by: INTERNAL MEDICINE

## 2018-01-01 PROCEDURE — 94799 UNLISTED PULMONARY SVC/PX: CPT

## 2018-01-01 PROCEDURE — 82962 GLUCOSE BLOOD TEST: CPT

## 2018-01-01 PROCEDURE — 63710000001 INSULIN GLARGINE PER 5 UNITS: Performed by: SPECIALIST

## 2018-01-01 PROCEDURE — 84132 ASSAY OF SERUM POTASSIUM: CPT | Performed by: INTERNAL MEDICINE

## 2018-01-01 PROCEDURE — 63710000001 INSULIN LISPRO (HUMAN) PER 5 UNITS: Performed by: INTERNAL MEDICINE

## 2018-01-01 PROCEDURE — 99308 SBSQ NF CARE LOW MDM 20: CPT | Performed by: NURSE PRACTITIONER

## 2018-01-01 PROCEDURE — 94640 AIRWAY INHALATION TREATMENT: CPT

## 2018-01-01 PROCEDURE — 63710000001 INSULIN ASPART PER 5 UNITS: Performed by: INTERNAL MEDICINE

## 2018-01-01 PROCEDURE — 87150 DNA/RNA AMPLIFIED PROBE: CPT | Performed by: EMERGENCY MEDICINE

## 2018-01-01 PROCEDURE — 84145 PROCALCITONIN (PCT): CPT | Performed by: EMERGENCY MEDICINE

## 2018-01-01 PROCEDURE — 63710000001 INSULIN DETEMER PER 5 UNITS: Performed by: INTERNAL MEDICINE

## 2018-01-01 PROCEDURE — 85025 COMPLETE CBC W/AUTO DIFF WBC: CPT | Performed by: FAMILY MEDICINE

## 2018-01-01 PROCEDURE — 99215 OFFICE O/P EST HI 40 MIN: CPT | Performed by: INTERNAL MEDICINE

## 2018-01-01 PROCEDURE — 99231 SBSQ HOSP IP/OBS SF/LOW 25: CPT | Performed by: NURSE PRACTITIONER

## 2018-01-01 PROCEDURE — 80202 ASSAY OF VANCOMYCIN: CPT | Performed by: SPECIALIST

## 2018-01-01 PROCEDURE — 80048 BASIC METABOLIC PNL TOTAL CA: CPT | Performed by: INTERNAL MEDICINE

## 2018-01-01 PROCEDURE — 63710000001 INSULIN GLARGINE PER 5 UNITS: Performed by: INTERNAL MEDICINE

## 2018-01-01 PROCEDURE — 93005 ELECTROCARDIOGRAM TRACING: CPT | Performed by: NURSE PRACTITIONER

## 2018-01-01 PROCEDURE — G0008 ADMIN INFLUENZA VIRUS VAC: HCPCS | Performed by: INTERNAL MEDICINE

## 2018-01-01 PROCEDURE — 84484 ASSAY OF TROPONIN QUANT: CPT | Performed by: FAMILY MEDICINE

## 2018-01-01 PROCEDURE — 63710000001 INSULIN ASPART PER 5 UNITS: Performed by: SPECIALIST

## 2018-01-01 PROCEDURE — 74230 X-RAY XM SWLNG FUNCJ C+: CPT

## 2018-01-01 PROCEDURE — 99222 1ST HOSP IP/OBS MODERATE 55: CPT | Performed by: INTERNAL MEDICINE

## 2018-01-01 PROCEDURE — 99233 SBSQ HOSP IP/OBS HIGH 50: CPT | Performed by: NURSE PRACTITIONER

## 2018-01-01 PROCEDURE — 85025 COMPLETE CBC W/AUTO DIFF WBC: CPT | Performed by: SPECIALIST

## 2018-01-01 PROCEDURE — 83605 ASSAY OF LACTIC ACID: CPT | Performed by: FAMILY MEDICINE

## 2018-01-01 PROCEDURE — 87147 CULTURE TYPE IMMUNOLOGIC: CPT | Performed by: EMERGENCY MEDICINE

## 2018-01-01 PROCEDURE — 80202 ASSAY OF VANCOMYCIN: CPT | Performed by: INTERNAL MEDICINE

## 2018-01-01 PROCEDURE — 94668 MNPJ CHEST WALL SBSQ: CPT

## 2018-01-01 PROCEDURE — 84484 ASSAY OF TROPONIN QUANT: CPT | Performed by: EMERGENCY MEDICINE

## 2018-01-01 PROCEDURE — 63710000001 DIPHENHYDRAMINE PER 50 MG: Performed by: SPECIALIST

## 2018-01-01 PROCEDURE — 97110 THERAPEUTIC EXERCISES: CPT

## 2018-01-01 PROCEDURE — 72125 CT NECK SPINE W/O DYE: CPT

## 2018-01-01 PROCEDURE — 25010000002 PIPERACILLIN SOD-TAZOBACTAM PER 1 G: Performed by: SPECIALIST

## 2018-01-01 PROCEDURE — 25010000002 ERTAPENEM PER 500 MG: Performed by: INTERNAL MEDICINE

## 2018-01-01 PROCEDURE — 99285 EMERGENCY DEPT VISIT HI MDM: CPT

## 2018-01-01 PROCEDURE — 25010000002 PIPERACILLIN SOD-TAZOBACTAM PER 1 G: Performed by: INTERNAL MEDICINE

## 2018-01-01 PROCEDURE — 80061 LIPID PANEL: CPT | Performed by: HOSPITALIST

## 2018-01-01 PROCEDURE — 85025 COMPLETE CBC W/AUTO DIFF WBC: CPT | Performed by: EMERGENCY MEDICINE

## 2018-01-01 PROCEDURE — 25010000002 FUROSEMIDE PER 20 MG: Performed by: NURSE PRACTITIONER

## 2018-01-01 PROCEDURE — 25010000002 PIPERACILLIN SOD-TAZOBACTAM PER 1 G: Performed by: EMERGENCY MEDICINE

## 2018-01-01 PROCEDURE — 63710000001 INSULIN DETEMER PER 5 UNITS: Performed by: SPECIALIST

## 2018-01-01 PROCEDURE — 85730 THROMBOPLASTIN TIME PARTIAL: CPT | Performed by: EMERGENCY MEDICINE

## 2018-01-01 PROCEDURE — 84443 ASSAY THYROID STIM HORMONE: CPT | Performed by: HOSPITALIST

## 2018-01-01 PROCEDURE — 71046 X-RAY EXAM CHEST 2 VIEWS: CPT

## 2018-01-01 PROCEDURE — 63710000001 INSULIN DETEMER PER 5 UNITS: Performed by: HOSPITALIST

## 2018-01-01 PROCEDURE — 83735 ASSAY OF MAGNESIUM: CPT | Performed by: SPECIALIST

## 2018-01-01 PROCEDURE — 83880 ASSAY OF NATRIURETIC PEPTIDE: CPT | Performed by: NURSE PRACTITIONER

## 2018-01-01 PROCEDURE — 81001 URINALYSIS AUTO W/SCOPE: CPT

## 2018-01-01 PROCEDURE — 99309 SBSQ NF CARE MODERATE MDM 30: CPT | Performed by: NURSE PRACTITIONER

## 2018-01-01 PROCEDURE — 25010000002 FUROSEMIDE PER 20 MG: Performed by: INTERNAL MEDICINE

## 2018-01-01 PROCEDURE — 25010000002 LORAZEPAM PER 2 MG: Performed by: INTERNAL MEDICINE

## 2018-01-01 PROCEDURE — 87581 M.PNEUMON DNA AMP PROBE: CPT | Performed by: INTERNAL MEDICINE

## 2018-01-01 PROCEDURE — 80069 RENAL FUNCTION PANEL: CPT | Performed by: INTERNAL MEDICINE

## 2018-01-01 PROCEDURE — 25010000002 VANCOMYCIN PER 500 MG: Performed by: EMERGENCY MEDICINE

## 2018-01-01 PROCEDURE — 97110 THERAPEUTIC EXERCISES: CPT | Performed by: OCCUPATIONAL THERAPIST

## 2018-01-01 PROCEDURE — 25010000002 HALOPERIDOL LACTATE PER 5 MG: Performed by: INTERNAL MEDICINE

## 2018-01-01 PROCEDURE — 80053 COMPREHEN METABOLIC PANEL: CPT | Performed by: FAMILY MEDICINE

## 2018-01-01 PROCEDURE — 25010000002 CEFTRIAXONE PER 250 MG: Performed by: FAMILY MEDICINE

## 2018-01-01 PROCEDURE — 25010000002 LEVOFLOXACIN PER 250 MG: Performed by: EMERGENCY MEDICINE

## 2018-01-01 PROCEDURE — 87186 SC STD MICRODIL/AGAR DIL: CPT | Performed by: FAMILY MEDICINE

## 2018-01-01 PROCEDURE — 63710000001 DIPHENHYDRAMINE PER 50 MG: Performed by: INTERNAL MEDICINE

## 2018-01-01 PROCEDURE — 80053 COMPREHEN METABOLIC PANEL: CPT | Performed by: EMERGENCY MEDICINE

## 2018-01-01 PROCEDURE — 80053 COMPREHEN METABOLIC PANEL: CPT | Performed by: HOSPITALIST

## 2018-01-01 PROCEDURE — 85025 COMPLETE CBC W/AUTO DIFF WBC: CPT | Performed by: NURSE PRACTITIONER

## 2018-01-01 PROCEDURE — 92526 ORAL FUNCTION THERAPY: CPT | Performed by: SPEECH-LANGUAGE PATHOLOGIST

## 2018-01-01 PROCEDURE — 87798 DETECT AGENT NOS DNA AMP: CPT | Performed by: INTERNAL MEDICINE

## 2018-01-01 PROCEDURE — 83880 ASSAY OF NATRIURETIC PEPTIDE: CPT | Performed by: HOSPITALIST

## 2018-01-01 PROCEDURE — 25010000002 VANCOMYCIN PER 500 MG: Performed by: INTERNAL MEDICINE

## 2018-01-01 PROCEDURE — G0378 HOSPITAL OBSERVATION PER HR: HCPCS

## 2018-01-01 PROCEDURE — 93005 ELECTROCARDIOGRAM TRACING: CPT | Performed by: INTERNAL MEDICINE

## 2018-01-01 PROCEDURE — 25010000002 VANCOMYCIN PER 500 MG: Performed by: SPECIALIST

## 2018-01-01 PROCEDURE — 63710000001 INSULIN ASPART PER 5 UNITS: Performed by: HOSPITALIST

## 2018-01-01 PROCEDURE — 93010 ELECTROCARDIOGRAM REPORT: CPT | Performed by: INTERNAL MEDICINE

## 2018-01-01 PROCEDURE — 96360 HYDRATION IV INFUSION INIT: CPT

## 2018-01-01 PROCEDURE — 87086 URINE CULTURE/COLONY COUNT: CPT | Performed by: FAMILY MEDICINE

## 2018-01-01 PROCEDURE — 99308 SBSQ NF CARE LOW MDM 20: CPT | Performed by: FAMILY MEDICINE

## 2018-01-01 PROCEDURE — 92611 MOTION FLUOROSCOPY/SWALLOW: CPT | Performed by: SPEECH-LANGUAGE PATHOLOGIST

## 2018-01-01 PROCEDURE — 82550 ASSAY OF CK (CPK): CPT | Performed by: NURSE PRACTITIONER

## 2018-01-01 PROCEDURE — 87086 URINE CULTURE/COLONY COUNT: CPT | Performed by: EMERGENCY MEDICINE

## 2018-01-01 PROCEDURE — 99223 1ST HOSP IP/OBS HIGH 75: CPT | Performed by: INTERNAL MEDICINE

## 2018-01-01 PROCEDURE — 83880 ASSAY OF NATRIURETIC PEPTIDE: CPT | Performed by: EMERGENCY MEDICINE

## 2018-01-01 PROCEDURE — 99232 SBSQ HOSP IP/OBS MODERATE 35: CPT | Performed by: PSYCHIATRY & NEUROLOGY

## 2018-01-01 PROCEDURE — 84484 ASSAY OF TROPONIN QUANT: CPT | Performed by: INTERNAL MEDICINE

## 2018-01-01 PROCEDURE — 92610 EVALUATE SWALLOWING FUNCTION: CPT

## 2018-01-01 PROCEDURE — 99232 SBSQ HOSP IP/OBS MODERATE 35: CPT | Performed by: NURSE PRACTITIONER

## 2018-01-01 PROCEDURE — 99232 SBSQ HOSP IP/OBS MODERATE 35: CPT | Performed by: INTERNAL MEDICINE

## 2018-01-01 PROCEDURE — 87493 C DIFF AMPLIFIED PROBE: CPT | Performed by: INTERNAL MEDICINE

## 2018-01-01 PROCEDURE — 81001 URINALYSIS AUTO W/SCOPE: CPT | Performed by: NURSE PRACTITIONER

## 2018-01-01 PROCEDURE — 84100 ASSAY OF PHOSPHORUS: CPT | Performed by: INTERNAL MEDICINE

## 2018-01-01 PROCEDURE — 96361 HYDRATE IV INFUSION ADD-ON: CPT

## 2018-01-01 PROCEDURE — 97165 OT EVAL LOW COMPLEX 30 MIN: CPT

## 2018-01-01 PROCEDURE — 80048 BASIC METABOLIC PNL TOTAL CA: CPT

## 2018-01-01 PROCEDURE — 84484 ASSAY OF TROPONIN QUANT: CPT | Performed by: NURSE PRACTITIONER

## 2018-01-01 PROCEDURE — 97162 PT EVAL MOD COMPLEX 30 MIN: CPT

## 2018-01-01 PROCEDURE — 73521 X-RAY EXAM HIPS BI 2 VIEWS: CPT

## 2018-01-01 PROCEDURE — 99307 SBSQ NF CARE SF MDM 10: CPT | Performed by: FAMILY MEDICINE

## 2018-01-01 PROCEDURE — 84145 PROCALCITONIN (PCT): CPT | Performed by: NURSE PRACTITIONER

## 2018-01-01 PROCEDURE — 25010000002 FUROSEMIDE PER 20 MG: Performed by: FAMILY MEDICINE

## 2018-01-01 PROCEDURE — 25010000002 MAGNESIUM SULFATE IN D5W 1G/100ML (PREMIX) 1-5 GM/100ML-% SOLUTION: Performed by: SPECIALIST

## 2018-01-01 PROCEDURE — 80053 COMPREHEN METABOLIC PANEL: CPT | Performed by: NURSE PRACTITIONER

## 2018-01-01 PROCEDURE — 25010000002 ONDANSETRON PER 1 MG: Performed by: INTERNAL MEDICINE

## 2018-01-01 PROCEDURE — 87486 CHLMYD PNEUM DNA AMP PROBE: CPT | Performed by: INTERNAL MEDICINE

## 2018-01-01 PROCEDURE — G0439 PPPS, SUBSEQ VISIT: HCPCS | Performed by: NURSE PRACTITIONER

## 2018-01-01 PROCEDURE — 82947 ASSAY GLUCOSE BLOOD QUANT: CPT | Performed by: INTERNAL MEDICINE

## 2018-01-01 PROCEDURE — 80048 BASIC METABOLIC PNL TOTAL CA: CPT | Performed by: HOSPITALIST

## 2018-01-01 PROCEDURE — 36415 COLL VENOUS BLD VENIPUNCTURE: CPT

## 2018-01-01 PROCEDURE — 97166 OT EVAL MOD COMPLEX 45 MIN: CPT | Performed by: OCCUPATIONAL THERAPIST

## 2018-01-01 PROCEDURE — 87086 URINE CULTURE/COLONY COUNT: CPT | Performed by: NURSE PRACTITIONER

## 2018-01-01 PROCEDURE — 99221 1ST HOSP IP/OBS SF/LOW 40: CPT | Performed by: PSYCHIATRY & NEUROLOGY

## 2018-01-01 PROCEDURE — P9612 CATHETERIZE FOR URINE SPEC: HCPCS

## 2018-01-01 PROCEDURE — 85025 COMPLETE CBC W/AUTO DIFF WBC: CPT | Performed by: HOSPITALIST

## 2018-01-01 PROCEDURE — 87186 SC STD MICRODIL/AGAR DIL: CPT | Performed by: NURSE PRACTITIONER

## 2018-01-01 PROCEDURE — 83605 ASSAY OF LACTIC ACID: CPT | Performed by: EMERGENCY MEDICINE

## 2018-01-01 PROCEDURE — 92610 EVALUATE SWALLOWING FUNCTION: CPT | Performed by: SPEECH-LANGUAGE PATHOLOGIST

## 2018-01-01 PROCEDURE — 80053 COMPREHEN METABOLIC PANEL: CPT | Performed by: SPECIALIST

## 2018-01-01 PROCEDURE — 87040 BLOOD CULTURE FOR BACTERIA: CPT | Performed by: EMERGENCY MEDICINE

## 2018-01-01 PROCEDURE — 94667 MNPJ CHEST WALL 1ST: CPT

## 2018-01-01 PROCEDURE — 87088 URINE BACTERIA CULTURE: CPT | Performed by: NURSE PRACTITIONER

## 2018-01-01 PROCEDURE — 25010000002 FUROSEMIDE PER 20 MG: Performed by: HOSPITALIST

## 2018-01-01 PROCEDURE — 83880 ASSAY OF NATRIURETIC PEPTIDE: CPT | Performed by: FAMILY MEDICINE

## 2018-01-01 PROCEDURE — 90661 CCIIV3 VAC ABX FR 0.5 ML IM: CPT | Performed by: INTERNAL MEDICINE

## 2018-01-01 PROCEDURE — 81003 URINALYSIS AUTO W/O SCOPE: CPT | Performed by: EMERGENCY MEDICINE

## 2018-01-01 PROCEDURE — 63710000001 INSULIN LISPRO (HUMAN) PER 5 UNITS: Performed by: SPECIALIST

## 2018-01-01 PROCEDURE — 92611 MOTION FLUOROSCOPY/SWALLOW: CPT

## 2018-01-01 PROCEDURE — 99284 EMERGENCY DEPT VISIT MOD MDM: CPT

## 2018-01-01 PROCEDURE — 87633 RESP VIRUS 12-25 TARGETS: CPT | Performed by: INTERNAL MEDICINE

## 2018-01-01 PROCEDURE — 71250 CT THORAX DX C-: CPT

## 2018-01-01 PROCEDURE — 25010000002 INFLUENZA VAC SUBUNIT QUAD 0.5 ML SUSPENSION PREFILLED SYRINGE: Performed by: INTERNAL MEDICINE

## 2018-01-01 PROCEDURE — 83036 HEMOGLOBIN GLYCOSYLATED A1C: CPT | Performed by: HOSPITALIST

## 2018-01-01 PROCEDURE — 81001 URINALYSIS AUTO W/SCOPE: CPT | Performed by: FAMILY MEDICINE

## 2018-01-01 PROCEDURE — 36415 COLL VENOUS BLD VENIPUNCTURE: CPT | Performed by: FAMILY MEDICINE

## 2018-01-01 PROCEDURE — 87040 BLOOD CULTURE FOR BACTERIA: CPT | Performed by: FAMILY MEDICINE

## 2018-01-01 PROCEDURE — 87186 SC STD MICRODIL/AGAR DIL: CPT | Performed by: EMERGENCY MEDICINE

## 2018-01-01 PROCEDURE — 81001 URINALYSIS AUTO W/SCOPE: CPT | Performed by: EMERGENCY MEDICINE

## 2018-01-01 PROCEDURE — 93306 TTE W/DOPPLER COMPLETE: CPT

## 2018-01-01 PROCEDURE — 93005 ELECTROCARDIOGRAM TRACING: CPT | Performed by: EMERGENCY MEDICINE

## 2018-01-01 PROCEDURE — 85610 PROTHROMBIN TIME: CPT | Performed by: EMERGENCY MEDICINE

## 2018-01-01 PROCEDURE — 25010000002 LEVOFLOXACIN PER 250 MG: Performed by: INTERNAL MEDICINE

## 2018-01-01 PROCEDURE — 97161 PT EVAL LOW COMPLEX 20 MIN: CPT

## 2018-01-01 PROCEDURE — 93306 TTE W/DOPPLER COMPLETE: CPT | Performed by: INTERNAL MEDICINE

## 2018-01-01 PROCEDURE — 93005 ELECTROCARDIOGRAM TRACING: CPT | Performed by: FAMILY MEDICINE

## 2018-01-01 PROCEDURE — 25010000002 VANCOMYCIN 10 G RECONSTITUTED SOLUTION: Performed by: INTERNAL MEDICINE

## 2018-01-01 PROCEDURE — 63710000001 INSULIN REGULAR HUMAN PER 5 UNITS: Performed by: EMERGENCY MEDICINE

## 2018-01-01 PROCEDURE — 25010000002 CEFTRIAXONE PER 250 MG: Performed by: NURSE PRACTITIONER

## 2018-01-01 PROCEDURE — 83036 HEMOGLOBIN GLYCOSYLATED A1C: CPT | Performed by: INTERNAL MEDICINE

## 2018-01-01 PROCEDURE — 85025 COMPLETE CBC W/AUTO DIFF WBC: CPT

## 2018-01-01 PROCEDURE — 25010000003 POTASSIUM CHLORIDE 10 MEQ/100ML SOLUTION: Performed by: NURSE PRACTITIONER

## 2018-01-01 PROCEDURE — 83690 ASSAY OF LIPASE: CPT | Performed by: EMERGENCY MEDICINE

## 2018-01-01 PROCEDURE — 87493 C DIFF AMPLIFIED PROBE: CPT | Performed by: NURSE PRACTITIONER

## 2018-01-01 RX ORDER — ACETAMINOPHEN 500 MG
1000 TABLET ORAL EVERY 6 HOURS PRN
Status: DISCONTINUED | OUTPATIENT
Start: 2018-01-01 | End: 2018-01-01 | Stop reason: HOSPADM

## 2018-01-01 RX ORDER — NICOTINE POLACRILEX 4 MG
15 LOZENGE BUCCAL
Status: DISCONTINUED | OUTPATIENT
Start: 2018-01-01 | End: 2018-01-01 | Stop reason: HOSPADM

## 2018-01-01 RX ORDER — GUAIFENESIN AND DEXTROMETHORPHAN HYDROBROMIDE 100; 10 MG/5ML; MG/5ML
5 SOLUTION ORAL EVERY 12 HOURS
COMMUNITY
End: 2018-01-01 | Stop reason: HOSPADM

## 2018-01-01 RX ORDER — NICOTINE POLACRILEX 4 MG
15 LOZENGE BUCCAL
Status: DISCONTINUED | OUTPATIENT
Start: 2018-01-01 | End: 2018-01-01

## 2018-01-01 RX ORDER — FUROSEMIDE 10 MG/ML
40 INJECTION INTRAMUSCULAR; INTRAVENOUS ONCE
Status: COMPLETED | OUTPATIENT
Start: 2018-01-01 | End: 2018-01-01

## 2018-01-01 RX ORDER — INSULIN GLARGINE 100 [IU]/ML
30 INJECTION, SOLUTION SUBCUTANEOUS NIGHTLY
Status: DISCONTINUED | OUTPATIENT
Start: 2018-01-01 | End: 2018-01-01 | Stop reason: HOSPADM

## 2018-01-01 RX ORDER — ONDANSETRON 4 MG/1
4 TABLET, FILM COATED ORAL EVERY 6 HOURS PRN
Status: DISCONTINUED | OUTPATIENT
Start: 2018-01-01 | End: 2018-01-01 | Stop reason: HOSPADM

## 2018-01-01 RX ORDER — FUROSEMIDE 10 MG/ML
20 INJECTION INTRAMUSCULAR; INTRAVENOUS EVERY 12 HOURS
Status: DISCONTINUED | OUTPATIENT
Start: 2018-01-01 | End: 2018-01-01

## 2018-01-01 RX ORDER — HALOPERIDOL 5 MG/ML
1 INJECTION INTRAMUSCULAR EVERY 6 HOURS PRN
Status: DISCONTINUED | OUTPATIENT
Start: 2018-01-01 | End: 2018-01-01 | Stop reason: HOSPADM

## 2018-01-01 RX ORDER — CALCIUM CARBONATE 200(500)MG
2 TABLET,CHEWABLE ORAL 3 TIMES DAILY PRN
Status: DISCONTINUED | OUTPATIENT
Start: 2018-01-01 | End: 2018-01-01 | Stop reason: HOSPADM

## 2018-01-01 RX ORDER — VANCOMYCIN HYDROCHLORIDE 1 G/200ML
20 INJECTION, SOLUTION INTRAVENOUS ONCE
Status: COMPLETED | OUTPATIENT
Start: 2018-01-01 | End: 2018-01-01

## 2018-01-01 RX ORDER — ESCITALOPRAM OXALATE 10 MG/1
10 TABLET ORAL DAILY
Status: DISCONTINUED | OUTPATIENT
Start: 2018-01-01 | End: 2018-01-01 | Stop reason: HOSPADM

## 2018-01-01 RX ORDER — INSULIN GLARGINE 100 [IU]/ML
12 INJECTION, SOLUTION SUBCUTANEOUS DAILY
Qty: 240 UNITS | Refills: 0 | Status: SHIPPED | OUTPATIENT
Start: 2018-01-01

## 2018-01-01 RX ORDER — BUDESONIDE 0.5 MG/2ML
0.5 INHALANT ORAL
Status: DISCONTINUED | OUTPATIENT
Start: 2018-01-01 | End: 2018-01-01 | Stop reason: HOSPADM

## 2018-01-01 RX ORDER — CLOPIDOGREL BISULFATE 75 MG/1
75 TABLET ORAL DAILY
Status: DISCONTINUED | OUTPATIENT
Start: 2018-01-01 | End: 2018-01-01 | Stop reason: HOSPADM

## 2018-01-01 RX ORDER — LEVOFLOXACIN 5 MG/ML
500 INJECTION, SOLUTION INTRAVENOUS EVERY 24 HOURS
Status: DISCONTINUED | OUTPATIENT
Start: 2018-01-01 | End: 2018-01-01

## 2018-01-01 RX ORDER — DEXTROSE MONOHYDRATE 25 G/50ML
25 INJECTION, SOLUTION INTRAVENOUS
Status: DISCONTINUED | OUTPATIENT
Start: 2018-01-01 | End: 2018-01-01

## 2018-01-01 RX ORDER — FUROSEMIDE 40 MG/1
40 TABLET ORAL DAILY
Status: DISCONTINUED | OUTPATIENT
Start: 2018-01-01 | End: 2018-01-01 | Stop reason: HOSPADM

## 2018-01-01 RX ORDER — VANCOMYCIN HYDROCHLORIDE 1 G/200ML
1000 INJECTION, SOLUTION INTRAVENOUS ONCE
Status: COMPLETED | OUTPATIENT
Start: 2018-01-01 | End: 2018-01-01

## 2018-01-01 RX ORDER — FUROSEMIDE 10 MG/ML
40 INJECTION INTRAMUSCULAR; INTRAVENOUS EVERY 12 HOURS
Status: DISCONTINUED | OUTPATIENT
Start: 2018-01-01 | End: 2018-01-01

## 2018-01-01 RX ORDER — LORAZEPAM 0.5 MG/1
0.5 TABLET ORAL NIGHTLY PRN
Status: DISCONTINUED | OUTPATIENT
Start: 2018-01-01 | End: 2018-01-01 | Stop reason: HOSPADM

## 2018-01-01 RX ORDER — DOCUSATE SODIUM 100 MG/1
100 CAPSULE, LIQUID FILLED ORAL 2 TIMES DAILY
Status: DISCONTINUED | OUTPATIENT
Start: 2018-01-01 | End: 2018-01-01

## 2018-01-01 RX ORDER — DEXTROSE MONOHYDRATE 25 G/50ML
25 INJECTION, SOLUTION INTRAVENOUS
Status: DISCONTINUED | OUTPATIENT
Start: 2018-01-01 | End: 2018-01-01 | Stop reason: HOSPADM

## 2018-01-01 RX ORDER — ACETAMINOPHEN 325 MG/1
650 TABLET ORAL EVERY 4 HOURS PRN
Status: DISCONTINUED | OUTPATIENT
Start: 2018-01-01 | End: 2018-01-01 | Stop reason: HOSPADM

## 2018-01-01 RX ORDER — FAMOTIDINE 40 MG/1
40 TABLET, FILM COATED ORAL DAILY
Status: DISCONTINUED | OUTPATIENT
Start: 2018-01-01 | End: 2018-01-01

## 2018-01-01 RX ORDER — BUDESONIDE 0.5 MG/2ML
0.5 INHALANT ORAL 2 TIMES DAILY
COMMUNITY

## 2018-01-01 RX ORDER — ONDANSETRON 2 MG/ML
4 INJECTION INTRAMUSCULAR; INTRAVENOUS EVERY 6 HOURS PRN
Status: DISCONTINUED | OUTPATIENT
Start: 2018-01-01 | End: 2018-01-01

## 2018-01-01 RX ORDER — PANTOPRAZOLE SODIUM 40 MG/1
40 TABLET, DELAYED RELEASE ORAL DAILY
Status: DISCONTINUED | OUTPATIENT
Start: 2018-01-01 | End: 2018-01-01 | Stop reason: HOSPADM

## 2018-01-01 RX ORDER — FUROSEMIDE 20 MG/1
20 TABLET ORAL DAILY
Status: DISCONTINUED | OUTPATIENT
Start: 2018-01-01 | End: 2018-01-01 | Stop reason: HOSPADM

## 2018-01-01 RX ORDER — POLYETHYLENE GLYCOL 3350 17 G/17G
17 POWDER, FOR SOLUTION ORAL DAILY
Status: DISCONTINUED | OUTPATIENT
Start: 2018-01-01 | End: 2018-01-01

## 2018-01-01 RX ORDER — FUROSEMIDE 40 MG/1
20 TABLET ORAL DAILY
Qty: 30 TABLET | Refills: 3 | Status: SHIPPED | OUTPATIENT
Start: 2018-01-01 | End: 2019-01-01 | Stop reason: SDUPTHER

## 2018-01-01 RX ORDER — DIPHENHYDRAMINE HCL 25 MG
50 CAPSULE ORAL NIGHTLY PRN
Status: DISCONTINUED | OUTPATIENT
Start: 2018-01-01 | End: 2018-01-01 | Stop reason: HOSPADM

## 2018-01-01 RX ORDER — INSULIN GLARGINE 100 [IU]/ML
30 INJECTION, SOLUTION SUBCUTANEOUS NIGHTLY
Status: DISCONTINUED | OUTPATIENT
Start: 2018-01-01 | End: 2018-01-01

## 2018-01-01 RX ORDER — ONDANSETRON 2 MG/ML
4 INJECTION INTRAMUSCULAR; INTRAVENOUS EVERY 6 HOURS PRN
Status: DISCONTINUED | OUTPATIENT
Start: 2018-01-01 | End: 2018-01-01 | Stop reason: HOSPADM

## 2018-01-01 RX ORDER — BUDESONIDE 0.5 MG/2ML
0.5 INHALANT ORAL 2 TIMES DAILY
Status: DISCONTINUED | OUTPATIENT
Start: 2018-01-01 | End: 2018-01-01 | Stop reason: HOSPADM

## 2018-01-01 RX ORDER — DONEPEZIL HYDROCHLORIDE 10 MG/1
10 TABLET, FILM COATED ORAL NIGHTLY
Status: DISCONTINUED | OUTPATIENT
Start: 2018-01-01 | End: 2018-01-01

## 2018-01-01 RX ORDER — PREDNISONE 10 MG/1
10 TABLET ORAL 2 TIMES DAILY
COMMUNITY
Start: 2018-01-01 | End: 2018-01-01 | Stop reason: HOSPADM

## 2018-01-01 RX ORDER — POTASSIUM CHLORIDE 7.45 MG/ML
10 INJECTION INTRAVENOUS
Status: DISCONTINUED | OUTPATIENT
Start: 2018-01-01 | End: 2018-01-01 | Stop reason: HOSPADM

## 2018-01-01 RX ORDER — NITROGLYCERIN 0.4 MG/1
0.4 TABLET SUBLINGUAL
Status: DISCONTINUED | OUTPATIENT
Start: 2018-01-01 | End: 2018-01-01 | Stop reason: HOSPADM

## 2018-01-01 RX ORDER — FUROSEMIDE 40 MG/1
40 TABLET ORAL DAILY
Status: DISCONTINUED | OUTPATIENT
Start: 2018-01-01 | End: 2018-01-01

## 2018-01-01 RX ORDER — IPRATROPIUM BROMIDE AND ALBUTEROL SULFATE 2.5; .5 MG/3ML; MG/3ML
3 SOLUTION RESPIRATORY (INHALATION)
Status: DISCONTINUED | OUTPATIENT
Start: 2018-01-01 | End: 2018-01-01

## 2018-01-01 RX ORDER — CEFTRIAXONE SODIUM 1 G/50ML
1 INJECTION, SOLUTION INTRAVENOUS EVERY 24 HOURS
Status: DISCONTINUED | OUTPATIENT
Start: 2018-01-01 | End: 2018-01-01

## 2018-01-01 RX ORDER — MAGNESIUM SULFATE 1 G/100ML
1 INJECTION INTRAVENOUS ONCE
Status: COMPLETED | OUTPATIENT
Start: 2018-01-01 | End: 2018-01-01

## 2018-01-01 RX ORDER — SODIUM CHLORIDE 0.9 % (FLUSH) 0.9 %
1-10 SYRINGE (ML) INJECTION AS NEEDED
Status: DISCONTINUED | OUTPATIENT
Start: 2018-01-01 | End: 2018-01-01 | Stop reason: HOSPADM

## 2018-01-01 RX ORDER — DONEPEZIL HYDROCHLORIDE 10 MG/1
10 TABLET, FILM COATED ORAL NIGHTLY
Status: DISCONTINUED | OUTPATIENT
Start: 2018-01-01 | End: 2018-01-01 | Stop reason: HOSPADM

## 2018-01-01 RX ORDER — INSULIN GLARGINE 100 [IU]/ML
20 INJECTION, SOLUTION SUBCUTANEOUS NIGHTLY
Status: DISCONTINUED | OUTPATIENT
Start: 2018-01-01 | End: 2018-01-01

## 2018-01-01 RX ORDER — NICOTINE POLACRILEX 4 MG
15 LOZENGE BUCCAL
Status: CANCELLED | OUTPATIENT
Start: 2018-01-01

## 2018-01-01 RX ORDER — SODIUM CHLORIDE 0.9 % (FLUSH) 0.9 %
10 SYRINGE (ML) INJECTION AS NEEDED
Status: DISCONTINUED | OUTPATIENT
Start: 2018-01-01 | End: 2018-01-01 | Stop reason: HOSPADM

## 2018-01-01 RX ORDER — POTASSIUM CHLORIDE 750 MG/1
40 CAPSULE, EXTENDED RELEASE ORAL AS NEEDED
Status: DISCONTINUED | OUTPATIENT
Start: 2018-01-01 | End: 2018-01-01 | Stop reason: HOSPADM

## 2018-01-01 RX ORDER — CARVEDILOL 25 MG/1
25 TABLET ORAL 2 TIMES DAILY WITH MEALS
Status: DISCONTINUED | OUTPATIENT
Start: 2018-01-01 | End: 2018-01-01 | Stop reason: HOSPADM

## 2018-01-01 RX ORDER — DOCUSATE SODIUM 100 MG/1
100 CAPSULE, LIQUID FILLED ORAL 2 TIMES DAILY PRN
Status: DISCONTINUED | OUTPATIENT
Start: 2018-01-01 | End: 2018-01-01 | Stop reason: HOSPADM

## 2018-01-01 RX ORDER — LORAZEPAM 2 MG/ML
0.5 INJECTION INTRAMUSCULAR ONCE
Status: COMPLETED | OUTPATIENT
Start: 2018-01-01 | End: 2018-01-01

## 2018-01-01 RX ORDER — PANTOPRAZOLE SODIUM 40 MG/1
40 TABLET, DELAYED RELEASE ORAL DAILY
Qty: 30 TABLET | Refills: 0 | Status: SHIPPED | OUTPATIENT
Start: 2018-01-01 | End: 2018-01-01

## 2018-01-01 RX ORDER — SODIUM CHLORIDE, SODIUM LACTATE, POTASSIUM CHLORIDE, CALCIUM CHLORIDE 600; 310; 30; 20 MG/100ML; MG/100ML; MG/100ML; MG/100ML
100 INJECTION, SOLUTION INTRAVENOUS CONTINUOUS
Status: DISCONTINUED | OUTPATIENT
Start: 2018-01-01 | End: 2018-01-01

## 2018-01-01 RX ORDER — LANOLIN ALCOHOL/MO/W.PET/CERES
CREAM (GRAM) TOPICAL DAILY
Status: DISCONTINUED | OUTPATIENT
Start: 2018-01-01 | End: 2018-01-01 | Stop reason: HOSPADM

## 2018-01-01 RX ORDER — IPRATROPIUM BROMIDE AND ALBUTEROL SULFATE 2.5; .5 MG/3ML; MG/3ML
3 SOLUTION RESPIRATORY (INHALATION)
Status: DISCONTINUED | OUTPATIENT
Start: 2018-01-01 | End: 2018-01-01 | Stop reason: HOSPADM

## 2018-01-01 RX ORDER — DIPHENHYDRAMINE HYDROCHLORIDE, ZINC ACETATE 2; .1 G/100G; G/100G
CREAM TOPICAL AS NEEDED
COMMUNITY
End: 2018-01-01 | Stop reason: HOSPADM

## 2018-01-01 RX ORDER — SODIUM CHLORIDE FOR INHALATION 7 %
4 VIAL, NEBULIZER (ML) INHALATION
Status: DISCONTINUED | OUTPATIENT
Start: 2018-01-01 | End: 2018-01-01 | Stop reason: HOSPADM

## 2018-01-01 RX ORDER — FUROSEMIDE 40 MG/1
40 TABLET ORAL 2 TIMES DAILY
Status: ON HOLD | COMMUNITY
End: 2018-01-01

## 2018-01-01 RX ORDER — DIPHENHYDRAMINE HCL 25 MG
25 CAPSULE ORAL EVERY 12 HOURS PRN
COMMUNITY
End: 2018-01-01 | Stop reason: HOSPADM

## 2018-01-01 RX ORDER — HYDRALAZINE HYDROCHLORIDE 20 MG/ML
10 INJECTION INTRAMUSCULAR; INTRAVENOUS EVERY 6 HOURS PRN
Status: DISCONTINUED | OUTPATIENT
Start: 2018-01-01 | End: 2018-01-01 | Stop reason: HOSPADM

## 2018-01-01 RX ORDER — MAGNESIUM SULFATE HEPTAHYDRATE 40 MG/ML
4 INJECTION, SOLUTION INTRAVENOUS AS NEEDED
Status: DISCONTINUED | OUTPATIENT
Start: 2018-01-01 | End: 2018-01-01 | Stop reason: HOSPADM

## 2018-01-01 RX ORDER — POTASSIUM CHLORIDE 1500 MG/1
20 TABLET, FILM COATED, EXTENDED RELEASE ORAL DAILY
COMMUNITY

## 2018-01-01 RX ORDER — MIRTAZAPINE 15 MG/1
15 TABLET, FILM COATED ORAL NIGHTLY
Status: DISCONTINUED | OUTPATIENT
Start: 2018-01-01 | End: 2018-01-01 | Stop reason: HOSPADM

## 2018-01-01 RX ORDER — FUROSEMIDE 20 MG/1
20 TABLET ORAL DAILY
Qty: 30 TABLET | Refills: 0 | Status: SHIPPED | OUTPATIENT
Start: 2018-01-01 | End: 2018-01-01 | Stop reason: HOSPADM

## 2018-01-01 RX ORDER — PREDNISONE 10 MG/1
10 TABLET ORAL DAILY
COMMUNITY
Start: 2018-01-01 | End: 2018-01-01 | Stop reason: HOSPADM

## 2018-01-01 RX ORDER — POTASSIUM CHLORIDE 750 MG/1
40 CAPSULE, EXTENDED RELEASE ORAL ONCE
Status: COMPLETED | OUTPATIENT
Start: 2018-01-01 | End: 2018-01-01

## 2018-01-01 RX ORDER — NITROGLYCERIN 0.4 MG/1
0.4 TABLET SUBLINGUAL
Status: DISCONTINUED | OUTPATIENT
Start: 2018-01-01 | End: 2018-01-01

## 2018-01-01 RX ORDER — MAGNESIUM SULFATE HEPTAHYDRATE 40 MG/ML
2 INJECTION, SOLUTION INTRAVENOUS AS NEEDED
Status: DISCONTINUED | OUTPATIENT
Start: 2018-01-01 | End: 2018-01-01 | Stop reason: HOSPADM

## 2018-01-01 RX ORDER — SODIUM CHLORIDE 9 MG/ML
75 INJECTION, SOLUTION INTRAVENOUS CONTINUOUS
Status: DISCONTINUED | OUTPATIENT
Start: 2018-01-01 | End: 2018-01-01

## 2018-01-01 RX ORDER — POTASSIUM CHLORIDE 1.5 G/1.77G
20 POWDER, FOR SOLUTION ORAL 2 TIMES DAILY
Status: DISCONTINUED | OUTPATIENT
Start: 2018-01-01 | End: 2018-01-01 | Stop reason: HOSPADM

## 2018-01-01 RX ORDER — IPRATROPIUM BROMIDE AND ALBUTEROL SULFATE 2.5; .5 MG/3ML; MG/3ML
3 SOLUTION RESPIRATORY (INHALATION) 4 TIMES DAILY
Status: DISCONTINUED | OUTPATIENT
Start: 2018-01-01 | End: 2018-01-01

## 2018-01-01 RX ORDER — POTASSIUM CHLORIDE 1.5 G/1.77G
20 POWDER, FOR SOLUTION ORAL DAILY
Status: DISCONTINUED | OUTPATIENT
Start: 2018-01-01 | End: 2018-01-01

## 2018-01-01 RX ORDER — LEVOFLOXACIN 5 MG/ML
750 INJECTION, SOLUTION INTRAVENOUS ONCE
Status: COMPLETED | OUTPATIENT
Start: 2018-01-01 | End: 2018-01-01

## 2018-01-01 RX ORDER — VANCOMYCIN HYDROCHLORIDE 1 G/200ML
20 INJECTION, SOLUTION INTRAVENOUS EVERY 24 HOURS
Status: DISCONTINUED | OUTPATIENT
Start: 2018-01-01 | End: 2018-01-01

## 2018-01-01 RX ORDER — POLYETHYLENE GLYCOL 3350 17 G/17G
17 POWDER, FOR SOLUTION ORAL DAILY
Status: DISCONTINUED | OUTPATIENT
Start: 2018-01-01 | End: 2018-01-01 | Stop reason: HOSPADM

## 2018-01-01 RX ORDER — POTASSIUM CHLORIDE 7.45 MG/ML
10 INJECTION INTRAVENOUS
Status: DISCONTINUED | OUTPATIENT
Start: 2018-01-01 | End: 2018-01-01

## 2018-01-01 RX ORDER — MEMANTINE HYDROCHLORIDE 10 MG/1
10 TABLET ORAL EVERY 12 HOURS SCHEDULED
Status: DISCONTINUED | OUTPATIENT
Start: 2018-01-01 | End: 2018-01-01 | Stop reason: HOSPADM

## 2018-01-01 RX ORDER — IPRATROPIUM BROMIDE AND ALBUTEROL SULFATE 2.5; .5 MG/3ML; MG/3ML
3 SOLUTION RESPIRATORY (INHALATION)
Status: DISCONTINUED | OUTPATIENT
Start: 2018-01-01 | End: 2018-01-01 | Stop reason: ALTCHOICE

## 2018-01-01 RX ORDER — INSULIN GLARGINE 100 [IU]/ML
10 INJECTION, SOLUTION SUBCUTANEOUS NIGHTLY
Status: DISCONTINUED | OUTPATIENT
Start: 2018-01-01 | End: 2018-01-01 | Stop reason: HOSPADM

## 2018-01-01 RX ORDER — POTASSIUM CHLORIDE 750 MG/1
40 CAPSULE, EXTENDED RELEASE ORAL ONCE
Status: DISCONTINUED | OUTPATIENT
Start: 2018-01-01 | End: 2018-01-01 | Stop reason: HOSPADM

## 2018-01-01 RX ORDER — LEVOFLOXACIN 500 MG/1
500 TABLET, FILM COATED ORAL DAILY
COMMUNITY
Start: 2018-01-01 | End: 2018-01-01 | Stop reason: HOSPADM

## 2018-01-01 RX ORDER — NITROGLYCERIN 0.3 MG/1
0.4 TABLET SUBLINGUAL
Status: ON HOLD | COMMUNITY
End: 2018-01-01

## 2018-01-01 RX ORDER — PANTOPRAZOLE SODIUM 40 MG/1
40 TABLET, DELAYED RELEASE ORAL DAILY
COMMUNITY
End: 2018-01-01 | Stop reason: HOSPADM

## 2018-01-01 RX ORDER — PANTOPRAZOLE SODIUM 40 MG/1
40 TABLET, DELAYED RELEASE ORAL
Status: DISCONTINUED | OUTPATIENT
Start: 2018-01-01 | End: 2018-01-01 | Stop reason: HOSPADM

## 2018-01-01 RX ORDER — DOCUSATE SODIUM 100 MG/1
100 CAPSULE, LIQUID FILLED ORAL DAILY
Status: DISCONTINUED | OUTPATIENT
Start: 2018-01-01 | End: 2018-01-01 | Stop reason: HOSPADM

## 2018-01-01 RX ORDER — IPRATROPIUM BROMIDE AND ALBUTEROL SULFATE 2.5; .5 MG/3ML; MG/3ML
3 SOLUTION RESPIRATORY (INHALATION) EVERY 6 HOURS PRN
Status: DISCONTINUED | OUTPATIENT
Start: 2018-01-01 | End: 2018-01-01

## 2018-01-01 RX ORDER — IPRATROPIUM BROMIDE AND ALBUTEROL SULFATE 2.5; .5 MG/3ML; MG/3ML
3 SOLUTION RESPIRATORY (INHALATION) EVERY 4 HOURS PRN
Status: DISCONTINUED | OUTPATIENT
Start: 2018-01-01 | End: 2018-01-01 | Stop reason: HOSPADM

## 2018-01-01 RX ORDER — GUAIFENESIN 600 MG/1
1200 TABLET, EXTENDED RELEASE ORAL 2 TIMES DAILY
COMMUNITY
End: 2018-01-01 | Stop reason: HOSPADM

## 2018-01-01 RX ORDER — CEFTRIAXONE SODIUM 1 G/50ML
1 INJECTION, SOLUTION INTRAVENOUS EVERY 24 HOURS
Status: DISPENSED | OUTPATIENT
Start: 2018-01-01 | End: 2018-01-01

## 2018-01-01 RX ORDER — MIRTAZAPINE 15 MG/1
15 TABLET, ORALLY DISINTEGRATING ORAL NIGHTLY
Qty: 30 TABLET | Refills: 3 | Status: SHIPPED | OUTPATIENT
Start: 2018-01-01

## 2018-01-01 RX ORDER — GUAIFENESIN 600 MG/1
600 TABLET, EXTENDED RELEASE ORAL EVERY 12 HOURS SCHEDULED
Status: DISCONTINUED | OUTPATIENT
Start: 2018-01-01 | End: 2018-01-01 | Stop reason: HOSPADM

## 2018-01-01 RX ORDER — CEFTRIAXONE SODIUM 1 G/50ML
1 INJECTION, SOLUTION INTRAVENOUS ONCE
Status: COMPLETED | OUTPATIENT
Start: 2018-01-01 | End: 2018-01-01

## 2018-01-01 RX ORDER — NITROGLYCERIN 0.4 MG/1
0.4 TABLET SUBLINGUAL
COMMUNITY

## 2018-01-01 RX ORDER — UREA 10 %
3 LOTION (ML) TOPICAL ONCE
Status: COMPLETED | OUTPATIENT
Start: 2018-01-01 | End: 2018-01-01

## 2018-01-01 RX ORDER — HYDROCODONE BITARTRATE AND ACETAMINOPHEN 5; 325 MG/1; MG/1
1 TABLET ORAL EVERY 4 HOURS PRN
Status: DISCONTINUED | OUTPATIENT
Start: 2018-01-01 | End: 2018-01-01

## 2018-01-01 RX ORDER — ZIPRASIDONE MESYLATE 20 MG/ML
10 INJECTION, POWDER, LYOPHILIZED, FOR SOLUTION INTRAMUSCULAR ONCE
Status: DISCONTINUED | OUTPATIENT
Start: 2018-01-01 | End: 2018-01-01 | Stop reason: HOSPADM

## 2018-01-01 RX ORDER — LORAZEPAM 0.5 MG/1
0.5 TABLET ORAL NIGHTLY PRN
Qty: 30 TABLET | Refills: 0 | Status: SHIPPED | OUTPATIENT
Start: 2018-01-01 | End: 2019-01-01 | Stop reason: SDUPTHER

## 2018-01-01 RX ORDER — ONDANSETRON 2 MG/ML
4 INJECTION INTRAMUSCULAR; INTRAVENOUS EVERY 4 HOURS PRN
Status: DISCONTINUED | OUTPATIENT
Start: 2018-01-01 | End: 2018-01-01 | Stop reason: HOSPADM

## 2018-01-01 RX ORDER — PREDNISONE 10 MG/1
10 TABLET ORAL 3 TIMES DAILY
COMMUNITY
Start: 2018-01-01 | End: 2018-01-01 | Stop reason: HOSPADM

## 2018-01-01 RX ORDER — INSULIN GLARGINE 100 [IU]/ML
8 INJECTION, SOLUTION SUBCUTANEOUS DAILY
Qty: 240 UNITS | Refills: 0 | Status: ON HOLD | OUTPATIENT
Start: 2018-01-01 | End: 2018-01-01 | Stop reason: SDUPTHER

## 2018-01-01 RX ORDER — POTASSIUM CHLORIDE 1.5 G/1.77G
40 POWDER, FOR SOLUTION ORAL AS NEEDED
Status: DISCONTINUED | OUTPATIENT
Start: 2018-01-01 | End: 2018-01-01 | Stop reason: HOSPADM

## 2018-01-01 RX ORDER — DOCUSATE SODIUM 250 MG
250 CAPSULE ORAL DAILY
Status: DISCONTINUED | OUTPATIENT
Start: 2018-01-01 | End: 2018-01-01 | Stop reason: CLARIF

## 2018-01-01 RX ORDER — RIVASTIGMINE 4.6 MG/24H
1 PATCH, EXTENDED RELEASE TRANSDERMAL DAILY
Status: DISCONTINUED | OUTPATIENT
Start: 2018-01-01 | End: 2018-01-01 | Stop reason: HOSPADM

## 2018-01-01 RX ORDER — POTASSIUM CHLORIDE 750 MG/1
20 CAPSULE, EXTENDED RELEASE ORAL DAILY
Status: DISCONTINUED | OUTPATIENT
Start: 2018-01-01 | End: 2018-01-01 | Stop reason: HOSPADM

## 2018-01-01 RX ORDER — HALOPERIDOL 5 MG/ML
2 INJECTION INTRAMUSCULAR ONCE
Status: COMPLETED | OUTPATIENT
Start: 2018-01-01 | End: 2018-01-01

## 2018-01-01 RX ORDER — FUROSEMIDE 20 MG/1
20 TABLET ORAL DAILY
Status: DISCONTINUED | OUTPATIENT
Start: 2018-01-01 | End: 2018-01-01

## 2018-01-01 RX ORDER — FUROSEMIDE 80 MG
80 TABLET ORAL 2 TIMES DAILY
COMMUNITY
End: 2018-01-01 | Stop reason: HOSPADM

## 2018-01-01 RX ORDER — ACETAMINOPHEN 500 MG
1000 TABLET ORAL EVERY 6 HOURS PRN
COMMUNITY

## 2018-01-01 RX ORDER — POTASSIUM CHLORIDE 1.5 G/1.77G
40 POWDER, FOR SOLUTION ORAL ONCE
Status: DISCONTINUED | OUTPATIENT
Start: 2018-01-01 | End: 2018-01-01 | Stop reason: CLARIF

## 2018-01-01 RX ORDER — LEVOFLOXACIN 5 MG/ML
750 INJECTION, SOLUTION INTRAVENOUS EVERY 24 HOURS
Status: DISCONTINUED | OUTPATIENT
Start: 2018-01-01 | End: 2018-01-01 | Stop reason: DRUGHIGH

## 2018-01-01 RX ORDER — MUPIROCIN CALCIUM 20 MG/G
CREAM TOPICAL 2 TIMES DAILY
Status: DISCONTINUED | OUTPATIENT
Start: 2018-01-01 | End: 2018-01-01 | Stop reason: HOSPADM

## 2018-01-01 RX ORDER — MUPIROCIN CALCIUM 20 MG/G
CREAM TOPICAL 2 TIMES DAILY
COMMUNITY
End: 2018-01-01 | Stop reason: HOSPADM

## 2018-01-01 RX ORDER — FUROSEMIDE 10 MG/ML
40 INJECTION INTRAMUSCULAR; INTRAVENOUS ONCE
Status: DISCONTINUED | OUTPATIENT
Start: 2018-01-01 | End: 2018-01-01

## 2018-01-01 RX ORDER — QUETIAPINE FUMARATE 25 MG/1
25 TABLET, FILM COATED ORAL DAILY
COMMUNITY
End: 2018-01-01 | Stop reason: HOSPADM

## 2018-01-01 RX ORDER — DIVALPROEX SODIUM 125 MG/1
125 CAPSULE, COATED PELLETS ORAL EVERY 12 HOURS SCHEDULED
Status: DISCONTINUED | OUTPATIENT
Start: 2018-01-01 | End: 2018-01-01 | Stop reason: HOSPADM

## 2018-01-01 RX ORDER — DIPHENHYDRAMINE HCL 25 MG
25 CAPSULE ORAL EVERY 6 HOURS PRN
Status: DISCONTINUED | OUTPATIENT
Start: 2018-01-01 | End: 2018-01-01 | Stop reason: HOSPADM

## 2018-01-01 RX ORDER — DEXTROSE MONOHYDRATE 25 G/50ML
25 INJECTION, SOLUTION INTRAVENOUS
Status: CANCELLED | OUTPATIENT
Start: 2018-01-01

## 2018-01-01 RX ORDER — FUROSEMIDE 40 MG/1
40 TABLET ORAL DAILY
Qty: 30 TABLET | Refills: 3 | Status: ON HOLD | OUTPATIENT
Start: 2018-01-01 | End: 2018-01-01 | Stop reason: SDUPTHER

## 2018-01-01 RX ORDER — MIRTAZAPINE 15 MG/1
15 TABLET, ORALLY DISINTEGRATING ORAL NIGHTLY
Status: DISCONTINUED | OUTPATIENT
Start: 2018-01-01 | End: 2018-01-01 | Stop reason: HOSPADM

## 2018-01-01 RX ORDER — GUAIFENESIN AND DEXTROMETHORPHAN HYDROBROMIDE 100; 10 MG/5ML; MG/5ML
5 SOLUTION ORAL 4 TIMES DAILY
COMMUNITY
End: 2018-01-01 | Stop reason: HOSPADM

## 2018-01-01 RX ORDER — DOCUSATE SODIUM 100 MG/1
200 CAPSULE, LIQUID FILLED ORAL DAILY
Status: DISCONTINUED | OUTPATIENT
Start: 2018-01-01 | End: 2018-01-01 | Stop reason: HOSPADM

## 2018-01-01 RX ORDER — ESCITALOPRAM OXALATE 10 MG/1
10 TABLET ORAL DAILY
Qty: 30 TABLET | Refills: 3 | Status: SHIPPED | OUTPATIENT
Start: 2018-01-01

## 2018-01-01 RX ORDER — LORAZEPAM 0.5 MG/1
0.5 TABLET ORAL NIGHTLY
Status: DISCONTINUED | OUTPATIENT
Start: 2018-01-01 | End: 2018-01-01

## 2018-01-01 RX ORDER — DIVALPROEX SODIUM 125 MG/1
125 CAPSULE, COATED PELLETS ORAL EVERY 12 HOURS SCHEDULED
Qty: 60 CAPSULE | Refills: 3 | Status: SHIPPED | OUTPATIENT
Start: 2018-01-01

## 2018-01-01 RX ORDER — LISINOPRIL 5 MG/1
5 TABLET ORAL
Status: DISCONTINUED | OUTPATIENT
Start: 2018-01-01 | End: 2018-01-01

## 2018-01-01 RX ORDER — VANCOMYCIN HYDROCHLORIDE 1 G/200ML
1000 INJECTION, SOLUTION INTRAVENOUS EVERY 24 HOURS
Status: DISCONTINUED | OUTPATIENT
Start: 2018-01-01 | End: 2018-01-01 | Stop reason: DRUGHIGH

## 2018-01-01 RX ORDER — SODIUM CHLORIDE 9 MG/ML
125 INJECTION, SOLUTION INTRAVENOUS CONTINUOUS
Status: DISCONTINUED | OUTPATIENT
Start: 2018-01-01 | End: 2018-01-01 | Stop reason: HOSPADM

## 2018-01-01 RX ORDER — CLOTRIMAZOLE AND BETAMETHASONE DIPROPIONATE 10; .64 MG/G; MG/G
CREAM TOPICAL 2 TIMES DAILY
COMMUNITY
End: 2018-01-01 | Stop reason: HOSPADM

## 2018-01-01 RX ORDER — MEMANTINE HYDROCHLORIDE 10 MG/1
10 TABLET ORAL EVERY 12 HOURS SCHEDULED
Status: DISCONTINUED | OUTPATIENT
Start: 2018-01-01 | End: 2018-01-01

## 2018-01-01 RX ORDER — NITROGLYCERIN 0.4 MG/1
TABLET SUBLINGUAL
Status: COMPLETED
Start: 2018-01-01 | End: 2018-01-01

## 2018-01-01 RX ORDER — INSULIN GLARGINE 100 [IU]/ML
25 INJECTION, SOLUTION SUBCUTANEOUS NIGHTLY
Status: DISCONTINUED | OUTPATIENT
Start: 2018-01-01 | End: 2018-01-01

## 2018-01-01 RX ORDER — MEMANTINE HYDROCHLORIDE 5 MG/1
5 TABLET ORAL EVERY 12 HOURS SCHEDULED
Status: DISCONTINUED | OUTPATIENT
Start: 2018-01-01 | End: 2018-01-01

## 2018-01-01 RX ORDER — FUROSEMIDE 10 MG/ML
20 INJECTION INTRAMUSCULAR; INTRAVENOUS ONCE
Status: COMPLETED | OUTPATIENT
Start: 2018-01-01 | End: 2018-01-01

## 2018-01-01 RX ORDER — RIVASTIGMINE 4.6 MG/24H
1 PATCH, EXTENDED RELEASE TRANSDERMAL DAILY
Qty: 30 EACH | Refills: 6 | Status: SHIPPED | OUTPATIENT
Start: 2018-01-01

## 2018-01-01 RX ORDER — SODIUM CHLORIDE 9 MG/ML
100 INJECTION, SOLUTION INTRAVENOUS CONTINUOUS
Status: ACTIVE | OUTPATIENT
Start: 2018-01-01 | End: 2018-01-01

## 2018-01-01 RX ADMIN — MEMANTINE HYDROCHLORIDE 10 MG: 10 TABLET, FILM COATED ORAL at 21:37

## 2018-01-01 RX ADMIN — INSULIN GLARGINE 30 UNITS: 100 INJECTION, SOLUTION SUBCUTANEOUS at 21:33

## 2018-01-01 RX ADMIN — ONDANSETRON 4 MG: 4 TABLET, FILM COATED ORAL at 16:16

## 2018-01-01 RX ADMIN — ESCITALOPRAM 10 MG: 10 TABLET, FILM COATED ORAL at 10:08

## 2018-01-01 RX ADMIN — DOCUSATE SODIUM 100 MG: 100 CAPSULE, LIQUID FILLED ORAL at 20:51

## 2018-01-01 RX ADMIN — MUPIROCIN 1 APPLICATION: 2 CREAM TOPICAL at 21:54

## 2018-01-01 RX ADMIN — SODIUM CHLORIDE 4 ML: 7 NEBU SOLN,3 % NEBU at 07:30

## 2018-01-01 RX ADMIN — CARVEDILOL 25 MG: 25 TABLET, FILM COATED ORAL at 17:45

## 2018-01-01 RX ADMIN — PANTOPRAZOLE SODIUM 40 MG: 40 TABLET, DELAYED RELEASE ORAL at 12:06

## 2018-01-01 RX ADMIN — ESCITALOPRAM 10 MG: 10 TABLET, FILM COATED ORAL at 10:17

## 2018-01-01 RX ADMIN — TAZOBACTAM SODIUM AND PIPERACILLIN SODIUM 3.38 G: 375; 3 INJECTION, SOLUTION INTRAVENOUS at 00:59

## 2018-01-01 RX ADMIN — PANTOPRAZOLE SODIUM 40 MG: 40 TABLET, DELAYED RELEASE ORAL at 21:11

## 2018-01-01 RX ADMIN — LEVOFLOXACIN 500 MG: 5 INJECTION, SOLUTION INTRAVENOUS at 10:17

## 2018-01-01 RX ADMIN — CARVEDILOL 25 MG: 25 TABLET, FILM COATED ORAL at 08:48

## 2018-01-01 RX ADMIN — CLOPIDOGREL 75 MG: 75 TABLET, FILM COATED ORAL at 08:28

## 2018-01-01 RX ADMIN — ENOXAPARIN SODIUM 30 MG: 30 INJECTION SUBCUTANEOUS at 10:07

## 2018-01-01 RX ADMIN — HALOPERIDOL LACTATE 1 MG: 5 INJECTION, SOLUTION INTRAMUSCULAR at 21:23

## 2018-01-01 RX ADMIN — DOCUSATE SODIUM 100 MG: 100 CAPSULE, LIQUID FILLED ORAL at 09:24

## 2018-01-01 RX ADMIN — TAZOBACTAM SODIUM AND PIPERACILLIN SODIUM 3.38 G: 375; 3 INJECTION, SOLUTION INTRAVENOUS at 17:51

## 2018-01-01 RX ADMIN — POLYETHYLENE GLYCOL 3350 17 G: 17 POWDER, FOR SOLUTION ORAL at 09:45

## 2018-01-01 RX ADMIN — INSULIN ASPART 2 UNITS: 100 INJECTION, SOLUTION INTRAVENOUS; SUBCUTANEOUS at 08:31

## 2018-01-01 RX ADMIN — IPRATROPIUM BROMIDE AND ALBUTEROL SULFATE 3 ML: 2.5; .5 SOLUTION RESPIRATORY (INHALATION) at 15:31

## 2018-01-01 RX ADMIN — Medication 2 TABLET: at 17:57

## 2018-01-01 RX ADMIN — DOCUSATE SODIUM 100 MG: 100 CAPSULE, LIQUID FILLED ORAL at 20:53

## 2018-01-01 RX ADMIN — INSULIN ASPART 8 UNITS: 100 INJECTION, SOLUTION INTRAVENOUS; SUBCUTANEOUS at 20:48

## 2018-01-01 RX ADMIN — MEMANTINE HYDROCHLORIDE 10 MG: 10 TABLET, FILM COATED ORAL at 20:24

## 2018-01-01 RX ADMIN — CARVEDILOL 25 MG: 25 TABLET, FILM COATED ORAL at 17:17

## 2018-01-01 RX ADMIN — LORAZEPAM 0.5 MG: 2 INJECTION INTRAMUSCULAR; INTRAVENOUS at 23:05

## 2018-01-01 RX ADMIN — INSULIN LISPRO 3 UNITS: 100 INJECTION, SOLUTION INTRAVENOUS; SUBCUTANEOUS at 12:21

## 2018-01-01 RX ADMIN — DIVALPROEX SODIUM 125 MG: 125 CAPSULE, COATED PELLETS ORAL at 13:11

## 2018-01-01 RX ADMIN — BUDESONIDE 0.5 MG: 0.5 INHALANT RESPIRATORY (INHALATION) at 08:43

## 2018-01-01 RX ADMIN — ACETAMINOPHEN 650 MG: 325 TABLET ORAL at 15:30

## 2018-01-01 RX ADMIN — DIPHENHYDRAMINE HYDROCHLORIDE 25 MG: 25 CAPSULE ORAL at 20:23

## 2018-01-01 RX ADMIN — CARVEDILOL 25 MG: 25 TABLET, FILM COATED ORAL at 09:13

## 2018-01-01 RX ADMIN — FUROSEMIDE 40 MG: 40 TABLET ORAL at 21:09

## 2018-01-01 RX ADMIN — DONEPEZIL HYDROCHLORIDE 10 MG: 10 TABLET, FILM COATED ORAL at 21:59

## 2018-01-01 RX ADMIN — POTASSIUM CHLORIDE 20 MEQ: 750 CAPSULE, EXTENDED RELEASE ORAL at 21:12

## 2018-01-01 RX ADMIN — POTASSIUM CHLORIDE 20 MEQ: 1.5 POWDER, FOR SOLUTION ORAL at 10:07

## 2018-01-01 RX ADMIN — ACETAMINOPHEN 1000 MG: 500 TABLET, FILM COATED ORAL at 05:08

## 2018-01-01 RX ADMIN — BARIUM SULFATE 4 ML: 980 POWDER, FOR SUSPENSION ORAL at 12:05

## 2018-01-01 RX ADMIN — DONEPEZIL HYDROCHLORIDE 10 MG: 10 TABLET, FILM COATED ORAL at 21:08

## 2018-01-01 RX ADMIN — SODIUM CHLORIDE, POTASSIUM CHLORIDE, SODIUM LACTATE AND CALCIUM CHLORIDE 100 ML/HR: 600; 310; 30; 20 INJECTION, SOLUTION INTRAVENOUS at 21:12

## 2018-01-01 RX ADMIN — IPRATROPIUM BROMIDE AND ALBUTEROL SULFATE 3 ML: 2.5; .5 SOLUTION RESPIRATORY (INHALATION) at 17:15

## 2018-01-01 RX ADMIN — MEMANTINE HYDROCHLORIDE 10 MG: 10 TABLET, FILM COATED ORAL at 10:17

## 2018-01-01 RX ADMIN — POTASSIUM CHLORIDE 40 MEQ: 750 CAPSULE, EXTENDED RELEASE ORAL at 17:15

## 2018-01-01 RX ADMIN — BUDESONIDE 0.5 MG: 0.5 INHALANT RESPIRATORY (INHALATION) at 09:57

## 2018-01-01 RX ADMIN — IPRATROPIUM BROMIDE AND ALBUTEROL SULFATE 3 ML: .5; 3 SOLUTION RESPIRATORY (INHALATION) at 17:45

## 2018-01-01 RX ADMIN — IPRATROPIUM BROMIDE AND ALBUTEROL SULFATE 3 ML: .5; 3 SOLUTION RESPIRATORY (INHALATION) at 03:55

## 2018-01-01 RX ADMIN — IPRATROPIUM BROMIDE AND ALBUTEROL SULFATE 3 ML: 2.5; .5 SOLUTION RESPIRATORY (INHALATION) at 21:32

## 2018-01-01 RX ADMIN — MEMANTINE HYDROCHLORIDE 5 MG: 10 TABLET, FILM COATED ORAL at 10:17

## 2018-01-01 RX ADMIN — PANTOPRAZOLE SODIUM 40 MG: 40 TABLET, DELAYED RELEASE ORAL at 09:24

## 2018-01-01 RX ADMIN — MAGNESIUM SULFATE HEPTAHYDRATE 4 G: 40 INJECTION, SOLUTION INTRAVENOUS at 22:16

## 2018-01-01 RX ADMIN — FUROSEMIDE 20 MG: 20 TABLET ORAL at 08:17

## 2018-01-01 RX ADMIN — ONDANSETRON 4 MG: 4 TABLET, FILM COATED ORAL at 18:12

## 2018-01-01 RX ADMIN — ENOXAPARIN SODIUM 30 MG: 30 INJECTION SUBCUTANEOUS at 08:27

## 2018-01-01 RX ADMIN — ESCITALOPRAM 10 MG: 10 TABLET, FILM COATED ORAL at 08:28

## 2018-01-01 RX ADMIN — INSULIN ASPART 5 UNITS: 100 INJECTION, SOLUTION INTRAVENOUS; SUBCUTANEOUS at 17:17

## 2018-01-01 RX ADMIN — INSULIN LISPRO 6 UNITS: 100 INJECTION, SOLUTION INTRAVENOUS; SUBCUTANEOUS at 21:16

## 2018-01-01 RX ADMIN — POTASSIUM CHLORIDE 20 MEQ: 1.5 POWDER, FOR SOLUTION ORAL at 21:56

## 2018-01-01 RX ADMIN — INSULIN DETEMIR 10 UNITS: 100 INJECTION, SOLUTION SUBCUTANEOUS at 21:45

## 2018-01-01 RX ADMIN — IPRATROPIUM BROMIDE AND ALBUTEROL SULFATE 3 ML: 2.5; .5 SOLUTION RESPIRATORY (INHALATION) at 11:01

## 2018-01-01 RX ADMIN — VANCOMYCIN HYDROCHLORIDE 1000 MG: 1 INJECTION, SOLUTION INTRAVENOUS at 21:31

## 2018-01-01 RX ADMIN — RIVASTIGMINE TRANSDERMAL SYSTEM 1 PATCH: 4.6 PATCH, EXTENDED RELEASE TRANSDERMAL at 12:12

## 2018-01-01 RX ADMIN — SODIUM CHLORIDE 4 ML: 7 NEBU SOLN,3 % NEBU at 20:05

## 2018-01-01 RX ADMIN — MEMANTINE HYDROCHLORIDE 10 MG: 10 TABLET, FILM COATED ORAL at 21:04

## 2018-01-01 RX ADMIN — INSULIN ASPART 6 UNITS: 100 INJECTION, SOLUTION INTRAVENOUS; SUBCUTANEOUS at 11:48

## 2018-01-01 RX ADMIN — DIVALPROEX SODIUM 125 MG: 125 CAPSULE, COATED PELLETS ORAL at 20:33

## 2018-01-01 RX ADMIN — CARVEDILOL 25 MG: 25 TABLET, FILM COATED ORAL at 17:09

## 2018-01-01 RX ADMIN — INSULIN DETEMIR 20 UNITS: 100 INJECTION, SOLUTION SUBCUTANEOUS at 21:10

## 2018-01-01 RX ADMIN — BUDESONIDE 0.5 MG: 0.5 INHALANT RESPIRATORY (INHALATION) at 19:29

## 2018-01-01 RX ADMIN — POLYETHYLENE GLYCOL 3350 17 G: 17 POWDER, FOR SOLUTION ORAL at 10:08

## 2018-01-01 RX ADMIN — INFLUENZA A VIRUS A/SINGAPORE/GP1908/2015 IVR-180 (H1N1) ANTIGEN (MDCK CELL DERIVED, PROPIOLACTONE INACTIVATED), INFLUENZA A VIRUS A/NORTH CAROLINA/04/2016 (H3N2) HEMAGGLUTININ ANTIGEN (MDCK CELL DERIVED, PROPIOLACTONE INACTIVATED), INFLUENZA B VIRUS B/IOWA/06/2017 HEMAGGLUTININ ANTIGEN (MDCK CELL DERIVED, PROPIOLACTONE INACTIVATED), INFLUENZA B VIRUS B/SINGAPORE/INFTT-16-0610/2016 HEMAGGLUTININ ANTIGEN (MDCK CELL DERIVED, PROPIOLACTONE INACTIVATED) 0.5 ML: 15; 15; 15; 15 INJECTION, SUSPENSION INTRAMUSCULAR at 17:45

## 2018-01-01 RX ADMIN — DOCUSATE SODIUM 200 MG: 100 CAPSULE, LIQUID FILLED ORAL at 08:00

## 2018-01-01 RX ADMIN — INSULIN ASPART 2 UNITS: 100 INJECTION, SOLUTION INTRAVENOUS; SUBCUTANEOUS at 21:13

## 2018-01-01 RX ADMIN — POLYETHYLENE GLYCOL 3350 17 G: 17 POWDER, FOR SOLUTION ORAL at 18:33

## 2018-01-01 RX ADMIN — TAZOBACTAM SODIUM AND PIPERACILLIN SODIUM 3.38 G: 375; 3 INJECTION, SOLUTION INTRAVENOUS at 08:31

## 2018-01-01 RX ADMIN — INSULIN ASPART 2 UNITS: 100 INJECTION, SOLUTION INTRAVENOUS; SUBCUTANEOUS at 08:39

## 2018-01-01 RX ADMIN — HALOPERIDOL LACTATE 2 MG: 5 INJECTION, SOLUTION INTRAMUSCULAR at 22:41

## 2018-01-01 RX ADMIN — INSULIN ASPART 4 UNITS: 100 INJECTION, SOLUTION INTRAVENOUS; SUBCUTANEOUS at 11:33

## 2018-01-01 RX ADMIN — ENOXAPARIN SODIUM 30 MG: 30 INJECTION SUBCUTANEOUS at 22:10

## 2018-01-01 RX ADMIN — BUDESONIDE 0.5 MG: 0.5 INHALANT RESPIRATORY (INHALATION) at 17:45

## 2018-01-01 RX ADMIN — MEMANTINE HYDROCHLORIDE 5 MG: 10 TABLET, FILM COATED ORAL at 21:21

## 2018-01-01 RX ADMIN — CARVEDILOL 25 MG: 25 TABLET, FILM COATED ORAL at 21:11

## 2018-01-01 RX ADMIN — CEFTRIAXONE SODIUM 1 G: 1 INJECTION, SOLUTION INTRAVENOUS at 16:24

## 2018-01-01 RX ADMIN — INSULIN ASPART 4 UNITS: 100 INJECTION, SOLUTION INTRAVENOUS; SUBCUTANEOUS at 20:34

## 2018-01-01 RX ADMIN — IPRATROPIUM BROMIDE AND ALBUTEROL SULFATE 3 ML: .5; 3 SOLUTION RESPIRATORY (INHALATION) at 11:07

## 2018-01-01 RX ADMIN — INSULIN LISPRO 4 UNITS: 100 INJECTION, SOLUTION INTRAVENOUS; SUBCUTANEOUS at 10:18

## 2018-01-01 RX ADMIN — ESCITALOPRAM 10 MG: 10 TABLET, FILM COATED ORAL at 21:11

## 2018-01-01 RX ADMIN — FUROSEMIDE 20 MG: 20 TABLET ORAL at 08:26

## 2018-01-01 RX ADMIN — DONEPEZIL HYDROCHLORIDE 10 MG: 10 TABLET, FILM COATED ORAL at 21:56

## 2018-01-01 RX ADMIN — LORAZEPAM 0.5 MG: 2 INJECTION INTRAMUSCULAR; INTRAVENOUS at 20:26

## 2018-01-01 RX ADMIN — ENOXAPARIN SODIUM 30 MG: 30 INJECTION SUBCUTANEOUS at 18:07

## 2018-01-01 RX ADMIN — DONEPEZIL HYDROCHLORIDE 10 MG: 10 TABLET, FILM COATED ORAL at 21:45

## 2018-01-01 RX ADMIN — CEFTRIAXONE SODIUM 1 G: 1 INJECTION, SOLUTION INTRAVENOUS at 10:43

## 2018-01-01 RX ADMIN — ESCITALOPRAM 10 MG: 10 TABLET, FILM COATED ORAL at 08:00

## 2018-01-01 RX ADMIN — INSULIN LISPRO 4 UNITS: 100 INJECTION, SOLUTION INTRAVENOUS; SUBCUTANEOUS at 11:51

## 2018-01-01 RX ADMIN — PANTOPRAZOLE SODIUM 40 MG: 40 TABLET, DELAYED RELEASE ORAL at 10:17

## 2018-01-01 RX ADMIN — CLOPIDOGREL 75 MG: 75 TABLET, FILM COATED ORAL at 09:24

## 2018-01-01 RX ADMIN — CARVEDILOL 25 MG: 25 TABLET, FILM COATED ORAL at 18:41

## 2018-01-01 RX ADMIN — MUPIROCIN: 2 CREAM TOPICAL at 21:34

## 2018-01-01 RX ADMIN — MEMANTINE HYDROCHLORIDE 5 MG: 10 TABLET, FILM COATED ORAL at 12:06

## 2018-01-01 RX ADMIN — BARIUM SULFATE 50 ML: 400 SUSPENSION ORAL at 12:05

## 2018-01-01 RX ADMIN — VANCOMYCIN HYDROCHLORIDE 1000 MG: 1 INJECTION, SOLUTION INTRAVENOUS at 12:59

## 2018-01-01 RX ADMIN — IPRATROPIUM BROMIDE AND ALBUTEROL SULFATE 3 ML: .5; 3 SOLUTION RESPIRATORY (INHALATION) at 08:40

## 2018-01-01 RX ADMIN — INSULIN ASPART 6 UNITS: 100 INJECTION, SOLUTION INTRAVENOUS; SUBCUTANEOUS at 20:46

## 2018-01-01 RX ADMIN — INSULIN ASPART 5 UNITS: 100 INJECTION, SOLUTION INTRAVENOUS; SUBCUTANEOUS at 12:02

## 2018-01-01 RX ADMIN — MUPIROCIN: 2 CREAM TOPICAL at 09:14

## 2018-01-01 RX ADMIN — INSULIN ASPART 4 UNITS: 100 INJECTION, SOLUTION INTRAVENOUS; SUBCUTANEOUS at 08:16

## 2018-01-01 RX ADMIN — MEMANTINE HYDROCHLORIDE 10 MG: 10 TABLET, FILM COATED ORAL at 08:00

## 2018-01-01 RX ADMIN — CLOPIDOGREL 75 MG: 75 TABLET, FILM COATED ORAL at 10:00

## 2018-01-01 RX ADMIN — CARVEDILOL 25 MG: 25 TABLET, FILM COATED ORAL at 09:39

## 2018-01-01 RX ADMIN — CARVEDILOL 25 MG: 25 TABLET, FILM COATED ORAL at 18:22

## 2018-01-01 RX ADMIN — ESCITALOPRAM 10 MG: 10 TABLET, FILM COATED ORAL at 09:45

## 2018-01-01 RX ADMIN — INSULIN ASPART 7 UNITS: 100 INJECTION, SOLUTION INTRAVENOUS; SUBCUTANEOUS at 11:33

## 2018-01-01 RX ADMIN — INSULIN GLARGINE 30 UNITS: 100 INJECTION, SOLUTION SUBCUTANEOUS at 22:05

## 2018-01-01 RX ADMIN — CEFTRIAXONE SODIUM 1 G: 1 INJECTION, SOLUTION INTRAVENOUS at 11:27

## 2018-01-01 RX ADMIN — ENOXAPARIN SODIUM 30 MG: 30 INJECTION SUBCUTANEOUS at 23:07

## 2018-01-01 RX ADMIN — CARVEDILOL 25 MG: 25 TABLET, FILM COATED ORAL at 08:25

## 2018-01-01 RX ADMIN — HYDROCORTISONE: 25 CREAM TOPICAL at 09:20

## 2018-01-01 RX ADMIN — CLOPIDOGREL 75 MG: 75 TABLET, FILM COATED ORAL at 09:20

## 2018-01-01 RX ADMIN — INSULIN ASPART 4 UNITS: 100 INJECTION, SOLUTION INTRAVENOUS; SUBCUTANEOUS at 09:45

## 2018-01-01 RX ADMIN — DONEPEZIL HYDROCHLORIDE 10 MG: 10 TABLET, FILM COATED ORAL at 21:07

## 2018-01-01 RX ADMIN — CARVEDILOL 25 MG: 25 TABLET, FILM COATED ORAL at 08:05

## 2018-01-01 RX ADMIN — INSULIN ASPART 4 UNITS: 100 INJECTION, SOLUTION INTRAVENOUS; SUBCUTANEOUS at 11:49

## 2018-01-01 RX ADMIN — IPRATROPIUM BROMIDE AND ALBUTEROL SULFATE 3 ML: .5; 3 SOLUTION RESPIRATORY (INHALATION) at 16:04

## 2018-01-01 RX ADMIN — IPRATROPIUM BROMIDE AND ALBUTEROL SULFATE 3 ML: 2.5; .5 SOLUTION RESPIRATORY (INHALATION) at 11:05

## 2018-01-01 RX ADMIN — MEMANTINE HYDROCHLORIDE 10 MG: 10 TABLET, FILM COATED ORAL at 08:26

## 2018-01-01 RX ADMIN — DONEPEZIL HYDROCHLORIDE 10 MG: 10 TABLET, FILM COATED ORAL at 21:49

## 2018-01-01 RX ADMIN — INSULIN GLARGINE 30 UNITS: 100 INJECTION, SOLUTION SUBCUTANEOUS at 21:55

## 2018-01-01 RX ADMIN — CARVEDILOL 25 MG: 25 TABLET, FILM COATED ORAL at 18:54

## 2018-01-01 RX ADMIN — PANTOPRAZOLE SODIUM 40 MG: 40 TABLET, DELAYED RELEASE ORAL at 08:26

## 2018-01-01 RX ADMIN — ACETAMINOPHEN 1000 MG: 500 TABLET, FILM COATED ORAL at 18:14

## 2018-01-01 RX ADMIN — Medication 3 MG: at 00:15

## 2018-01-01 RX ADMIN — IPRATROPIUM BROMIDE AND ALBUTEROL SULFATE 3 ML: 2.5; .5 SOLUTION RESPIRATORY (INHALATION) at 06:36

## 2018-01-01 RX ADMIN — DONEPEZIL HYDROCHLORIDE 10 MG: 10 TABLET, FILM COATED ORAL at 20:29

## 2018-01-01 RX ADMIN — IPRATROPIUM BROMIDE AND ALBUTEROL SULFATE 3 ML: 2.5; .5 SOLUTION RESPIRATORY (INHALATION) at 09:57

## 2018-01-01 RX ADMIN — TAZOBACTAM SODIUM AND PIPERACILLIN SODIUM 4.5 G: 500; 4 INJECTION, SOLUTION INTRAVENOUS at 02:26

## 2018-01-01 RX ADMIN — BUDESONIDE 0.5 MG: 0.5 INHALANT RESPIRATORY (INHALATION) at 07:22

## 2018-01-01 RX ADMIN — SODIUM CHLORIDE 1 G: 900 INJECTION INTRAVENOUS at 14:13

## 2018-01-01 RX ADMIN — PANTOPRAZOLE SODIUM 40 MG: 40 TABLET, DELAYED RELEASE ORAL at 08:28

## 2018-01-01 RX ADMIN — INSULIN ASPART 4 UNITS: 100 INJECTION, SOLUTION INTRAVENOUS; SUBCUTANEOUS at 18:22

## 2018-01-01 RX ADMIN — HYDROCORTISONE: 25 CREAM TOPICAL at 21:04

## 2018-01-01 RX ADMIN — INSULIN LISPRO 4 UNITS: 100 INJECTION, SOLUTION INTRAVENOUS; SUBCUTANEOUS at 08:51

## 2018-01-01 RX ADMIN — INSULIN ASPART 3 UNITS: 100 INJECTION, SOLUTION INTRAVENOUS; SUBCUTANEOUS at 21:41

## 2018-01-01 RX ADMIN — INSULIN LISPRO 2 UNITS: 100 INJECTION, SOLUTION INTRAVENOUS; SUBCUTANEOUS at 12:30

## 2018-01-01 RX ADMIN — ESCITALOPRAM 10 MG: 10 TABLET, FILM COATED ORAL at 09:13

## 2018-01-01 RX ADMIN — IPRATROPIUM BROMIDE AND ALBUTEROL SULFATE 3 ML: 2.5; .5 SOLUTION RESPIRATORY (INHALATION) at 21:52

## 2018-01-01 RX ADMIN — CLOPIDOGREL 75 MG: 75 TABLET, FILM COATED ORAL at 08:00

## 2018-01-01 RX ADMIN — INSULIN ASPART 4 UNITS: 100 INJECTION, SOLUTION INTRAVENOUS; SUBCUTANEOUS at 12:05

## 2018-01-01 RX ADMIN — CARVEDILOL 25 MG: 25 TABLET, FILM COATED ORAL at 09:27

## 2018-01-01 RX ADMIN — ENOXAPARIN SODIUM 30 MG: 30 INJECTION SUBCUTANEOUS at 09:23

## 2018-01-01 RX ADMIN — MEMANTINE HYDROCHLORIDE 10 MG: 10 TABLET, FILM COATED ORAL at 21:07

## 2018-01-01 RX ADMIN — MEMANTINE HYDROCHLORIDE 10 MG: 10 TABLET, FILM COATED ORAL at 21:06

## 2018-01-01 RX ADMIN — IPRATROPIUM BROMIDE AND ALBUTEROL SULFATE 3 ML: .5; 3 SOLUTION RESPIRATORY (INHALATION) at 17:50

## 2018-01-01 RX ADMIN — DONEPEZIL HYDROCHLORIDE 10 MG: 10 TABLET, FILM COATED ORAL at 21:13

## 2018-01-01 RX ADMIN — IPRATROPIUM BROMIDE AND ALBUTEROL SULFATE 3 ML: 2.5; .5 SOLUTION RESPIRATORY (INHALATION) at 20:24

## 2018-01-01 RX ADMIN — INSULIN ASPART 7 UNITS: 100 INJECTION, SOLUTION INTRAVENOUS; SUBCUTANEOUS at 21:08

## 2018-01-01 RX ADMIN — HYDROCORTISONE: 25 CREAM TOPICAL at 13:12

## 2018-01-01 RX ADMIN — ESCITALOPRAM 10 MG: 10 TABLET, FILM COATED ORAL at 08:26

## 2018-01-01 RX ADMIN — SODIUM CHLORIDE 100 ML/HR: 9 INJECTION, SOLUTION INTRAVENOUS at 16:11

## 2018-01-01 RX ADMIN — VANCOMYCIN HYDROCHLORIDE 750 MG: 10 INJECTION, POWDER, LYOPHILIZED, FOR SOLUTION INTRAVENOUS at 14:26

## 2018-01-01 RX ADMIN — CARVEDILOL 25 MG: 25 TABLET, FILM COATED ORAL at 17:50

## 2018-01-01 RX ADMIN — CLOPIDOGREL 75 MG: 75 TABLET, FILM COATED ORAL at 09:13

## 2018-01-01 RX ADMIN — CARVEDILOL 25 MG: 25 TABLET, FILM COATED ORAL at 10:01

## 2018-01-01 RX ADMIN — CARVEDILOL 25 MG: 25 TABLET, FILM COATED ORAL at 18:14

## 2018-01-01 RX ADMIN — CARVEDILOL 25 MG: 25 TABLET, FILM COATED ORAL at 18:07

## 2018-01-01 RX ADMIN — MEMANTINE HYDROCHLORIDE 10 MG: 10 TABLET, FILM COATED ORAL at 20:50

## 2018-01-01 RX ADMIN — FUROSEMIDE 40 MG: 10 INJECTION, SOLUTION INTRAMUSCULAR; INTRAVENOUS at 09:59

## 2018-01-01 RX ADMIN — CEFTRIAXONE SODIUM 1 G: 1 INJECTION, SOLUTION INTRAVENOUS at 12:12

## 2018-01-01 RX ADMIN — MEMANTINE HYDROCHLORIDE 10 MG: 10 TABLET, FILM COATED ORAL at 19:47

## 2018-01-01 RX ADMIN — ESCITALOPRAM 10 MG: 10 TABLET, FILM COATED ORAL at 09:23

## 2018-01-01 RX ADMIN — ACETAMINOPHEN 650 MG: 325 TABLET ORAL at 22:06

## 2018-01-01 RX ADMIN — DEXTROSE MONOHYDRATE 25 G: 25 INJECTION, SOLUTION INTRAVENOUS at 12:51

## 2018-01-01 RX ADMIN — IPRATROPIUM BROMIDE AND ALBUTEROL SULFATE 3 ML: 2.5; .5 SOLUTION RESPIRATORY (INHALATION) at 14:44

## 2018-01-01 RX ADMIN — FUROSEMIDE 40 MG: 10 INJECTION, SOLUTION INTRAMUSCULAR; INTRAVENOUS at 23:38

## 2018-01-01 RX ADMIN — PANTOPRAZOLE SODIUM 40 MG: 40 TABLET, DELAYED RELEASE ORAL at 10:57

## 2018-01-01 RX ADMIN — IPRATROPIUM BROMIDE AND ALBUTEROL SULFATE 3 ML: 2.5; .5 SOLUTION RESPIRATORY (INHALATION) at 15:52

## 2018-01-01 RX ADMIN — CARVEDILOL 25 MG: 25 TABLET, FILM COATED ORAL at 09:45

## 2018-01-01 RX ADMIN — DIPHENHYDRAMINE HYDROCHLORIDE 25 MG: 25 CAPSULE ORAL at 14:13

## 2018-01-01 RX ADMIN — FAMOTIDINE 40 MG: 40 TABLET, FILM COATED ORAL at 09:45

## 2018-01-01 RX ADMIN — CEFTRIAXONE SODIUM 1 G: 1 INJECTION, SOLUTION INTRAVENOUS at 12:06

## 2018-01-01 RX ADMIN — DONEPEZIL HYDROCHLORIDE 10 MG: 10 TABLET, FILM COATED ORAL at 20:24

## 2018-01-01 RX ADMIN — FUROSEMIDE 40 MG: 10 INJECTION, SOLUTION INTRAMUSCULAR; INTRAVENOUS at 15:30

## 2018-01-01 RX ADMIN — CARVEDILOL 25 MG: 25 TABLET, FILM COATED ORAL at 12:33

## 2018-01-01 RX ADMIN — MIRTAZAPINE 15 MG: 15 TABLET, FILM COATED ORAL at 21:04

## 2018-01-01 RX ADMIN — INSULIN LISPRO 4 UNITS: 100 INJECTION, SOLUTION INTRAVENOUS; SUBCUTANEOUS at 22:00

## 2018-01-01 RX ADMIN — INSULIN GLARGINE 30 UNITS: 100 INJECTION, SOLUTION SUBCUTANEOUS at 21:56

## 2018-01-01 RX ADMIN — DOCUSATE SODIUM 100 MG: 100 CAPSULE, LIQUID FILLED ORAL at 08:28

## 2018-01-01 RX ADMIN — MIRTAZAPINE 15 MG: 15 TABLET, FILM COATED ORAL at 20:24

## 2018-01-01 RX ADMIN — TAZOBACTAM SODIUM AND PIPERACILLIN SODIUM 3.38 G: 375; 3 INJECTION, SOLUTION INTRAVENOUS at 08:58

## 2018-01-01 RX ADMIN — CLOPIDOGREL 75 MG: 75 TABLET, FILM COATED ORAL at 08:39

## 2018-01-01 RX ADMIN — FUROSEMIDE 40 MG: 40 TABLET ORAL at 09:13

## 2018-01-01 RX ADMIN — ONDANSETRON 4 MG: 4 TABLET, FILM COATED ORAL at 18:03

## 2018-01-01 RX ADMIN — CLOPIDOGREL 75 MG: 75 TABLET, FILM COATED ORAL at 10:16

## 2018-01-01 RX ADMIN — FUROSEMIDE 20 MG: 10 INJECTION, SOLUTION INTRAMUSCULAR; INTRAVENOUS at 12:09

## 2018-01-01 RX ADMIN — SODIUM CHLORIDE 4 ML: 7 NEBU SOLN,3 % NEBU at 06:41

## 2018-01-01 RX ADMIN — SODIUM CHLORIDE 100 ML/HR: 9 INJECTION, SOLUTION INTRAVENOUS at 04:07

## 2018-01-01 RX ADMIN — INSULIN ASPART 2 UNITS: 100 INJECTION, SOLUTION INTRAVENOUS; SUBCUTANEOUS at 13:36

## 2018-01-01 RX ADMIN — CLOPIDOGREL 75 MG: 75 TABLET, FILM COATED ORAL at 12:32

## 2018-01-01 RX ADMIN — BUDESONIDE 0.5 MG: 0.5 INHALANT RESPIRATORY (INHALATION) at 07:02

## 2018-01-01 RX ADMIN — Medication: at 10:20

## 2018-01-01 RX ADMIN — BUDESONIDE 0.5 MG: 0.5 INHALANT RESPIRATORY (INHALATION) at 08:40

## 2018-01-01 RX ADMIN — SODIUM CHLORIDE 1 G: 900 INJECTION INTRAVENOUS at 17:23

## 2018-01-01 RX ADMIN — ENOXAPARIN SODIUM 30 MG: 30 INJECTION SUBCUTANEOUS at 17:08

## 2018-01-01 RX ADMIN — CARVEDILOL 25 MG: 25 TABLET, FILM COATED ORAL at 17:23

## 2018-01-01 RX ADMIN — POTASSIUM CHLORIDE 10 MEQ: 10 INJECTION, SOLUTION INTRAVENOUS at 12:25

## 2018-01-01 RX ADMIN — IPRATROPIUM BROMIDE AND ALBUTEROL SULFATE 3 ML: 2.5; .5 SOLUTION RESPIRATORY (INHALATION) at 15:48

## 2018-01-01 RX ADMIN — CARVEDILOL 25 MG: 25 TABLET, FILM COATED ORAL at 12:17

## 2018-01-01 RX ADMIN — IPRATROPIUM BROMIDE AND ALBUTEROL SULFATE 3 ML: 2.5; .5 SOLUTION RESPIRATORY (INHALATION) at 10:56

## 2018-01-01 RX ADMIN — ACETAMINOPHEN 1000 MG: 500 TABLET, FILM COATED ORAL at 18:33

## 2018-01-01 RX ADMIN — BUDESONIDE 0.5 MG: 0.5 INHALANT RESPIRATORY (INHALATION) at 22:05

## 2018-01-01 RX ADMIN — FUROSEMIDE 40 MG: 40 TABLET ORAL at 21:13

## 2018-01-01 RX ADMIN — IPRATROPIUM BROMIDE AND ALBUTEROL SULFATE 3 ML: 2.5; .5 SOLUTION RESPIRATORY (INHALATION) at 21:25

## 2018-01-01 RX ADMIN — POTASSIUM CHLORIDE 20 MEQ: 750 CAPSULE, EXTENDED RELEASE ORAL at 10:19

## 2018-01-01 RX ADMIN — IPRATROPIUM BROMIDE AND ALBUTEROL SULFATE 3 ML: 2.5; .5 SOLUTION RESPIRATORY (INHALATION) at 15:20

## 2018-01-01 RX ADMIN — FUROSEMIDE 40 MG: 40 TABLET ORAL at 09:23

## 2018-01-01 RX ADMIN — ESCITALOPRAM 10 MG: 10 TABLET, FILM COATED ORAL at 18:33

## 2018-01-01 RX ADMIN — ESCITALOPRAM 10 MG: 10 TABLET, FILM COATED ORAL at 09:04

## 2018-01-01 RX ADMIN — ENOXAPARIN SODIUM 30 MG: 30 INJECTION SUBCUTANEOUS at 16:50

## 2018-01-01 RX ADMIN — INSULIN ASPART 2 UNITS: 100 INJECTION, SOLUTION INTRAVENOUS; SUBCUTANEOUS at 11:38

## 2018-01-01 RX ADMIN — CEFTRIAXONE SODIUM 1 G: 1 INJECTION, SOLUTION INTRAVENOUS at 10:18

## 2018-01-01 RX ADMIN — MIRTAZAPINE 15 MG: 15 TABLET, FILM COATED ORAL at 21:07

## 2018-01-01 RX ADMIN — INSULIN LISPRO 6 UNITS: 100 INJECTION, SOLUTION INTRAVENOUS; SUBCUTANEOUS at 18:43

## 2018-01-01 RX ADMIN — IPRATROPIUM BROMIDE AND ALBUTEROL SULFATE 3 ML: .5; 3 SOLUTION RESPIRATORY (INHALATION) at 23:55

## 2018-01-01 RX ADMIN — CARVEDILOL 25 MG: 25 TABLET, FILM COATED ORAL at 10:00

## 2018-01-01 RX ADMIN — BUDESONIDE 0.5 MG: 0.5 INHALANT RESPIRATORY (INHALATION) at 21:52

## 2018-01-01 RX ADMIN — MIRTAZAPINE 15 MG: 15 TABLET, FILM COATED ORAL at 22:19

## 2018-01-01 RX ADMIN — CARVEDILOL 25 MG: 25 TABLET, FILM COATED ORAL at 08:43

## 2018-01-01 RX ADMIN — INSULIN LISPRO 7 UNITS: 100 INJECTION, SOLUTION INTRAVENOUS; SUBCUTANEOUS at 13:01

## 2018-01-01 RX ADMIN — MUPIROCIN: 2 CREAM TOPICAL at 08:29

## 2018-01-01 RX ADMIN — ONDANSETRON 4 MG: 2 INJECTION INTRAMUSCULAR; INTRAVENOUS at 20:33

## 2018-01-01 RX ADMIN — CARVEDILOL 25 MG: 25 TABLET, FILM COATED ORAL at 09:10

## 2018-01-01 RX ADMIN — PANTOPRAZOLE SODIUM 40 MG: 40 TABLET, DELAYED RELEASE ORAL at 11:51

## 2018-01-01 RX ADMIN — BUDESONIDE 0.5 MG: 0.5 INHALANT RESPIRATORY (INHALATION) at 07:49

## 2018-01-01 RX ADMIN — ONDANSETRON 4 MG: 2 INJECTION INTRAMUSCULAR; INTRAVENOUS at 16:56

## 2018-01-01 RX ADMIN — POTASSIUM CHLORIDE 40 MEQ: 750 CAPSULE, EXTENDED RELEASE ORAL at 13:58

## 2018-01-01 RX ADMIN — SODIUM CHLORIDE 4 ML: 7 NEBU SOLN,3 % NEBU at 06:47

## 2018-01-01 RX ADMIN — CARVEDILOL 25 MG: 25 TABLET, FILM COATED ORAL at 18:33

## 2018-01-01 RX ADMIN — IPRATROPIUM BROMIDE AND ALBUTEROL SULFATE 3 ML: 2.5; .5 SOLUTION RESPIRATORY (INHALATION) at 21:37

## 2018-01-01 RX ADMIN — CLOPIDOGREL 75 MG: 75 TABLET, FILM COATED ORAL at 21:11

## 2018-01-01 RX ADMIN — IPRATROPIUM BROMIDE AND ALBUTEROL SULFATE 3 ML: .5; 3 SOLUTION RESPIRATORY (INHALATION) at 19:36

## 2018-01-01 RX ADMIN — CARVEDILOL 25 MG: 25 TABLET, FILM COATED ORAL at 09:04

## 2018-01-01 RX ADMIN — ENOXAPARIN SODIUM 30 MG: 30 INJECTION SUBCUTANEOUS at 22:35

## 2018-01-01 RX ADMIN — MEMANTINE HYDROCHLORIDE 10 MG: 10 TABLET, FILM COATED ORAL at 09:20

## 2018-01-01 RX ADMIN — VANCOMYCIN HYDROCHLORIDE 1000 MG: 1 INJECTION, SOLUTION INTRAVENOUS at 02:22

## 2018-01-01 RX ADMIN — INSULIN ASPART 5 UNITS: 100 INJECTION, SOLUTION INTRAVENOUS; SUBCUTANEOUS at 12:12

## 2018-01-01 RX ADMIN — INSULIN ASPART 4 UNITS: 100 INJECTION, SOLUTION INTRAVENOUS; SUBCUTANEOUS at 17:03

## 2018-01-01 RX ADMIN — BUDESONIDE 0.5 MG: 0.5 INHALANT RESPIRATORY (INHALATION) at 10:54

## 2018-01-01 RX ADMIN — RIVASTIGMINE TRANSDERMAL SYSTEM 1 PATCH: 4.6 PATCH, EXTENDED RELEASE TRANSDERMAL at 08:52

## 2018-01-01 RX ADMIN — MIRTAZAPINE 15 MG: 15 TABLET, ORALLY DISINTEGRATING ORAL at 21:21

## 2018-01-01 RX ADMIN — MEMANTINE HYDROCHLORIDE 10 MG: 10 TABLET, FILM COATED ORAL at 09:45

## 2018-01-01 RX ADMIN — FUROSEMIDE 40 MG: 40 TABLET ORAL at 10:19

## 2018-01-01 RX ADMIN — INSULIN LISPRO 8 UNITS: 100 INJECTION, SOLUTION INTRAVENOUS; SUBCUTANEOUS at 18:08

## 2018-01-01 RX ADMIN — ONDANSETRON 4 MG: 2 INJECTION INTRAMUSCULAR; INTRAVENOUS at 12:22

## 2018-01-01 RX ADMIN — ENOXAPARIN SODIUM 30 MG: 30 INJECTION SUBCUTANEOUS at 16:56

## 2018-01-01 RX ADMIN — CLOPIDOGREL 75 MG: 75 TABLET, FILM COATED ORAL at 09:45

## 2018-01-01 RX ADMIN — BUDESONIDE 0.5 MG: 0.5 INHALANT RESPIRATORY (INHALATION) at 07:06

## 2018-01-01 RX ADMIN — CARVEDILOL 25 MG: 25 TABLET, FILM COATED ORAL at 21:07

## 2018-01-01 RX ADMIN — DOCUSATE SODIUM 200 MG: 100 CAPSULE, LIQUID FILLED ORAL at 09:04

## 2018-01-01 RX ADMIN — BUDESONIDE 0.5 MG: 0.5 INHALANT RESPIRATORY (INHALATION) at 06:46

## 2018-01-01 RX ADMIN — POTASSIUM CHLORIDE 40 MEQ: 1.5 POWDER, FOR SOLUTION ORAL at 12:32

## 2018-01-01 RX ADMIN — SODIUM CHLORIDE 4 ML: 7 NEBU SOLN,3 % NEBU at 07:19

## 2018-01-01 RX ADMIN — ESCITALOPRAM 10 MG: 10 TABLET, FILM COATED ORAL at 08:39

## 2018-01-01 RX ADMIN — BUDESONIDE 0.5 MG: 0.5 INHALANT RESPIRATORY (INHALATION) at 08:56

## 2018-01-01 RX ADMIN — SODIUM CHLORIDE 75 ML/HR: 9 INJECTION, SOLUTION INTRAVENOUS at 03:02

## 2018-01-01 RX ADMIN — INSULIN ASPART 4 UNITS: 100 INJECTION, SOLUTION INTRAVENOUS; SUBCUTANEOUS at 17:59

## 2018-01-01 RX ADMIN — DEXTROSE MONOHYDRATE 25 G: 25 INJECTION, SOLUTION INTRAVENOUS at 14:50

## 2018-01-01 RX ADMIN — IPRATROPIUM BROMIDE AND ALBUTEROL SULFATE 3 ML: .5; 3 SOLUTION RESPIRATORY (INHALATION) at 07:00

## 2018-01-01 RX ADMIN — CARVEDILOL 25 MG: 25 TABLET, FILM COATED ORAL at 08:38

## 2018-01-01 RX ADMIN — DOCUSATE SODIUM 100 MG: 100 CAPSULE, LIQUID FILLED ORAL at 09:00

## 2018-01-01 RX ADMIN — BUDESONIDE 0.5 MG: 0.5 INHALANT RESPIRATORY (INHALATION) at 06:57

## 2018-01-01 RX ADMIN — BUDESONIDE 0.5 MG: 0.5 INHALANT RESPIRATORY (INHALATION) at 20:14

## 2018-01-01 RX ADMIN — MEMANTINE HYDROCHLORIDE 10 MG: 10 TABLET, FILM COATED ORAL at 09:10

## 2018-01-01 RX ADMIN — FUROSEMIDE 40 MG: 10 INJECTION, SOLUTION INTRAMUSCULAR; INTRAVENOUS at 12:37

## 2018-01-01 RX ADMIN — INSULIN LISPRO 6 UNITS: 100 INJECTION, SOLUTION INTRAVENOUS; SUBCUTANEOUS at 18:14

## 2018-01-01 RX ADMIN — NITROGLYCERIN 0.4 MG: 0.4 TABLET SUBLINGUAL at 05:53

## 2018-01-01 RX ADMIN — INSULIN LISPRO 7 UNITS: 100 INJECTION, SOLUTION INTRAVENOUS; SUBCUTANEOUS at 18:07

## 2018-01-01 RX ADMIN — CARVEDILOL 25 MG: 25 TABLET, FILM COATED ORAL at 08:31

## 2018-01-01 RX ADMIN — IPRATROPIUM BROMIDE AND ALBUTEROL SULFATE 3 ML: .5; 3 SOLUTION RESPIRATORY (INHALATION) at 19:58

## 2018-01-01 RX ADMIN — CARVEDILOL 25 MG: 25 TABLET, FILM COATED ORAL at 17:03

## 2018-01-01 RX ADMIN — HYDROCORTISONE: 25 CREAM TOPICAL at 02:39

## 2018-01-01 RX ADMIN — INSULIN LISPRO 3 UNITS: 100 INJECTION, SOLUTION INTRAVENOUS; SUBCUTANEOUS at 18:03

## 2018-01-01 RX ADMIN — FAMOTIDINE 40 MG: 40 TABLET, FILM COATED ORAL at 08:39

## 2018-01-01 RX ADMIN — MUPIROCIN: 2 CREAM TOPICAL at 20:17

## 2018-01-01 RX ADMIN — FUROSEMIDE 40 MG: 10 INJECTION, SOLUTION INTRAMUSCULAR; INTRAVENOUS at 17:15

## 2018-01-01 RX ADMIN — INSULIN ASPART 2 UNITS: 100 INJECTION, SOLUTION INTRAVENOUS; SUBCUTANEOUS at 22:07

## 2018-01-01 RX ADMIN — BUDESONIDE 0.5 MG: 0.5 INHALANT RESPIRATORY (INHALATION) at 06:40

## 2018-01-01 RX ADMIN — PANTOPRAZOLE SODIUM 40 MG: 40 TABLET, DELAYED RELEASE ORAL at 08:51

## 2018-01-01 RX ADMIN — DIPHENHYDRAMINE HYDROCHLORIDE 50 MG: 25 CAPSULE ORAL at 23:00

## 2018-01-01 RX ADMIN — BUDESONIDE 0.5 MG: 0.5 INHALANT RESPIRATORY (INHALATION) at 19:45

## 2018-01-01 RX ADMIN — MEMANTINE HYDROCHLORIDE 10 MG: 10 TABLET, FILM COATED ORAL at 20:53

## 2018-01-01 RX ADMIN — BUDESONIDE 0.5 MG: 0.5 INHALANT RESPIRATORY (INHALATION) at 19:23

## 2018-01-01 RX ADMIN — MEMANTINE HYDROCHLORIDE 10 MG: 10 TABLET, FILM COATED ORAL at 10:01

## 2018-01-01 RX ADMIN — INSULIN LISPRO 7 UNITS: 100 INJECTION, SOLUTION INTRAVENOUS; SUBCUTANEOUS at 11:51

## 2018-01-01 RX ADMIN — CARVEDILOL 25 MG: 25 TABLET, FILM COATED ORAL at 18:12

## 2018-01-01 RX ADMIN — VANCOMYCIN HYDROCHLORIDE 1000 MG: 1 INJECTION, SOLUTION INTRAVENOUS at 13:00

## 2018-01-01 RX ADMIN — IPRATROPIUM BROMIDE AND ALBUTEROL SULFATE 3 ML: 2.5; .5 SOLUTION RESPIRATORY (INHALATION) at 07:11

## 2018-01-01 RX ADMIN — MIRTAZAPINE 15 MG: 15 TABLET, ORALLY DISINTEGRATING ORAL at 20:33

## 2018-01-01 RX ADMIN — MEMANTINE HYDROCHLORIDE 10 MG: 10 TABLET, FILM COATED ORAL at 21:59

## 2018-01-01 RX ADMIN — CLOPIDOGREL 75 MG: 75 TABLET, FILM COATED ORAL at 10:08

## 2018-01-01 RX ADMIN — ESCITALOPRAM 10 MG: 10 TABLET, FILM COATED ORAL at 10:19

## 2018-01-01 RX ADMIN — FUROSEMIDE 40 MG: 10 INJECTION, SOLUTION INTRAMUSCULAR; INTRAVENOUS at 21:14

## 2018-01-01 RX ADMIN — INSULIN DETEMIR 30 UNITS: 100 INJECTION, SOLUTION SUBCUTANEOUS at 21:19

## 2018-01-01 RX ADMIN — DOCUSATE SODIUM 100 MG: 100 CAPSULE, LIQUID FILLED ORAL at 21:08

## 2018-01-01 RX ADMIN — MEMANTINE HYDROCHLORIDE 10 MG: 10 TABLET, FILM COATED ORAL at 21:08

## 2018-01-01 RX ADMIN — ACETAMINOPHEN 650 MG: 325 TABLET ORAL at 19:51

## 2018-01-01 RX ADMIN — INSULIN LISPRO 4 UNITS: 100 INJECTION, SOLUTION INTRAVENOUS; SUBCUTANEOUS at 09:42

## 2018-01-01 RX ADMIN — TAZOBACTAM SODIUM AND PIPERACILLIN SODIUM 3.38 G: 375; 3 INJECTION, SOLUTION INTRAVENOUS at 10:21

## 2018-01-01 RX ADMIN — BUDESONIDE 0.5 MG: 0.5 INHALANT RESPIRATORY (INHALATION) at 20:24

## 2018-01-01 RX ADMIN — FUROSEMIDE 40 MG: 10 INJECTION, SOLUTION INTRAMUSCULAR; INTRAVENOUS at 10:00

## 2018-01-01 RX ADMIN — VANCOMYCIN HYDROCHLORIDE 1000 MG: 1 INJECTION, SOLUTION INTRAVENOUS at 21:07

## 2018-01-01 RX ADMIN — CARVEDILOL 25 MG: 25 TABLET, FILM COATED ORAL at 08:17

## 2018-01-01 RX ADMIN — BUDESONIDE 0.5 MG: 0.5 INHALANT RESPIRATORY (INHALATION) at 20:11

## 2018-01-01 RX ADMIN — MEMANTINE HYDROCHLORIDE 5 MG: 10 TABLET, FILM COATED ORAL at 20:45

## 2018-01-01 RX ADMIN — FUROSEMIDE 20 MG: 20 TABLET ORAL at 09:27

## 2018-01-01 RX ADMIN — ACETAMINOPHEN 650 MG: 325 TABLET ORAL at 09:55

## 2018-01-01 RX ADMIN — IPRATROPIUM BROMIDE AND ALBUTEROL SULFATE 3 ML: .5; 3 SOLUTION RESPIRATORY (INHALATION) at 18:38

## 2018-01-01 RX ADMIN — ENOXAPARIN SODIUM 30 MG: 30 INJECTION SUBCUTANEOUS at 23:51

## 2018-01-01 RX ADMIN — PANTOPRAZOLE SODIUM 40 MG: 40 TABLET, DELAYED RELEASE ORAL at 08:43

## 2018-01-01 RX ADMIN — TAZOBACTAM SODIUM AND PIPERACILLIN SODIUM 3.38 G: 375; 3 INJECTION, SOLUTION INTRAVENOUS at 16:56

## 2018-01-01 RX ADMIN — INSULIN ASPART 4 UNITS: 100 INJECTION, SOLUTION INTRAVENOUS; SUBCUTANEOUS at 17:57

## 2018-01-01 RX ADMIN — INSULIN ASPART 2 UNITS: 100 INJECTION, SOLUTION INTRAVENOUS; SUBCUTANEOUS at 17:06

## 2018-01-01 RX ADMIN — SODIUM CHLORIDE 75 ML/HR: 9 INJECTION, SOLUTION INTRAVENOUS at 22:49

## 2018-01-01 RX ADMIN — DONEPEZIL HYDROCHLORIDE 10 MG: 10 TABLET, FILM COATED ORAL at 20:45

## 2018-01-01 RX ADMIN — ENOXAPARIN SODIUM 30 MG: 30 INJECTION SUBCUTANEOUS at 13:01

## 2018-01-01 RX ADMIN — CARVEDILOL 25 MG: 25 TABLET, FILM COATED ORAL at 10:16

## 2018-01-01 RX ADMIN — BUDESONIDE 0.5 MG: 0.5 INHALANT RESPIRATORY (INHALATION) at 20:33

## 2018-01-01 RX ADMIN — INSULIN DETEMIR 20 UNITS: 100 INJECTION, SOLUTION SUBCUTANEOUS at 20:52

## 2018-01-01 RX ADMIN — INSULIN LISPRO 3 UNITS: 100 INJECTION, SOLUTION INTRAVENOUS; SUBCUTANEOUS at 12:56

## 2018-01-01 RX ADMIN — BARIUM SULFATE 50 ML: 400 SUSPENSION ORAL at 09:36

## 2018-01-01 RX ADMIN — ONDANSETRON 4 MG: 2 INJECTION INTRAMUSCULAR; INTRAVENOUS at 23:27

## 2018-01-01 RX ADMIN — ACETAMINOPHEN 1000 MG: 500 TABLET, FILM COATED ORAL at 14:07

## 2018-01-01 RX ADMIN — INSULIN ASPART 7 UNITS: 100 INJECTION, SOLUTION INTRAVENOUS; SUBCUTANEOUS at 17:09

## 2018-01-01 RX ADMIN — CLOPIDOGREL 75 MG: 75 TABLET, FILM COATED ORAL at 21:07

## 2018-01-01 RX ADMIN — POTASSIUM CHLORIDE 40 MEQ: 1.5 POWDER, FOR SOLUTION ORAL at 16:47

## 2018-01-01 RX ADMIN — CARVEDILOL 25 MG: 25 TABLET, FILM COATED ORAL at 10:19

## 2018-01-01 RX ADMIN — CARVEDILOL 25 MG: 25 TABLET, FILM COATED ORAL at 17:01

## 2018-01-01 RX ADMIN — TAZOBACTAM SODIUM AND PIPERACILLIN SODIUM 3.38 G: 375; 3 INJECTION, SOLUTION INTRAVENOUS at 16:59

## 2018-01-01 RX ADMIN — POTASSIUM CHLORIDE 20 MEQ: 1.5 POWDER, FOR SOLUTION ORAL at 09:24

## 2018-01-01 RX ADMIN — FUROSEMIDE 40 MG: 10 INJECTION, SOLUTION INTRAMUSCULAR; INTRAVENOUS at 20:36

## 2018-01-01 RX ADMIN — BUDESONIDE 0.5 MG: 0.5 INHALANT RESPIRATORY (INHALATION) at 19:37

## 2018-01-01 RX ADMIN — POLYETHYLENE GLYCOL 3350 17 G: 17 POWDER, FOR SOLUTION ORAL at 08:27

## 2018-01-01 RX ADMIN — TAZOBACTAM SODIUM AND PIPERACILLIN SODIUM 3.38 G: 375; 3 INJECTION, SOLUTION INTRAVENOUS at 16:57

## 2018-01-01 RX ADMIN — INSULIN LISPRO 5 UNITS: 100 INJECTION, SOLUTION INTRAVENOUS; SUBCUTANEOUS at 20:46

## 2018-01-01 RX ADMIN — TAZOBACTAM SODIUM AND PIPERACILLIN SODIUM 3.38 G: 375; 3 INJECTION, SOLUTION INTRAVENOUS at 00:27

## 2018-01-01 RX ADMIN — TAZOBACTAM SODIUM AND PIPERACILLIN SODIUM 3.38 G: 375; 3 INJECTION, SOLUTION INTRAVENOUS at 00:19

## 2018-01-01 RX ADMIN — ENOXAPARIN SODIUM 30 MG: 30 INJECTION SUBCUTANEOUS at 11:30

## 2018-01-01 RX ADMIN — INSULIN ASPART 5 UNITS: 100 INJECTION, SOLUTION INTRAVENOUS; SUBCUTANEOUS at 17:40

## 2018-01-01 RX ADMIN — INSULIN ASPART 7 UNITS: 100 INJECTION, SOLUTION INTRAVENOUS; SUBCUTANEOUS at 18:07

## 2018-01-01 RX ADMIN — FUROSEMIDE 40 MG: 10 INJECTION, SOLUTION INTRAMUSCULAR; INTRAVENOUS at 01:21

## 2018-01-01 RX ADMIN — ENOXAPARIN SODIUM 30 MG: 30 INJECTION SUBCUTANEOUS at 09:13

## 2018-01-01 RX ADMIN — HYDROCODONE BITARTRATE AND ACETAMINOPHEN 1 TABLET: 5; 325 TABLET ORAL at 21:37

## 2018-01-01 RX ADMIN — CLOPIDOGREL 75 MG: 75 TABLET, FILM COATED ORAL at 10:19

## 2018-01-01 RX ADMIN — ENOXAPARIN SODIUM 30 MG: 30 INJECTION SUBCUTANEOUS at 13:59

## 2018-01-01 RX ADMIN — POLYETHYLENE GLYCOL 3350 17 G: 17 POWDER, FOR SOLUTION ORAL at 09:40

## 2018-01-01 RX ADMIN — INSULIN ASPART 4 UNITS: 100 INJECTION, SOLUTION INTRAVENOUS; SUBCUTANEOUS at 21:22

## 2018-01-01 RX ADMIN — IPRATROPIUM BROMIDE AND ALBUTEROL SULFATE 3 ML: .5; 3 SOLUTION RESPIRATORY (INHALATION) at 16:00

## 2018-01-01 RX ADMIN — DEXTROSE MONOHYDRATE 25 G: 25 INJECTION, SOLUTION INTRAVENOUS at 05:11

## 2018-01-01 RX ADMIN — CLOPIDOGREL 75 MG: 75 TABLET, FILM COATED ORAL at 09:04

## 2018-01-01 RX ADMIN — CARVEDILOL 25 MG: 25 TABLET, FILM COATED ORAL at 21:13

## 2018-01-01 RX ADMIN — INSULIN ASPART 6 UNITS: 100 INJECTION, SOLUTION INTRAVENOUS; SUBCUTANEOUS at 17:24

## 2018-01-01 RX ADMIN — CARVEDILOL 25 MG: 25 TABLET, FILM COATED ORAL at 07:56

## 2018-01-01 RX ADMIN — POLYETHYLENE GLYCOL 3350 17 G: 17 POWDER, FOR SOLUTION ORAL at 10:17

## 2018-01-01 RX ADMIN — TAZOBACTAM SODIUM AND PIPERACILLIN SODIUM 3.38 G: 375; 3 INJECTION, SOLUTION INTRAVENOUS at 16:14

## 2018-01-01 RX ADMIN — ESCITALOPRAM 10 MG: 10 TABLET, FILM COATED ORAL at 08:30

## 2018-01-01 RX ADMIN — IPRATROPIUM BROMIDE AND ALBUTEROL SULFATE 3 ML: 2.5; .5 SOLUTION RESPIRATORY (INHALATION) at 07:06

## 2018-01-01 RX ADMIN — IPRATROPIUM BROMIDE AND ALBUTEROL SULFATE 3 ML: 2.5; .5 SOLUTION RESPIRATORY (INHALATION) at 11:41

## 2018-01-01 RX ADMIN — CARVEDILOL 25 MG: 25 TABLET, FILM COATED ORAL at 17:00

## 2018-01-01 RX ADMIN — IPRATROPIUM BROMIDE AND ALBUTEROL SULFATE 3 ML: 2.5; .5 SOLUTION RESPIRATORY (INHALATION) at 06:46

## 2018-01-01 RX ADMIN — INSULIN LISPRO 3 UNITS: 100 INJECTION, SOLUTION INTRAVENOUS; SUBCUTANEOUS at 21:13

## 2018-01-01 RX ADMIN — BUDESONIDE 0.5 MG: 0.5 INHALANT RESPIRATORY (INHALATION) at 07:11

## 2018-01-01 RX ADMIN — MIRTAZAPINE 15 MG: 15 TABLET, FILM COATED ORAL at 21:37

## 2018-01-01 RX ADMIN — IPRATROPIUM BROMIDE AND ALBUTEROL SULFATE 3 ML: 2.5; .5 SOLUTION RESPIRATORY (INHALATION) at 22:05

## 2018-01-01 RX ADMIN — ESCITALOPRAM 10 MG: 10 TABLET, FILM COATED ORAL at 10:00

## 2018-01-01 RX ADMIN — ONDANSETRON 4 MG: 2 INJECTION INTRAMUSCULAR; INTRAVENOUS at 11:27

## 2018-01-01 RX ADMIN — IPRATROPIUM BROMIDE AND ALBUTEROL SULFATE 3 ML: 2.5; .5 SOLUTION RESPIRATORY (INHALATION) at 11:42

## 2018-01-01 RX ADMIN — CEFTRIAXONE SODIUM 1 G: 1 INJECTION, SOLUTION INTRAVENOUS at 12:41

## 2018-01-01 RX ADMIN — TAZOBACTAM SODIUM AND PIPERACILLIN SODIUM 3.38 G: 375; 3 INJECTION, SOLUTION INTRAVENOUS at 01:10

## 2018-01-01 RX ADMIN — CARVEDILOL 25 MG: 25 TABLET, FILM COATED ORAL at 17:59

## 2018-01-01 RX ADMIN — INSULIN ASPART 6 UNITS: 100 INJECTION, SOLUTION INTRAVENOUS; SUBCUTANEOUS at 08:25

## 2018-01-01 RX ADMIN — INSULIN LISPRO 5 UNITS: 100 INJECTION, SOLUTION INTRAVENOUS; SUBCUTANEOUS at 12:33

## 2018-01-01 RX ADMIN — INSULIN ASPART 7 UNITS: 100 INJECTION, SOLUTION INTRAVENOUS; SUBCUTANEOUS at 17:00

## 2018-01-01 RX ADMIN — IPRATROPIUM BROMIDE AND ALBUTEROL SULFATE 3 ML: 2.5; .5 SOLUTION RESPIRATORY (INHALATION) at 15:57

## 2018-01-01 RX ADMIN — IPRATROPIUM BROMIDE AND ALBUTEROL SULFATE 3 ML: .5; 3 SOLUTION RESPIRATORY (INHALATION) at 20:33

## 2018-01-01 RX ADMIN — INSULIN LISPRO 5 UNITS: 100 INJECTION, SOLUTION INTRAVENOUS; SUBCUTANEOUS at 21:54

## 2018-01-01 RX ADMIN — INSULIN ASPART 7 UNITS: 100 INJECTION, SOLUTION INTRAVENOUS; SUBCUTANEOUS at 10:21

## 2018-01-01 RX ADMIN — INSULIN DETEMIR 20 UNITS: 100 INJECTION, SOLUTION SUBCUTANEOUS at 20:46

## 2018-01-01 RX ADMIN — ACETAMINOPHEN 1000 MG: 500 TABLET, FILM COATED ORAL at 15:16

## 2018-01-01 RX ADMIN — IPRATROPIUM BROMIDE AND ALBUTEROL SULFATE 3 ML: .5; 3 SOLUTION RESPIRATORY (INHALATION) at 08:12

## 2018-01-01 RX ADMIN — INSULIN ASPART 5 UNITS: 100 INJECTION, SOLUTION INTRAVENOUS; SUBCUTANEOUS at 08:30

## 2018-01-01 RX ADMIN — INSULIN LISPRO 7 UNITS: 100 INJECTION, SOLUTION INTRAVENOUS; SUBCUTANEOUS at 12:07

## 2018-01-01 RX ADMIN — ENOXAPARIN SODIUM 30 MG: 30 INJECTION SUBCUTANEOUS at 15:43

## 2018-01-01 RX ADMIN — CARVEDILOL 25 MG: 25 TABLET, FILM COATED ORAL at 10:21

## 2018-01-01 RX ADMIN — FUROSEMIDE 20 MG: 20 TABLET ORAL at 14:24

## 2018-01-01 RX ADMIN — CLOPIDOGREL 75 MG: 75 TABLET, FILM COATED ORAL at 18:33

## 2018-01-01 RX ADMIN — VANCOMYCIN HYDROCHLORIDE 1000 MG: 1 INJECTION, SOLUTION INTRAVENOUS at 16:56

## 2018-01-01 RX ADMIN — DONEPEZIL HYDROCHLORIDE 10 MG: 10 TABLET, FILM COATED ORAL at 20:36

## 2018-01-01 RX ADMIN — POTASSIUM CHLORIDE 20 MEQ: 750 CAPSULE, EXTENDED RELEASE ORAL at 10:17

## 2018-01-01 RX ADMIN — DOCUSATE SODIUM 100 MG: 100 CAPSULE, LIQUID FILLED ORAL at 19:47

## 2018-01-01 RX ADMIN — INSULIN LISPRO 2 UNITS: 100 INJECTION, SOLUTION INTRAVENOUS; SUBCUTANEOUS at 18:29

## 2018-01-01 RX ADMIN — DIPHENHYDRAMINE HYDROCHLORIDE 50 MG: 25 CAPSULE ORAL at 20:51

## 2018-01-01 RX ADMIN — CARVEDILOL 25 MG: 25 TABLET, FILM COATED ORAL at 09:23

## 2018-01-01 RX ADMIN — CLOPIDOGREL 75 MG: 75 TABLET, FILM COATED ORAL at 08:26

## 2018-01-01 RX ADMIN — CARVEDILOL 25 MG: 25 TABLET, FILM COATED ORAL at 10:17

## 2018-01-01 RX ADMIN — LORAZEPAM 0.5 MG: 0.5 TABLET ORAL at 23:29

## 2018-01-01 RX ADMIN — DOCUSATE SODIUM 200 MG: 100 CAPSULE, LIQUID FILLED ORAL at 08:30

## 2018-01-01 RX ADMIN — INSULIN LISPRO 4 UNITS: 100 INJECTION, SOLUTION INTRAVENOUS; SUBCUTANEOUS at 17:46

## 2018-01-01 RX ADMIN — TAZOBACTAM SODIUM AND PIPERACILLIN SODIUM 3.38 G: 375; 3 INJECTION, SOLUTION INTRAVENOUS at 08:17

## 2018-01-01 RX ADMIN — BUDESONIDE 0.5 MG: 0.5 INHALANT RESPIRATORY (INHALATION) at 06:55

## 2018-01-01 RX ADMIN — INSULIN LISPRO 15 UNITS: 100 INJECTION, SOLUTION INTRAVENOUS; SUBCUTANEOUS at 13:36

## 2018-01-01 RX ADMIN — IPRATROPIUM BROMIDE AND ALBUTEROL SULFATE 3 ML: .5; 3 SOLUTION RESPIRATORY (INHALATION) at 10:59

## 2018-01-01 RX ADMIN — DIPHENHYDRAMINE HYDROCHLORIDE 25 MG: 25 CAPSULE ORAL at 20:51

## 2018-01-01 RX ADMIN — INSULIN ASPART 4 UNITS: 100 INJECTION, SOLUTION INTRAVENOUS; SUBCUTANEOUS at 17:42

## 2018-01-01 RX ADMIN — Medication: at 09:43

## 2018-01-01 RX ADMIN — SODIUM CHLORIDE 4 ML: 7 NEBU SOLN,3 % NEBU at 21:33

## 2018-01-01 RX ADMIN — POTASSIUM CHLORIDE 20 MEQ: 1.5 POWDER, FOR SOLUTION ORAL at 10:00

## 2018-01-01 RX ADMIN — CARVEDILOL 25 MG: 25 TABLET, FILM COATED ORAL at 12:06

## 2018-01-01 RX ADMIN — GUAIFENESIN 200 MG: 200 SOLUTION ORAL at 00:53

## 2018-01-01 RX ADMIN — SODIUM CHLORIDE 75 ML/HR: 9 INJECTION, SOLUTION INTRAVENOUS at 20:33

## 2018-01-01 RX ADMIN — INSULIN GLARGINE 10 UNITS: 100 INJECTION, SOLUTION SUBCUTANEOUS at 22:00

## 2018-01-01 RX ADMIN — INSULIN ASPART 6 UNITS: 100 INJECTION, SOLUTION INTRAVENOUS; SUBCUTANEOUS at 21:45

## 2018-01-01 RX ADMIN — FAMOTIDINE 40 MG: 40 TABLET, FILM COATED ORAL at 09:20

## 2018-01-01 RX ADMIN — TAZOBACTAM SODIUM AND PIPERACILLIN SODIUM 3.38 G: 375; 3 INJECTION, SOLUTION INTRAVENOUS at 01:51

## 2018-01-01 RX ADMIN — DONEPEZIL HYDROCHLORIDE 10 MG: 10 TABLET, FILM COATED ORAL at 21:04

## 2018-01-01 RX ADMIN — INSULIN LISPRO 6 UNITS: 100 INJECTION, SOLUTION INTRAVENOUS; SUBCUTANEOUS at 22:16

## 2018-01-01 RX ADMIN — IPRATROPIUM BROMIDE AND ALBUTEROL SULFATE 3 ML: 2.5; .5 SOLUTION RESPIRATORY (INHALATION) at 07:02

## 2018-01-01 RX ADMIN — IPRATROPIUM BROMIDE AND ALBUTEROL SULFATE 3 ML: 2.5; .5 SOLUTION RESPIRATORY (INHALATION) at 13:28

## 2018-01-01 RX ADMIN — MIRTAZAPINE 15 MG: 15 TABLET, FILM COATED ORAL at 21:08

## 2018-01-01 RX ADMIN — VANCOMYCIN HYDROCHLORIDE 1000 MG: 1 INJECTION, SOLUTION INTRAVENOUS at 03:14

## 2018-01-01 RX ADMIN — MUPIROCIN: 2 CREAM TOPICAL at 09:24

## 2018-01-01 RX ADMIN — MEMANTINE HYDROCHLORIDE 10 MG: 10 TABLET, FILM COATED ORAL at 09:04

## 2018-01-01 RX ADMIN — FAMOTIDINE 40 MG: 40 TABLET, FILM COATED ORAL at 18:33

## 2018-01-01 RX ADMIN — INSULIN DETEMIR 40 UNITS: 100 INJECTION, SOLUTION SUBCUTANEOUS at 21:59

## 2018-01-01 RX ADMIN — MIRTAZAPINE 15 MG: 15 TABLET, FILM COATED ORAL at 20:53

## 2018-01-01 RX ADMIN — PANTOPRAZOLE SODIUM 40 MG: 40 TABLET, DELAYED RELEASE ORAL at 10:06

## 2018-01-01 RX ADMIN — BUDESONIDE 0.5 MG: 0.5 INHALANT RESPIRATORY (INHALATION) at 07:26

## 2018-01-01 RX ADMIN — IPRATROPIUM BROMIDE AND ALBUTEROL SULFATE 3 ML: 2.5; .5 SOLUTION RESPIRATORY (INHALATION) at 14:34

## 2018-01-01 RX ADMIN — PANTOPRAZOLE SODIUM 40 MG: 40 TABLET, DELAYED RELEASE ORAL at 06:55

## 2018-01-01 RX ADMIN — INSULIN LISPRO 4 UNITS: 100 INJECTION, SOLUTION INTRAVENOUS; SUBCUTANEOUS at 10:00

## 2018-01-01 RX ADMIN — DOCUSATE SODIUM 200 MG: 100 CAPSULE, LIQUID FILLED ORAL at 08:42

## 2018-01-01 RX ADMIN — INSULIN LISPRO 7 UNITS: 100 INJECTION, SOLUTION INTRAVENOUS; SUBCUTANEOUS at 09:12

## 2018-01-01 RX ADMIN — FUROSEMIDE 40 MG: 40 TABLET ORAL at 10:07

## 2018-01-01 RX ADMIN — DIVALPROEX SODIUM 125 MG: 125 CAPSULE, COATED PELLETS ORAL at 20:04

## 2018-01-01 RX ADMIN — HALOPERIDOL LACTATE 1 MG: 5 INJECTION, SOLUTION INTRAMUSCULAR at 04:51

## 2018-01-01 RX ADMIN — FUROSEMIDE 20 MG: 20 TABLET ORAL at 12:17

## 2018-01-01 RX ADMIN — POLYETHYLENE GLYCOL 3350 17 G: 17 POWDER, FOR SOLUTION ORAL at 09:20

## 2018-01-01 RX ADMIN — DIPHENHYDRAMINE HYDROCHLORIDE 50 MG: 25 CAPSULE ORAL at 19:42

## 2018-01-01 RX ADMIN — CLOPIDOGREL 75 MG: 75 TABLET, FILM COATED ORAL at 10:01

## 2018-01-01 RX ADMIN — CLOPIDOGREL 75 MG: 75 TABLET, FILM COATED ORAL at 10:17

## 2018-01-01 RX ADMIN — INSULIN LISPRO 4 UNITS: 100 INJECTION, SOLUTION INTRAVENOUS; SUBCUTANEOUS at 22:09

## 2018-01-01 RX ADMIN — INSULIN ASPART 3 UNITS: 100 INJECTION, SOLUTION INTRAVENOUS; SUBCUTANEOUS at 21:48

## 2018-01-01 RX ADMIN — CARVEDILOL 25 MG: 25 TABLET, FILM COATED ORAL at 08:30

## 2018-01-01 RX ADMIN — MUPIROCIN: 2 CREAM TOPICAL at 10:02

## 2018-01-01 RX ADMIN — DONEPEZIL HYDROCHLORIDE 10 MG: 10 TABLET, FILM COATED ORAL at 21:11

## 2018-01-01 RX ADMIN — MEMANTINE HYDROCHLORIDE 10 MG: 10 TABLET, FILM COATED ORAL at 21:11

## 2018-01-01 RX ADMIN — FAMOTIDINE 40 MG: 40 TABLET, FILM COATED ORAL at 10:17

## 2018-01-01 RX ADMIN — IPRATROPIUM BROMIDE AND ALBUTEROL SULFATE 3 ML: .5; 3 SOLUTION RESPIRATORY (INHALATION) at 07:54

## 2018-01-01 RX ADMIN — DIVALPROEX SODIUM 125 MG: 125 CAPSULE, COATED PELLETS ORAL at 08:51

## 2018-01-01 RX ADMIN — BUDESONIDE 0.5 MG: 0.5 INHALANT RESPIRATORY (INHALATION) at 07:40

## 2018-01-01 RX ADMIN — TAZOBACTAM SODIUM AND PIPERACILLIN SODIUM 3.38 G: 375; 3 INJECTION, SOLUTION INTRAVENOUS at 08:38

## 2018-01-01 RX ADMIN — INSULIN ASPART 2 UNITS: 100 INJECTION, SOLUTION INTRAVENOUS; SUBCUTANEOUS at 18:35

## 2018-01-01 RX ADMIN — ACETAMINOPHEN 1000 MG: 500 TABLET, FILM COATED ORAL at 12:53

## 2018-01-01 RX ADMIN — HYDROCORTISONE 1 APPLICATION: 25 CREAM TOPICAL at 08:26

## 2018-01-01 RX ADMIN — ENOXAPARIN SODIUM 30 MG: 30 INJECTION SUBCUTANEOUS at 16:25

## 2018-01-01 RX ADMIN — DIVALPROEX SODIUM 125 MG: 125 CAPSULE, COATED PELLETS ORAL at 21:22

## 2018-01-01 RX ADMIN — INSULIN ASPART 2 UNITS: 100 INJECTION, SOLUTION INTRAVENOUS; SUBCUTANEOUS at 12:59

## 2018-01-01 RX ADMIN — CARVEDILOL 25 MG: 25 TABLET, FILM COATED ORAL at 17:43

## 2018-01-01 RX ADMIN — BARIUM SULFATE 183 ML: 960 POWDER, FOR SUSPENSION ORAL at 09:35

## 2018-01-01 RX ADMIN — ACETAMINOPHEN 650 MG: 325 TABLET ORAL at 20:23

## 2018-01-01 RX ADMIN — ESCITALOPRAM 10 MG: 10 TABLET, FILM COATED ORAL at 10:01

## 2018-01-01 RX ADMIN — HALOPERIDOL LACTATE 1 MG: 5 INJECTION, SOLUTION INTRAMUSCULAR at 21:37

## 2018-01-01 RX ADMIN — ENOXAPARIN SODIUM 30 MG: 30 INJECTION SUBCUTANEOUS at 16:54

## 2018-01-01 RX ADMIN — MEMANTINE HYDROCHLORIDE 10 MG: 10 TABLET, FILM COATED ORAL at 10:19

## 2018-01-01 RX ADMIN — Medication: at 10:48

## 2018-01-01 RX ADMIN — SODIUM CHLORIDE, POTASSIUM CHLORIDE, SODIUM LACTATE AND CALCIUM CHLORIDE 100 ML/HR: 600; 310; 30; 20 INJECTION, SOLUTION INTRAVENOUS at 17:08

## 2018-01-01 RX ADMIN — CARVEDILOL 25 MG: 25 TABLET, FILM COATED ORAL at 10:07

## 2018-01-01 RX ADMIN — TAZOBACTAM SODIUM AND PIPERACILLIN SODIUM 3.38 G: 375; 3 INJECTION, SOLUTION INTRAVENOUS at 08:05

## 2018-01-01 RX ADMIN — TAZOBACTAM SODIUM AND PIPERACILLIN SODIUM 3.38 G: 375; 3 INJECTION, SOLUTION INTRAVENOUS at 18:07

## 2018-01-01 RX ADMIN — INSULIN ASPART 7 UNITS: 100 INJECTION, SOLUTION INTRAVENOUS; SUBCUTANEOUS at 08:25

## 2018-01-01 RX ADMIN — CARVEDILOL 25 MG: 25 TABLET, FILM COATED ORAL at 17:06

## 2018-01-01 RX ADMIN — ENOXAPARIN SODIUM 30 MG: 30 INJECTION SUBCUTANEOUS at 13:58

## 2018-01-01 RX ADMIN — ENOXAPARIN SODIUM 30 MG: 30 INJECTION SUBCUTANEOUS at 17:58

## 2018-01-01 RX ADMIN — MUPIROCIN: 2 CREAM TOPICAL at 10:08

## 2018-01-01 RX ADMIN — MIRTAZAPINE 15 MG: 15 TABLET, ORALLY DISINTEGRATING ORAL at 20:04

## 2018-01-01 RX ADMIN — ESCITALOPRAM 10 MG: 10 TABLET, FILM COATED ORAL at 12:06

## 2018-01-01 RX ADMIN — IPRATROPIUM BROMIDE AND ALBUTEROL SULFATE 3 ML: 2.5; .5 SOLUTION RESPIRATORY (INHALATION) at 06:40

## 2018-01-01 RX ADMIN — ENOXAPARIN SODIUM 30 MG: 30 INJECTION SUBCUTANEOUS at 21:22

## 2018-01-01 RX ADMIN — POLYETHYLENE GLYCOL 3350 17 G: 17 POWDER, FOR SOLUTION ORAL at 10:01

## 2018-01-01 RX ADMIN — DONEPEZIL HYDROCHLORIDE 10 MG: 10 TABLET, FILM COATED ORAL at 21:06

## 2018-01-01 RX ADMIN — BUDESONIDE 0.5 MG: 0.5 INHALANT RESPIRATORY (INHALATION) at 06:37

## 2018-01-01 RX ADMIN — TAZOBACTAM SODIUM AND PIPERACILLIN SODIUM 3.38 G: 375; 3 INJECTION, SOLUTION INTRAVENOUS at 09:39

## 2018-01-01 RX ADMIN — MUPIROCIN: 2 CREAM TOPICAL at 21:14

## 2018-01-01 RX ADMIN — CLOPIDOGREL 75 MG: 75 TABLET, FILM COATED ORAL at 08:29

## 2018-01-01 RX ADMIN — INSULIN ASPART 7 UNITS: 100 INJECTION, SOLUTION INTRAVENOUS; SUBCUTANEOUS at 12:17

## 2018-01-01 RX ADMIN — FAMOTIDINE 40 MG: 40 TABLET, FILM COATED ORAL at 10:01

## 2018-01-01 RX ADMIN — ACETAMINOPHEN 650 MG: 325 TABLET ORAL at 03:05

## 2018-01-01 RX ADMIN — HUMAN INSULIN 10 UNITS: 100 INJECTION, SOLUTION SUBCUTANEOUS at 15:13

## 2018-01-01 RX ADMIN — DOCUSATE SODIUM 100 MG: 100 CAPSULE, LIQUID FILLED ORAL at 10:01

## 2018-01-01 RX ADMIN — INSULIN DETEMIR 8 UNITS: 100 INJECTION, SOLUTION SUBCUTANEOUS at 11:33

## 2018-01-01 RX ADMIN — CARVEDILOL 25 MG: 25 TABLET, FILM COATED ORAL at 17:40

## 2018-01-01 RX ADMIN — ESCITALOPRAM 10 MG: 10 TABLET, FILM COATED ORAL at 08:43

## 2018-01-01 RX ADMIN — MEMANTINE HYDROCHLORIDE 10 MG: 10 TABLET, FILM COATED ORAL at 08:39

## 2018-01-01 RX ADMIN — INSULIN ASPART 7 UNITS: 100 INJECTION, SOLUTION INTRAVENOUS; SUBCUTANEOUS at 12:07

## 2018-01-01 RX ADMIN — POTASSIUM CHLORIDE 20 MEQ: 750 CAPSULE, EXTENDED RELEASE ORAL at 08:51

## 2018-01-01 RX ADMIN — ESCITALOPRAM 10 MG: 10 TABLET, FILM COATED ORAL at 21:07

## 2018-01-01 RX ADMIN — SODIUM CHLORIDE 125 ML/HR: 9 INJECTION, SOLUTION INTRAVENOUS at 15:13

## 2018-01-01 RX ADMIN — PANTOPRAZOLE SODIUM 40 MG: 40 TABLET, DELAYED RELEASE ORAL at 08:39

## 2018-01-01 RX ADMIN — POTASSIUM CHLORIDE 20 MEQ: 1.5 POWDER, FOR SOLUTION ORAL at 08:28

## 2018-01-01 RX ADMIN — CARVEDILOL 25 MG: 25 TABLET, FILM COATED ORAL at 17:14

## 2018-01-01 RX ADMIN — INSULIN ASPART 7 UNITS: 100 INJECTION, SOLUTION INTRAVENOUS; SUBCUTANEOUS at 16:49

## 2018-01-01 RX ADMIN — MUPIROCIN: 2 CREAM TOPICAL at 21:56

## 2018-01-01 RX ADMIN — IPRATROPIUM BROMIDE AND ALBUTEROL SULFATE 3 ML: 2.5; .5 SOLUTION RESPIRATORY (INHALATION) at 12:52

## 2018-01-01 RX ADMIN — SODIUM CHLORIDE 75 ML/HR: 9 INJECTION, SOLUTION INTRAVENOUS at 11:39

## 2018-01-01 RX ADMIN — INSULIN LISPRO 3 UNITS: 100 INJECTION, SOLUTION INTRAVENOUS; SUBCUTANEOUS at 10:18

## 2018-01-01 RX ADMIN — DOCUSATE SODIUM 100 MG: 100 CAPSULE, LIQUID FILLED ORAL at 09:19

## 2018-01-01 RX ADMIN — INSULIN LISPRO 7 UNITS: 100 INJECTION, SOLUTION INTRAVENOUS; SUBCUTANEOUS at 21:44

## 2018-01-01 RX ADMIN — IPRATROPIUM BROMIDE AND ALBUTEROL SULFATE 3 ML: 2.5; .5 SOLUTION RESPIRATORY (INHALATION) at 08:43

## 2018-01-01 RX ADMIN — TAZOBACTAM SODIUM AND PIPERACILLIN SODIUM 3.38 G: 375; 3 INJECTION, SOLUTION INTRAVENOUS at 00:23

## 2018-01-01 RX ADMIN — INSULIN ASPART 3 UNITS: 100 INJECTION, SOLUTION INTRAVENOUS; SUBCUTANEOUS at 12:10

## 2018-01-01 RX ADMIN — ESCITALOPRAM 10 MG: 10 TABLET, FILM COATED ORAL at 12:17

## 2018-01-01 RX ADMIN — MEMANTINE HYDROCHLORIDE 10 MG: 10 TABLET, FILM COATED ORAL at 08:30

## 2018-01-01 RX ADMIN — INSULIN LISPRO 5 UNITS: 100 INJECTION, SOLUTION INTRAVENOUS; SUBCUTANEOUS at 21:06

## 2018-01-01 RX ADMIN — CLOPIDOGREL 75 MG: 75 TABLET, FILM COATED ORAL at 09:10

## 2018-01-01 RX ADMIN — IPRATROPIUM BROMIDE AND ALBUTEROL SULFATE 3 ML: 2.5; .5 SOLUTION RESPIRATORY (INHALATION) at 06:45

## 2018-01-01 RX ADMIN — CARVEDILOL 25 MG: 25 TABLET, FILM COATED ORAL at 09:46

## 2018-01-01 RX ADMIN — POTASSIUM CHLORIDE 20 MEQ: 1.5 POWDER, FOR SOLUTION ORAL at 20:29

## 2018-01-01 RX ADMIN — IPRATROPIUM BROMIDE AND ALBUTEROL SULFATE 3 ML: 2.5; .5 SOLUTION RESPIRATORY (INHALATION) at 19:34

## 2018-01-01 RX ADMIN — FUROSEMIDE 40 MG: 10 INJECTION, SOLUTION INTRAMUSCULAR; INTRAVENOUS at 08:25

## 2018-01-01 RX ADMIN — MIRTAZAPINE 15 MG: 15 TABLET, FILM COATED ORAL at 20:25

## 2018-01-01 RX ADMIN — INSULIN LISPRO 4 UNITS: 100 INJECTION, SOLUTION INTRAVENOUS; SUBCUTANEOUS at 18:12

## 2018-01-01 RX ADMIN — IPRATROPIUM BROMIDE AND ALBUTEROL SULFATE 3 ML: .5; 3 SOLUTION RESPIRATORY (INHALATION) at 07:26

## 2018-01-01 RX ADMIN — CARVEDILOL 25 MG: 25 TABLET, FILM COATED ORAL at 08:51

## 2018-01-01 RX ADMIN — MIRTAZAPINE 15 MG: 15 TABLET, FILM COATED ORAL at 21:49

## 2018-01-01 RX ADMIN — Medication: at 17:30

## 2018-01-01 RX ADMIN — FUROSEMIDE 40 MG: 40 TABLET ORAL at 08:28

## 2018-01-01 RX ADMIN — SODIUM CHLORIDE 100 ML/HR: 9 INJECTION, SOLUTION INTRAVENOUS at 18:00

## 2018-01-01 RX ADMIN — SODIUM CHLORIDE, POTASSIUM CHLORIDE, SODIUM LACTATE AND CALCIUM CHLORIDE 100 ML/HR: 600; 310; 30; 20 INJECTION, SOLUTION INTRAVENOUS at 02:50

## 2018-01-01 RX ADMIN — IPRATROPIUM BROMIDE AND ALBUTEROL SULFATE 3 ML: .5; 3 SOLUTION RESPIRATORY (INHALATION) at 23:30

## 2018-01-01 RX ADMIN — CARVEDILOL 25 MG: 25 TABLET, FILM COATED ORAL at 08:28

## 2018-01-01 RX ADMIN — VANCOMYCIN HYDROCHLORIDE 1000 MG: 1 INJECTION, SOLUTION INTRAVENOUS at 13:54

## 2018-01-01 RX ADMIN — MUPIROCIN: 2 CREAM TOPICAL at 21:15

## 2018-01-01 RX ADMIN — CARVEDILOL 25 MG: 25 TABLET, FILM COATED ORAL at 18:06

## 2018-01-01 RX ADMIN — ACETAMINOPHEN 650 MG: 325 TABLET ORAL at 13:38

## 2018-01-01 RX ADMIN — DIPHENHYDRAMINE HYDROCHLORIDE 50 MG: 25 CAPSULE ORAL at 22:01

## 2018-01-01 RX ADMIN — PANTOPRAZOLE SODIUM 40 MG: 40 TABLET, DELAYED RELEASE ORAL at 10:00

## 2018-01-01 RX ADMIN — ENOXAPARIN SODIUM 30 MG: 30 INJECTION SUBCUTANEOUS at 00:46

## 2018-01-01 RX ADMIN — ENOXAPARIN SODIUM 30 MG: 30 INJECTION SUBCUTANEOUS at 17:23

## 2018-01-01 RX ADMIN — BUDESONIDE 0.5 MG: 0.5 INHALANT RESPIRATORY (INHALATION) at 06:47

## 2018-01-01 RX ADMIN — BUDESONIDE 0.5 MG: 0.5 INHALANT RESPIRATORY (INHALATION) at 19:34

## 2018-01-01 RX ADMIN — IPRATROPIUM BROMIDE AND ALBUTEROL SULFATE 3 ML: 2.5; .5 SOLUTION RESPIRATORY (INHALATION) at 10:42

## 2018-01-01 RX ADMIN — DONEPEZIL HYDROCHLORIDE 10 MG: 10 TABLET, FILM COATED ORAL at 19:47

## 2018-01-01 RX ADMIN — ESCITALOPRAM 10 MG: 10 TABLET, FILM COATED ORAL at 09:20

## 2018-01-01 RX ADMIN — INSULIN ASPART 4 UNITS: 100 INJECTION, SOLUTION INTRAVENOUS; SUBCUTANEOUS at 08:47

## 2018-01-01 RX ADMIN — TAZOBACTAM SODIUM AND PIPERACILLIN SODIUM 3.38 G: 375; 3 INJECTION, SOLUTION INTRAVENOUS at 01:02

## 2018-01-01 RX ADMIN — CARVEDILOL 25 MG: 25 TABLET, FILM COATED ORAL at 20:48

## 2018-01-01 RX ADMIN — GUAIFENESIN 600 MG: 600 TABLET, EXTENDED RELEASE ORAL at 09:47

## 2018-01-01 RX ADMIN — INSULIN ASPART 7 UNITS: 100 INJECTION, SOLUTION INTRAVENOUS; SUBCUTANEOUS at 12:45

## 2018-01-01 RX ADMIN — POTASSIUM CHLORIDE 20 MEQ: 750 CAPSULE, EXTENDED RELEASE ORAL at 12:05

## 2018-01-01 RX ADMIN — ENOXAPARIN SODIUM 30 MG: 30 INJECTION SUBCUTANEOUS at 17:10

## 2018-01-01 RX ADMIN — Medication 10 ML: at 08:27

## 2018-01-01 RX ADMIN — LORAZEPAM 0.5 MG: 0.5 TABLET ORAL at 21:21

## 2018-01-01 RX ADMIN — BUDESONIDE 0.5 MG: 0.5 INHALANT RESPIRATORY (INHALATION) at 22:00

## 2018-01-01 RX ADMIN — VANCOMYCIN HYDROCHLORIDE 1000 MG: 1 INJECTION, SOLUTION INTRAVENOUS at 17:40

## 2018-01-01 RX ADMIN — INSULIN ASPART 2 UNITS: 100 INJECTION, SOLUTION INTRAVENOUS; SUBCUTANEOUS at 21:20

## 2018-01-01 RX ADMIN — BARIUM SULFATE 135 ML: 980 POWDER, FOR SUSPENSION ORAL at 09:35

## 2018-01-01 RX ADMIN — ACETAMINOPHEN 1000 MG: 500 TABLET, FILM COATED ORAL at 16:46

## 2018-01-01 RX ADMIN — BUDESONIDE 0.5 MG: 0.5 INHALANT RESPIRATORY (INHALATION) at 21:37

## 2018-01-01 RX ADMIN — CARVEDILOL 25 MG: 25 TABLET, FILM COATED ORAL at 17:58

## 2018-01-01 RX ADMIN — INSULIN LISPRO 4 UNITS: 100 INJECTION, SOLUTION INTRAVENOUS; SUBCUTANEOUS at 17:23

## 2018-01-01 RX ADMIN — INSULIN ASPART 4 UNITS: 100 INJECTION, SOLUTION INTRAVENOUS; SUBCUTANEOUS at 12:29

## 2018-01-01 RX ADMIN — INSULIN DETEMIR 40 UNITS: 100 INJECTION, SOLUTION SUBCUTANEOUS at 21:37

## 2018-01-01 RX ADMIN — IPRATROPIUM BROMIDE AND ALBUTEROL SULFATE 3 ML: 2.5; .5 SOLUTION RESPIRATORY (INHALATION) at 10:20

## 2018-01-01 RX ADMIN — ENOXAPARIN SODIUM 30 MG: 30 INJECTION SUBCUTANEOUS at 10:01

## 2018-01-01 RX ADMIN — HYDROCORTISONE: 25 CREAM TOPICAL at 08:40

## 2018-01-01 RX ADMIN — DONEPEZIL HYDROCHLORIDE 10 MG: 10 TABLET, FILM COATED ORAL at 20:51

## 2018-01-01 RX ADMIN — MIRTAZAPINE 15 MG: 15 TABLET, FILM COATED ORAL at 21:59

## 2018-01-01 RX ADMIN — MUPIROCIN: 2 CREAM TOPICAL at 16:50

## 2018-01-01 RX ADMIN — SODIUM CHLORIDE 4 ML: 7 NEBU SOLN,3 % NEBU at 21:25

## 2018-01-01 RX ADMIN — CLOPIDOGREL 75 MG: 75 TABLET, FILM COATED ORAL at 08:51

## 2018-01-01 RX ADMIN — PANTOPRAZOLE SODIUM 40 MG: 40 TABLET, DELAYED RELEASE ORAL at 15:42

## 2018-01-01 RX ADMIN — INSULIN GLARGINE 30 UNITS: 100 INJECTION, SOLUTION SUBCUTANEOUS at 22:17

## 2018-01-01 RX ADMIN — IPRATROPIUM BROMIDE AND ALBUTEROL SULFATE 3 ML: 2.5; .5 SOLUTION RESPIRATORY (INHALATION) at 20:04

## 2018-01-01 RX ADMIN — INSULIN ASPART 2 UNITS: 100 INJECTION, SOLUTION INTRAVENOUS; SUBCUTANEOUS at 21:07

## 2018-01-01 RX ADMIN — INSULIN LISPRO 10 UNITS: 100 INJECTION, SOLUTION INTRAVENOUS; SUBCUTANEOUS at 15:06

## 2018-01-01 RX ADMIN — INSULIN DETEMIR 20 UNITS: 100 INJECTION, SOLUTION SUBCUTANEOUS at 21:13

## 2018-01-01 RX ADMIN — Medication 10 ML: at 08:25

## 2018-01-01 RX ADMIN — INSULIN GLARGINE 25 UNITS: 100 INJECTION, SOLUTION SUBCUTANEOUS at 21:45

## 2018-01-01 RX ADMIN — INSULIN LISPRO 3 UNITS: 100 INJECTION, SOLUTION INTRAVENOUS; SUBCUTANEOUS at 21:56

## 2018-01-01 RX ADMIN — TAZOBACTAM SODIUM AND PIPERACILLIN SODIUM 3.38 G: 375; 3 INJECTION, SOLUTION INTRAVENOUS at 16:54

## 2018-01-01 RX ADMIN — ESCITALOPRAM 10 MG: 10 TABLET, FILM COATED ORAL at 08:51

## 2018-01-01 RX ADMIN — POTASSIUM CHLORIDE 40 MEQ: 750 CAPSULE, EXTENDED RELEASE ORAL at 11:34

## 2018-01-01 RX ADMIN — TAZOBACTAM SODIUM AND PIPERACILLIN SODIUM 3.38 G: 375; 3 INJECTION, SOLUTION INTRAVENOUS at 17:40

## 2018-01-01 RX ADMIN — IPRATROPIUM BROMIDE AND ALBUTEROL SULFATE 3 ML: 2.5; .5 SOLUTION RESPIRATORY (INHALATION) at 19:23

## 2018-01-01 RX ADMIN — IPRATROPIUM BROMIDE AND ALBUTEROL SULFATE 3 ML: 2.5; .5 SOLUTION RESPIRATORY (INHALATION) at 19:13

## 2018-01-01 RX ADMIN — DONEPEZIL HYDROCHLORIDE 10 MG: 10 TABLET, FILM COATED ORAL at 21:38

## 2018-01-01 RX ADMIN — HYDROCORTISONE: 25 CREAM TOPICAL at 20:51

## 2018-01-01 RX ADMIN — POLYETHYLENE GLYCOL 3350 17 G: 17 POWDER, FOR SOLUTION ORAL at 09:23

## 2018-01-01 RX ADMIN — TAZOBACTAM SODIUM AND PIPERACILLIN SODIUM 3.38 G: 375; 3 INJECTION, SOLUTION INTRAVENOUS at 00:37

## 2018-01-01 RX ADMIN — ENOXAPARIN SODIUM 30 MG: 30 INJECTION SUBCUTANEOUS at 13:54

## 2018-01-01 RX ADMIN — ENOXAPARIN SODIUM 30 MG: 30 INJECTION SUBCUTANEOUS at 14:46

## 2018-01-01 RX ADMIN — CARVEDILOL 25 MG: 25 TABLET, FILM COATED ORAL at 19:04

## 2018-01-01 RX ADMIN — DOCUSATE SODIUM 100 MG: 100 CAPSULE, LIQUID FILLED ORAL at 12:33

## 2018-01-01 RX ADMIN — IPRATROPIUM BROMIDE AND ALBUTEROL SULFATE 3 ML: .5; 3 SOLUTION RESPIRATORY (INHALATION) at 12:24

## 2018-01-01 RX ADMIN — ESCITALOPRAM 10 MG: 10 TABLET, FILM COATED ORAL at 09:10

## 2018-01-01 RX ADMIN — BUDESONIDE 0.5 MG: 0.5 INHALANT RESPIRATORY (INHALATION) at 07:54

## 2018-01-01 RX ADMIN — POTASSIUM CHLORIDE 20 MEQ: 1.5 POWDER, FOR SOLUTION ORAL at 09:13

## 2018-01-01 RX ADMIN — ESCITALOPRAM 10 MG: 10 TABLET, FILM COATED ORAL at 12:30

## 2018-01-01 RX ADMIN — INSULIN DETEMIR 8 UNITS: 100 INJECTION, SOLUTION SUBCUTANEOUS at 06:39

## 2018-01-01 RX ADMIN — PANTOPRAZOLE SODIUM 40 MG: 40 TABLET, DELAYED RELEASE ORAL at 13:15

## 2018-01-01 RX ADMIN — ACETAMINOPHEN 650 MG: 325 TABLET ORAL at 16:20

## 2018-01-01 RX ADMIN — INSULIN ASPART 2 UNITS: 100 INJECTION, SOLUTION INTRAVENOUS; SUBCUTANEOUS at 21:37

## 2018-01-01 RX ADMIN — DONEPEZIL HYDROCHLORIDE 10 MG: 10 TABLET, FILM COATED ORAL at 20:53

## 2018-01-01 RX ADMIN — MUPIROCIN: 2 CREAM TOPICAL at 09:46

## 2018-01-01 RX ADMIN — ACETAMINOPHEN 1000 MG: 500 TABLET, FILM COATED ORAL at 20:45

## 2018-01-01 RX ADMIN — SODIUM CHLORIDE 4 ML: 7 NEBU SOLN,3 % NEBU at 19:13

## 2018-01-01 RX ADMIN — ENOXAPARIN SODIUM 30 MG: 30 INJECTION SUBCUTANEOUS at 12:59

## 2018-01-01 RX ADMIN — INSULIN LISPRO 2 UNITS: 100 INJECTION, SOLUTION INTRAVENOUS; SUBCUTANEOUS at 21:34

## 2018-01-01 RX ADMIN — DOCUSATE SODIUM 100 MG: 100 CAPSULE, LIQUID FILLED ORAL at 10:17

## 2018-01-01 RX ADMIN — CLOPIDOGREL 75 MG: 75 TABLET, FILM COATED ORAL at 08:43

## 2018-01-01 RX ADMIN — INSULIN ASPART 2 UNITS: 100 INJECTION, SOLUTION INTRAVENOUS; SUBCUTANEOUS at 11:30

## 2018-01-01 RX ADMIN — MEMANTINE HYDROCHLORIDE 10 MG: 10 TABLET, FILM COATED ORAL at 08:43

## 2018-01-01 RX ADMIN — INSULIN ASPART 6 UNITS: 100 INJECTION, SOLUTION INTRAVENOUS; SUBCUTANEOUS at 11:02

## 2018-01-01 RX ADMIN — INSULIN ASPART 4 UNITS: 100 INJECTION, SOLUTION INTRAVENOUS; SUBCUTANEOUS at 09:10

## 2018-01-01 RX ADMIN — ACETAMINOPHEN 1000 MG: 500 TABLET, FILM COATED ORAL at 11:51

## 2018-01-01 RX ADMIN — CARVEDILOL 25 MG: 25 TABLET, FILM COATED ORAL at 17:07

## 2018-01-01 RX ADMIN — VANCOMYCIN HYDROCHLORIDE 1000 MG: 1 INJECTION, SOLUTION INTRAVENOUS at 21:45

## 2018-01-01 RX ADMIN — BARIUM SULFATE 65 ML: 960 POWDER, FOR SUSPENSION ORAL at 12:05

## 2018-01-01 RX ADMIN — CARVEDILOL 25 MG: 25 TABLET, FILM COATED ORAL at 16:51

## 2018-01-01 RX ADMIN — CARVEDILOL 25 MG: 25 TABLET, FILM COATED ORAL at 09:20

## 2018-01-01 RX ADMIN — LEVOFLOXACIN 750 MG: 5 INJECTION, SOLUTION INTRAVENOUS at 11:28

## 2018-01-01 RX ADMIN — MUPIROCIN: 2 CREAM TOPICAL at 15:56

## 2018-01-01 RX ADMIN — MAGNESIUM SULFATE HEPTAHYDRATE 1 G: 1 INJECTION, SOLUTION INTRAVENOUS at 15:56

## 2018-01-01 RX ADMIN — CLOPIDOGREL 75 MG: 75 TABLET, FILM COATED ORAL at 12:06

## 2018-01-01 RX ADMIN — POTASSIUM CHLORIDE 20 MEQ: 1.5 POWDER, FOR SOLUTION ORAL at 09:41

## 2018-01-01 RX ADMIN — MEMANTINE HYDROCHLORIDE 10 MG: 10 TABLET, FILM COATED ORAL at 21:49

## 2018-01-01 RX ADMIN — INSULIN LISPRO 6 UNITS: 100 INJECTION, SOLUTION INTRAVENOUS; SUBCUTANEOUS at 12:32

## 2018-01-01 RX ADMIN — IPRATROPIUM BROMIDE AND ALBUTEROL SULFATE 3 ML: 2.5; .5 SOLUTION RESPIRATORY (INHALATION) at 07:22

## 2018-01-01 RX ADMIN — MIRTAZAPINE 15 MG: 15 TABLET, FILM COATED ORAL at 20:51

## 2018-01-03 ENCOUNTER — PATIENT OUTREACH (OUTPATIENT)
Dept: CASE MANAGEMENT | Facility: OTHER | Age: 83
End: 2018-01-03

## 2018-01-10 ENCOUNTER — PATIENT OUTREACH (OUTPATIENT)
Dept: CASE MANAGEMENT | Facility: OTHER | Age: 83
End: 2018-01-10

## 2018-01-18 ENCOUNTER — PATIENT OUTREACH (OUTPATIENT)
Dept: CASE MANAGEMENT | Facility: OTHER | Age: 83
End: 2018-01-18

## 2018-01-24 NOTE — OUTREACH NOTE
Skilled Nursing Facility Discharge Flowsheet:     Skilled Nursing Facility Discharge Assessment 1/24/2018   Acute Facility Discharged From -   Acute Discharge Date -   Name of the Skilled Nursing Facility? -   Tier Level of the Skilled Nursing Facility -   Purpose of SNF Admission -   Estimated length of stay for the patient? Will convert to Medicaid Long term care   Who is the insurance provider or payor of patient stay? -   Progression of Patient? Continues with Medicare skilled benefit for therapies and care

## 2018-01-26 PROBLEM — N39.0 ACUTE UTI: Status: ACTIVE | Noted: 2018-01-01

## 2018-01-30 NOTE — OUTREACH NOTE
Skilled Nursing Facility Discharge Flowsheet:     Skilled Nursing Facility Discharge Assessment 1/30/2018   Acute Facility Discharged From -   Acute Discharge Date -   Name of the Skilled Nursing Facility? -   Tier Level of the Skilled Nursing Facility -   Purpose of SNF Admission -   Estimated length of stay for the patient? Converted to Medicaid 1/3/18   Who is the insurance provider or payor of patient stay? Medicaid   Progression of Patient? LONG TERM CARE   Where was the patient discharged to? LTC

## 2018-01-31 NOTE — OUTREACH NOTE
Skilled Nursing Facility Discharge Flowsheet:     Skilled Nursing Facility Discharge Assessment 1/31/2018   Acute Facility Discharged From [facility]   Acute Discharge Date 1/31/2018   Name of the Skilled Nursing Facility? [facility] [place]   Tier Level of the Skilled Nursing Facility 3   Purpose of SNF Admission OT;PT;SN   Estimated length of stay for the patient? Long term Care   Who is the insurance provider or payor of patient stay? Other (Comment)   Progression of Patient? -   Where was the patient discharged to? -

## 2018-02-02 NOTE — PROGRESS NOTES
Case Management Discharge Note    Final Note: Pt dc'd to [facility]     Discharge Placement     Facility/Agency Request Status Selected? Address Phone Number Fax Number    [facility] Accepted    Yes [address], [facility] [address] [phone]        [facility] ambulance    Discharge Codes: 03  Discharged/transferred to skilled nursing facility (SNF) with Medicare certification in anticipation of skilled care

## 2018-02-14 NOTE — OUTREACH NOTE
Skilled Nursing Facility Discharge Flowsheet:     Skilled Nursing Facility Discharge Assessment 2/14/2018   Acute Facility Discharged From [facility]   Acute Discharge Date 1/31/2018   Name of the Skilled Nursing Facility? [facility]   Tier Level of the Skilled Nursing Facility 3   Purpose of SNF Admission OT;PT;SN   Estimated length of stay for the patient? Long term Care   Who is the insurance provider or payor of patient stay? Medicare   Progression of Patient? LONG TERM CARE   Where was the patient discharged to? -

## 2018-02-22 NOTE — OUTREACH NOTE
"Skilled Nursing Facility Discharge Flowsheet:     Skilled Nursing Facility Discharge Assessment 2/22/2018   Acute Facility Discharged From [facility]   Acute Discharge Date 1/31/2018   Name of the Skilled Nursing Facility? [facility]   Tier Level of the Skilled Nursing Facility 3   Purpose of SNF Admission OT;PT;SN   Estimated length of stay for the patient? Long term Care   Who is the insurance provider or payor of patient stay? Medicare   Progression of Patient? Dr. august documents \"non-compliant behavior.\"   Where was the patient discharged to? -     "

## 2018-02-28 NOTE — OUTREACH NOTE
Skilled Nursing Facility Discharge Flowsheet:     Skilled Nursing Facility Discharge Assessment 2/28/2018   Acute Facility Discharged From [facility]   Acute Discharge Date 1/31/2018   Name of the Skilled Nursing Facility? [facility]   Tier Level of the Skilled Nursing Facility 3   Purpose of SNF Admission OT;PT;SN   Estimated length of stay for the patient? Long term Care   Who is the insurance provider or payor of patient stay? Medicare   Progression of Patient? Plan Long term Care   Where was the patient discharged to? -

## 2018-03-07 NOTE — OUTREACH NOTE
Skilled Nursing Facility Discharge Flowsheet:     Skilled Nursing Facility Discharge Assessment 3/7/2018   Acute Facility Discharged From [facility]   Acute Discharge Date 1/31/2018   Name of the Skilled Nursing Facility? [facility]   Tier Level of the Skilled Nursing Facility 3   Purpose of SNF Admission OT;PT;SN   Estimated length of stay for the patient? Long term Care   Who is the insurance provider or payor of patient stay? Medicare   Progression of Patient? -   Where was the patient discharged to? -

## 2018-03-14 NOTE — OUTREACH NOTE
Skilled Nursing Facility Discharge Flowsheet:     Skilled Nursing Facility Discharge Assessment 3/14/2018   Acute Facility Discharged From [facility]   Acute Discharge Date 1/31/2018   Name of the Skilled Nursing Facility? [facility]   Tier Level of the Skilled Nursing Facility 3   Purpose of SNF Admission OT;PT;SN   Estimated length of stay for the patient? Long term Care   Who is the insurance provider or payor of patient stay? Medicare   Progression of Patient? Last Medicare covered day 3/16/18   Where was the patient discharged to? -

## 2018-03-19 NOTE — OUTREACH NOTE
Skilled Nursing Facility Discharge Flowsheet:     Skilled Nursing Facility Discharge Assessment 3/19/2018   Acute Facility Discharged From [facility]   Acute Discharge Date 1/31/2018   Name of the Skilled Nursing Facility? [facility]   Tier Level of the Skilled Nursing Facility 3   Purpose of SNF Admission OT;PT;SN   Estimated length of stay for the patient? Long term Care   Who is the insurance provider or payor of patient stay? Medicare   Progression of Patient? Last Medicare covered day 3/16/18   Where was the patient discharged to? LTC

## 2018-03-22 PROBLEM — E16.2 HYPOGLYCEMIA: Status: ACTIVE | Noted: 2018-01-01

## 2018-03-22 PROBLEM — I50.22 CHRONIC SYSTOLIC CONGESTIVE HEART FAILURE (HCC): Status: ACTIVE | Noted: 2018-01-01

## 2018-03-22 PROBLEM — J18.9 HCAP (HEALTHCARE-ASSOCIATED PNEUMONIA): Status: ACTIVE | Noted: 2018-01-01

## 2018-03-22 PROBLEM — E11.9 TYPE 2 DIABETES MELLITUS (HCC): Status: ACTIVE | Noted: 2018-01-01

## 2018-03-23 PROBLEM — G92.8 TOXIC METABOLIC ENCEPHALOPATHY: Status: ACTIVE | Noted: 2018-01-01

## 2018-03-27 PROBLEM — E11.649 TYPE 2 DIABETES MELLITUS WITH HYPOGLYCEMIA (HCC): Status: ACTIVE | Noted: 2018-01-01

## 2018-03-27 PROBLEM — F02.80 ALZHEIMER DISEASE (HCC): Status: ACTIVE | Noted: 2018-01-01

## 2018-03-27 PROBLEM — R82.71 BACTERIURIA: Status: ACTIVE | Noted: 2018-01-01

## 2018-03-27 PROBLEM — G30.9 ALZHEIMER DISEASE (HCC): Status: ACTIVE | Noted: 2018-01-01

## 2018-04-04 NOTE — OUTREACH NOTE
Skilled Nursing Facility Discharge Flowsheet:     Skilled Nursing Facility Discharge Assessment 4/4/2018   Acute Facility Discharged From [facility]   Acute Discharge Date 1/31/2018   Name of the Skilled Nursing Facility? [facility]   Tier Level of the Skilled Nursing Facility 3   Purpose of SNF Admission OT;PT;SN   Estimated length of stay for the patient? Long term Care   Who is the insurance provider or payor of patient stay? Medicare   Progression of Patient? Last Medicare covered day 3/16/18   Skilled Nursing Discharge Date? 3/17/2018   Where was the patient discharged to? LTC

## 2018-04-15 PROBLEM — J96.01 ACUTE HYPOXEMIC RESPIRATORY FAILURE (HCC): Status: ACTIVE | Noted: 2018-01-01

## 2018-04-15 PROBLEM — J18.9 PNEUMONIA OF RIGHT LOWER LOBE DUE TO INFECTIOUS ORGANISM: Status: ACTIVE | Noted: 2018-01-01

## 2018-04-15 PROBLEM — I50.33 CHF (CONGESTIVE HEART FAILURE), NYHA CLASS I, ACUTE ON CHRONIC, DIASTOLIC (HCC): Status: ACTIVE | Noted: 2018-01-01

## 2018-04-18 PROBLEM — E86.0 DEHYDRATION: Status: ACTIVE | Noted: 2018-01-01

## 2018-04-25 NOTE — OUTREACH NOTE
Skilled Nursing Facility Discharge Flowsheet:     Skilled Nursing Facility Discharge Assessment 4/25/2018   Acute Facility Discharged From [facility]   Acute Discharge Date 4/24/2018   Name of the Skilled Nursing Facility? [facility]   Tier Level of the Skilled Nursing Facility 3   Purpose of SNF Admission -   Estimated length of stay for the patient? Long term Care   Who is the insurance provider or payor of patient stay? Medicaid   Progression of Patient? Intermediate level of care   Skilled Nursing Discharge Date? 4/24/2018   Where was the patient discharged to? LTC

## 2018-05-12 NOTE — ED PROVIDER NOTES
EMERGENCY DEPARTMENT ENCOUNTER    CHIEF COMPLAINT  Chief Complaint: Altered mental status  History given by: Pt  History limited by: Dementia/ poor historian  Room Number: 38/38  PMD: Tay Lynch MD      HPI  Pt is a 87 y.o. female who presents via ambulance from [facility] due to Hyperglycemia.  Pt's glucose was reported to be greater than 600.  Pt states that she is taking all of her medications as directed.  Pt also states that she receives insulin injections but does not remember whether she received one today. Pt has no other complaints.   .    Duration:  PTA  Onset: Unknown  Timing: Episodic  Quality: >600  Intensity/Severity: Moderate  Progression: Unchanged  Associated Symptoms: None  Aggravating Factors: None  Alleviating Factors: None  Previous Episodes: Unknown  Treatment before arrival: None    PAST MEDICAL HISTORY  Active Ambulatory Problems     Diagnosis Date Noted   • Acute respiratory failure with hypoxia 08/08/2016   • Essential hypertension 08/12/2016   • Syncope and collapse 11/01/2017   • Bacteriuria 01/26/2018   • HCAP (healthcare-associated pneumonia) 03/22/2018   • Hypoglycemia 03/22/2018   • DM (diabetes mellitus) 03/22/2018   • Chronic systolic congestive heart failure 03/22/2018   • Toxic metabolic encephalopathy 03/23/2018   • Alzheimer disease 03/27/2018   • Pneumonia of right lower lobe due to infectious organism 04/15/2018   • Acute hypoxemic respiratory failure 04/15/2018   • CHF (congestive heart failure), NYHA class I, acute on chronic, diastolic 04/15/2018   • Dehydration 04/18/2018     Resolved Ambulatory Problems     Diagnosis Date Noted   • No Resolved Ambulatory Problems     Past Medical History:   Diagnosis Date   • Alzheimer disease    • Atherosclerotic heart disease    • Bronchitis    • CHF (congestive heart failure)    • COPD (chronic obstructive pulmonary disease)    • Dementia    • Depression    • Diabetes mellitus    • GERD (gastroesophageal reflux disease)    •  HTN (hypertension)    • Hypokalemia    • Insomnia    • Metabolic encephalopathy    • Neuropathy    • OA (osteoarthritis)    • Pneumonia    • Renal disorder    • TIA (transient ischemic attack)    • Upper respiratory infection        PAST SURGICAL HISTORY  Past Surgical History:   Procedure Laterality Date   • CARDIAC SURGERY         FAMILY HISTORY  History reviewed. No pertinent family history.    SOCIAL HISTORY  Social History     Social History   • Marital status: Single     Spouse name: N/A   • Number of children: 3   • Years of education: N/A     Occupational History   •       [employer]     Social History Main Topics   • Smoking status: Former Smoker     Quit date: 1980   • Smokeless tobacco: Not on file      Comment: unable to assess   • Alcohol use No   • Drug use: No   • Sexual activity: Defer     Other Topics Concern   • Not on file     Social History Narrative   • No narrative on file       ALLERGIES  Codeine; Latex; Other; Soma [carisoprodol]; and Sulfa antibiotics    REVIEW OF SYSTEMS  Review of Systems   Constitutional: Negative for fever.   HENT: Negative for sore throat.    Eyes: Negative.    Respiratory: Negative for cough and shortness of breath.    Cardiovascular: Negative for chest pain.   Gastrointestinal: Negative for abdominal pain, diarrhea and vomiting.   Endocrine:        Hyperglycemia   Genitourinary: Negative for dysuria.   Musculoskeletal: Negative for neck pain.   Skin: Negative for rash.   Allergic/Immunologic: Negative.    Neurological: Negative for weakness, numbness and headaches.   Hematological: Negative.    Psychiatric/Behavioral: Negative.    All other systems reviewed and are negative.      PHYSICAL EXAM  ED Triage Vitals [05/12/18 1431]   Temp Heart Rate Resp BP SpO2   -- 70 15 156/67 100 %      Temp src Heart Rate Source Patient Position BP Location FiO2 (%)   -- Monitor -- -- --       Physical Exam   Constitutional: She is oriented to person, place, and time and  well-developed, well-nourished, and in no distress. No distress.   HENT:   Head: Normocephalic and atraumatic.   Right Ear: Tympanic membrane normal.   Left Ear: Tympanic membrane normal.   Eyes: EOM are normal. Pupils are equal, round, and reactive to light.   Neck: Normal range of motion. Neck supple.   Cardiovascular: Normal rate, regular rhythm and normal heart sounds.    Pulmonary/Chest: Effort normal and breath sounds normal. No respiratory distress.   Abdominal: Soft. There is no tenderness. There is no rebound and no guarding.   Musculoskeletal: Normal range of motion. She exhibits no edema.   Neurological: She is alert and oriented to person, place, and time. She has normal sensation and normal strength.   Skin: Skin is warm and dry. No rash noted.   Psychiatric: Mood and affect normal.   Nursing note and vitals reviewed.      LAB RESULTS  Lab Results (last 24 hours)     Procedure Component Value Units Date/Time    POC Glucose Once [[id number]]  (Abnormal) Collected:  05/12/18 1452    Specimen:  Blood Updated:  05/12/18 1454     Glucose 468 (C) mg/dL     Narrative:       RN Notified R and V Meter: [id number] : [id number] Odalis MATA    CBC & Differential [[id number]] Collected:  05/12/18 1507    Specimen:  Blood Updated:  05/12/18 1519    Narrative:       The following orders were created for panel order CBC & Differential.  Procedure                               Abnormality         Status                     ---------                               -----------         ------                     CBC Auto Differential[[id number]]        Abnormal            Final result                 Please view results for these tests on the individual orders.    Comprehensive Metabolic Panel [[id number]]  (Abnormal) Collected:  05/12/18 1507    Specimen:  Blood Updated:  05/12/18 1550     Glucose 505 (C) mg/dL      BUN 16 mg/dL      Creatinine 1.14 (H) mg/dL      Sodium 132 (L) mmol/L      Potassium 4.0  mmol/L      Chloride 90 (L) mmol/L      CO2 31.0 (H) mmol/L      Calcium 8.9 mg/dL      Total Protein 6.5 g/dL      Albumin 3.60 g/dL      ALT (SGPT) 11 U/L      AST (SGOT) 12 U/L      Alkaline Phosphatase 76 U/L      Total Bilirubin 0.4 mg/dL      eGFR Non African Amer 45 (L) mL/min/1.73      Globulin 2.9 gm/dL      A/G Ratio 1.2 g/dL      BUN/Creatinine Ratio 14.0     Anion Gap 11.0 mmol/L     Narrative:       The MDRD GFR formula is only valid for adults with stable renal function between ages 18 and 70.    CBC Auto Differential [[id number]]  (Abnormal) Collected:  05/12/18 1507    Specimen:  Blood Updated:  05/12/18 1519     WBC 6.42 10*3/mm3      RBC 3.73 (L) 10*6/mm3      Hemoglobin 10.8 (L) g/dL      Hematocrit 33.8 (L) %      MCV 90.6 fL      MCH 29.0 pg      MCHC 32.0 (L) g/dL      RDW 13.9 (H) %      RDW-SD 46.3 fl      MPV 10.0 fL      Platelets 231 10*3/mm3      Neutrophil % 66.3 %      Lymphocyte % 26.2 %      Monocyte % 5.3 %      Eosinophil % 1.9 %      Basophil % 0.3 %      Immature Grans % 0.0 %      Neutrophils, Absolute 4.26 10*3/mm3      Lymphocytes, Absolute 1.68 10*3/mm3      Monocytes, Absolute 0.34 10*3/mm3      Eosinophils, Absolute 0.12 10*3/mm3      Basophils, Absolute 0.02 10*3/mm3      Immature Grans, Absolute 0.00 10*3/mm3     Urinalysis With / Culture If Indicated - Urine, Catheter [[id number]]  (Abnormal) Collected:  05/12/18 1548    Specimen:  Urine from Urine, Catheter Updated:  05/12/18 1619     Color, UA Yellow     Appearance, UA Clear     pH, UA 6.5     Specific Gravity, UA 1.021     Glucose, UA >=1000 mg/dL (3+) (A)     Ketones, UA Negative     Bilirubin, UA Negative     Blood, UA Negative     Protein, UA Negative     Leuk Esterase, UA Negative     Nitrite, UA Negative     Urobilinogen, UA 0.2 E.U./dL    Narrative:       Urine microscopic not indicated.    POC Glucose Once [[id number]]  (Abnormal) Collected:  05/12/18 1707    Specimen:  Blood Updated:  05/12/18 1708     Glucose 243  (H) mg/dL     Narrative:       Meter: [id number] : [id number] Altaf Olson RN          I ordered the above labs and reviewed the results        PROCEDURES  Procedures      PROGRESS AND CONSULTS  ED Course     Medications   sodium chloride 0.9 % infusion (125 mL/hr Intravenous New Bag 5/12/18 1513)   insulin regular (humuLIN R,novoLIN R) injection 10 Units (10 Units Intravenous Not Given 5/12/18 1708)   sodium chloride 0.9 % bolus 500 mL (500 mL Intravenous Not Given 5/12/18 1709)   insulin regular (humuLIN R,novoLIN R) injection 10 Units (10 Units Intravenous Given 5/12/18 1513)     1447  Ordered CMP, Urinalysis, and CBC for further evaluation.     1503  Blood glucose is 505. Ordered insulin injection    1603  Ordered POC glucose reading every hour.  Ordered insulin injection.     1707  Glucose is 243.  Rechecked Pt.  Discussed plan to discharge. Pt understands and agrees with the plan, all questions answered.       MEDICAL DECISION MAKING  Results were reviewed/discussed with the patient and they were also made aware of online access. Pt also made aware that some labs, such as cultures, will not be resulted during ER visit and follow up with PMD is necessary.     MDM  Number of Diagnoses or Management Options     Amount and/or Complexity of Data Reviewed  Clinical lab tests: reviewed and ordered (Labs show blood glucose levels: 505, 468, and 243.)  Decide to obtain previous medical records or to obtain history from someone other than the patient: yes  Review and summarize past medical records: yes (Pt was last admitted in mid April for RLL pneumonia)    Patient Progress  Patient progress: stable         DIAGNOSIS  Final diagnoses:   Acute hyperglycemia       DISPOSITION  DISCHARGE    Patient discharged in stable condition.    Reviewed implications of results, diagnosis, meds, responsibility to follow up, warning signs and symptoms of possible worsening, potential complications and reasons to return to  ER.    Patient/Family voiced understanding of above instructions.    Discussed plan for discharge, as there is no emergent indication for admission. Patient referred to primary care provider for BP management due to today's BP. Pt/family is agreeable and understands need for follow up and repeat testing.  Pt is aware that discharge does not mean that nothing is wrong but it indicates no emergency is present that requires admission and they must continue care with follow-up as given below or physician of their choice.     FOLLOW-UP  Tay Lynch MD  [address] [id number] [phone]      As needed    [facility] Emergency Department  [address] [facility] [id number] [phone]    As needed         Medication List      No changes were made to your prescriptions during this visit.           Latest Documented Vital Signs:  As of 5:38 PM  BP- 139/68 HR- 73 Temp- 97.5 °F (36.4 °C) (Tympanic) O2 sat- 99%    --  Documentation assistance provided by dayday Murillo for Dr. Benítez.  Information recorded by the scribe was done at my direction and has been verified and validated by me.     Reji Murillo  05/12/18 1729       Moncho Benítez MD  05/12/18 4175

## 2018-05-12 NOTE — ED NOTES
Removed pt's dirty brief from facility. Pt refused hospital's brief, stating it breaks down her skin. Pt placed on chux and educated on use of call light for restroom breaks and informed that her skin will not be irritated and staff will keep her clean and dry. Pt ok'ed.     Alida Roberto RN  05/12/18 1310

## 2018-05-12 NOTE — ED TRIAGE NOTES
Patient presents from [facility] with altered mental status via [facility].  Patient had critical high glucose (>600) with ems.  Patient is uncooperative with ems.

## 2018-11-19 PROBLEM — I50.9 CONGESTIVE HEART FAILURE (HCC): Status: ACTIVE | Noted: 2018-01-01

## 2018-11-19 NOTE — ED NOTES
Pt is from [facility] and sent to the ED for abnormal labs. Pts BNP was 3162.1. Pt c/o cough and has a known history of CHF. Pt is on 2 liters O2 all the time. Pts O2 sat is 96% on 2 liters O2.     Bettina Bee, RN  11/19/18 7875

## 2018-11-20 NOTE — PLAN OF CARE
Problem: Patient Care Overview  Goal: Plan of Care Review   11/20/18 0319   Coping/Psychosocial   Plan of Care Reviewed With patient   Plan of Care Review   Progress no change   OTHER   Outcome Summary Pt admitted to unit for CHF from [facility]. Pt is confused but is able to be redirected at times. IV lasix given. Purewick in place. VSS, denies any c/o of pain. Will continue to closely monitor.       Problem: Fall Risk (Adult)  Goal: Absence of Fall  Outcome: Ongoing (interventions implemented as appropriate)      Problem: Skin Injury Risk (Adult)  Goal: Skin Health and Integrity  Outcome: Ongoing (interventions implemented as appropriate)

## 2018-11-20 NOTE — PROGRESS NOTES
Clinical Pharmacy Services: Medication History    Bernardo Cox is a 87 y.o. female presenting to  for   Chief Complaint   Patient presents with   • Abnormal Lab       She  has a past medical history of Alzheimer disease, Atherosclerotic heart disease, Bronchitis, CHF (congestive heart failure) (CMS/formerly [facility]), COPD (chronic obstructive pulmonary disease) (CMS/formerly [facility]), Dementia, Depression, Diabetes mellitus (CMS/formerly [facility]), GERD (gastroesophageal reflux disease), HTN (hypertension), Hypokalemia, Insomnia, Metabolic encephalopathy, Neuropathy, OA (osteoarthritis), Pneumonia, Renal disorder, TIA (transient ischemic attack), and Upper respiratory infection.    Allergies as of 11/19/2018 - Reviewed 11/19/2018   Allergen Reaction Noted   • Codeine  08/07/2016   • Latex  08/07/2016   • Other  08/07/2016   • Soma [carisoprodol]  07/04/2017   • Sulfa antibiotics  08/07/2016       Medication information was obtained from: Nursing home  Pharmacy and Phone Number:     Prior to Admission Medications     Prescriptions Last Dose Informant Patient Reported? Taking?    acetaminophen (TYLENOL) 500 MG tablet 11/19/2018 Nursing Home Yes Yes    Take 1,000 mg by mouth Every 6 (Six) Hours As Needed for Mild Pain .    budesonide (PULMICORT) 0.5 MG/2ML nebulizer solution  Nursing Home Yes Yes    Take 0.5 mg by nebulization 2 (Two) Times a Day.    carvedilol (COREG) 25 MG tablet 11/19/2018 Nursing Home Yes Yes    Take 25 mg by mouth 2 (two) times a day with meals. Hold for SBP <100    clopidogrel (PLAVIX) 75 MG tablet 11/19/2018 Nursing Home Yes Yes    Take 75 mg by mouth daily.    dextromethorphan-guaifenesin (ROBITUSSIN-DM)  MG/5ML syrup  Nursing Home Yes Yes    Take 5 mL by mouth 4 (Four) Times a Day.    diphenhydrAMINE (BENADRYL) 25 mg capsule  Nursing Home Yes Yes    Take 25 mg by mouth Every 12 (Twelve) Hours As Needed for Itching.    diphenhydrAMINE-zinc acetate (BENADRYL EXTRA STRENGTH) 2-0.1 % cream  Nursing Home Yes  Yes    Apply  topically to the appropriate area as directed As Needed for Itching.    docusate sodium (COLACE) 250 MG capsule 11/19/2018 Nursing Home Yes Yes    Take 250 mg by mouth Daily.    donepezil (ARICEPT) 10 MG tablet 11/19/2018 Nursing Home Yes Yes    Take 10 mg by mouth every night at bedtime.    escitalopram (LEXAPRO) 10 MG tablet 11/19/2018 Nursing Home Yes Yes    Take 20 mg by mouth Daily.    furosemide (LASIX) 20 MG tablet  Nursing Home No Yes    Take 1 tablet by mouth Daily.    Patient taking differently:  Take 60 mg by mouth Daily.    furosemide (LASIX) 80 MG tablet  Nursing Home Yes Yes    Take 80 mg by mouth 2 (Two) Times a Day.    glucagon (GLUCAGON EMERGENCY) 1 MG injection  Nursing Home Yes Yes    Inject 1 mg under the skin 1 (One) Time As Needed for Low Blood Sugar.    insulin aspart (novoLOG) 100 UNIT/ML injection  Nursing Home Yes Yes    Inject  under the skin into the appropriate area as directed 3 (Three) Times a Day Before Meals. 200-299= 3 units  300-399= 5 units  400-499= 7 units  >500= 9 units    insulin glargine (LANTUS) 100 UNIT/ML injection  Nursing Home No Yes    Inject 8 Units under the skin Daily.    Patient taking differently:  Inject 20 Units under the skin into the appropriate area as directed Daily.    ipratropium-albuterol (DUO-NEB) 0.5-2.5 mg/mL nebulizer  Nursing Home Yes Yes    Take 3 mL by nebulization 4 (Four) Times a Day.    levoFLOXacin (LEVAQUIN) 500 MG tablet  Nursing Home Yes Yes    Take 500 mg by mouth Daily.    memantine (NAMENDA XR) 28 MG capsule sustained-release 24 hr extended release capsule 11/19/2018 Nursing Home Yes Yes    Take 28 mg by mouth Daily.    mirtazapine (REMERON) 15 MG tablet 11/19/2018 Nursing Home Yes Yes    Take 15 mg by mouth Every Night.    mupirocin (BACTROBAN) 2 % cream 11/19/2018 Nursing Home Yes Yes    Apply  topically to the appropriate area as directed 2 (Two) Times a Day.    nitroglycerin (NITROSTAT) 0.4 MG SL tablet  Nursing Home Yes  Yes    Place 0.4 mg under the tongue Every 5 (Five) Minutes As Needed for Chest Pain. Take no more than 3 doses in 15 minutes.    pantoprazole (PROTONIX) 40 MG EC tablet  Nursing Home Yes Yes    Take 40 mg by mouth Daily.    polyethylene glycol (MIRALAX) packet  Nursing Home Yes Yes    Take 17 g by mouth Daily.    potassium chloride ER (K-TAB) 20 MEQ tablet controlled-release ER tablet 11/19/2018 Nursing Home Yes Yes    Take 20 mEq by mouth Daily.    predniSONE (DELTASONE) 10 MG tablet  Nursing Home Yes Yes    Take 10 mg by mouth 2 (Two) Times a Day.    predniSONE (DELTASONE) 10 MG tablet  Nursing Home Yes Yes    Take 10 mg by mouth 3 (Three) Times a Day.    predniSONE (DELTASONE) 10 MG tablet  Nursing Home Yes Yes    Take 10 mg by mouth Daily.    QUEtiapine (SEROquel) 25 MG tablet  Nursing Home Yes Yes    Take 25 mg by mouth Daily.    SITagliptin (JANUVIA) 100 MG tablet  Nursing Home Yes Yes    Take 100 mg by mouth Daily.            Medication notes: Added Robitussin DM and Seroquel per facility records    This medication list is complete to the best of my knowledge as of 11/20/2018    Please call if questions.    Calli Covarrubias, Medication History Technician  11/20/2018 4:16 PM

## 2018-11-20 NOTE — PLAN OF CARE
Problem: Patient Care Overview  Goal: Plan of Care Review  Outcome: Ongoing (interventions implemented as appropriate)   11/20/18 4060   Coping/Psychosocial   Plan of Care Reviewed With patient   Plan of Care Review   Progress improving   OTHER   Outcome Summary Pt has been compliant today with alot of encouragement. Intermittently confused, has sitter, any teaching will need reinforcement.     Goal: Individualization and Mutuality  Outcome: Ongoing (interventions implemented as appropriate)    Goal: Interprofessional Rounds/Family Conf  Outcome: Ongoing (interventions implemented as appropriate)      Problem: Fall Risk (Adult)  Goal: Identify Related Risk Factors and Signs and Symptoms  Outcome: Ongoing (interventions implemented as appropriate)    Goal: Absence of Fall  Outcome: Ongoing (interventions implemented as appropriate)      Problem: Skin Injury Risk (Adult)  Goal: Identify Related Risk Factors and Signs and Symptoms  Outcome: Ongoing (interventions implemented as appropriate)    Goal: Skin Health and Integrity  Outcome: Ongoing (interventions implemented as appropriate)
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
## 2018-11-20 NOTE — SIGNIFICANT NOTE
11/20/18 0852   Rehab Time/Intention   Evaluation Not Performed (appropriate. )   Rehab Treatment   Discipline physical therapist  (pt from [facility]. on previous admissions pt was max A of 2, limited participation. currently adamently refusing skilled PT services, sitter bedside. pt not appropriate for skilled PT at this time. please reorder should pt become skilled )

## 2018-11-20 NOTE — ED PROVIDER NOTES
EMERGENCY DEPARTMENT ENCOUNTER    CHIEF COMPLAINT  Chief Complaint: abnormal lab  History given by: patient, family  History limited by: dementia  Room Number: S408/1  PMD: Tay Lynch MD      HPI:  Pt is a 87 y.o. female who presents to the ED after she had a reportedly elevated BNP drawn at [facility]. Pt's family states that the pt had the labs drawn because she had BLE edema, which has improved since the pt's BLE were wrapped at her NH. Pt's family states that the pt has had a cough and SOA recently as well. Pt denies CP, back pain or fever. Pt is on 2L O2 at baseline    Duration:  unspecified  Onset: gradual   Timing: constant  Quality: elevated BNP  Intensity/Severity: moderate  Progression: unspecified  Associated Symptoms: BLE edema, cough, SOA  Aggravating Factors: unknown  Alleviating Factors: unknown  Previous Episodes: Pt has a history of CHF  Treatment before arrival: Pt had labs drawn recently that showed an elevated BNP    PAST MEDICAL HISTORY  Active Ambulatory Problems     Diagnosis Date Noted   • Acute respiratory failure with hypoxia (CMS/[facility]) 08/08/2016   • Essential hypertension 08/12/2016   • Syncope and collapse 11/01/2017   • Bacteriuria 01/26/2018   • HCAP (healthcare-associated pneumonia) 03/22/2018   • Hypoglycemia 03/22/2018   • DM (diabetes mellitus) (CMS/[facility]) 03/22/2018   • Chronic systolic congestive heart failure (CMS/[facility]) 03/22/2018   • Toxic metabolic encephalopathy 03/23/2018   • Alzheimer disease 03/27/2018   • Pneumonia of right lower lobe due to infectious organism (CMS/[facility]) 04/15/2018   • Acute hypoxemic respiratory failure (CMS/[facility]) 04/15/2018   • CHF (congestive heart failure), NYHA class I, acute on chronic, diastolic (CMS/[facility]) 04/15/2018   • Dehydration 04/18/2018     Resolved Ambulatory Problems     Diagnosis Date Noted   • No Resolved Ambulatory Problems     Past Medical History:   Diagnosis Date   • Alzheimer disease    • Atherosclerotic heart disease    •  Bronchitis    • CHF (congestive heart failure) (CMS/[facility])    • COPD (chronic obstructive pulmonary disease) (CMS/[facility])    • Dementia    • Depression    • Diabetes mellitus (CMS/[facility])    • GERD (gastroesophageal reflux disease)    • HTN (hypertension)    • Hypokalemia    • Insomnia    • Metabolic encephalopathy    • Neuropathy    • OA (osteoarthritis)    • Pneumonia    • Renal disorder    • TIA (transient ischemic attack)    • Upper respiratory infection        PAST SURGICAL HISTORY  Past Surgical History:   Procedure Laterality Date   • CARDIAC SURGERY         FAMILY HISTORY  History reviewed. No pertinent family history.    SOCIAL HISTORY  Social History     Socioeconomic History   • Marital status: Single     Spouse name: Not on file   • Number of children: 3   • Years of education: Not on file   • Highest education level: Not on file   Social Needs   • Financial resource strain: Not on file   • Food insecurity - worry: Not on file   • Food insecurity - inability: Not on file   • Transportation needs - medical: Not on file   • Transportation needs - non-medical: Not on file   Occupational History     Comment: [employer]   Tobacco Use   • Smoking status: Former Smoker     Last attempt to quit: 1980     Years since quittin.9   • Tobacco comment: unable to assess   Substance and Sexual Activity   • Alcohol use: No   • Drug use: No   • Sexual activity: Defer   Other Topics Concern   • Not on file   Social History Narrative   • Not on file       ALLERGIES  Codeine; Latex; Other; Soma [carisoprodol]; and Sulfa antibiotics    REVIEW OF SYSTEMS  Review of Systems   Unable to perform ROS: Dementia   Constitutional: Negative for fever.   Respiratory: Positive for cough and shortness of breath.    Cardiovascular: Positive for leg swelling (BLE). Negative for chest pain.   Musculoskeletal: Negative for back pain.       PHYSICAL EXAM  ED Triage Vitals   Temp Heart Rate Resp BP SpO2   18 1645 18 1645 18  1645 11/19/18 1645 11/19/18 1645   97.9 °F (36.6 °C) 92 18 170/86 96 %      Temp src Heart Rate Source Patient Position BP Location FiO2 (%)   11/19/18 1645 -- 11/19/18 1845 11/19/18 1845 --   Tympanic  Sitting Right arm        Physical Exam   Constitutional: No distress.   Pt appears elderly   HENT:   Head: Normocephalic and atraumatic.   Eyes: EOM are normal. Pupils are equal, round, and reactive to light.   Neck: Normal range of motion. Neck supple.   Cardiovascular: Normal rate, regular rhythm and normal heart sounds.   Pulses:       Dorsalis pedis pulses are 2+ on the right side, and 2+ on the left side.   Pulmonary/Chest: Effort normal. No respiratory distress. She has rhonchi (clear with coughing).   Abdominal: Soft. There is no tenderness. There is no rebound and no guarding.   Musculoskeletal: Normal range of motion. She exhibits no edema.   Unna boots on BLE   Neurological: She is alert. She has normal sensation and normal strength. She is disoriented.   Skin: Skin is warm and dry. No rash noted.   Psychiatric: Mood and affect normal.   Nursing note and vitals reviewed.      LAB RESULTS  Lab Results (last 24 hours)     Procedure Component Value Units Date/Time    CBC & Differential [[id number]] Collected:  11/19/18 1741    Specimen:  Blood Updated:  11/19/18 1804    Narrative:       The following orders were created for panel order CBC & Differential.  Procedure                               Abnormality         Status                     ---------                               -----------         ------                     CBC Auto Differential[[id number]]        Abnormal            Final result                 Please view results for these tests on the individual orders.    Troponin [[id number]]  (Abnormal) Collected:  11/19/18 1741    Specimen:  Blood Updated:  11/19/18 1820     Troponin T 0.034 ng/mL     Narrative:       Troponin T Reference Ranges:  Less than 0.03 ng/mL:    Negative for AMI  0.03 to 0.09  ng/mL:      Indeterminant for AMI  Greater than 0.09 ng/mL: Positive for AMI    BNP [[id number]]  (Abnormal) Collected:  11/19/18 1741    Specimen:  Blood Updated:  11/19/18 1814     proBNP 24,570.0 pg/mL     Narrative:       Among patients with dyspnea, NT-proBNP is highly sensitive for the detection of acute congestive heart failure. In addition NT-proBNP of <300 pg/ml effectively rules out acute congestive heart failure with 99% negative predictive value.    CBC Auto Differential [[id number]]  (Abnormal) Collected:  11/19/18 1741    Specimen:  Blood Updated:  11/19/18 1804     WBC 14.78 10*3/mm3      RBC 4.41 10*6/mm3      Hemoglobin 11.0 g/dL      Hematocrit 39.0 %      MCV 88.4 fL      MCH 24.9 pg      MCHC 28.2 g/dL      RDW 14.6 %      RDW-SD 47.2 fl      MPV 8.9 fL      Platelets 316 10*3/mm3      Neutrophil % 85.4 %      Lymphocyte % 6.5 %      Monocyte % 5.5 %      Eosinophil % 0.3 %      Basophil % 0.3 %      Immature Grans % 2.0 %      Neutrophils, Absolute 12.62 10*3/mm3      Lymphocytes, Absolute 0.96 10*3/mm3      Monocytes, Absolute 0.82 10*3/mm3      Eosinophils, Absolute 0.04 10*3/mm3      Basophils, Absolute 0.04 10*3/mm3      Immature Grans, Absolute 0.30 10*3/mm3     Comprehensive Metabolic Panel [[id number]]  (Abnormal) Collected:  11/19/18 2019    Specimen:  Blood Updated:  11/19/18 2120     Glucose 422 mg/dL      BUN 30 mg/dL      Creatinine 1.12 mg/dL      Sodium 132 mmol/L      Potassium 4.5 mmol/L      Chloride 92 mmol/L      CO2 29.3 mmol/L      Calcium 8.8 mg/dL      Total Protein 6.6 g/dL      Albumin 2.80 g/dL      ALT (SGPT) 9 U/L      AST (SGOT) 10 U/L      Alkaline Phosphatase 151 U/L      Total Bilirubin 0.5 mg/dL      eGFR Non African Amer 46 mL/min/1.73      Globulin 3.8 gm/dL      A/G Ratio 0.7 g/dL      BUN/Creatinine Ratio 26.8     Anion Gap 10.7 mmol/L     Narrative:       The MDRD GFR formula is only valid for adults with stable renal function between ages 18 and 70.     "Procalcitonin [[id number]]  (Abnormal) Collected:  11/19/18 2019    Specimen:  Blood Updated:  11/19/18 2112     Procalcitonin 0.08 ng/mL     Narrative:       As a Marker for Sepsis (Non-Neonates):   1. <0.5 ng/mL represents a low risk of severe sepsis and/or septic shock.  1. >2 ng/mL represents a high risk of severe sepsis and/or septic shock.    As a Marker for Lower Respiratory Tract Infections that require antibiotic therapy:  PCT on Admission     Antibiotic Therapy             6-12 Hrs later  > 0.5                Strongly Recommended            >0.25 - <0.5         Recommended  0.1 - 0.25           Discouraged                   Remeasure/reassess PCT  <0.1                 Strongly Discouraged          Remeasure/reassess PCT      As 28 day mortality risk marker: \"Change in Procalcitonin Result\" (> 80 % or <=80 %) if Day 0 (or Day 1) and Day 4 values are available. Refer to http://www.E-Signpct-calculator.com/   Change in PCT <=80 %   A decrease of PCT levels below or equal to 80 % defines a positive change in PCT test result representing a higher risk for 28-day all-cause mortality of patients diagnosed with severe sepsis or septic shock.  Change in PCT > 80 %   A decrease of PCT levels of more than 80 % defines a negative change in PCT result representing a lower risk for 28-day all-cause mortality of patients diagnosed with severe sepsis or septic shock.                      I ordered the above labs and reviewed the results    RADIOLOGY  XR Chest 1 View   Final Result   Small likely atelectasis or infiltrate at the right base,   follow-up suggested. Tortuous aorta.       This report was finalized on 11/19/2018 6:52 PM by Dr. Micky Olson M.D.               I ordered the above noted radiological studies. Interpreted by radiologist.  Reviewed by me in PACS.       PROCEDURES  Procedures  EKG          EKG time: 1755  Rhythm/Rate: NSR, 94  P waves and IA: normal  QRS, axis: IVCD   ST and T waves: " non-specific ST and T wave changes     Interpreted Contemporaneously by me, independently viewed  Unchanged compared to prior on 4/15/18    PROGRESS AND CONSULTS  ED Course as of Nov 19 2230   Mon Nov 19, 2018   1722 Elevated BNP  Family denies all symptoms except cough  [KG]      ED Course User Index  [KG] Phyllis Fung, APRN     2035- Notified pt and family of the pt's lab and imaging results, including the pt's CXR, and that I am waiting on additional lab results prior to disposition. Pt understands and agrees with the plan, all questions answered.    2141- Placed call to Dr. Butcher (on call for PCP) for admission.    2142- Rechecked pt. Pt is resting comfortably. Notified pt and family of the pt's lab and imaging results, including the pt's BNP, elevated troponin and hyperglycemia. Discussed the plan to admit the pt for further evaluation and diuresis. Pt and family agree with the plan and all questions were addressed.    2146- Discussed the pt's case with Dr. Butcher (on call for PCP), who agrees to admit the pt to a telemetry bed.    MEDICAL DECISION MAKING  Results were reviewed/discussed with the patient and they were also made aware of online access. Pt also made aware that some labs, such as cultures, will not be resulted during ER visit and follow up with PMD is necessary.     MDM  Number of Diagnoses or Management Options  JASVIR (acute kidney injury) (CMS/HCC):   Congestive heart failure, unspecified HF chronicity, unspecified heart failure type (CMS/HCC):   Elevated troponin:   Hyperglycemia:      Amount and/or Complexity of Data Reviewed  Clinical lab tests: ordered and reviewed (BNP=24,570)  Tests in the radiology section of CPT®: ordered and reviewed (CXR shows small likely atelectasis or infiltrate at the right lung base)  Tests in the medicine section of CPT®: ordered and reviewed (See EKG procedure note)  Decide to obtain previous medical records or to obtain history from someone other than the  patient: yes  Obtain history from someone other than the patient: yes (family)  Review and summarize past medical records: yes (Pt was last admitted in April 2018 for RLL pneumonia)  Discuss the patient with other providers: yes (D/w Dr. Butcher (on call for PCP))  Independent visualization of images, tracings, or specimens: yes           DIAGNOSIS  Final diagnoses:   Congestive heart failure, unspecified HF chronicity, unspecified heart failure type (CMS/[facility])   Elevated troponin   JASVIR (acute kidney injury) (CMS/[facility])   Hyperglycemia       DISPOSITION  ADMISSION    Discussed treatment plan and reason for admission with pt/family and admitting physician.  Pt/family voiced understanding of the plan for admission for further testing/treatment as needed.     Latest Documented Vital Signs:  As of 10:30 PM  BP- 159/79 HR- 99 Temp- 97.9 °F (36.6 °C) (Tympanic) O2 sat- 100%    --  Documentation assistance provided by dayday Pereira for Dr. Ayala.  Information recorded by the scribe was done at my direction and has been verified and validated by me.     Yessenia Pereira  11/19/18 2843       Tay Ayala MD  11/20/18 0069

## 2018-11-20 NOTE — CONSULTS
Date of Hospital Visit:18  Encounter Provider: Edda Andersen, RN EXTENDER  Place of Service: [facility]  Patient Name: Bernardo Cox  :1931  Referral Provider: Sanjay Butcher MD    Chief complaint: dyspnea and edema.    History of Present Illness  Ms. Cox is an 87 year old woman who has been seen by Dr. Jones and Dr. Fields in the past. She has a history coronary artery disease s/p CABG x 6 in , heart failure, dementia, diabetes, TIA, and peripheral vascular disease. In , she had CABG x 6 LIMA to the LAD and SVG's to the LAD, RCA, PDA, OM1 and OM2) then BILLY to SVG to the LAD). In , she she had cardiac catheterization which showed normal EF, atretic LIMA, and 90% disease of SVG to LAD but all other grafts patent. The SVG to LAD was treated with BILLY. She had an echocardiogram in  which showed EF 50% and mild mitral insufficiency. He was seen in consultation by Dr. Fields in 2017 for syncope and mildly elevated troponins. She had an echocardiogram which showed EF 56%, akinesis of inferior wall segments, hypokinesis of inferoseptal and inferolateral wall segments, severely increased volume of LA, moderate to severe thickening of aortic valve, and moderate MAC with moderate insufficiency. Her medication was titrated and she was discharged back to nursing home. She was readmitted in 2018 for weakness and UTI. She had another echocardiogram which showed EF 66%, mild to moderately dilated left atrium, mild to moderate LVH, mild aortic stenosis, severe mitral insufficiency, and RVSP 35-45mmHg.     She was brought to ED from nursing home on 18 after having a BNP drawn which was elevated following BLE edema, dyspnea, and cough. Upon arrival, /86 and HR 92; ekg showed sinus rhythm. Labs: proBNP 64999, troponin 0.034, WBC 14.78, Glu 422, BUN 30, Crea 1.12. CXR showed atelectasis vs. Infiltrate of the right base. We have  been asked to see for CHF exacerbation.  She denies all being out of breath.    Cardiac Testing:  Echocardiogram 1/31/18  Interpretation Summary     · Left ventricular systolic function is normal. Calculated EF = 66.7%. Estimated EF was in agreement with the calculated EF. The left ventricular cavity is mild-to-moderately dilated. Left ventricular wall thickness is consistent with mild-to-moderate concentric hypertrophy.  · Left ventricular diastolic dysfunction (grade II) consistent with pseudonormalization.  · Mild aortic valve stenosis is present.  · Severe mitral valve regurgitation is present.  · Estimated right ventricular systolic pressure from tricuspid regurgitation is mildly elevated (35-45 mmHg).     Echocardiogram 11/3/17  Interpretation Summary     · Left ventricular systolic function is normal. Calculated EF = 56.6%. Estimated EF was in agreement with the calculated EF. Estimated EF = 57%. Normal left ventricular cavity size and wall thickness noted. Left ventricular diastolic was unable to be assessed. Elevated left atrial pressure.  · The following segments are akinetic: basal inferior and mid inferior. The following segments are hypokinetic: basal inferoseptal and basal inferolateral. All other segments are normal.  · Left atrial volume is severely increased.  · Mildly reduced right ventricular systolic function noted.  · Right atrial cavity size is moderately dilated.  · The aortic valve is abnormal in structure. There is moderate-to-severe thickening of the aortic valve.  · Moderate mitral annular calcification is present. Moderate mitral valve regurgitation is present.  · Trace tricuspid valve regurgitation is present. Estimated right ventricular systolic pressure from tricuspid regurgitation is normal (<35 mmHg). Calculated right ventricular systolic pressure from tricuspid regurgitation is 20 mmHg.     Echocardiogram 9/3/14      Past Medical History:   Diagnosis Date   • Alzheimer disease    •  Atherosclerotic heart disease    • Bronchitis    • CHF (congestive heart failure) (CMS/[facility])     EF = 25%   • COPD (chronic obstructive pulmonary disease) (CMS/[facility])    • Dementia    • Depression    • Diabetes mellitus (CMS/[facility])    • GERD (gastroesophageal reflux disease)    • HTN (hypertension)    • Hypokalemia    • Insomnia    • Metabolic encephalopathy    • Neuropathy    • OA (osteoarthritis)    • Pneumonia    • Renal disorder     stage three   • TIA (transient ischemic attack)    • Upper respiratory infection        Past Surgical History:   Procedure Laterality Date   • CARDIAC SURGERY         No current facility-administered medications on file prior to encounter.      Current Outpatient Medications on File Prior to Encounter   Medication Sig Dispense Refill   • acetaminophen (TYLENOL) 500 MG tablet Take 1,000 mg by mouth Every 6 (Six) Hours As Needed for Mild Pain .     • carvedilol (COREG) 25 MG tablet Take 25 mg by mouth 2 (two) times a day with meals. Hold for SBP <100     • clopidogrel (PLAVIX) 75 MG tablet Take 75 mg by mouth daily.     • docusate sodium (COLACE) 250 MG capsule Take 100 mg by mouth Daily.     • donepezil (ARICEPT) 10 MG tablet Take 10 mg by mouth every night at bedtime.     • escitalopram (LEXAPRO) 10 MG tablet Take 20 mg by mouth Daily.     • furosemide (LASIX) 80 MG tablet Take 80 mg by mouth 2 (Two) Times a Day.     • memantine (NAMENDA XR) 28 MG capsule sustained-release 24 hr extended release capsule Take 10 mg by mouth Daily.     • mirtazapine (REMERON) 15 MG tablet Take 15 mg by mouth Every Night.     • mupirocin (BACTROBAN) 2 % cream Apply  topically to the appropriate area as directed 2 (Two) Times a Day.     • potassium chloride ER (K-TAB) 20 MEQ tablet controlled-release ER tablet Take 20 mEq by mouth Daily.     • [START ON 11/23/2018] predniSONE (DELTASONE) 10 MG tablet Take 10 mg by mouth 2 (Two) Times a Day.     • budesonide (PULMICORT) 0.5 MG/2ML nebulizer solution Take 0.5  mg by nebulization 2 (two) times a day.     • diphenhydrAMINE (BENADRYL) 25 mg capsule Take 25 mg by mouth Every 12 (Twelve) Hours As Needed for Itching.     • diphenhydrAMINE-zinc acetate (BENADRYL EXTRA STRENGTH) 2-0.1 % cream Apply  topically to the appropriate area as directed As Needed for Itching.     • furosemide (LASIX) 20 MG tablet Take 1 tablet by mouth Daily. 30 tablet 0   • glucagon (GLUCAGON EMERGENCY) 1 MG injection Inject 1 mg under the skin 1 (One) Time As Needed for Low Blood Sugar.     • insulin aspart (novoLOG) 100 UNIT/ML injection Inject  under the skin into the appropriate area as directed 3 (Three) Times a Day Before Meals. 200-299= 3 units  300-399= 5 units  400-499= 7 units  >500= 9 units     • insulin glargine (LANTUS) 100 UNIT/ML injection Inject 8 Units under the skin Daily. (Patient taking differently: Inject 20 Units under the skin into the appropriate area as directed Daily.) 240 Units 0   • ipratropium-albuterol (DUO-NEB) 0.5-2.5 mg/mL nebulizer Take 3 mL by nebulization every 3 (three) hours as needed for wheezing.     • levoFLOXacin (LEVAQUIN) 500 MG tablet Take 500 mg by mouth Daily.     • nitroglycerin (NITROSTAT) 0.3 MG SL tablet Place 0.4 mg under the tongue Every 5 (Five) Minutes As Needed for Chest Pain. Take no more than 3 doses in 15 minutes.     • pantoprazole (PROTONIX) 40 MG EC tablet Take 40 mg by mouth Daily.     • polyethylene glycol (MIRALAX) packet Take 17 g by mouth Every 72 (Seventy-Two) Hours As Needed.     • predniSONE (DELTASONE) 10 MG tablet Take 10 mg by mouth 3 (Three) Times a Day.     • [START ON 11/27/2018] predniSONE (DELTASONE) 10 MG tablet Take 10 mg by mouth Daily.     • SITagliptin (JANUVIA) 100 MG tablet Take 100 mg by mouth Daily.         Social History     Socioeconomic History   • Marital status: Single     Spouse name: Not on file   • Number of children: 3   • Years of education: Not on file   • Highest education level: Not on file   Social Needs  "  • Financial resource strain: Not on file   • Food insecurity - worry: Not on file   • Food insecurity - inability: Not on file   • Transportation needs - medical: Not on file   • Transportation needs - non-medical: Not on file   Occupational History     Comment: [employer]   Tobacco Use   • Smoking status: Former Smoker     Last attempt to quit: 1980     Years since quittin.9   • Tobacco comment: unable to assess   Substance and Sexual Activity   • Alcohol use: No   • Drug use: No   • Sexual activity: Defer   Other Topics Concern   • Not on file   Social History Narrative   • Not on file       History reviewed. No pertinent family history.     REVIEW OF SYSTEMS:   All ROS was performed and is Negative except as outlined in HPI     Objective:     Vitals:    18 2118 18 2119 18 2236 18 0745   BP:   142/72 135/63   BP Location:   Left arm Left arm   Patient Position:   Lying Lying   Pulse: 99  96 105   Resp:  18 18 20   Temp:   99.2 °F (37.3 °C) 97.6 °F (36.4 °C)   TempSrc:   Oral Oral   SpO2: 100% 100% 95% 97%   Weight:   59.6 kg (131 lb 6.4 oz)    Height:   165.1 cm (65\")      Body mass index is 21.87 kg/m².  Flowsheet Rows      First Filed Value   Admission Height  165.1 cm (65\") Documented at 2018 1645   Admission Weight  66.2 kg (146 lb) Documented at 2018 1845          Physical Exam   Physical Exam   Constitutional: She appears well-developed and well-nourished. No distress.   HENT:   Head: Normocephalic.   Eyes: Conjunctivae are normal. Pupils are equal, round, and reactive to light. No scleral icterus.   Neck: Normal carotid pulses, no hepatojugular reflux and no JVD present. Carotid bruit is not present. No tracheal deviation, no edema and no erythema present. No thyromegaly present.   Cardiovascular: Normal rate, regular rhythm, S1 normal, S2 normal and intact distal pulses.  No extrasystoles are present. PMI is not displaced. Exam reveals no gallop, no distant " heart sounds and no friction rub.   Murmur heard.   Systolic murmur is present with a grade of 2/6 at the apex.  Pulses:       Carotid pulses are 2+ on the right side, and 2+ on the left side.       Radial pulses are 2+ on the right side, and 2+ on the left side.        Femoral pulses are 2+ on the right side, and 2+ on the left side.       Dorsalis pedis pulses are 2+ on the right side, and 2+ on the left side.        Posterior tibial pulses are 2+ on the right side, and 2+ on the left side.   Pulmonary/Chest: Effort normal. No respiratory distress. She has no decreased breath sounds. She has no wheezes. She has no rhonchi. She has rales in the right lower field and the left lower field. She exhibits no tenderness.   Abdominal: Soft. Bowel sounds are normal. She exhibits no distension and no mass. There is no hepatosplenomegaly. There is no tenderness. There is no rebound and no guarding.   Musculoskeletal: She exhibits no edema, tenderness or deformity.   Neurological: She is alert. She is disoriented.   Skin: Skin is warm and dry. No rash noted. She is not diaphoretic. No cyanosis or erythema. No pallor. Nails show no clubbing.   Psychiatric: She has a normal mood and affect. Her speech is normal and behavior is normal. Judgment and thought content normal.       Lab Review:                Results from last 7 days   Lab Units  11/20/18   0517   SODIUM mmol/L  130*   POTASSIUM mmol/L  4.1   CHLORIDE mmol/L  93*   CO2 mmol/L  25.0   BUN mg/dL  28*   CREATININE mg/dL  0.96   GLUCOSE mg/dL  476*   CALCIUM mg/dL  8.8     Results from last 7 days   Lab Units  11/19/18   1741   TROPONIN T ng/mL  0.034*     Results from last 7 days   Lab Units  11/20/18   0517   WBC 10*3/mm3  11.56*   HEMOGLOBIN g/dL  10.1*   HEMATOCRIT %  33.5*   PLATELETS 10*3/mm3  337                     Telemetry:        EKG:          I personally viewed and interpreted the patient's EKG/Telemetry data     Assessment:   1.  87-year-old female with  acute heart failure.  In the past she's had normal LV systolic function but severe mitral insufficiency.  Heart failure could be due to the severe MR.  She's really not a candidate for any type of intervention would increase diuretics.  Adjust by mouth medication follow her clinically.  2.  History of coronary artery disease with previous corneal bypass grafting no reported angina continue home therapy.  3.  Remote TIA.  4.  History diabetes mellitus followed blood sugars.  5.  History of hypertension follow blood pressure.  6.  Hyperlipidemia continue home therapy.  7.  History of peripheral vascular disease.                   Plan:

## 2018-11-20 NOTE — H&P
HISTORY AND PHYSICAL   [facility], [facility]          Patient Identification:    Name: Bernardo Cox  Age: 87 y.o.  Sex: female  :  1931  MRN: [id number]                       Primary Care Physician: Tay Lynch MD    Chief Complaint:      Chief Complaint   Patient presents with   • Abnormal Lab         History of Present Illness:   Patient is a 86 y/o female, she has h/o CHF, Alzheimer dementia, COPD, DM, TIA. She developed worsening LE edema up to 4+, as well as cough and SOB. Labs done at NH revealed elevated BNP; 3162. She was sent to ER, she was found to have [place] [address], WBC 14.78, BS, BUN and creatinine elevated. CXR revealed atelectasis in lower bases. Patient was admitted for further management    Past Medical History:  Past Medical History:   Diagnosis Date   • Alzheimer disease    • Atherosclerotic heart disease    • Bronchitis    • CHF (congestive heart failure) (CMS/[facility])     EF = 25%   • COPD (chronic obstructive pulmonary disease) (CMS/[facility])    • Dementia    • Depression    • Diabetes mellitus (CMS/[facility])    • GERD (gastroesophageal reflux disease)    • HTN (hypertension)    • Hypokalemia    • Insomnia    • Metabolic encephalopathy    • Neuropathy    • OA (osteoarthritis)    • Pneumonia    • Renal disorder     stage three   • TIA (transient ischemic attack)    • Upper respiratory infection      Past Surgical History:  Past Surgical History:   Procedure Laterality Date   • CARDIAC SURGERY        Home Meds:  Medications Prior to Admission   Medication Sig Dispense Refill Last Dose   • acetaminophen (TYLENOL) 500 MG tablet Take 1,000 mg by mouth Every 6 (Six) Hours As Needed for Mild Pain .   2018 at Unknown time   • budesonide (PULMICORT) 0.5 MG/2ML nebulizer solution Take 0.5 mg by nebulization 2 (Two) Times a Day.      • carvedilol (COREG) 25 MG tablet Take 25 mg by mouth 2 (two) times a day with meals. Hold for SBP <100   2018 at Unknown time   •  clopidogrel (PLAVIX) 75 MG tablet Take 75 mg by mouth daily.   11/19/2018 at Unknown time   • dextromethorphan-guaifenesin (ROBITUSSIN-DM)  MG/5ML syrup Take 5 mL by mouth 4 (Four) Times a Day.      • diphenhydrAMINE (BENADRYL) 25 mg capsule Take 25 mg by mouth Every 12 (Twelve) Hours As Needed for Itching.      • diphenhydrAMINE-zinc acetate (BENADRYL EXTRA STRENGTH) 2-0.1 % cream Apply  topically to the appropriate area as directed As Needed for Itching.      • docusate sodium (COLACE) 250 MG capsule Take 250 mg by mouth Daily.   11/19/2018 at Unknown time   • donepezil (ARICEPT) 10 MG tablet Take 10 mg by mouth every night at bedtime.   11/19/2018 at Unknown time   • escitalopram (LEXAPRO) 10 MG tablet Take 20 mg by mouth Daily.   11/19/2018 at Unknown time   • furosemide (LASIX) 20 MG tablet Take 1 tablet by mouth Daily. (Patient taking differently: Take 60 mg by mouth Daily.) 30 tablet 0 4/14/2018 at Unknown time   • furosemide (LASIX) 80 MG tablet Take 80 mg by mouth 2 (Two) Times a Day.      • glucagon (GLUCAGON EMERGENCY) 1 MG injection Inject 1 mg under the skin 1 (One) Time As Needed for Low Blood Sugar.      • insulin aspart (novoLOG) 100 UNIT/ML injection Inject  under the skin into the appropriate area as directed 3 (Three) Times a Day Before Meals. 200-299= 3 units  300-399= 5 units  400-499= 7 units  >500= 9 units      • insulin glargine (LANTUS) 100 UNIT/ML injection Inject 8 Units under the skin Daily. (Patient taking differently: Inject 20 Units under the skin into the appropriate area as directed Daily.) 240 Units 0    • ipratropium-albuterol (DUO-NEB) 0.5-2.5 mg/mL nebulizer Take 3 mL by nebulization 4 (Four) Times a Day.      • levoFLOXacin (LEVAQUIN) 500 MG tablet Take 500 mg by mouth Daily.      • memantine (NAMENDA XR) 28 MG capsule sustained-release 24 hr extended release capsule Take 28 mg by mouth Daily.   11/19/2018 at Unknown time   • mirtazapine (REMERON) 15 MG tablet Take 15 mg  by mouth Every Night.   2018 at Unknown time   • mupirocin (BACTROBAN) 2 % cream Apply  topically to the appropriate area as directed 2 (Two) Times a Day.   2018 at Unknown time   • nitroglycerin (NITROSTAT) 0.4 MG SL tablet Place 0.4 mg under the tongue Every 5 (Five) Minutes As Needed for Chest Pain. Take no more than 3 doses in 15 minutes.      • pantoprazole (PROTONIX) 40 MG EC tablet Take 40 mg by mouth Daily.      • polyethylene glycol (MIRALAX) packet Take 17 g by mouth Daily.      • potassium chloride ER (K-TAB) 20 MEQ tablet controlled-release ER tablet Take 20 mEq by mouth Daily.   2018 at Unknown time   • [START ON 2018] predniSONE (DELTASONE) 10 MG tablet Take 10 mg by mouth 2 (Two) Times a Day.      • predniSONE (DELTASONE) 10 MG tablet Take 10 mg by mouth 3 (Three) Times a Day.      • [START ON 2018] predniSONE (DELTASONE) 10 MG tablet Take 10 mg by mouth Daily.      • QUEtiapine (SEROquel) 25 MG tablet Take 25 mg by mouth Daily.      • SITagliptin (JANUVIA) 100 MG tablet Take 100 mg by mouth Daily.          Allergies:  Allergies   Allergen Reactions   • Codeine    • Latex    • Other      Plastic tape, canned fish   • Soma [Carisoprodol]    • Sulfa Antibiotics      Immunizations:  There is no immunization history for the selected administration types on file for this patient.  Social History:   Social History     Social History Narrative   • Not on file     Social History     Tobacco Use   • Smoking status: Former Smoker     Last attempt to quit: 1980     Years since quittin.9   • Tobacco comment: unable to assess   Substance Use Topics   • Alcohol use: No     Family History:  History reviewed. No pertinent family history.     Review of Systems  See history of present illness and past medical history.   Unable to perform ROS: Dementia   Constitutional: Negative for fever.   Respiratory: Positive for cough and shortness of breath.    Cardiovascular: Positive for leg  "swelling (BLE). Negative for chest pain.   Musculoskeletal: Negative for back pain.                 Objective:    Exam:    tMax 24 hrs: Temp (24hrs), Av.6 °F (36.4 °C), Min:97.2 °F (36.2 °C), Max:98.1 °F (36.7 °C)    Vitals Ranges:   Temp:  [97.2 °F (36.2 °C)-98.1 °F (36.7 °C)] 97.4 °F (36.3 °C)  Heart Rate:  [77-88] 77  Resp:  [18] 18  BP: (121-138)/(56-65) 138/65    /65 (BP Location: Left arm, Patient Position: Lying)   Pulse 77   Temp 97.4 °F (36.3 °C) (Oral)   Resp 18   Ht 165.1 cm (65\")   Wt 59.6 kg (131 lb 6.4 oz)   SpO2 99%   BMI 21.87 kg/m²     General: Alert, oriented x  1-2. Cooperative, some distress due to low O2. BS elevated  HEENT:    Head: Normocephalic, without obvious abnormality, atraumatic  Eyes: EOM are normal. Pupils are equal, round, and reactive to light.   Neck: Supple, symmetrical, trachea midline, no adenopathy;              thyroid:  no enlargement/tenderness/nodules;              no carotid bruit or JVD  Cardiovascular: Normal rate, regular rhythm and intact distal pulses.              Exam reveals no gallop and no friction rub. No murmur heard  Chest wall: No tenderness or deformity  Pulmonary: Decrease BS luis. No wheezing or rales.   Abdominal: Soft. Soft, non-tender, bowel sounds active all four quadrants,     no masses, no hepatomegaly, no splenomegaly.   Extremities: Normal, atraumatic, no cyanosis. +++ LE edema  Pulses: 2 + symmetric all extremities  Neurological: Patient is alert and oriented to person, place, and time.                 CNII-XII intact, normal strength, sensation intact throughout  Skin: Skin color, texture, normal. Turgor is decreased. No rashes or lesions      Data Review:    Results from last 7 days   Lab Units  18   0351  18   0517  18   1741   WBC 10*3/mm3  10.16  11.56*  14.78*   HEMOGLOBIN g/dL  10.1*  10.1*  11.0*   HEMATOCRIT %  34.1*  33.5*  39.0   PLATELETS 10*3/mm3  290  337  316       Results from last 7 days   Lab " Units  11/21/18   0351  11/20/18   0517  11/19/18 2019   SODIUM mmol/L  132*  130*  132*   POTASSIUM mmol/L  3.5  4.1  4.5   CHLORIDE mmol/L  93*  93*  92*   CO2 mmol/L  26.6  25.0  29.3*   BUN mg/dL  23  28*  30*   CREATININE mg/dL  0.89  0.96  1.12*   CALCIUM mg/dL  8.7  8.8  8.8   BILIRUBIN mg/dL   --    --   0.5   ALK PHOS U/L   --    --   151*   ALT (SGPT) U/L   --    --   9   AST (SGOT) U/L   --    --   10   GLUCOSE mg/dL  290*  476*  422*                 Lab Results   Lab Value Date/Time    TROPONINT 0.034 (H) 11/19/2018 1741    TROPONINT <0.010 04/15/2018 0151    TROPONINT <0.010 03/26/2018 0401    TROPONINT <0.010 03/23/2018 0613    TROPONINT 0.035 (H) 01/26/2018 1040    TROPONINT 0.023 11/03/2017 0510    TROPONINT 0.052 (H) 11/02/2017 0302    TROPONINT 0.054 (H) 11/01/2017 2105    TROPONINT 0.084 (H) 11/01/2017 1320    TROPONINT 0.017 07/04/2017 1653    TROPONINT <0.010 08/07/2016 2158    TROPONINT <0.01 01/19/2016 1405       Brief Urine Lab Results  (Last result in the past 365 days)      Color   Clarity   Blood   Leuk Est   Nitrite   Protein   CREAT   Urine HCG        07/01/18 2118 Yellow Cloudy Trace Large (3+) Negative 30 mg/dL (1+)                Imaging Results (all)     Procedure Component Value Units Date/Time    XR Chest 1 View [[id number]] Collected:  11/19/18 1849     Updated:  11/19/18 1855    Narrative:       XR CHEST 1 VW-     HISTORY: Female who is 87 years-old,  cough     TECHNIQUE: Frontal view of the chest     COMPARISON: 4/23/2018     FINDINGS: Heart is enlarged. Aorta is tortuous, calcified. Sternotomy  wires are present. Pulmonary vasculature is unremarkable. Small likely  atelectasis or infiltrate at the right base, follow-up suggested. No  large pleural effusion, no pneumothorax. No acute osseous process.       Impression:       Small likely atelectasis or infiltrate at the right base,  follow-up suggested. Tortuous aorta.     This report was finalized on 11/19/2018 6:52 PM by   Micky Olson M.D.             Assessment:      Acute on chronic systolic CHF (congestive heart failure) (CMS/[facility])    Elevated troponin    Essential hypertension    DM (diabetes mellitus) (CMS/[facility])    Toxic metabolic encephalopathy    Alzheimer disease       Patient Active Problem List   Diagnosis Code   • Acute respiratory failure with hypoxia (CMS/HCC) J96.01   • Essential hypertension I10   • Syncope and collapse R55   • Bacteriuria R82.71   • HCAP (healthcare-associated pneumonia) J18.9   • Hypoglycemia E16.2   • DM (diabetes mellitus) (CMS/[facility]) E11.9   • Acute on chronic systolic CHF (congestive heart failure) (CMS/[facility]) I50.23   • Toxic metabolic encephalopathy G92   • Alzheimer disease G30.9, F02.80   • Pneumonia of right lower lobe due to infectious organism (CMS/HCC) J18.1   • Acute hypoxemic respiratory failure (CMS/[facility]) J96.01   • CHF (congestive heart failure), NYHA class I, acute on chronic, diastolic (CMS/HCC) I50.33   • Dehydration E86.0   • Elevated troponin R74.8       Plan:    Inpatient admission  IV diuretics gently  2 D echo  Cardiology consult, probable conservative treatment  Monitor and correct electrolytes  Accucheck and SSI  Monitor mental status  Home medications  DVT/stress ulcer prophylaxis  PT consult  Labs in am        Sanjay Butcher MD  11/21/2018  7:56 AM

## 2018-11-21 PROBLEM — R77.8 ELEVATED TROPONIN: Status: ACTIVE | Noted: 2018-01-01

## 2018-11-21 PROBLEM — I34.0 NONRHEUMATIC MITRAL VALVE INSUFFICIENCY: Status: ACTIVE | Noted: 2018-01-01

## 2018-11-21 PROBLEM — I50.23 ACUTE ON CHRONIC SYSTOLIC CHF (CONGESTIVE HEART FAILURE) (HCC): Status: ACTIVE | Noted: 2018-01-01

## 2018-11-21 PROBLEM — R60.0 EDEMA OF BOTH LEGS: Status: ACTIVE | Noted: 2018-01-01

## 2018-11-21 NOTE — PLAN OF CARE
Problem: Patient Care Overview  Goal: Plan of Care Review   11/21/18 1031   Coping/Psychosocial   Plan of Care Reviewed With patient   OTHER   Outcome Summary pt presents w. gross weakness, baseline w. functional mobility, dependent assist of 2 ppl for bed mobility and positioning. pt from LTC. pt arousable during PT session however eyes kept close. no expected functional gains to be made.no skilled PT warranted. to return to LTC/ECF at [place]. [facility]'ed. will sign off. CCP notified.

## 2018-11-21 NOTE — PLAN OF CARE
Problem: Patient Care Overview  Goal: Plan of Care Review  Outcome: Ongoing (interventions implemented as appropriate)   11/21/18 1500   Coping/Psychosocial   Plan of Care Reviewed With patient   OTHER   Outcome Summary Bedside swallow eval completed. Recommend mech soft no mixed consistencies and nectar thick liquids. Meds crushed with puree. Water protocol ok. ST to follow for VFSS friday as appropriate.

## 2018-11-21 NOTE — PROGRESS NOTES
Discharge Planning Assessment  [facility]     Patient Name: Bernardo Cox  MRN: [id number]  Today's Date: 11/21/2018    Admit Date: 11/19/2018    Discharge Needs Assessment     Row Name 11/21/18 1147       Living Environment    Lives With  facility resident [facility]    Current Living Arrangements  extended care facility       Resource/Environmental Concerns    Resource/Environmental Concerns  none    Transportation Concerns  car, none       Transition Planning    Patient/Family Anticipates Transition to  long term care facility    Patient/Family Anticipated Services at Transition  none       Discharge Needs Assessment    Readmission Within the Last 30 Days  no previous admission in last 30 days    Concerns to be Addressed  denies needs/concerns at this time    Equipment Currently Used at Home  wheelchair    Anticipated Changes Related to Illness  none    Equipment Needed After Discharge  none    Discharge Facility/Level of Care Needs  nursing facility, skilled;nursing facility, intermediate        Discharge Plan     Row Name 11/21/18 1149       Plan    Plan  [facility] IC or skilled care if appropriate no bedhold but may return and a bed is available    Patient/Family in Agreement with Plan  yes    Plan Comments  Facesheet information is verified.  IMM documented.  Patient is from long term care at [facility].  Spoke with her son/ POREVA Morley [phone].  He agrees with return to [facility].  He would like ambulance transportation to [place].  Spoke with Joleen/ [facility] liaison.  She states that patient is a long term Medicaid patient at [facility].  She may return skilled or IC level if appropriate.  She is not a bedhold but may return and a bed is available.  Packet with report and fax in chart........................Becky Barba RN        Destination      No service coordination in this encounter.      Durable Medical Equipment      No service  coordination in this encounter.      Dialysis/Infusion      No service coordination in this encounter.      Home Medical Care      No service coordination in this encounter.      Community Resources      No service coordination in this encounter.          Demographic Summary     Row Name 11/21/18 1144       General Information    Admission Type  inpatient    Arrived From  long term [facility]    Required Notices Provided  Important Message from Medicare    Referral Source  admission list    Preferred Language  English       Contact Information    Contact Information Comments  OBDULIO Morley [phone]        Functional Status     Row Name 11/21/18 1146       Functional Status    Usual Activity Tolerance  poor    Current Activity Tolerance  poor       Functional Status, IADL    Medications  completely dependent    Meal Preparation  completely dependent    Housekeeping  completely dependent    Laundry  completely dependent    Shopping  completely dependent       Mental Status    General Appearance WDL  WDL       Mental Status Summary    Recent Changes in Mental Status/Cognitive Functioning  no changes;other (see comments) dementia baseline        Psychosocial    No documentation.       Abuse/Neglect    No documentation.       Legal    No documentation.       Substance Abuse    No documentation.       Patient Forms    No documentation.           Becky Barba RN

## 2018-11-21 NOTE — THERAPY DISCHARGE NOTE
Acute Care - Physical Therapy Initial Eval/Discharge  [facility]     Patient Name: Bernardo Cox  : 1931  MRN: [id number]  Today's Date: 2018   Onset of Illness/Injury or Date of Surgery: 18  Date of Referral to PT: 18  Referring Physician: Hadley      Admit Date: 2018    Visit Dx:    ICD-10-CM ICD-9-CM   1. Congestive heart failure, unspecified HF chronicity, unspecified heart failure type (CMS/[facility]) I50.9 428.0   2. Elevated troponin R74.8 790.6   3. JASVIR (acute kidney injury) (CMS/[facility]) N17.9 584.9   4. Hyperglycemia R73.9 790.29   5. Impaired mobility Z74.09 799.89     Patient Active Problem List   Diagnosis   • Acute respiratory failure with hypoxia (CMS/HCC)   • Essential hypertension   • Syncope and collapse   • Bacteriuria   • HCAP (healthcare-associated pneumonia)   • Hypoglycemia   • DM (diabetes mellitus) (CMS/HCC)   • Acute on chronic systolic CHF (congestive heart failure) (CMS/HCC)   • Toxic metabolic encephalopathy   • Alzheimer disease   • Pneumonia of right lower lobe due to infectious organism (CMS/HCC)   • Acute hypoxemic respiratory failure (CMS/HCC)   • CHF (congestive heart failure), NYHA class I, acute on chronic, diastolic (CMS/HCC)   • Dehydration   • Elevated troponin     Past Medical History:   Diagnosis Date   • Alzheimer disease    • Atherosclerotic heart disease    • Bronchitis    • CHF (congestive heart failure) (CMS/HCC)     EF = 25%   • COPD (chronic obstructive pulmonary disease) (CMS/[facility])    • Dementia    • Depression    • Diabetes mellitus (CMS/[facility])    • GERD (gastroesophageal reflux disease)    • HTN (hypertension)    • Hypokalemia    • Insomnia    • Metabolic encephalopathy    • Neuropathy    • OA (osteoarthritis)    • Pneumonia    • Renal disorder     stage three   • TIA (transient ischemic attack)    • Upper respiratory infection      Past Surgical History:   Procedure Laterality Date   • CARDIAC SURGERY            PT ASSESSMENT (last 12 hours)       Physical Therapy Evaluation     Row Name 11/21/18 1026          PT Evaluation Time/Intention    Subjective Information  no complaints  -     Document Type  discharge evaluation/summary  -     Mode of Treatment  individual therapy;physical therapy  -     Patient Effort  fair  -     Symptoms Noted During/After Treatment  none  -     Comment  pt baseline  -     Row Name 11/21/18 1026          General Information    Patient Profile Reviewed?  yes  -     Onset of Illness/Injury or Date of Surgery  11/19/18  -     Referring Physician  Hadley  -     Patient Observations  agree to therapy  -     General Observations of Patient  pt supine in bed no acute distress, arousable but eyes kept closed all of session  -     Prior Level of Function  dependent:  -     Pertinent History of Current Functional Problem  acute on chronic CHF  -     Existing Precautions/Restrictions  fall  -     Risks Reviewed  patient:  -     Benefits Reviewed  patient:  -     Row Name 11/21/18 1026          Resource/Environmental Concerns    Current Living Arrangements  extended care facility  -     Row Name 11/21/18 1026          Cognitive Assessment/Intervention- PT/OT    Orientation Status (Cognition)  oriented to;person  -     Follows Commands (Cognition)  follows one step commands;50-74% accuracy;initiation impaired;physical/tactile prompts required;repetition of directions required;verbal cues/prompting required  -     Safety Deficit (Cognitive)  mild deficit;insight into deficits/self awareness  -     Row Name 11/21/18 1026          Bed Mobility Assessment/Treatment    Bed Mobility Assessment/Treatment  supine-sit;sit-supine  -     Supine-Sit [place] (Bed Mobility)  dependent (less than 25% patient effort);2 person assist  -     Sit-Supine [place] (Bed Mobility)  dependent (less than 25% patient effort);2 person assist  -     Bed Mobility, Safety Issues  cognitive deficits limit  understanding  -     Assistive Device (Bed Mobility)  bed rails;draw sheet  -     Row Name 11/21/18 1026          Transfer Assessment/Treatment    Comment (Transfers)  NT  -     Row Name 11/21/18 1026          Gait/Stairs Assessment/Training    Comment (Gait/Stairs)  nonambulatory  -[facility] Name 11/21/18 1026          General ROM    GENERAL ROM COMMENTS  BLEs AAROM WFL  -[facility] Name 11/21/18 1026          MMT (Manual Muscle Testing)    General MMT Comments  BLEs grossly at least 3-/5  -[facility] Name 11/21/18 1026          Pain Assessment    Additional Documentation  Pain Scale: FACES Pre/Post-Treatment (Group)  -[facility] Name 11/21/18 1026          Pain Scale: Numbers Pre/Post-Treatment    Pain Location - Side  Bilateral  -     Pain Location - Orientation  generalized  -[facility] Name 11/21/18 1026          Pain Scale: FACES Pre/Post-Treatment    Pain: FACES Scale, Pretreatment  0-->no hurt  -     Pain: FACES Scale, Post-Treatment  2-->hurts little bit  -     Pre/Post Treatment Pain Comment  appears to have generalized discomfort with mobility, noted dec skin integrity  -     Row Name             Wound 11/20/18 1630 Right anterior;lower leg blisters;other (see comments)    Wound - Properties Group Date first assessed: 11/20/18  -TD Time first assessed: 1630  -TD Side: Right  -TD Orientation: anterior;lower  -TD Location: leg  -TD Type: blisters;other (see comments)  -TD    Row Name             Wound 11/20/18 1630 Left anterior;lower;proximal leg other (see comments)    Wound - Properties Group Date first assessed: 11/20/18  -TD Time first assessed: 1630  -TD Side: Left  -TD Orientation: anterior;lower;proximal  -TD Location: leg  -TD Type: other (see comments)  -TD    Row Name 11/21/18 1026          Plan of Care Review    Plan of Care Reviewed With  patient  -[facility] Name 11/21/18 1026          Physical Therapy Clinical Impression    Date of Referral to PT  11/20/18  -     PT Diagnosis  (PT Clinical Impression)  baseline mobility  -     Criteria for Skilled Interventions Met (PT Clinical Impression)  current level of function same as previous level of function;no significant expected improvement in functional status  -     Row Name 11/21/18 1026          Positioning and Restraints    Pre-Treatment Position  in bed  -     Post Treatment Position  bed  -     In Bed  supine;call light within reach;encouraged to call for assist;exit alarm on;heels elevated  -       User Key  (r) = Recorded By, (t) = Taken By, (c) = Cosigned By    Initials Name Provider Type     Supriya Boyd, PT Physical Therapist    Edda Kenny RN Registered Nurse          Physical Therapy Education     Title: PT OT SLP Therapies (Resolved)     Topic: Physical Therapy (Resolved)     Point: Mobility training (Resolved)     Learning Progress Summary           Patient Nonacceptance, E,TB, NR by  at 11/21/2018 10:31 AM                   Point: Home exercise program (Resolved)     Learning Progress Summary           Patient Nonacceptance, E,TB, NR by  at 11/21/2018 10:31 AM                   Point: Body mechanics (Resolved)     Learning Progress Summary           Patient Nonacceptance, E,TB, NR by  at 11/21/2018 10:31 AM                   Point: Precautions (Resolved)     Learning Progress Summary           Patient Nonacceptance, E,TB, NR by  at 11/21/2018 10:31 AM                               User Key     Initials Effective Dates Name Provider Type Critical access hospital 04/03/18 -  Supriya Boyd, PT Physical Therapist PT                PT Recommendation and Plan  Therapy Frequency (PT Clinical Impression): evaluation only  Plan of Care Reviewed With: patient  Outcome Summary: pt presents w. gross weakness, baseline w. functional mobility, dependent assist of 2 ppl for bed mobility and positioning. pt from LT. pt arousable during PT session however eyes kept close. no expected functional gains to be made.no skilled  PT warranted. to return to LTC/ECF at [place]. [facility]'ed. will sign off. CCP notified.     Outcome Measures     Row Name 11/21/18 1000             How much help from another person do you currently need...    Turning from your back to your side while in flat bed without using bedrails?  1  -LH      Moving from lying on back to sitting on the side of a flat bed without bedrails?  1  -LH      Moving to and from a bed to a chair (including a wheelchair)?  1  -LH      Standing up from a chair using your arms (e.g., wheelchair, bedside chair)?  1  -LH      Climbing 3-5 steps with a railing?  1  -LH      To walk in hospital room?  1  -      AM-PAC 6 Clicks Score  6  -         Functional Assessment    Outcome Measure Options  AM-PAC 6 Clicks Basic Mobility (PT)  -        User Key  (r) = Recorded By, (t) = Taken By, (c) = Cosigned By    Initials Name Provider Type     Supriya Boyd, PT Physical Therapist           Time Calculation:   PT Charges     Row Name 11/21/18 1033             Time Calculation    Start Time  1009  -      Stop Time  1020  -      Time Calculation (min)  11 min  -      PT Received On  11/21/18  -        User Key  (r) = Recorded By, (t) = Taken By, (c) = Cosigned By    Initials Name Provider Type     Supriya Boyd, PT Physical Therapist        Therapy Suggested Charges     Code   Minutes Charges    None           Therapy Charges for Today     Code Description Service Date Service Provider Modifiers Qty    14697982733 HC PT EVAL MOD COMPLEXITY 2 11/21/2018 Supriya Boyd, PT GP 1          PT G-Codes  Outcome Measure Options: AM-PAC 6 Clicks Basic Mobility (PT)  AM-PAC 6 Clicks Score: 6         Supriya Boyd PT  11/21/2018

## 2018-11-21 NOTE — PROGRESS NOTES
Progress Note  Alex Johnson MD    Patient ID:  Name:  Bernardo Cox  MRN:  [id number]  4/26/1931  87 y.o.  female            CC/Reason for visit:CHF exacerbation,Severe Mitral Regurg, Issues C Diff possibiliy,Dysphagia,Dementa    Interval history: multiple issues. Looks really tired    Vitals:  Vitals:    11/20/18 2113 11/21/18 0014 11/21/18 0756 11/21/18 1349   BP:  138/65 149/71 (!) 152/101   BP Location:  Left arm Left arm Left arm   Patient Position:  Lying Lying Lying   Pulse: 86 77 93 90   Resp:  18 18 18   Temp:  97.4 °F (36.3 °C) 97.6 °F (36.4 °C) 97.2 °F (36.2 °C)   TempSrc:  Oral Oral Oral   SpO2:  99% 95% 97%   Weight:       Height:               Body mass index is 21.87 kg/m².    Intake/Output Summary (Last 24 hours) at 11/21/2018 1758  Last data filed at 11/21/2018 1349  Gross per 24 hour   Intake 100 ml   Output 1500 ml   Net -1400 ml       Exam:  GEN:  No distress, appears stated age  EYES:   EOM-i, anicteric sclera bilat  ENT:    External ears/nose normal, OP clear  NECK:  Supple, midline trachea  LUNGS: Clear breath sounds bilat, nonlabored breathing  CV:  Normal S1S2, without murmur, no edema  ABD:  Non tender, no enlarged liver or masses  EXT:  No cyanosis or clubbing    Scheduled meds:    budesonide 0.5 mg Nebulization Daily - RT   carvedilol 25 mg Oral BID With Meals   clopidogrel 75 mg Oral Daily   docusate sodium 100 mg Oral Daily   donepezil 10 mg Oral Nightly   enoxaparin 30 mg Subcutaneous Q24H   escitalopram 10 mg Oral Daily   furosemide 40 mg Intravenous Q12H   insulin glargine 30 Units Subcutaneous Nightly   insulin lispro 0-7 Units Subcutaneous 4x Daily With Meals & Nightly   ipratropium-albuterol 3 mL Nebulization 4x Daily - RT   mupirocin  Topical BID   pantoprazole 40 mg Oral QAM AC   polyethylene glycol 17 g Oral Daily   potassium chloride 20 mEq Oral Daily   sodium chloride 4 mL Nebulization BID - RT     IV meds:                           Data Review:   I reviewed the patient's  medications and new clinical results.  Lab Results   Component Value Date    CALCIUM 8.7 11/21/2018    PHOS 2.7 04/20/2018     Results from last 7 days   Lab Units  11/21/18   0351  11/20/18   0517  11/19/18 2019 11/19/18   1741   SODIUM mmol/L  132*  130*  132*   --    POTASSIUM mmol/L  3.5  4.1  4.5   --    CHLORIDE mmol/L  93*  93*  92*   --    CO2 mmol/L  26.6  25.0  29.3*   --    BUN mg/dL  23  28*  30*   --    CREATININE mg/dL  0.89  0.96  1.12*   --    CALCIUM mg/dL  8.7  8.8  8.8   --    BILIRUBIN mg/dL   --    --   0.5   --    ALK PHOS U/L   --    --   151*   --    ALT (SGPT) U/L   --    --   9   --    AST (SGOT) U/L   --    --   10   --    GLUCOSE mg/dL  290*  476*  422*   --    WBC 10*3/mm3  10.16  11.56*   --   14.78*   HEMOGLOBIN g/dL  10.1*  10.1*   --   11.0*   PLATELETS 10*3/mm3  290  337   --   316   PROBNP pg/mL  32,283.0*   --    --   24,570.0*   PROCALCITONIN ng/mL   --    --   0.08*   --              Results from last 7 days   Lab Units  11/19/18   1741   TROPONIN T ng/mL  0.034*           Estimated Creatinine Clearance: 41.9 mL/min (by C-G formula based on SCr of 0.89 mg/dL).    WEIGHTS:     Wt Readings from Last 1 Encounters:   11/19/18 2236 59.6 kg (131 lb 6.4 oz)   11/19/18 1845 66.2 kg (146 lb)         ASSESSMENT:     Acute on chronic systolic CHF (congestive heart failure) (CMS/[facility])    Essential hypertension    DM (diabetes mellitus) (CMS/[facility])    Toxic metabolic encephalopathy    Alzheimer disease    Elevated troponin      PLAN:    CXR showed possible atelectasis vs infiltrate of right lung base, but is difficult to really assess given implants. However overall it looks better than previous CXR. WBC have improved and procal is normal, which does go against any bacterial infection. However, she does have chronic issues with dysphagia and should be on a pureed diet based on last speech evaluation in 4/2018. Her dementia may also be worsening especially when in a different environment such  as the hospital and needs extra guidance with feedings and aspirations precautions.      Aspiration precautions and keep HOB elevated at 30 degrees.   Continue diuretics.  NPO and Speech to evaluate again.  Monitor labs in am and will hold on antibiotics for now given possible C diff and frailty.   Aggressive pulmonary hygiene measures with OPEP, hypertonic saline, and chest PT.      I reviewed complete patients chart for additional 15 minutes        Alex Johnson MD  11/21/2018

## 2018-11-21 NOTE — PROGRESS NOTES
"    Patient Name: Bernarod Cox  :1931  87 y.o.      Patient Care Team:  Tay Lynch MD as PCP - General (Family Medicine)  Sophie Zuniga, RN as Care Coordinator (Population Health)    Chief Complaint: follow up heart failure, mitral valve insufficiency    Interval History: she can't really tell me why she is in the hospital but she does feel better than she did yesterday. On IV lasix. Renal function stable. Sodium low but better. She denies chest pain. She is stable on room air but still appears a little dyspneic after moving around and getting repositioned in bed.        Objective   Vital Signs  Temp:  [97.2 °F (36.2 °C)-98.1 °F (36.7 °C)] 97.6 °F (36.4 °C)  Heart Rate:  [77-93] 93  Resp:  [18] 18  BP: (121-149)/(56-71) 149/71    Intake/Output Summary (Last 24 hours) at 2018 1029  Last data filed at 2018 2058  Gross per 24 hour   Intake 580 ml   Output 600 ml   Net -20 ml     Flowsheet Rows      First Filed Value   Admission Height  165.1 cm (65\") Documented at 2018 1645   Admission Weight  66.2 kg (146 lb) Documented at 2018 1845          Physical Exam:   General Appearance:    Alert, cooperative, in no acute distress   Lungs:     diminished to auscultation.  Normal respiratory effort and rate.      Heart:    Regular rhythm and normal rate, normal S1 and S2, 2/6 systolic murmur, no gallops or rubs.     Chest Wall:    No abnormalities observed   Abdomen:     Soft, nontender, positive bowel sounds.     Extremities:   no cyanosis, clubbing or edema.  No marked joint deformities.  Adequate musculoskeletal strength.       Results Review:    Results from last 7 days   Lab Units  18   0351   SODIUM mmol/L  132*   POTASSIUM mmol/L  3.5   CHLORIDE mmol/L  93*   CO2 mmol/L  26.6   BUN mg/dL  23   CREATININE mg/dL  0.89   GLUCOSE mg/dL  290*   CALCIUM mg/dL  8.7     Results from last 7 days   Lab Units  18   1741   TROPONIN T ng/mL  0.034*     Results from last 7 days   Lab " Units  11/21/18   0351   WBC 10*3/mm3  10.16   HEMOGLOBIN g/dL  10.1*   HEMATOCRIT %  34.1*   PLATELETS 10*3/mm3  290                           Medication Review:     budesonide 0.5 mg Nebulization Daily - RT   carvedilol 25 mg Oral BID With Meals   clopidogrel 75 mg Oral Daily   docusate sodium 100 mg Oral Daily   donepezil 10 mg Oral Nightly   enoxaparin 30 mg Subcutaneous Q24H   escitalopram 10 mg Oral Daily   furosemide 40 mg Intravenous Q12H   insulin glargine 30 Units Subcutaneous Nightly   insulin lispro 0-7 Units Subcutaneous 4x Daily With Meals & Nightly   mupirocin  Topical BID   pantoprazole 40 mg Oral QAM AC   polyethylene glycol 17 g Oral Daily   potassium chloride 20 mEq Oral Daily             Assessment/Plan   1. Acute on chronic diastolic heart failure - likely due to severe MR. Continue Iv diuretics. Monitor renal function. Continue beta blocker. Normal LV systolic function on last echo 01/2018.     2. Severe mitral regurgitation - conservative management.     3. History of coronary artery disease - prior CABG - no angina. Continue medical therapy.     4. Dementia - nursing home resident.     5. History of HTN - somewhat high. Follow with diuresis.     6. Hyponatremia - likely from heart failure. Continue to monitor with diuresis. Na is better today.     CLAUDIA Diaz  [facility]  11/21/18  10:29 AM

## 2018-11-21 NOTE — CONSULTS
Pulmonary Consultation     Patient Name: Bernardo Cox  Age/Sex: 87 y.o. female  : 1931  MRN: [id number]    Date of Admission: 2018  Date of Encounter Visit: 18  Encounter Provider: Arnol Jean MD  Referring Provider: Sanjay Butcher MD  Place of Service: [facility]  Patient Care Team:  Tay Lynch MD as PCP - General (Family Medicine)  Sophie Zuniga RN as Care Coordinator ([facility])      Subjective:     Consulted for: RLL wheezing, infiltrate on CXR and failed swallow study    Chief Complaint: dyspnea    History of Present Illness:  Bernardo Cox is a 87 y.o. female with a history of alzheimer's dementia, systolic CHF, DM, HTN, GERD, TIA, CKD, CAD s/p CABG, PVD and history of pneumonia.     She was admitted on 18 with complaints of edema and was found to have an elevated BNP at nursing facility. She was found to have JASVIR with underlying CHF and was treated with diuretics. She was noted to have intermittent confusion and required a sitter and persistent redirection. She failed RN bedside swallow and was noted to have some wheezing in the RLL after eating. CXR had showed small atelectasis vs infiltrate in the right lung base. Initially she had leukocytosis with left shift that did improve. Procalcitonin was negative.     Video Swallow: 18  Pt demonstrates a mild oropharyngeal swallow characterized by trace, transient penetration thin at times and trace posterior aspiration with mixed consistency.  Premature spillage to valleculae and pyriforms.  Minimal to mild residue noted.  REC fork mashed/thin when upright. Meds w/ puree.      ECHO: 18  · Left ventricular systolic function is normal. Calculated EF = 66.7%. Estimated EF was in agreement with the calculated EF. The left ventricular cavity is mild-to-moderately dilated. Left ventricular wall thickness is consistent with mild-to-moderate concentric hypertrophy.  · Left ventricular diastolic dysfunction  (grade II) consistent with pseudonormalization.  · Mild aortic valve stenosis is present.  · Severe mitral valve regurgitation is present.  · Estimated right ventricular systolic pressure from tricuspid regurgitation is mildly elevated (35-45 mmHg).    Review of Systems:   Review of Systems   difficult to perform ROS due to Dementia   Constitutional: Negative for fever.   Respiratory: Positive for cough and shortness of breath.    Cardiovascular: Positive for leg swelling (BLE). Negative for chest pain.   Musculoskeletal: Negative for back pain.     Past Medical History:  Past Medical History:   Diagnosis Date   • Alzheimer disease    • Atherosclerotic heart disease    • Bronchitis    • CHF (congestive heart failure) (CMS/[facility])     EF = 25%   • COPD (chronic obstructive pulmonary disease) (CMS/[facility])    • Dementia    • Depression    • Diabetes mellitus (CMS/[facility])    • GERD (gastroesophageal reflux disease)    • HTN (hypertension)    • Hypokalemia    • Insomnia    • Metabolic encephalopathy    • Neuropathy    • OA (osteoarthritis)    • Pneumonia    • Renal disorder     stage three   • TIA (transient ischemic attack)    • Upper respiratory infection        Past Surgical History:   Procedure Laterality Date   • CARDIAC SURGERY         Home Medications:   Medications Prior to Admission   Medication Sig Dispense Refill Last Dose   • acetaminophen (TYLENOL) 500 MG tablet Take 1,000 mg by mouth Every 6 (Six) Hours As Needed for Mild Pain .   11/19/2018 at Unknown time   • budesonide (PULMICORT) 0.5 MG/2ML nebulizer solution Take 0.5 mg by nebulization 2 (Two) Times a Day.      • carvedilol (COREG) 25 MG tablet Take 25 mg by mouth 2 (two) times a day with meals. Hold for SBP <100   11/19/2018 at Unknown time   • clopidogrel (PLAVIX) 75 MG tablet Take 75 mg by mouth daily.   11/19/2018 at Unknown time   • dextromethorphan-guaifenesin (ROBITUSSIN-DM)  MG/5ML syrup Take 5 mL by mouth 4 (Four) Times a Day.      •  diphenhydrAMINE (BENADRYL) 25 mg capsule Take 25 mg by mouth Every 12 (Twelve) Hours As Needed for Itching.      • diphenhydrAMINE-zinc acetate (BENADRYL EXTRA STRENGTH) 2-0.1 % cream Apply  topically to the appropriate area as directed As Needed for Itching.      • docusate sodium (COLACE) 250 MG capsule Take 250 mg by mouth Daily.   11/19/2018 at Unknown time   • donepezil (ARICEPT) 10 MG tablet Take 10 mg by mouth every night at bedtime.   11/19/2018 at Unknown time   • escitalopram (LEXAPRO) 10 MG tablet Take 20 mg by mouth Daily.   11/19/2018 at Unknown time   • furosemide (LASIX) 20 MG tablet Take 1 tablet by mouth Daily. (Patient taking differently: Take 60 mg by mouth Daily.) 30 tablet 0 4/14/2018 at Unknown time   • furosemide (LASIX) 80 MG tablet Take 80 mg by mouth 2 (Two) Times a Day.      • glucagon (GLUCAGON EMERGENCY) 1 MG injection Inject 1 mg under the skin 1 (One) Time As Needed for Low Blood Sugar.      • insulin aspart (novoLOG) 100 UNIT/ML injection Inject  under the skin into the appropriate area as directed 3 (Three) Times a Day Before Meals. 200-299= 3 units  300-399= 5 units  400-499= 7 units  >500= 9 units      • insulin glargine (LANTUS) 100 UNIT/ML injection Inject 8 Units under the skin Daily. (Patient taking differently: Inject 20 Units under the skin into the appropriate area as directed Daily.) 240 Units 0    • ipratropium-albuterol (DUO-NEB) 0.5-2.5 mg/mL nebulizer Take 3 mL by nebulization 4 (Four) Times a Day.      • levoFLOXacin (LEVAQUIN) 500 MG tablet Take 500 mg by mouth Daily.      • memantine (NAMENDA XR) 28 MG capsule sustained-release 24 hr extended release capsule Take 28 mg by mouth Daily.   11/19/2018 at Unknown time   • mirtazapine (REMERON) 15 MG tablet Take 15 mg by mouth Every Night.   11/19/2018 at Unknown time   • mupirocin (BACTROBAN) 2 % cream Apply  topically to the appropriate area as directed 2 (Two) Times a Day.   11/19/2018 at Unknown time   • nitroglycerin  (NITROSTAT) 0.4 MG SL tablet Place 0.4 mg under the tongue Every 5 (Five) Minutes As Needed for Chest Pain. Take no more than 3 doses in 15 minutes.      • pantoprazole (PROTONIX) 40 MG EC tablet Take 40 mg by mouth Daily.      • polyethylene glycol (MIRALAX) packet Take 17 g by mouth Daily.      • potassium chloride ER (K-TAB) 20 MEQ tablet controlled-release ER tablet Take 20 mEq by mouth Daily.   11/19/2018 at Unknown time   • [START ON 11/23/2018] predniSONE (DELTASONE) 10 MG tablet Take 10 mg by mouth 2 (Two) Times a Day.      • predniSONE (DELTASONE) 10 MG tablet Take 10 mg by mouth 3 (Three) Times a Day.      • [START ON 11/27/2018] predniSONE (DELTASONE) 10 MG tablet Take 10 mg by mouth Daily.      • QUEtiapine (SEROquel) 25 MG tablet Take 25 mg by mouth Daily.      • SITagliptin (JANUVIA) 100 MG tablet Take 100 mg by mouth Daily.          Inpatient Medications:  Scheduled Meds:    budesonide 0.5 mg Nebulization Daily - RT   carvedilol 25 mg Oral BID With Meals   clopidogrel 75 mg Oral Daily   docusate sodium 100 mg Oral Daily   donepezil 10 mg Oral Nightly   enoxaparin 30 mg Subcutaneous Q24H   escitalopram 10 mg Oral Daily   furosemide 40 mg Intravenous Q12H   insulin glargine 30 Units Subcutaneous Nightly   insulin lispro 0-7 Units Subcutaneous 4x Daily With Meals & Nightly   ipratropium-albuterol 3 mL Nebulization 4x Daily - RT   mupirocin  Topical BID   pantoprazole 40 mg Oral QAM AC   polyethylene glycol 17 g Oral Daily   potassium chloride 20 mEq Oral Daily   sodium chloride 4 mL Nebulization BID - RT     Continuous Infusions:   PRN Meds:.•  acetaminophen  •  dextrose  •  dextrose  •  glucagon (human recombinant)  •  nitroglycerin    Allergies:  Allergies   Allergen Reactions   • Codeine    • Latex    • Other      Plastic tape, canned fish   • Soma [Carisoprodol]    • Sulfa Antibiotics        Past Social History:  Social History     Socioeconomic History   • Marital status: Single     Spouse name: Not  on file   • Number of children: 3   • Years of education: Not on file   • Highest education level: Not on file   Occupational History     Comment: [employer]   Tobacco Use   • Smoking status: Former Smoker     Last attempt to quit: 1980     Years since quittin.9   • Tobacco comment: unable to assess   Substance and Sexual Activity   • Alcohol use: No   • Drug use: No   • Sexual activity: Defer       Past Family History:  History reviewed. No pertinent family history.        Objective:   Temp:  [97.2 °F (36.2 °C)-97.6 °F (36.4 °C)] 97.2 °F (36.2 °C)  Heart Rate:  [77-93] 90  Resp:  [18] 18  BP: (124-152)/() 152/101  SpO2:  [95 %-99 %] 97 %  on    Device (Oxygen Therapy): room air     Intake/Output Summary (Last 24 hours) at 2018 1624  Last data filed at 2018 1349  Gross per 24 hour   Intake 340 ml   Output 1500 ml   Net -1160 ml     Body mass index is 21.87 kg/m².      18  1845 18  2236   Weight: 66.2 kg (146 lb) 59.6 kg (131 lb 6.4 oz)     Weight change:     Physical Exam:   Physical Exam   General:    No acute distress, alert, disoriented, frail, elderly, on oxygen per NC at 1 LPM, breast implants                   Head:    Normocephalic, atraumatic.   Eyes:          Conjunctivae and sclerae normal, no icterus, PERRLA   Throat:   No oral lesions, no thrush, oral mucosa moist.    Neck:   Supple, trachea midline.   Lungs:     Normal chest on inspection, RLL crackles, no wheezes. Respirations regular, even and unlabored.      Heart:    Regular rhythm and normal rate.  No murmurs, gallops, or rubs noted.   Abdomen:     Soft, non-tender, non-distended, positive bowel sounds.    Extremities:   No clubbing, cyanosis. Trace edema. BLL edema wrapped and drsg was not removed.      Pulses:   Pulses palpable and equal bilaterally.    Skin:   No bleeding or rash.   Neuro:   Non-focal.  Moves all extremities well.    Psychiatric:   Normal mood and affect.     Lab Review:   Results from last 7  days   Lab Units  11/21/18 0351 11/20/18 0517 11/19/18 2019   SODIUM mmol/L  132*  130*  132*   POTASSIUM mmol/L  3.5  4.1  4.5   CHLORIDE mmol/L  93*  93*  92*   CO2 mmol/L  26.6  25.0  29.3*   BUN mg/dL  23  28*  30*   CREATININE mg/dL  0.89  0.96  1.12*   GLUCOSE mg/dL  290*  476*  422*   CALCIUM mg/dL  8.7  8.8  8.8   AST (SGOT) U/L   --    --   10   ALT (SGPT) U/L   --    --   9   ALBUMIN g/dL   --    --   2.80*     Results from last 7 days   Lab Units  11/19/18   1741   TROPONIN T ng/mL  0.034*     Results from last 7 days   Lab Units  11/21/18 0351 11/20/18 0517 11/19/18   1741   WBC 10*3/mm3  10.16  11.56*  14.78*   HEMOGLOBIN g/dL  10.1*  10.1*  11.0*   HEMATOCRIT %  34.1*  33.5*  39.0   PLATELETS 10*3/mm3  290  337  316   MCV fL  84.8  82.9  88.4   MCH pg  25.1*  25.0*  24.9*   MCHC g/dL  29.6*  30.1*  28.2*   RDW %  14.5*  14.7*  14.6*   RDW-SD fl  45.0  44.0  47.2   MPV fL  8.9  9.4  8.9   NEUTROPHIL % %   --    --   85.4*   LYMPHOCYTE % %   --    --   6.5*   MONOCYTES % %   --    --   5.5   EOSINOPHIL % %   --    --   0.3   BASOPHIL % %   --    --   0.3   IMM GRAN % %   --    --   2.0*   NEUTROS ABS 10*3/mm3   --    --   12.62*   LYMPHS ABS 10*3/mm3   --    --   0.96   MONOS ABS 10*3/mm3   --    --   0.82   EOS ABS 10*3/mm3   --    --   0.04   BASOS ABS 10*3/mm3   --    --   0.04   IMMATURE GRANS (ABS) 10*3/mm3   --    --   0.30*                   Invalid input(s): LDLCALC  Results from last 7 days   Lab Units  11/21/18   0351  11/19/18   1741   PROBNP pg/mL  32,283.0*  24,570.0*             Results from last 7 days   Lab Units  11/19/18   2019   PROCALCITONIN ng/mL  0.08*                                 Imaging:  Imaging Results (most recent)     Procedure Component Value Units Date/Time    XR Chest 1 View [[id number]] Collected:  11/19/18 1849     Updated:  11/19/18 1855    Narrative:       XR CHEST 1 VW-     HISTORY: Female who is 87 years-old,  cough     TECHNIQUE: Frontal view of the  chest     COMPARISON: 4/23/2018     FINDINGS: Heart is enlarged. Aorta is tortuous, calcified. Sternotomy  wires are present. Pulmonary vasculature is unremarkable. Small likely  atelectasis or infiltrate at the right base, follow-up suggested. No  large pleural effusion, no pneumothorax. No acute osseous process.       Impression:       Small likely atelectasis or infiltrate at the right base,  follow-up suggested. Tortuous aorta.     This report was finalized on 11/19/2018 6:52 PM by Dr. Micky Olson M.D.             I personally viewed and interpreted the patient's imaging studies.    Assessment:     1. Abnormal CXR with atelectasis vs infiltrate of RLL- likely aspiration pneumonitis  2. 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 Mitral regurgitation  4. Alzheimer's dementia  5. Hypertension   6. Dysphagia- previously on mechanical soft and pureed diet    Plan:     CXR showed possible atelectasis vs infiltrate of right lung base, but is difficult to really assess given implants. However overall it looks better than previous CXR. WBC have improved and procal is normal, which does go against any bacterial infection. However, she does have chronic issues with dysphagia and should be on a pureed diet based on last speech evaluation in 4/2018. Her dementia may also be worsening especially when in a different environment such as the hospital and needs extra guidance with feedings and aspirations precautions.     Aspiration precautions and keep HOB elevated at 30 degrees.   Continue diuretics.  NPO and Speech to evaluate again.  Monitor labs in am and will hold on antibiotics for now given possible C diff and frailty.   Aggressive pulmonary hygiene measures with OPEP, hypertonic saline, and chest PT.     Thank you for allowing me to participate in the care of Bernardo Cox. Feel free to contact me directly with any further questions or concerns.    Arnol Jean MD  [facility]   11/21/18  4:24  PM    Dictated utilizing Dragon dictation

## 2018-11-21 NOTE — PLAN OF CARE
Problem: Patient Care Overview  Goal: Plan of Care Review   11/21/18 3920   Coping/Psychosocial   Plan of Care Reviewed With patient   Plan of Care Review   Progress improving   OTHER   Outcome Summary VSS, denies any c/o pain. Sitter still at bedside. Patient is confused but is able to be redirected and willing to follow instructions. Will continue to monitor

## 2018-11-21 NOTE — THERAPY EVALUATION
Acute Care - Speech Language Pathology   Swallow Initial Evaluation [facility]     Patient Name: Bernardo Cox  : 1931  MRN: [id number]  Today's Date: 2018  Onset of Illness/Injury or Date of Surgery: 18     Referring Physician: Hadley      Admit Date: 2018    Visit Dx:     ICD-10-CM ICD-9-CM   1. Congestive heart failure, unspecified HF chronicity, unspecified heart failure type (CMS/[facility]) I50.9 428.0   2. Elevated troponin R74.8 790.6   3. JASVIR (acute kidney injury) (CMS/[facility]) N17.9 584.9   4. Hyperglycemia R73.9 790.29   5. Impaired mobility Z74.09 799.89     Patient Active Problem List   Diagnosis   • Acute respiratory failure with hypoxia (CMS/[facility])   • Essential hypertension   • Syncope and collapse   • Bacteriuria   • HCAP (healthcare-associated pneumonia)   • Hypoglycemia   • DM (diabetes mellitus) (CMS/[facility])   • Acute on chronic systolic CHF (congestive heart failure) (CMS/[facility])   • Toxic metabolic encephalopathy   • Alzheimer disease   • Pneumonia of right lower lobe due to infectious organism (CMS/[facility])   • Acute hypoxemic respiratory failure (CMS/[facility])   • CHF (congestive heart failure), NYHA class I, acute on chronic, diastolic (CMS/[facility])   • Dehydration   • Elevated troponin     Past Medical History:   Diagnosis Date   • Alzheimer disease    • Atherosclerotic heart disease    • Bronchitis    • CHF (congestive heart failure) (CMS/[facility])     EF = 25%   • COPD (chronic obstructive pulmonary disease) (CMS/[facility])    • Dementia    • Depression    • Diabetes mellitus (CMS/[facility])    • GERD (gastroesophageal reflux disease)    • HTN (hypertension)    • Hypokalemia    • Insomnia    • Metabolic encephalopathy    • Neuropathy    • OA (osteoarthritis)    • Pneumonia    • Renal disorder     stage three   • TIA (transient ischemic attack)    • Upper respiratory infection      Past Surgical History:   Procedure Laterality Date   • CARDIAC SURGERY          SWALLOW EVALUATION (last 72 hours)      SLP Adult  Swallow Evaluation     Row Name 11/21/18 1500          Document Type  evaluation  -OC    Subjective Information  no complaints  -OC    Patient Observations  alert;cooperative;agree to therapy  -OC    Patient Effort  fair  -OC          Patient Profile Reviewed  yes  -OC    Pertinent History Of Current Problem  Pt admitted for acute on chronic CHF. Hx of dementia, COPD, DM, TIA.    -OC    Current Method of Nutrition  NPO  -OC    Precautions/Limitations, Vision  WFL with corrective lenses  -OC    Precautions/Limitations, Hearing  WFL  -OC    Prior Level of Function-Swallowing  other (see comments) VFSS 04/17/18 FM and thin liquids  -OC    Plans/Goals Discussed with  patient;agreed upon  -OC    Barriers to Rehab  cognitive status  -OC    Patient's Goals for Discharge  patient did not state  -OC          Pain: FACES Scale, Pretreatment  0-->no hurt  -OC    Pain: FACES Scale, Post-Treatment  0-->no hurt  -OC          Dentition Assessment  missing teeth;other (see comments) missing molars  -OC    Secretion Management  WNL/WFL  -OC    Mucosal Quality  dry  -OC    Volitional Swallow  WFL  -OC    Volitional Cough  WFL  -OC          Oral Motor General Assessment  generalized oral motor weakness  -OC          Oral Prep Phase  WFL  -OC    Oral Transit  WFL  -OC    Oral Residue  WFL  -OC    Pharyngeal Phase  suspected pharyngeal impairment  -OC    Clinical Swallow Evaluation Summary  Pt demonstrates throat clear with ice chip X2, audible swallow with thins and inconsistent cough.  No overt s/s nectar thick via cup, puree, and mech soft no mixed.   -OC          SLP Swallowing Diagnosis  oral dysfunction;suspected pharyngeal dysfunction  -OC    Functional Impact  risk of aspiration/pneumonia  -OC    Rehab Potential/Prognosis, Swallowing  good, to achieve stated therapy goals  -OC    Swallow Criteria for Skilled Therapeutic Interventions Met  demonstrates skilled criteria  -OC          Therapy Frequency (Swallow)  PRN  -OC     Predicted Duration Therapy Intervention (Days)  until discharge  -OC    SLP Diet Recommendation  mechanical soft with no mixed consistencies;nectar thick liquids  -OC    Recommended Diagnostics  reassess via VFSS (MBS)  -OC    Recommended Precautions and Strategies  no straw;upright posture during/after eating;small bites of food and sips of liquid;alternate between small bites of food and sips of liquid;other (see comments) supervision with PO  -OC    SLP Rec. for Method of Medication Administration  meds crushed;with pudding or applesauce  -OC    Monitor for Signs of Aspiration  yes;notify SLP if any concerns  -OC    Anticipated Dischage Disposition  anticipate therapy at next level of care  -OC          Oral Nutrition/Hydration Goal Selection (SLP)  oral nutrition/hydration, SLP goal 1  -OC          Oral Nutrition/Hydration Goal 1, SLP  Tolerate least restrictive diet with no overt s/s aspiration.   -OC      User Key  (r) = Recorded By, (t) = Taken By, (c) = Cosigned By    Initials Name Effective Dates    OC Margy Holloway MA,CCC-SLP 06/08/18 -           EDUCATION  The patient has been educated in the following areas:   Dysphagia (Swallowing Impairment).    SLP Recommendation and Plan  SLP Swallowing Diagnosis: oral dysfunction, suspected pharyngeal dysfunction  SLP Diet Recommendation: mechanical soft with no mixed consistencies, nectar thick liquids  Recommended Precautions and Strategies: no straw, upright posture during/after eating, small bites of food and sips of liquid, alternate between small bites of food and sips of liquid, other (see comments)(supervision with PO)     Monitor for Signs of Aspiration: yes, notify SLP if any concerns  Recommended Diagnostics: reassess via VFSS (MBS)  Swallow Criteria for Skilled Therapeutic Interventions Met: demonstrates skilled criteria  Anticipated Dischage Disposition: anticipate therapy at next level of care  Rehab Potential/Prognosis, Swallowing: good, to achieve  stated therapy goals  Therapy Frequency (Swallow): PRN  Predicted Duration Therapy Intervention (Days): until discharge       Plan of Care Reviewed With: patient  Plan of Care Review  Plan of Care Reviewed With: patient  Outcome Summary: Bedside swallow eval completed. Recommend mech soft no mixed consistencies and nectar thick liquids. Meds crushed with puree. Water protocol ok. ST to follow for VFSS friday as appropriate.    SLP GOALS     Row Name 11/21/18 1500             Oral Nutrition/Hydration Goal 1 (SLP)    Oral Nutrition/Hydration Goal 1, SLP  Tolerate least restrictive diet with no overt s/s aspiration.   -OC        User Key  (r) = Recorded By, (t) = Taken By, (c) = Cosigned By    Initials Name Provider Type    Margy Amaya MA,CCC-SLP Speech and Language Pathologist           SLP Outcome Measures (last 72 hours)      SLP Outcome Measures     Row Name 11/21/18 1500             SLP Outcome Measures    Outcome Measure Used?  Adult NOMS  -OC         Adult FCM Scores    FCM Chosen  Swallowing  -OC      Swallowing FCM Score  4  -OC        User Key  (r) = Recorded By, (t) = Taken By, (c) = Cosigned By    Initials Name Effective Dates    Margy Amaya MA, CCC-SLP 06/08/18 -            Time Calculation:   Time Calculation- SLP     Row Name 11/21/18 1556             Time Calculation- SLP    SLP Start Time  1500  -OC      SLP Received On  11/21/18  -OC        User Key  (r) = Recorded By, (t) = Taken By, (c) = Cosigned By    Initials Name Provider Type    Margy Amaya MA,ALINE-SLP Speech and Language Pathologist          Therapy Charges for Today     Code Description Service Date Service Provider Modifiers Qty    [id number]  ST EVAL ORAL PHARYNG SWALLOW 4 11/21/2018 Margy Holloway MA,ALINE-SLP GN 1               Margy Holloway MA, CCC-SLP  11/21/2018

## 2018-11-22 NOTE — PROGRESS NOTES
[facility]  Progress note: 2018    Patient Identification:  Name:Bernardo Cox  Age:87 y.o.  Sex: female  :  1931  MRN: [id number]           CC:  follow up heart failure, mitral valve insufficiency    Interval history:  Significant diuresis, blood pressure controlled.  Patient very angry not willing to let me examine her stating she just wants to go home.  That she is ready to go home she is leaving.    Vital Signs:   Temp:  [97.3 °F (36.3 °C)-98.3 °F (36.8 °C)] 97.3 °F (36.3 °C)  Heart Rate:  [75-93] 76  Resp:  [16-20] 20  BP: (121-159)/(50-71) 121/50    Intake/Output Summary (Last 24 hours) at 2018 1804  Last data filed at 2018 1323  Gross per 24 hour   Intake --   Output 1500 ml   Net -1500 ml       Physical Examination: Unable to examine as she is quite agitated and unwilling to be examined      Lab Review:  Personally reviewed the labs, radiology imaging and other cardiac procedures. Results from last 7 days   Lab Units  18   0252   SODIUM mmol/L  135*   POTASSIUM mmol/L  3.0*   CHLORIDE mmol/L  92*   CO2 mmol/L  31.0*   BUN mg/dL  18   CREATININE mg/dL  0.90   CALCIUM mg/dL  8.5*   BILIRUBIN mg/dL  0.5   ALK PHOS U/L  126*   ALT (SGPT) U/L  9   AST (SGOT) U/L  8   GLUCOSE mg/dL  124*     Results from last 7 days   Lab Units  18   1741   TROPONIN T ng/mL  0.034*     Results from last 7 days   Lab Units  18   0252  18   0351  18   0517   WBC 10*3/mm3  10.63  10.16  11.56*   HEMOGLOBIN g/dL  10.3*  10.1*  10.1*   HEMATOCRIT %  35.3*  34.1*  33.5*   PLATELETS 10*3/mm3  325  290  337     Medication Review:   Meds reviewed  Scheduled Meds:  budesonide 0.5 mg Nebulization Daily - RT   carvedilol 25 mg Oral BID With Meals   clopidogrel 75 mg Oral Daily   docusate sodium 100 mg Oral Daily   donepezil 10 mg Oral Nightly   enoxaparin 30 mg Subcutaneous Q24H   escitalopram 10 mg Oral Daily   furosemide 40 mg Intravenous Q12H   insulin glargine 30 Units  Subcutaneous Nightly   insulin lispro 0-7 Units Subcutaneous 4x Daily With Meals & Nightly   ipratropium-albuterol 3 mL Nebulization 4x Daily - RT   mupirocin  Topical BID   pantoprazole 40 mg Oral QAM AC   polyethylene glycol 17 g Oral Daily   potassium chloride 20 mEq Oral Daily   sodium chloride 4 mL Nebulization BID - RT     I personally viewed and interpreted the patient's EKG/Telemetry data    Assessment and Plan  1. Acute on chronic diastolic heart failure.  Unable to make further recommendations given her patient's agitation.  I have notified staff who will address in this further with primary provider.   2. Severe mitral regurgitation,continue conservative management.   3. History of coronary artery disease with prior CABG,  no angina. Continue medical therapy.   4. Probable aspiration pneumonitis followed by pulmonary  5. History of HTN, somewhat high. Follow with diuresis.   6. Hyponatremia    Addendum: I was contacted by nursing staff that she was willing to talk with me.  After much coaxing with the nurse in the room she lab me examine her.  Her lungs were clear and her cardiac exam showed regular rhythm and rate with no murmurs or gallops.  Skin showed relative pruning.  Legs are wrapped up and stockings.  As multiple excoriations over her legs     I discussed with her that we switched to oral diuretics and if she did well overnight possibly be dismissed home tomorrow from a cardiac standpoint.  She then became again quite agitated and related dismiss her immediately.  I told her that I could not do that and she asked me to leave using profanity.  I left and nurse remained in the room.  Also switched her Lasix to an oral regimen.    Kimberly Fields  11/22/20186:04 PM  25min spent in reviewing records, discussion and examination of the patient and discussion with other members of the patient's medical team.     Dictated utilizing Dragon dictation

## 2018-11-22 NOTE — PROGRESS NOTES
PROGRESS NOTE  Patient Name: Bernardo Cox  Age/Sex: 87 y.o. female  : 1931  MRN: [id number]    Date of Admission: 2018  Date of Encounter Visit: 18   LOS: 2 days   Patient Care Team:  Tay Lynch MD as PCP - General (Family Medicine)  Sophie Zuniga, RN as Care Coordinator (Population Health)    Chief Complaint: follow up on RLL infiltrate suspecting aspiration pneumonitis    Hospital course: She was admitted on 18 with complaints of edema and was found to have an elevated BNP at nursing facility. She was found to have JASVIR with underlying CHF and was treated with diuretics. She was noted to have intermittent confusion and required a sitter and persistent redirection. She failed RN bedside swallow and was noted to have some wheezing in the RLL after eating. CXR had showed small atelectasis vs infiltrate in the right lung base and repeat CXR showed worsening RLL infiltrate. Initially she had leukocytosis with left shift that did improve. Procalcitonin was negative and antibiotics were not started due to reports of diarrhea and possible C. Difficile.     Interval History: tolerating nectar liquids better. Last night she was refusing all treatment and was more aggressive likely from sun downers. Still has difficulty expectorating. Overall is uncomfortable. She did not sleep well last night.    REVIEW OF SYSTEMS:   Difficult to obtain due to dementia and lethargy and mostly obtained from nursing staff  CONSTITUTIONAL: no fever  CARDIOVASCULAR: No chest pain, chest pressure or chest discomfort. No palpitations. edema.   RESPIRATORY: cough with no sputum  GASTROINTESTINAL: No anorexia, nausea, vomiting or diarrhea. No abdominal pain or blood. Frequent urination and chloe area redness and tenderness  HEMATOLOGIC: No bleeding or bruising.     Ventilator/Non-Invasive Ventilation Settings (From admission, onward)    None            Vital Signs  Temp:  [97.2 °F (36.2 °C)-98.3 °F (36.8 °C)] 98.3  "°F (36.8 °C)  Heart Rate:  [84-93] 86  Resp:  [16-20] 20  BP: (134-159)/(58-71) 159/71  SpO2:  [94 %-98 %] 94 %  on    Device (Oxygen Therapy): room air    Intake/Output Summary (Last 24 hours) at 11/22/2018 1113  Last data filed at 11/21/2018 2231  Gross per 24 hour   Intake --   Output 1700 ml   Net -1700 ml     Flowsheet Rows      First Filed Value   Admission Height  165.1 cm (65\") Documented at 11/19/2018 1645   Admission Weight  66.2 kg (146 lb) Documented at 11/19/2018 1845        Body mass index is 21.87 kg/m².      11/19/18  1845 11/19/18 2236   Weight: 66.2 kg (146 lb) 59.6 kg (131 lb 6.4 oz)       Physical Exam:  GEN:  No acute distress, lethargic, on room air sating well   EYES:   Sclera clear. No icterus. PERRL.   ENT:   External ears/nose normal, no oral lesions, no thrush, mucous membranes moist  NECK:  Supple, midline trachea  LUNGS: Normal chest on inspection, diminished, coarse rhonchi throughout worse on the right, no wheezes.  Respirations regular, even and unlabored.   CV:  Regular rhythm and rate. Normal S1/S2. 2/6 systolic mitral murmur  No gallops, or rubs noted.  ABD:  Soft, non-tender and non-distended. Normal bowel sounds. No guarding  EXT:  Moves all extremities well. No cyanosis. No redness. trace edema. BLL edema wrapped and drsg was not removed.     Skin: dry, intact, no bleeding    Results Review:      Results from last 7 days   Lab Units  11/22/18   0252  11/21/18   0351  11/20/18   0517  11/19/18 2019   SODIUM mmol/L  135*  132*  130*  132*   POTASSIUM mmol/L  3.0*  3.5  4.1  4.5   CHLORIDE mmol/L  92*  93*  93*  92*   CO2 mmol/L  31.0*  26.6  25.0  29.3*   BUN mg/dL  18  23  28*  30*   CREATININE mg/dL  0.90  0.89  0.96  1.12*   CALCIUM mg/dL  8.5*  8.7  8.8  8.8   AST (SGOT) U/L  8   --    --   10   ALT (SGPT) U/L  9   --    --   9   ANION GAP mmol/L  12.0  12.4  12.0  10.7   ALBUMIN g/dL  2.60*   --    --   2.80*     Results from last 7 days   Lab Units  11/19/18   1742 "   TROPONIN T ng/mL  0.034*         Results from last 7 days   Lab Units  11/21/18   0351  11/19/18   1741   PROBNP pg/mL  32,283.0*  24,570.0*     Results from last 7 days   Lab Units  11/22/18   0252  11/21/18   0351  11/20/18   0517  11/19/18   1741   WBC 10*3/mm3  10.63  10.16  11.56*  14.78*   HEMOGLOBIN g/dL  10.3*  10.1*  10.1*  11.0*   HEMATOCRIT %  35.3*  34.1*  33.5*  39.0   PLATELETS 10*3/mm3  325  290  337  316   MCV fL  85.7  84.8  82.9  88.4   NEUTROPHIL % %  76.7*   --    --   85.4*   LYMPHOCYTE % %  13.1*   --    --   6.5*   MONOCYTES % %  8.9   --    --   5.5   EOSINOPHIL % %  0.5   --    --   0.3   BASOPHIL % %  0.1   --    --   0.3   IMM GRAN % %  0.7*   --    --   2.0*         Results from last 7 days   Lab Units  11/22/18   0252   MAGNESIUM mg/dL  1.5*           Invalid input(s): LDLCALC          Glucose   Date/Time Value Ref Range Status   11/22/2018 1102 114 70 - 130 mg/dL Final   11/22/2018 0804 82 70 - 130 mg/dL Final   11/22/2018 0702 69 (L) 70 - 130 mg/dL Final   11/21/2018 2059 190 (H) 70 - 130 mg/dL Final   11/21/2018 1620 191 (H) 70 - 130 mg/dL Final   11/21/2018 1253 415 (H) 70 - 130 mg/dL Final   11/21/2018 1251 410 (H) 70 - 130 mg/dL Final   11/21/2018 0638 284 (H) 70 - 130 mg/dL Final     Results from last 7 days   Lab Units  11/22/18   0252  11/19/18   2019   PROCALCITONIN ng/mL  0.08*  0.08*                       Imaging:   Imaging Results (all)     Procedure Component Value Units Date/Time    XR Chest 1 View [[id number]] Collected:  11/21/18 1511     Updated:  11/21/18 1515    Narrative:       XR CHEST 1 VW-     Clinical: Cough     COMPARISON 11/19/2018     FINDINGS: Cardiomegaly similar to the previous examination. Increasing  right base opacity consistent with progressive air space disease with  now consolidation at this location. The left lung remains clear. No  edema or effusion identified. The remainder is unremarkable.     This report was finalized on 11/21/2018 3:11 PM by  Dr. Darryl Mendez M.D.       XR Chest 1 View [[id number]] Collected:  11/19/18 1849     Updated:  11/19/18 1855    Narrative:       XR CHEST 1 VW-     HISTORY: Female who is 87 years-old,  cough     TECHNIQUE: Frontal view of the chest     COMPARISON: 4/23/2018     FINDINGS: Heart is enlarged. Aorta is tortuous, calcified. Sternotomy  wires are present. Pulmonary vasculature is unremarkable. Small likely  atelectasis or infiltrate at the right base, follow-up suggested. No  large pleural effusion, no pneumothorax. No acute osseous process.       Impression:       Small likely atelectasis or infiltrate at the right base,  follow-up suggested. Tortuous aorta.     This report was finalized on 11/19/2018 6:52 PM by Dr. Micky Olson M.D.                          I reviewed the patient's new clinical results.  I personally viewed and interpreted the patient's imaging results:        Medication Review:     budesonide 0.5 mg Nebulization Daily - RT   carvedilol 25 mg Oral BID With Meals   clopidogrel 75 mg Oral Daily   docusate sodium 100 mg Oral Daily   donepezil 10 mg Oral Nightly   enoxaparin 30 mg Subcutaneous Q24H   escitalopram 10 mg Oral Daily   furosemide 40 mg Intravenous Q12H   insulin glargine 30 Units Subcutaneous Nightly   insulin lispro 0-7 Units Subcutaneous 4x Daily With Meals & Nightly   ipratropium-albuterol 3 mL Nebulization 4x Daily - RT   mupirocin  Topical BID   pantoprazole 40 mg Oral QAM AC   polyethylene glycol 17 g Oral Daily   potassium chloride 20 mEq Oral Daily   sodium chloride 4 mL Nebulization BID - RT            ASSESSMENT:   1. Abnormal CXR with atelectasis vs infiltrate of RLL- likely aspiration pneumonitis  2. 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 Mitral regurgitation  4. Alzheimer's dementia  5. Hypertension   6. Dysphagia- previously on mechanical soft and pureed diet  7. Hypokalemia     PLAN:  Labs are stable, but lungs sounds are worse. She is coughing with chest PT,  but no significant improvement in being able to expectorate.     Continue to monitor off antibiotics for now.   Continue aggressive pulmonary hygiene measures with TCDB, OPEP, chest PT, hypertonic saline and bronchodilators. Add yonker and consider NT suction if no improvement.   Aspiration precautions and keep HOB elevated at 30 degrees.  Continue diet per speech recommendations and if any further difficulty with swallowing will go back to NPO.   Diuretics per cardiology and admitting.   Consider repeat CXR if worsening respiratory status or in a few days.     Discussed with staff.     Disposition: per admitting    Arnol Jean MD  11/22/18  11:13 AM      Dictated utilizing Dragon dictation

## 2018-11-22 NOTE — PLAN OF CARE
Problem: Patient Care Overview  Goal: Plan of Care Review  Outcome: Ongoing (interventions implemented as appropriate)   11/22/18 0342   Coping/Psychosocial   Plan of Care Reviewed With patient   Plan of Care Review   Progress no change   OTHER   Outcome Summary vss. no c/o pain. patient argumentative and uncooperative with staff while frequently seeking staff out. q2turn. purwick in place. WOCN consulted for stage 2. will continue to monitor

## 2018-11-22 NOTE — PROGRESS NOTES
Progress Note  Alex Johnson MD    Patient ID:  Name:  Bernardo Cox  MRN:  [id number]  4/26/1931  87 y.o.  female            CC/Reason for visit:  :CHF exacerbation,Severe Mitral Regurg, Issues C Diff possibiliy,Dysphagia,Dementa     Interval history: multiple issues. Looks really tired       Vitals:  Vitals:    11/22/18 0706 11/22/18 0722 11/22/18 1042 11/22/18 1054   BP:  159/71     BP Location:  Right arm     Patient Position:  Lying     Pulse: 84 87 89 86   Resp: 20 20 20 20   Temp:  98.3 °F (36.8 °C)     TempSrc:  Oral     SpO2: 95% 95% 94%    Weight:       Height:               Body mass index is 21.87 kg/m².    Intake/Output Summary (Last 24 hours) at 11/22/2018 1215  Last data filed at 11/21/2018 2231  Gross per 24 hour   Intake --   Output 1700 ml   Net -1700 ml       Exam:  GEN:  No distress, appears stated age  EYES:   EOM-i, anicteric sclera bilat  ENT:    External ears/nose normal, OP clear  NECK:  Supple, midline trachea  LUNGS: Decrease  sounds bilat,ronchi, nonlabored breathing  CV:  Normal S1S2, without murmur, no edema  ABD:  Non tender, no enlarged liver or masses  EXT:  No cyanosis or clubbing    Scheduled meds:    budesonide 0.5 mg Nebulization Daily - RT   carvedilol 25 mg Oral BID With Meals   clopidogrel 75 mg Oral Daily   docusate sodium 100 mg Oral Daily   donepezil 10 mg Oral Nightly   enoxaparin 30 mg Subcutaneous Q24H   escitalopram 10 mg Oral Daily   furosemide 40 mg Intravenous Q12H   insulin glargine 30 Units Subcutaneous Nightly   insulin lispro 0-7 Units Subcutaneous 4x Daily With Meals & Nightly   ipratropium-albuterol 3 mL Nebulization 4x Daily - RT   mupirocin  Topical BID   pantoprazole 40 mg Oral QAM AC   polyethylene glycol 17 g Oral Daily   potassium chloride 20 mEq Oral Daily   sodium chloride 4 mL Nebulization BID - RT     IV meds:                           Data Review:   I reviewed the patient's medications and new clinical results.  Lab Results   Component Value Date     CALCIUM 8.5 (L) 11/22/2018    PHOS 2.7 04/20/2018     Results from last 7 days   Lab Units  11/22/18   0252  11/21/18   0351  11/20/18   0517  11/19/18 2019 11/19/18   1741   SODIUM mmol/L  135*  132*  130*  132*   < >   --    POTASSIUM mmol/L  3.0*  3.5  4.1  4.5   < >   --    CHLORIDE mmol/L  92*  93*  93*  92*   < >   --    CO2 mmol/L  31.0*  26.6  25.0  29.3*   < >   --    BUN mg/dL  18  23  28*  30*   < >   --    CREATININE mg/dL  0.90  0.89  0.96  1.12*   < >   --    CALCIUM mg/dL  8.5*  8.7  8.8  8.8   < >   --    BILIRUBIN mg/dL  0.5   --    --   0.5   --    --    ALK PHOS U/L  126*   --    --   151*   --    --    ALT (SGPT) U/L  9   --    --   9   --    --    AST (SGOT) U/L  8   --    --   10   --    --    GLUCOSE mg/dL  124*  290*  476*  422*   < >   --    WBC 10*3/mm3  10.63  10.16  11.56*   --    --   14.78*   HEMOGLOBIN g/dL  10.3*  10.1*  10.1*   --    --   11.0*   PLATELETS 10*3/mm3  325  290  337   --    --   316   PROBNP pg/mL   --   32,283.0*   --    --    --   24,570.0*   PROCALCITONIN ng/mL  0.08*   --    --   0.08*   --    --     < > = values in this interval not displayed.             Results from last 7 days   Lab Units  11/19/18   1741   TROPONIN T ng/mL  0.034*           Estimated Creatinine Clearance: 41.4 mL/min (by C-G formula based on SCr of 0.9 mg/dL).    WEIGHTS:     Wt Readings from Last 1 Encounters:   11/19/18 2236 59.6 kg (131 lb 6.4 oz)   11/19/18 1845 66.2 kg (146 lb)         ASSESSMENT:     Acute on chronic systolic CHF (congestive heart failure) (CMS/HCC)    Essential hypertension    DM (diabetes mellitus) (CMS/HCC)    Toxic metabolic encephalopathy    Alzheimer disease    Elevated troponin    Nonrheumatic mitral valve insufficiency    Edema of both legs    Hypomagnesemia    PLAN:    Labs are stable, but lungs sounds are worse. She is coughing with chest PT, but no significant improvement in being able to expectorate.      Continue to monitor off antibiotics for now.    Continue aggressive pulmonary hygiene measures with TCDB, OPEP, chest PT, hypertonic saline and bronchodilators. Add yonker and consider NT suction if no improvement.   Aspiration precautions and keep HOB elevated at 30 degrees.  Continue diet per speech recommendations and if any further difficulty with swallowing will go back to NPO.   Diuretics per cardiology  Consider repeat CXR if worsening respiratory status or in a few days.   Order One gram magnesium sulfate iv X 1          Alex Johnson MD  11/22/2018

## 2018-11-23 NOTE — PLAN OF CARE
Problem: Patient Care Overview  Goal: Plan of Care Review   11/23/18 7150   Coping/Psychosocial   Plan of Care Reviewed With patient;caregiver   OTHER   Outcome Summary Pt seen following new order per Dr. Johnson. Pt very alert today & wanting to get up to chair. She is confused but very conversant & inquisitive. She presents with decreased bed mob, txfs, and gait. She will benefit from skilled PT to address deficits to facilitate improved physical function.

## 2018-11-23 NOTE — PROGRESS NOTES
PROGRESS NOTE  Patient Name: Bernardo Cox  Age/Sex: 87 y.o. female  : 1931  MRN: [id number]    Date of Admission: 2018  Date of Encounter Visit: 18   LOS: 3 days   Patient Care Team:  Tay Lynch MD as PCP - General (Family Medicine)  Sophie Zuniga, RN as Care Coordinator (Population Health)    Chief Complaint: follow up on RLL infiltrate suspecting aspiration pneumonitis    Hospital course: She was admitted on 18 with complaints of edema and was found to have an elevated BNP at nursing facility. She was found to have JASVIR with underlying CHF and was treated with diuretics. She was noted to have intermittent confusion and required a sitter and persistent redirection. She failed RN bedside swallow and was noted to have some wheezing in the RLL after eating. CXR had showed small atelectasis vs infiltrate in the right lung base and repeat CXR showed worsening RLL infiltrate. Initially she had leukocytosis with left shift that did improve. Procalcitonin was negative and antibiotics were not started due to reports of diarrhea and possible C. Difficile. She was taken off antibiotics because of her C-Diff history and seem to be doing better with pulmonary hygiene measures    Interval History: tolerating nectar liquids .on RA, susing Vest and mucolytics and improving..    REVIEW OF SYSTEMS:   Difficult to obtain due to dementia , but she is more awake and interactive today  CONSTITUTIONAL: no fever  CARDIOVASCULAR: No chest pain, chest pressure or chest discomfort. No palpitations. edema.   RESPIRATORY: cough with no sputum  GASTROINTESTINAL: No anorexia, nausea, vomiting or diarrhea. No abdominal pain or blood.    HEMATOLOGIC: No bleeding or bruising.     Ventilator/Non-Invasive Ventilation Settings (From admission, onward)    On RA            Vital Signs  Temp:  [97.6 °F (36.4 °C)-98.4 °F (36.9 °C)] 97.6 °F (36.4 °C)  Heart Rate:  [74-92] 84  Resp:  [16-22] 18  BP: (119-151)/(51-74)  "128/66  SpO2:  [92 %-100 %] 95 %  on    Device (Oxygen Therapy): room air    Intake/Output Summary (Last 24 hours) at 11/23/2018 1336  Last data filed at 11/23/2018 1239  Gross per 24 hour   Intake 50 ml   Output 110 ml   Net -60 ml     Flowsheet Rows      First Filed Value   Admission Height  165.1 cm (65\") Documented at 11/19/2018 1645   Admission Weight  66.2 kg (146 lb) Documented at 11/19/2018 1845        Body mass index is 21.87 kg/m².      11/19/18  1845 11/19/18  2236   Weight: 66.2 kg (146 lb) 59.6 kg (131 lb 6.4 oz)       Physical Exam:  GEN:  No acute distress, alert, on room air sating well   EYES:   Sclera clear. No icterus. PERRL.   ENT:   External ears/nose normal, no oral lesions, no thrush, mucous membranes moist  NECK:  Supple, midline trachea  LUNGS: Normal chest on inspection, diminished, rhonchi only while coughing, minimal wheezes.  Respirations regular, even and unlabored.   CV:  Regular rhythm and rate. Normal S1/S2. 2/6 systolic mitral murmur  No gallops, or rubs noted.  ABD:  Soft, non-tender and non-distended. Normal bowel sounds. No guarding  EXT:  Moves all extremities well. No cyanosis. No redness. trace edema. BLL edema wrapped and drsg was not removed.     Skin: dry, intact, no bleeding    Results Review:      Results from last 7 days   Lab Units  11/23/18   0508  11/22/18   0252  11/21/18   0351  11/20/18   0517  11/19/18 2019   SODIUM mmol/L  137  135*  132*  130*  132*   POTASSIUM mmol/L  3.1*  3.0*  3.5  4.1  4.5   CHLORIDE mmol/L  93*  92*  93*  93*  92*   CO2 mmol/L  29.8*  31.0*  26.6  25.0  29.3*   BUN mg/dL  16  18  23  28*  30*   CREATININE mg/dL  1.09*  0.90  0.89  0.96  1.12*   CALCIUM mg/dL  8.1*  8.5*  8.7  8.8  8.8   AST (SGOT) U/L   --   8   --    --   10   ALT (SGPT) U/L   --   9   --    --   9   ANION GAP mmol/L  14.2  12.0  12.4  12.0  10.7   ALBUMIN g/dL   --   2.60*   --    --   2.80*     Results from last 7 days   Lab Units  11/19/18   1741   TROPONIN T ng/mL  " 0.034*         Results from last 7 days   Lab Units  11/21/18   0351  11/19/18   1741   PROBNP pg/mL  32,283.0*  24,570.0*     Results from last 7 days   Lab Units  11/22/18   0252  11/21/18   0351  11/20/18   0517  11/19/18   1741   WBC 10*3/mm3  10.63  10.16  11.56*  14.78*   HEMOGLOBIN g/dL  10.3*  10.1*  10.1*  11.0*   HEMATOCRIT %  35.3*  34.1*  33.5*  39.0   PLATELETS 10*3/mm3  325  290  337  316   MCV fL  85.7  84.8  82.9  88.4   NEUTROPHIL % %  76.7*   --    --   85.4*   LYMPHOCYTE % %  13.1*   --    --   6.5*   MONOCYTES % %  8.9   --    --   5.5   EOSINOPHIL % %  0.5   --    --   0.3   BASOPHIL % %  0.1   --    --   0.3   IMM GRAN % %  0.7*   --    --   2.0*         Results from last 7 days   Lab Units  11/22/18   0252   MAGNESIUM mg/dL  1.5*           Invalid input(s): LDLCALC          Glucose   Date/Time Value Ref Range Status   11/23/2018 1108 155 (H) 70 - 130 mg/dL Final   11/23/2018 0605 126 70 - 130 mg/dL Final   11/23/2018 0450 315 (H) 70 - 130 mg/dL Final   11/23/2018 0440 56 (L) 70 - 130 mg/dL Final   11/22/2018 2210 378 (H) 70 - 130 mg/dL Final   11/22/2018 1610 247 (H) 70 - 130 mg/dL Final   11/22/2018 1102 114 70 - 130 mg/dL Final   11/22/2018 0804 82 70 - 130 mg/dL Final     Results from last 7 days   Lab Units  11/22/18   0252  11/19/18   2019   PROCALCITONIN ng/mL  0.08*  0.08*                       Imaging:   Imaging Results (all)     Procedure Component Value Units Date/Time    XR Chest 1 View [[id number]] Collected:  11/21/18 1511     Updated:  11/21/18 1515    Narrative:       XR CHEST 1 VW-     Clinical: Cough     COMPARISON 11/19/2018     FINDINGS: Cardiomegaly similar to the previous examination. Increasing  right base opacity consistent with progressive air space disease with  now consolidation at this location. The left lung remains clear. No  edema or effusion identified. The remainder is unremarkable.     This report was finalized on 11/21/2018 3:11 PM by Dr. Darryl Mendez M.D.        XR Chest 1 View [[id number]] Collected:  11/19/18 1849     Updated:  11/19/18 1855    Narrative:       XR CHEST 1 VW-     HISTORY: Female who is 87 years-old,  cough     TECHNIQUE: Frontal view of the chest     COMPARISON: 4/23/2018     FINDINGS: Heart is enlarged. Aorta is tortuous, calcified. Sternotomy  wires are present. Pulmonary vasculature is unremarkable. Small likely  atelectasis or infiltrate at the right base, follow-up suggested. No  large pleural effusion, no pneumothorax. No acute osseous process.       Impression:       Small likely atelectasis or infiltrate at the right base,  follow-up suggested. Tortuous aorta.     This report was finalized on 11/19/2018 6:52 PM by Dr. Micky Olson M.D.                                 Medication Review:     budesonide 0.5 mg Nebulization Daily - RT   carvedilol 25 mg Oral BID With Meals   clopidogrel 75 mg Oral Daily   docusate sodium 100 mg Oral Daily   donepezil 10 mg Oral Nightly   enoxaparin 30 mg Subcutaneous Q24H   escitalopram 10 mg Oral Daily   furosemide 40 mg Oral Daily   insulin glargine 25 Units Subcutaneous Nightly   insulin lispro 0-7 Units Subcutaneous 4x Daily With Meals & Nightly   ipratropium-albuterol 3 mL Nebulization 4x Daily - RT   mupirocin  Topical BID   pantoprazole 40 mg Oral QAM AC   polyethylene glycol 17 g Oral Daily   [START ON 11/24/2018] potassium chloride 20 mEq Oral BID   potassium chloride 40 mEq Oral Once   sodium chloride 4 mL Nebulization BID - RT            ASSESSMENT:   1. Abnormal CXR with atelectasis vs infiltrate of RLL- likely aspiration pneumonitis/edema  2. 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 Mitral regurgitation  4. Alzheimer's dementia  5. Hypertension   6. Dysphagia- previously on mechanical soft and pureed diet  7. Hypokalemia     PLAN:  Labs are stable, lungs sound better.   She is coughing with chest PT, still limited expectoration.   Episodes of restlessness and confusion at night  We'll  continue to monitor off antibiotics, aggressive pulmonary hygiene to be continued with TCDB, OPEP, chest PT, hypertonic saline and bronchodilators, and NT suction as needed.   Consider repeat CXR if worsening respiratory status or in a few days.       Disposition: per admitting    Arnol Jean MD  11/23/18  1:36 PM      Dictated utilizing Dragon dictation

## 2018-11-23 NOTE — PLAN OF CARE
Problem: Patient Care Overview  Goal: Plan of Care Review  Outcome: Ongoing (interventions implemented as appropriate)   11/23/18 2644   Coping/Psychosocial   Plan of Care Reviewed With family   Plan of Care Review   Progress no change   OTHER   Outcome Summary Patient confused and refusing to take any further medication for nurse. Unable to start replacing Potassium. Refusing vital signs and accuchecks. Personal caregiver and significant other at bedside and patient continues to stay in wheelchair     Goal: Individualization and Mutuality  Outcome: Ongoing (interventions implemented as appropriate)    Goal: Discharge Needs Assessment  Outcome: Ongoing (interventions implemented as appropriate)    Goal: Interprofessional Rounds/Family Conf  Outcome: Ongoing (interventions implemented as appropriate)      Problem: Fall Risk (Adult)  Goal: Absence of Fall  Outcome: Ongoing (interventions implemented as appropriate)      Problem: Skin Injury Risk (Adult)  Goal: Skin Health and Integrity  Outcome: Ongoing (interventions implemented as appropriate)      Problem: Breathing Pattern Ineffective (Adult)  Goal: Effective Oxygenation/Ventilation  Outcome: Ongoing (interventions implemented as appropriate)    Goal: Anxiety/Fear Reduction  Outcome: Ongoing (interventions implemented as appropriate)

## 2018-11-23 NOTE — NURSING NOTE
At 4:30 this am, patient very restless, diaphoretic trying to get out of bed stating she felt like she was going to faint. Blood sugar=56. Patient not alert enough for safe po intake d/t risk of aspiration. Treated per protocol with amp of d50 IV and blood sugar [id number]=134. Vital signs stable. Call to Md to notify of hypoglycemic event.

## 2018-11-23 NOTE — PROGRESS NOTES
[facility]  Progress note: 2018    Patient Identification:  Name:Bernardo Cox  Age:87 y.o.  Sex: female  :  1931  MRN: [id number]           CC:  follow up heart failure, mitral valve insufficiency    Interval history:  Saturations normal on room air.  Let pressures and heart rate stable.  Noted plans to stay in hospital and continue ongoing respiratory therapy.  Patient resting comfortably without oxygen.  Still complains of feeling weak.    Vital Signs:   Temp:  [97.3 °F (36.3 °C)-98.4 °F (36.9 °C)] 97.6 °F (36.4 °C)  Heart Rate:  [74-92] 90  Resp:  [16-22] 18  BP: (119-151)/(50-74) 128/66    Intake/Output Summary (Last 24 hours) at 2018 1139  Last data filed at 2018 0511  Gross per 24 hour   Intake 50 ml   Output 800 ml   Net -750 ml       Physical Examination:    General Appearance No acute distress   Neck No adenopathy, supple, trachea midline, no thyromegaly, no carotid bruit, no JVD   Lungs Clear to auscultation,respirations regular, even and unlabored   Heart Regular rhythm and normal rate, normal S1 and S2, no murmur, no gallop, no rub, no click   Chest wall No abnormalities observed   Abdomen Normal bowel sounds, no masses, no hepatomegaly, soft   Extremities Moves all extremities well, no edema, no cyanosis, no redness   Neurological Alert and oriented x 2     Lab Review:  Personally reviewed the labs, radiology imaging and other cardiac procedures. Results from last 7 days   Lab Units  18   0508  18   0252   SODIUM mmol/L  137  135*   POTASSIUM mmol/L  3.1*  3.0*   CHLORIDE mmol/L  93*  92*   CO2 mmol/L  29.8*  31.0*   BUN mg/dL  16  18   CREATININE mg/dL  1.09*  0.90   CALCIUM mg/dL  8.1*  8.5*   BILIRUBIN mg/dL   --   0.5   ALK PHOS U/L   --   126*   ALT (SGPT) U/L   --   9   AST (SGOT) U/L   --   8   GLUCOSE mg/dL  178*  124*     Results from last 7 days   Lab Units  18   1741   TROPONIN T ng/mL  0.034*     )  Results from last 7 days   Lab Units   11/22/18   0252  11/21/18   0351  11/20/18   0517   WBC 10*3/mm3  10.63  10.16  11.56*   HEMOGLOBIN g/dL  10.3*  10.1*  10.1*   HEMATOCRIT %  35.3*  34.1*  33.5*   PLATELETS 10*3/mm3  325  290  337       Medication Review:   Meds reviewed  Scheduled Meds:  budesonide 0.5 mg Nebulization Daily - RT   carvedilol 25 mg Oral BID With Meals   clopidogrel 75 mg Oral Daily   docusate sodium 100 mg Oral Daily   donepezil 10 mg Oral Nightly   enoxaparin 30 mg Subcutaneous Q24H   escitalopram 10 mg Oral Daily   furosemide 40 mg Oral Daily   insulin glargine 25 Units Subcutaneous Nightly   insulin lispro 0-7 Units Subcutaneous 4x Daily With Meals & Nightly   ipratropium-albuterol 3 mL Nebulization 4x Daily - RT   mupirocin  Topical BID   pantoprazole 40 mg Oral QAM AC   polyethylene glycol 17 g Oral Daily   potassium chloride 20 mEq Oral Daily   sodium chloride 4 mL Nebulization BID - RT     I personally viewed and interpreted the patient's EKG/Telemetry data    Assessment and Plan  1. Acute on chronic diastolic heart failure.  Changed to oral antibiotics yesterday and seems to be tolerating this well    Though needs more potassium supplementation.  We'll give additional today and increase baseline dose.  2. Severe mitral regurgitation,continue conservative management.   3. History of coronary artery disease with prior CABG,  no angina. Continue medical therapy.   4. Probable aspiration pneumonitis followed by pulmonary  5. History of HTN, somewhat high. Follow with diuresis.   6. Hyponatremia    Mini Fields  11/23/201811:39 AM  25min spent in reviewing records, discussion and examination of the patient and discussion with other members of the patient's medical team.     Dictated utilizing Dragon dictation

## 2018-11-23 NOTE — MBS/VFSS/FEES
Acute Care - Speech Language Pathology   Swallow Initial Evaluation [facility]     Patient Name: Bernardo Cox  : 1931  MRN: [id number]  Today's Date: 2018  Onset of Illness/Injury or Date of Surgery: 18     Referring Physician: Hadley      Admit Date: 2018    Visit Dx:     ICD-10-CM ICD-9-CM   1. Congestive heart failure, unspecified HF chronicity, unspecified heart failure type (CMS/[facility]) I50.9 428.0   2. Elevated troponin R74.8 790.6   3. JASVIR (acute kidney injury) (CMS/[facility]) N17.9 584.9   4. Hyperglycemia R73.9 790.29   5. Impaired mobility Z74.09 799.89     Patient Active Problem List   Diagnosis   • Acute respiratory failure with hypoxia (CMS/[facility])   • Essential hypertension   • Syncope and collapse   • Bacteriuria   • HCAP (healthcare-associated pneumonia)   • Hypoglycemia   • DM (diabetes mellitus) (CMS/[facility])   • Acute on chronic systolic CHF (congestive heart failure) (CMS/[facility])   • Toxic metabolic encephalopathy   • Alzheimer disease   • Pneumonia of right lower lobe due to infectious organism (CMS/HCC)   • Acute hypoxemic respiratory failure (CMS/[facility])   • CHF (congestive heart failure), NYHA class I, acute on chronic, diastolic (CMS/HCC)   • Dehydration   • Elevated troponin   • Nonrheumatic mitral valve insufficiency   • Edema of both legs     Past Medical History:   Diagnosis Date   • Alzheimer disease    • Atherosclerotic heart disease    • Bronchitis    • CHF (congestive heart failure) (CMS/[facility])     EF = 25%   • COPD (chronic obstructive pulmonary disease) (CMS/[facility])    • Dementia    • Depression    • Diabetes mellitus (CMS/[facility])    • GERD (gastroesophageal reflux disease)    • HTN (hypertension)    • Hypokalemia    • Insomnia    • Metabolic encephalopathy    • Neuropathy    • OA (osteoarthritis)    • Pneumonia    • Renal disorder     stage three   • TIA (transient ischemic attack)    • Upper respiratory infection      Past Surgical History:   Procedure Laterality Date   • CARDIAC  SURGERY          SWALLOW EVALUATION (last 72 hours)      SLP Adult Swallow Evaluation     Row Name 11/23/18 0900 11/21/18 1500          Document Type  evaluation  -  evaluation  -OC    Subjective Information  no complaints  -SH  no complaints  -OC    Patient Observations  alert;cooperative  -SH  alert;cooperative;agree to therapy  -OC    Patient Effort  adequate  -SH  fair  -OC          Patient Profile Reviewed  yes  -SH  yes  -OC    Pertinent History Of Current Problem  Admitting dx acute on chronic CHF. Hx silent aspiration mixed per VFSS.   -SH  Pt admitted for acute on chronic CHF. Hx of dementia, COPD, DM, TIA.    -OC    Current Method of Nutrition  NPO  -SH  NPO  -OC    Precautions/Limitations, Vision  WFL with corrective lenses  -SH  WFL with corrective lenses  -OC    Precautions/Limitations, Hearing  WFL  -SH  WFL  -OC    Prior Level of Function-Swallowing  other (see comments) FM/THINS PER VFSS  -  other (see comments) VFSS 04/17/18 FM and thin liquids  -OC    Plans/Goals Discussed with  patient;agreed upon  -SH  patient;agreed upon  -OC    Barriers to Rehab  cognitive status  -SH  cognitive status  -OC    Patient's Goals for Discharge  patient did not state  -SH  patient did not state  -OC          Pain: FACES Scale, Pretreatment  0-->no hurt  -SH  0-->no hurt  -OC    Pain: FACES Scale, Post-Treatment  0-->no hurt  -SH  0-->no hurt  -OC          Dentition Assessment  --  missing teeth;other (see comments) missing molars  -OC    Secretion Management  --  WNL/WFL  -OC    Mucosal Quality  --  dry  -OC    Volitional Swallow  --  WFL  -OC    Volitional Cough  --  WFL  -OC          Oral Motor General Assessment  --  generalized oral motor weakness  -OC          Oral Prep Phase  --  WFL  -OC    Oral Transit  --  WFL  -OC    Oral Residue  --  WFL  -OC    Pharyngeal Phase  --  suspected pharyngeal impairment  -OC    Clinical Swallow Evaluation Summary  --  Pt demonstrates throat clear with ice chip X2, audible  swallow with thins and inconsistent cough.  No overt s/s nectar thick via cup, puree, and mech soft no mixed.   -OC          VFSS Summary  Pt presents with mild-moderate oropharyngeal dysphagia characterized by swallow delay and poor protective reaction to aspiration. Spill to valleculae before the swallow with nectar via spoon without penetration. Normal swallow initiation with nectar via cup without penetration. Pt refused straw drinks. Spill to valleculae before the swallow with thins via spoon without penetration. No penetration with thins via cup at the beginning of the assessment. Spill to valleculae before the swallow with puree without penetration or significant pharyngeal residue post swallow. Trace spill to pyriforms before the swallow with mech soft with transient, trace penetration during the swallow. Significant premature spillage to pyriforms before the swallow with mixed mech soft with porfirio silent aspiration x1 during the swallow and transient deep penetration x1 during the swallow. Increased mastication time with regular with poor bolus formation/control. Spill to valleculae before the swallow. Pt required cued liquid wash to assist with bolus formation. Trace deep penetration during and after the swallow from liquid wash thins, difficult to r/o trace aspiration d/t baseline shadowing near laryngeal vestibule. Consistent deep penetration thins via cup noted at the end of the assessment. D/t fluctuating cognitive status, coughing with thins at bedside (no coughing observed during this study), and silent aspiration mixed; SLP recs conservative diet of nectar and mech soft (no mixed).    -SH  --          Summary Statement  Pt presents with mild-moderate oropharyngeal dysphagia characterized by swallow delay and poor protective reaction to aspiration. Deep penetration thins. Silent aspiration mixed mech soft. No penetration with nectar, puree, or regular.   -SH  --          SLP Swallowing Diagnosis   mild-moderate;oral dysfunction;pharyngeal dysfunction  -SH  oral dysfunction;suspected pharyngeal dysfunction  -OC    Functional Impact  risk of aspiration/pneumonia  -SH  risk of aspiration/pneumonia  -OC    Rehab Potential/Prognosis, Swallowing  good, to achieve stated therapy goals  -SH  good, to achieve stated therapy goals  -OC    Swallow Criteria for Skilled Therapeutic Interventions Met  demonstrates skilled criteria  -SH  demonstrates skilled criteria  -OC          Therapy Frequency (Swallow)  PRN  -SH  PRN  -OC    Predicted Duration Therapy Intervention (Days)  until discharge  -SH  until discharge  -OC    SLP Diet Recommendation  mechanical soft with no mixed consistencies;nectar thick liquids;water between meals after oral care, with supervision  -SH  mechanical soft with no mixed consistencies;nectar thick liquids  -OC    Recommended Diagnostics  reassess via clinical swallow evaluation  -SH  reassess via VFSS (MBS)  -OC    Recommended Precautions and Strategies  upright posture during/after eating;small bites of food and sips of liquid;no straw  -SH  no straw;upright posture during/after eating;small bites of food and sips of liquid;alternate between small bites of food and sips of liquid;other (see comments) supervision with PO  -OC    SLP Rec. for Method of Medication Administration  meds crushed;with thick liquids;with pudding or applesauce;as tolerated  -SH  meds crushed;with pudding or applesauce  -OC    Monitor for Signs of Aspiration  yes;notify SLP if any concerns  -SH  yes;notify SLP if any concerns  -OC    Anticipated Dischage Disposition  anticipate therapy at next level of care  -SH  anticipate therapy at next level of care  -OC          Oral Nutrition/Hydration Goal Selection (SLP)  oral nutrition/hydration, SLP goal 1  -SH  oral nutrition/hydration, SLP goal 1  -OC          Oral Nutrition/Hydration Goal 1, SLP  Tolerate least restrictive diet with no overt s/s aspiration.   -SH  Tolerate  least restrictive diet with no overt s/s aspiration.   -OC    Time Frame (Oral Nutrition/Hydration Goal 1, SLP)  by discharge  -  --      User Key  (r) = Recorded By, (t) = Taken By, (c) = Cosigned By    Initials Name Effective Dates    OC Margy Holloway MA,[facility]-SLP 06/08/18 -     SH Fide Cordoba, MS CCC-SLP 03/07/18 -           EDUCATION  The patient has been educated in the following areas:   Modified Diet Instruction.    SLP Recommendation and Plan  SLP Swallowing Diagnosis: mild-moderate, oral dysfunction, pharyngeal dysfunction  SLP Diet Recommendation: mechanical soft with no mixed consistencies, nectar thick liquids, water between meals after oral care, with supervision  Recommended Precautions and Strategies: upright posture during/after eating, small bites of food and sips of liquid, no straw     Monitor for Signs of Aspiration: yes, notify SLP if any concerns  Recommended Diagnostics: reassess via clinical swallow evaluation  Swallow Criteria for Skilled Therapeutic Interventions Met: demonstrates skilled criteria  Anticipated Dischage Disposition: anticipate therapy at next level of care  Rehab Potential/Prognosis, Swallowing: good, to achieve stated therapy goals  Therapy Frequency (Swallow): PRN  Predicted Duration Therapy Intervention (Days): until discharge       Plan of Care Reviewed With: patient  Plan of Care Review  Plan of Care Reviewed With: patient  Progress: no change  Outcome Summary: VFSS completed. Silent aspiration mixed. Deep penetration thins. D/t fluctuating cognitive status, coughing with thins at bedside (no coughing observed during this study), and silent aspiration mixed; SLP recs conservative diet of nectar (straws ok) and mech soft (no mixed). Free water protocol ok. Meds whole with nectar or puree.     SLP GOALS     Row Name 11/23/18 0900 11/21/18 1500          Oral Nutrition/Hydration Goal 1 (SLP)    Oral Nutrition/Hydration Goal 1, SLP  Tolerate least restrictive diet with no  overt s/s aspiration.   -  Tolerate least restrictive diet with no overt s/s aspiration.   -OC     Time Frame (Oral Nutrition/Hydration Goal 1, SLP)  by discharge  -  --       User Key  (r) = Recorded By, (t) = Taken By, (c) = Cosigned By    Initials Name Provider Type    Margy Amaya MA,CCC-SLP Speech and Language Pathologist     Fide Cordoba MS CCC-SLP Speech and Language Pathologist           SLP Outcome Measures (last 72 hours)      SLP Outcome Measures     Row Name 11/23/18 0900 11/21/18 1500          SLP Outcome Measures    Outcome Measure Used?  Adult NOMS  -SH  Adult NOMS  -OC        Adult FCM Scores    FCM Chosen  Swallowing  -SH  Swallowing  -OC     Swallowing FCM Score  4  -  4  -OC       User Key  (r) = Recorded By, (t) = Taken By, (c) = Cosigned By    Initials Name Effective Dates    Margy Amaya MA,CCC-SLP 06/08/18 -      Fide Cordoba MS CCC-SLP 03/07/18 -            Time Calculation:   Time Calculation- SLP     Row Name 11/23/18 0956             Time Calculation- SLP    SLP Start Time  0845  -      SLP Received On  11/23/18  -        User Key  (r) = Recorded By, (t) = Taken By, (c) = Cosigned By    Initials Name Provider Type     Fide Cordoba MS CCC-SLP Speech and Language Pathologist          Therapy Charges for Today     Code Description Service Date Service Provider Modifiers Qty    [id number] HC ST MOTION FLUORO EVAL SWALLOW 4 11/23/2018 Fide Cordoba MS CCC-SLP GN 1               Fide Cordoba MS CCC-SLP  11/23/2018

## 2018-11-23 NOTE — NURSING NOTE
Pt requested to get up to bedside commode.  Once alarm was turned off and patient was allowed out of chair patient refused to sit down on commode.  With walker and 2 assist patient was walked to bathroom but refused to use toilet.  Many attempts made to get patient to sit back down in chair or bed but was persistent correct bathroom was in hallway.  Placed in wheelchair and wheeled around hallway.  Patient yelling at staff and and attempting to hit them.  Personal caregiver at bedside unable to calm patient.  Placed patient on phone with son and son attempted to calm patient but not successful  Call placed to Dr. Johnson to request intervention.  MD voicemail full and unable to leave message.

## 2018-11-23 NOTE — NURSING NOTE
Pt placed in wheelchair and wheeled around nurses station multiple times.  After returning to room patient continued to refuse to get in chair with alarm or back into bed.  Patient left in wheelchair in room with personal caregiver in chair beside her and alarm in w/c against patients back instead of under patient.  Person sitter instructed to  Call if patient attempted to get out of chair.  Call placed to Dr. Isaac to request some medication to calm patient but call went to voicemail that was full and unable to leave message.

## 2018-11-23 NOTE — PLAN OF CARE
Problem: Patient Care Overview  Goal: Plan of Care Review   11/23/18 0955   Coping/Psychosocial   Plan of Care Reviewed With patient   Plan of Care Review   Progress no change   OTHER   Outcome Summary VFSS completed. Silent aspiration mixed. Deep penetration thins. D/t fluctuating cognitive status, coughing with thins at bedside (no coughing observed during this study), and silent aspiration mixed; SLP recs conservative diet of nectar (no straws) and mech soft (no mixed). Free water protocol ok. Meds whole with nectar or puree.

## 2018-11-23 NOTE — PLAN OF CARE
Problem: Patient Care Overview  Goal: Plan of Care Review  Outcome: Ongoing (interventions implemented as appropriate)   11/23/18 0650   Coping/Psychosocial   Plan of Care Reviewed With patient   Plan of Care Review   Progress no change   11/23/18 0650   Coping/Psychosocial   Plan of Care Reviewed With patient   Plan of Care Review   Progress no change   OTHER   Outcome Summary vital signs stable overnight. On room air all night. patient remains confused-disoriented to place and time overnight. cooperative with care, requires frequent redirection and orientation. no falls. turn q2hr. rested a few hours total overnight. patient had episode of hypoglycemia this am-treated per protocol. aspiration precautions in place-suction at bedside. incontinence care provided. Am labs. ctm closely         Goal: Individualization and Mutuality  Outcome: Ongoing (interventions implemented as appropriate)    Goal: Discharge Needs Assessment  Outcome: Ongoing (interventions implemented as appropriate)      Problem: Fall Risk (Adult)  Goal: Identify Related Risk Factors and Signs and Symptoms  Outcome: Outcome(s) achieved Date Met: 11/23/18    Goal: Absence of Fall  Outcome: Ongoing (interventions implemented as appropriate)      Problem: Skin Injury Risk (Adult)  Goal: Identify Related Risk Factors and Signs and Symptoms  Outcome: Outcome(s) achieved Date Met: 11/23/18    Goal: Skin Health and Integrity  Outcome: Ongoing (interventions implemented as appropriate)      Problem: Breathing Pattern Ineffective (Adult)  Goal: Identify Related Risk Factors and Signs and Symptoms  Outcome: Outcome(s) achieved Date Met: 11/23/18    Goal: Effective Oxygenation/Ventilation  Outcome: Ongoing (interventions implemented as appropriate)    Goal: Anxiety/Fear Reduction  Outcome: Ongoing (interventions implemented as appropriate)

## 2018-11-23 NOTE — THERAPY RE-EVALUATION
Acute Care - Physical Therapy Re-Evaluation  [facility]     Patient Name: Bernardo Cox  : 1931  MRN: [id number]  Today's Date: 2018   Onset of Illness/Injury or Date of Surgery: 18  Date of Referral to PT: 18  Referring Physician: Hadley      Admit Date: 2018    Visit Dx:     ICD-10-CM ICD-9-CM   1. Congestive heart failure, unspecified HF chronicity, unspecified heart failure type (CMS/Formerly [facility]) I50.9 428.0   2. Elevated troponin R74.8 790.6   3. JASVIR (acute kidney injury) (CMS/Formerly [facility]) N17.9 584.9   4. Hyperglycemia R73.9 790.29   5. Impaired mobility Z74.09 799.89   6. Difficulty walking R26.2 719.7     Patient Active Problem List   Diagnosis   • Acute respiratory failure with hypoxia (CMS/Formerly [facility])   • Essential hypertension   • Syncope and collapse   • Bacteriuria   • HCAP (healthcare-associated pneumonia)   • Hypoglycemia   • DM (diabetes mellitus) (CMS/HCC)   • Acute on chronic systolic CHF (congestive heart failure) (CMS/Formerly [facility])   • Toxic metabolic encephalopathy   • Alzheimer disease   • Pneumonia of right lower lobe due to infectious organism (CMS/Formerly [facility])   • Acute hypoxemic respiratory failure (CMS/HCC)   • CHF (congestive heart failure), NYHA class I, acute on chronic, diastolic (CMS/Formerly [facility])   • Dehydration   • Elevated troponin   • Nonrheumatic mitral valve insufficiency   • Edema of both legs     Past Medical History:   Diagnosis Date   • Alzheimer disease    • Atherosclerotic heart disease    • Bronchitis    • CHF (congestive heart failure) (CMS/[facility]ly [facility])     EF = 25%   • COPD (chronic obstructive pulmonary disease) (CMS/[facility]ly [facility])    • Dementia    • Depression    • Diabetes mellitus (CMS/[facility])    • GERD (gastroesophageal reflux disease)    • HTN (hypertension)    • Hypokalemia    • Insomnia    • Metabolic encephalopathy    • Neuropathy    • OA (osteoarthritis)    • Pneumonia    • Renal disorder     stage three   • TIA (transient ischemic attack)    • Upper respiratory infection      Past Surgical  History:   Procedure Laterality Date   • CARDIAC SURGERY          PT ASSESSMENT (last 12 hours)      Physical Therapy Evaluation     Row Name 11/23/18 1422          PT Evaluation Time/Intention    Subjective Information  -- I want to sit up in the chair.  -EF     Document Type  re-evaluation new order per Dr. Johnson  -EF     Mode of Treatment  physical therapy  -EF     Patient Effort  good  -EF     Symptoms Noted During/After Treatment  increased pain  -EF     Row Name 11/23/18 1422          General Information    Patient Profile Reviewed?  yes  -EF     Onset of Illness/Injury or Date of Surgery  11/19/18  -EF     Patient Observations  alert;cooperative;agree to therapy  -EF     Patient/Family Observations  caregiver present  -EF     General Observations of Patient  sitting up in bed  -EF     Prior Level of Function  -- per caregiver, pt amb with walker  -EF     Equipment Currently Used at Home  walker, rolling  -EF     Pertinent History of Current Functional Problem  Pt adm with from [facility] with abnormal labs, lethargy.  -EF     Existing Precautions/Restrictions  fall  -EF     Row Name 11/23/18 1422          Cognitive Assessment/Intervention- PT/OT    Orientation Status (Cognition)  oriented to;person  -EF     Follows Commands (Cognition)  follows one step commands;75-90% accuracy;repetition of directions required;verbal cues/prompting required  -EF     Safety Deficit (Cognitive)  mild deficit;moderate deficit;awareness of need for assistance;insight into deficits/self awareness  -EF     Row Name 11/23/18 1422          Bed Mobility Assessment/Treatment    Supine-Sit [place] (Bed Mobility)  verbal cues;contact guard;minimum assist (75% patient effort)  -EF     Bed Mobility, Safety Issues  cognitive deficits limit understanding  -EF     Assistive Device (Bed Mobility)  bed rails  -EF     Row Name 11/23/18 1422          Transfer Assessment/Treatment    Transfer Assessment/Treatment  sit-stand  transfer;stand-sit transfer;stand pivot/stand step transfer  -EF     Sit-Stand [place] (Transfers)  verbal cues;minimum assist (75% patient effort)  -EF     Stand-Sit [place] (Transfers)  verbal cues;minimum assist (75% patient effort)  -EF     Row Name 11/23/18 1422          Sit-Stand Transfer    Assistive Device (Sit-Stand Transfers)  walker, front-wheeled  -EF     Row Name 11/23/18 1422          Stand-Sit Transfer    Assistive Device (Stand-Sit Transfers)  walker, front-wheeled  -EF     Row Name 11/23/18 1422          Stand Pivot/Stand Step Transfer    Stand Pivot/Stand Step [place]  verbal cues;minimum assist (75% patient effort)  -EF     Assistive Device (Stand Pivot Stand Step Transfer)  walker, front-wheeled  -EF     Row Name 11/23/18 1422          Gait/Stairs Assessment/Training    [place] Level (Gait)  verbal cues;minimum assist (75% patient effort)  -EF     Assistive Device (Gait)  walker, front-wheeled  -EF     Distance in Feet (Gait)  several steps from bed to chair  -EF     Deviations/Abnormal Patterns (Gait)  gait speed decreased;stride length decreased  -EF     Bilateral Gait Deviations  forward flexed posture;weight shift ability decreased  -EF     Row Name 11/23/18 1422          General ROM    GENERAL ROM COMMENTS  WFL for age  -EF     Row Name 11/23/18 1422          MMT (Manual Muscle Testing)    General MMT Comments  3/5 or greater throughout  -EF     Row Name 11/23/18 1422          Pain Assessment    Additional Documentation  Pain Scale: Word Pre/Post-Treatment (Group)  -EF     Row Name 11/23/18 1422          Pain Scale: Numbers Pre/Post-Treatment    Pain Location - Side  Bilateral  -EF     Pain Location  -- legs  -EF     Pain Intervention(s)  Repositioned;Ambulation/increased activity  -EF     Row Name 11/23/18 1422          Pain Scale: Word Pre/Post-Treatment    Pain: Word Scale, Pretreatment  6 - moderate-severe pain  -EF     Pain: Word Scale, Post-Treatment  6 -  moderate-severe pain  -EF     Pre/Post Treatment Pain Comment  recommended pt ask for pain meds as needed, informed ALEXANDREA Solomon of pt's report of pain  -EF     Row Name             Wound 11/20/18 1630 Right anterior;lower leg blisters;other (see comments)    Wound - Properties Group Date first assessed: 11/20/18  -TD Time first assessed: 1630  -TD Side: Right  -TD Orientation: anterior;lower  -TD Location: leg  -TD Type: blisters;other (see comments)  -TD    Row Name             Wound 11/20/18 1630 Left anterior;lower;proximal leg other (see comments)    Wound - Properties Group Date first assessed: 11/20/18  -TD Time first assessed: 1630  -TD Side: Left  -TD Orientation: anterior;lower;proximal  -TD Location: leg  -TD Type: other (see comments)  -TD    Row Name             Wound 11/21/18 1317 Left gluteal pressure injury    Wound - Properties Group Date first assessed: 11/21/18  -SK Time first assessed: 1317  -SK Present On Admission : picture taken  -SK Side: Left  -SK Location: gluteal  -SK Type: pressure injury  -SK Stage, Pressure Injury: Stage 2  -SK    Row Name 11/23/18 1422          Physical Therapy Goals    Bed Mobility Goal Selection (PT)  bed mobility, PT goal 1  -EF     Transfer Goal Selection (PT)  transfer, PT goal 1  -EF     Gait Training Goal Selection (PT)  gait training, PT goal 1  -EF     Row Name 11/23/18 1422          Bed Mobility Goal 1 (PT)    Activity/Assistive Device (Bed Mobility Goal 1, PT)  bed mobility activities, all  -EF     Cross Level/Cues Needed (Bed Mobility Goal 1, PT)  supervision required  -EF     Time Frame (Bed Mobility Goal 1, PT)  2 weeks  -EF     Row Name 11/23/18 1422          Transfer Goal 1 (PT)    Activity/Assistive Device (Transfer Goal 1, PT)  transfers, all;walker, rolling  -EF     [place] Level/Cues Needed (Transfer Goal 1, PT)  contact guard assist  -EF     Time Frame (Transfer Goal 1, PT)  2 weeks  -EF     Row Name 11/23/18 1422          Gait Training  Goal 1 (PT)    Activity/Assistive Device (Gait Training Goal 1, PT)  gait (walking locomotion);backward stepping;walker, rolling  -EF     [place] Level (Gait Training Goal 1, PT)  contact guard assist  -EF     Distance (Gait Goal 1, PT)  50  -EF     Time Frame (Gait Training Goal 1, PT)  2 weeks  -EF     Row Name 11/23/18 1422          Positioning and Restraints    Pre-Treatment Position  in bed  -EF     Post Treatment Position  chair  -EF     In Chair  notified nsg;reclined;call light within reach;encouraged to call for assist;exit alarm on;with family/caregiver  -EF       User Key  (r) = Recorded By, (t) = Taken By, (c) = Cosigned By    Initials Name Provider Type    EF Aspen Abebe, PT Physical Therapist    Damaris Roldan, RN Registered Nurse    Edda Kenny RN Registered Nurse        Physical Therapy Education     Title: PT OT SLP Therapies (Active)     Topic: Physical Therapy (Done)     Point: Mobility training (Done)     Learning Progress Summary           Patient Acceptance, E, VU,NR by  at 11/23/2018  2:36 PM    Nonacceptance, E,TB, NR by  at 11/21/2018 10:31 AM   Caregiver Acceptance, E, VU,NR by  at 11/23/2018  2:36 PM                   Point: Home exercise program (Done)     Learning Progress Summary           Patient Acceptance, E, VU,NR by EF at 11/23/2018  2:36 PM    Nonacceptance, E,TB, NR by  at 11/21/2018 10:31 AM   Caregiver Acceptance, E, VU,NR by  at 11/23/2018  2:36 PM                   Point: Body mechanics (Done)     Learning Progress Summary           Patient Acceptance, E, VU,NR by EF at 11/23/2018  2:36 PM    Nonacceptance, E,TB, NR by  at 11/21/2018 10:31 AM   Caregiver Acceptance, E, VU,NR by  at 11/23/2018  2:36 PM                   Point: Precautions (Done)     Learning Progress Summary           Patient Acceptance, E, VU,NR by EF at 11/23/2018  2:36 PM    Nonacceptance, E,TB, NR by  at 11/21/2018 10:31 AM   Caregiver Acceptance, E, VU,NR by  at  11/23/2018  2:36 PM                               User Key     Initials Effective Dates Name Provider Type Discipline    EF 06/08/18 -  Aspen Abebe, PT Physical Therapist PT     04/03/18 -  Supriya Boyd, PT Physical Therapist PT              PT Recommendation and Plan     Plan of Care Reviewed With: patient, caregiver  Outcome Summary: Pt seen following new order per Dr. Johnson. Pt very alert today & wanting to get up to chair. She is confused but very conversant & inquisitive. She presents with decreased bed mob, txfs, and gait. She will benefit from skilled PT to address deficits to facilitate improved physical function.  Outcome Measures     Row Name 11/23/18 1400 11/21/18 1000          How much help from another person do you currently need...    Turning from your back to your side while in flat bed without using bedrails?  3  -EF  1  -LH     Moving from lying on back to sitting on the side of a flat bed without bedrails?  3  -EF  1  -LH     Moving to and from a bed to a chair (including a wheelchair)?  3  -EF  1  -LH     Standing up from a chair using your arms (e.g., wheelchair, bedside chair)?  3  -EF  1  -LH     Climbing 3-5 steps with a railing?  1  -EF  1  -LH     To walk in hospital room?  2  -EF  1  -LH     AM-PAC 6 Clicks Score  15  -EF  6  -        Functional Assessment    Outcome Measure Options  AM-PAC 6 Clicks Basic Mobility (PT)  -EF  AM-PAC 6 Clicks Basic Mobility (PT)  -       User Key  (r) = Recorded By, (t) = Taken By, (c) = Cosigned By    Initials Name Provider Type    EF Aspen Abebe, PT Physical Therapist     Supriya Boyd, PT Physical Therapist         Time Calculation:   PT Charges     Row Name 11/23/18 1438             Time Calculation    Start Time  1404  -EF      Stop Time  1421  -EF      Time Calculation (min)  17 min  -EF      PT Received On  11/23/18  -EF      PT - Next Appointment  11/24/18  -EF      PT Goal Re-Cert Due Date  12/07/18  -EF         Time  Calculation- PT    Total Timed Code Minutes- PT  17 minute(s)  -EF        User Key  (r) = Recorded By, (t) = Taken By, (c) = Cosigned By    Initials Name Provider Type    EF Aspen Abebe PT Physical Therapist        Therapy Suggested Charges     Code   Minutes Charges    None           Therapy Charges for Today     Code Description Service Date Service Provider Modifiers Qty    22873546743 HC PT THER PROC EA 15 MIN 11/23/2018 Aspen Abebe PT GP 1          PT G-Codes  Outcome Measure Options: AM-PAC 6 Clicks Basic Mobility (PT)  AM-PAC 6 Clicks Score: 15      Aspen Abebe PT  11/23/2018

## 2018-11-23 NOTE — PROGRESS NOTES
Progress Note  Alex Johnson MD    Patient ID:  Name:  eBrnardo Cox  MRN:  [id number]  4/26/1931  87 y.o.  female            CC/Reason for visit:  CHF exacerbation,Severe Mitral Regurg, Issues C Diff possibiliy,Dysphagia,Dementa     Interval history: multiple issues, VFSS done results pending,weak....    Vitals:  Vitals:    11/23/18 0622 11/23/18 0646 11/23/18 0707 11/23/18 0746   BP: 122/51 123/55  128/66   BP Location:    Left arm   Patient Position:    Lying   Pulse: 74 77 87 90   Resp:  18 22 18   Temp:    97.6 °F (36.4 °C)   TempSrc:    Oral   SpO2: 93% 95% 100% 95%   Weight:       Height:               Body mass index is 21.87 kg/m².    Intake/Output Summary (Last 24 hours) at 11/23/2018 1040  Last data filed at 11/23/2018 0511  Gross per 24 hour   Intake 50 ml   Output 800 ml   Net -750 ml       Exam:  GEN:  No distress, appears stated age  EYES:   EOM-i, anicteric sclera bilat  ENT:    External ears/nose normal, OP clear  NECK:  Supple, midline trachea  LUNGS: More Clear breath sounds bilat, nonlabored breathing  CV:  Normal S1S2, without murmur, no edema  ABD:  Non tender, no enlarged liver or masses  EXT:  No cyanosis or clubbing    Scheduled meds:    budesonide 0.5 mg Nebulization Daily - RT   carvedilol 25 mg Oral BID With Meals   clopidogrel 75 mg Oral Daily   docusate sodium 100 mg Oral Daily   donepezil 10 mg Oral Nightly   enoxaparin 30 mg Subcutaneous Q24H   escitalopram 10 mg Oral Daily   furosemide 40 mg Oral Daily   insulin glargine 25 Units Subcutaneous Nightly   insulin lispro 0-7 Units Subcutaneous 4x Daily With Meals & Nightly   ipratropium-albuterol 3 mL Nebulization 4x Daily - RT   mupirocin  Topical BID   pantoprazole 40 mg Oral QAM AC   polyethylene glycol 17 g Oral Daily   potassium chloride 20 mEq Oral Daily   sodium chloride 4 mL Nebulization BID - RT     IV meds:                           Data Review:   I reviewed the patient's medications and new clinical results.  Lab Results    Component Value Date    CALCIUM 8.1 (L) 11/23/2018    PHOS 2.7 04/20/2018     Results from last 7 days   Lab Units  11/23/18   0508  11/22/18   0252  11/21/18   0351  11/20/18   0517  11/19/18 2019 11/19/18   1741   SODIUM mmol/L  137  135*  132*  130*  132*   < >   --    POTASSIUM mmol/L  3.1*  3.0*  3.5  4.1  4.5   < >   --    CHLORIDE mmol/L  93*  92*  93*  93*  92*   < >   --    CO2 mmol/L  29.8*  31.0*  26.6  25.0  29.3*   < >   --    BUN mg/dL  16 18 23  28*  30*   < >   --    CREATININE mg/dL  1.09*  0.90  0.89  0.96  1.12*   < >   --    CALCIUM mg/dL  8.1*  8.5*  8.7  8.8  8.8   < >   --    BILIRUBIN mg/dL   --   0.5   --    --   0.5   --    --    ALK PHOS U/L   --   126*   --    --   151*   --    --    ALT (SGPT) U/L   --   9   --    --   9   --    --    AST (SGOT) U/L   --   8   --    --   10   --    --    GLUCOSE mg/dL  178*  124*  290*  476*  422*   < >   --    WBC 10*3/mm3   --   10.63  10.16  11.56*   --    --   14.78*   HEMOGLOBIN g/dL   --   10.3*  10.1*  10.1*   --    --   11.0*   PLATELETS 10*3/mm3   --   325  290  337   --    --   316   PROBNP pg/mL   --    --   32,283.0*   --    --    --   24,570.0*   PROCALCITONIN ng/mL   --   0.08*   --    --   0.08*   --    --     < > = values in this interval not displayed.             Results from last 7 days   Lab Units  11/19/18   1741   TROPONIN T ng/mL  0.034*           Estimated Creatinine Clearance: 34.2 mL/min (A) (by C-G formula based on SCr of 1.09 mg/dL (H)).    WEIGHTS:     Wt Readings from Last 1 Encounters:   11/19/18 2236 59.6 kg (131 lb 6.4 oz)   11/19/18 1845 66.2 kg (146 lb)         ASSESSMENT:     Acute on chronic systolic CHF (congestive heart failure) (CMS/HCC)    Essential hypertension    DM (diabetes mellitus) (CMS/HCC)    Toxic metabolic encephalopathy    Alzheimer disease    Elevated troponin    Nonrheumatic mitral valve insufficiency    Edema of both legs      PLAN:     Continue to monitor off antibiotics for now.   Continue  aggressive pulmonary hygiene measures with TCDB, OPEP, chest PT, hypertonic saline and bronchodilators. Add yonker and consider NT suction if no improvement.   Aspiration precautions and keep HOB elevated at 30 degrees.  Continue diet per speech recommendations and if any further difficulty with swallowing will go back to NPO.   Diuretics per cardiology  Consider repeat CXR if worsening respiratory status or in a few days.   Check labs in am  Await VFSS results  Order PT  Start potassium protocol  Cardiology plus pulmonary note appreciated  Discussed with the nurse Juanito.               Alex Johnson MD  11/23/2018

## 2018-11-23 NOTE — PLAN OF CARE
Problem: Patient Care Overview  Goal: Plan of Care Review  Outcome: Ongoing (interventions implemented as appropriate)   11/22/18 1600   Plan of Care Review   Progress no change   OTHER   Outcome Summary Vitals stable w/confusion at times. q2h turns with pillow support and heels elevated. Wound Care consulted for stage 2 pressure ulcer. Patient aggitated and uncooperative with staff later in day. Purewick in place. Will continue to monitor.

## 2018-11-23 NOTE — NURSING NOTE
11/23/18 1100   Wound 11/20/18 1630 Right anterior;lower leg blisters;other (see comments)   Date first assessed/Time first assessed: 11/20/18 1630   Side: Right  Orientation: anterior;lower  Location: leg  Type: blisters;other (see comments)   Dressing Appearance dried drainage   Closure None   Periwound dry   Edges irregular   Drainage Characteristics/Odor serosanguineous   Drainage Amount small   Care, Wound cleansed with;sterile normal saline   Dressing Care, Wound dressing changed;hydrofiber;multi-layer wrap;silver impregnated   Periwound Care, Wound cleansed with pH balanced cleanser   Wound 11/20/18 1630 Left anterior;lower;proximal leg other (see comments)   Date first assessed/Time first assessed: 11/20/18 1630   Side: Left  Orientation: anterior;lower;proximal  Location: leg  Type: other (see comments)   Dressing Appearance dried drainage   Periwound dry   Edges open   Drainage Characteristics/Odor serous;tan   Drainage Amount moderate   Care, Wound cleansed with;sterile normal saline   Dressing Care, Wound dressing changed;hydrofiber;multi-layer wrap;silver impregnated   Periwound Care, Wound cleansed with pH balanced cleanser   Wound 11/21/18 1317 Left gluteal pressure injury   Date first assessed/Time first assessed: 11/21/18 1317   Present On Admission : picture taken  Side: Left  Location: gluteal  Type: pressure injury  Stage, Pressure Injury: Stage 2   Dressing Appearance no drainage;open to air   Red (%), Wound Tissue Color 100   Periwound Temperature warm   Periwound Skin Turgor soft   Edges irregular   Wound Length (cm) 1.5 cm   Wound Width (cm) 2 cm   Care, Wound cleansed with;soap and water   Dressing Care, Wound other (see comments)  (z-guard applied)   CWOCN consult for skin assessment.  Area of partial thickness skin loss to left gluteus likely related to moisture and shear; measured and treated with z-guard. Bilateral legs with ace wrap dated 11/20 assessed today.  Right leg with 3 open  areas noted of partial thickness skin loss likely d/t recent edema reported in legs and blistering.  Intact skin cleaned with soap and water; wounds cleaned with normal saline.  Anterior right leg wounds 2.0 x 1.0 and second anterior wound also 2.0 x 1.0 cm; posterior right leg wound 2.0 x 2.0 cm.  Left lower extremity with wound beginning anterior and wrapping around lateral leg to posterior measures 7.0 x 10.0 cm with moderate serous drainage.  Cleaned as above.  Areas treated with hydrofiber ag layer, kerlix wrap, and ace wrap to secure.  Patient called friend/ significant other during visit named Levi at [phone] and asked me to update him related to care.  Notified CCP of potential caregiver for patient discharge planning.

## 2018-11-24 NOTE — PROGRESS NOTES
"  PROGRESS NOTE  Patient Name: Bernardo Cox  Age/Sex: 87 y.o. female  : 1931  MRN: [id number]    Date of Admission: 2018  Date of Encounter Visit: 18   LOS: 4 days   Patient Care Team:  Tay Lynch MD as PCP - General (Family Medicine)  Sophie Zuniga, RN as Care Coordinator (Population Health)    Chief Complaint: follow up on RLL infiltrate suspecting aspiration pneumonitis    Interval History: Patient seen and examined this a.m.  Says that she feels okay.  Does not have significant cough or worsening shortness of breath.  Waiting to participate in physical therapy    REVIEW OF SYSTEMS:   Cannot to obtain due to dementia     Ventilator/Non-Invasive Ventilation Settings (From admission, onward)    On RA            Vital Signs  Temp:  [97.2 °F (36.2 °C)-98.3 °F (36.8 °C)] 97.9 °F (36.6 °C)  Heart Rate:  [70-89] 71  Resp:  [16-18] 16  BP: (102-148)/(54-66) 102/54  SpO2:  [94 %-100 %] 100 %  on    Device (Oxygen Therapy): room air  No intake or output data in the 24 hours ending 18 1623  Flowsheet Rows      First Filed Value   Admission Height  165.1 cm (65\") Documented at 2018 1645   Admission Weight  66.2 kg (146 lb) Documented at 2018 1845        Body mass index is 21.87 kg/m².      18  1845 186   Weight: 66.2 kg (146 lb) 59.6 kg (131 lb 6.4 oz)       Physical Exam:  GEN: Elderly cachectic white female   No acute distress, alert, on room air   EYES:   Sclera clear. No icterus. PERRL.   ENT:   External ears/nose normal, no oral lesions, no thrush, mucous membranes moist  NECK:  Supple, midline trachea  LUNGS: Normal chest on inspection, diminished, No wheezes/crackles. Respirations regular, even and unlabored.   CV:  Regular rhythm and rate. Normal S1/S2. 2/6 systolic mitral murmur  No gallops, or rubs noted.  ABD:  Soft, non-tender and non-distended. Normal bowel sounds. No guarding  EXT:  Moves all extremities well. No cyanosis. No redness. trace edema. " BLL edema wrapped and drsg was not removed.     Skin: dry, intact, no bleeding    Results Review:      Results from last 7 days   Lab Units  11/24/18   0436  11/23/18   0508  11/22/18 0252 11/21/18 0351 11/20/18 0517 11/19/18 2019   SODIUM mmol/L  134*  137  135*  132*  130*  132*   POTASSIUM mmol/L  3.2*  3.1*  3.0*  3.5  4.1  4.5   CHLORIDE mmol/L  94*  93*  92*  93*  93*  92*   CO2 mmol/L  28.4  29.8*  31.0*  26.6  25.0  29.3*   BUN mg/dL  16  16  18  23  28*  30*   CREATININE mg/dL  0.96  1.09*  0.90  0.89  0.96  1.12*   CALCIUM mg/dL  8.3*  8.1*  8.5*  8.7  8.8  8.8   AST (SGOT) U/L   --    --   8   --    --   10   ALT (SGPT) U/L   --    --   9   --    --   9   ANION GAP mmol/L  11.6  14.2  12.0  12.4  12.0  10.7   ALBUMIN g/dL   --    --   2.60*   --    --   2.80*     Results from last 7 days   Lab Units  11/19/18   1741   TROPONIN T ng/mL  0.034*         Results from last 7 days   Lab Units  11/21/18   0351 11/19/18   1741   PROBNP pg/mL  32,283.0*  24,570.0*     Results from last 7 days   Lab Units  11/24/18   0436  11/22/18   0252  11/21/18 0351 11/20/18 0517 11/19/18   1741   WBC 10*3/mm3  8.06  10.63  10.16  11.56*  14.78*   HEMOGLOBIN g/dL  9.8*  10.3*  10.1*  10.1*  11.0*   HEMATOCRIT %  32.2*  35.3*  34.1*  33.5*  39.0   PLATELETS 10*3/mm3  328  325  290  337  316   MCV fL  82.1  85.7  84.8  82.9  88.4   NEUTROPHIL % %  68.6  76.7*   --    --   85.4*   LYMPHOCYTE % %  18.5*  13.1*   --    --   6.5*   MONOCYTES % %  11.4  8.9   --    --   5.5   EOSINOPHIL % %  1.4  0.5   --    --   0.3   BASOPHIL % %  0.1  0.1   --    --   0.3   IMM GRAN % %  0.5  0.7*   --    --   2.0*         Results from last 7 days   Lab Units  11/24/18   0436  11/22/18   0252   MAGNESIUM mg/dL  1.7  1.5*           Invalid input(s): LDLCALC          Glucose   Date/Time Value Ref Range Status   11/24/2018 0954 198 (H) 70 - 130 mg/dL Final   11/24/2018 0631 76 70 - 130 mg/dL Final   11/23/2018 2100 420 (H) 70 - 130  mg/dL Final   11/23/2018 1108 155 (H) 70 - 130 mg/dL Final   11/23/2018 0605 126 70 - 130 mg/dL Final   11/23/2018 0450 315 (H) 70 - 130 mg/dL Final   11/23/2018 0440 56 (L) 70 - 130 mg/dL Final   11/22/2018 2210 378 (H) 70 - 130 mg/dL Final     Results from last 7 days   Lab Units  11/22/18   0252  11/19/18   2019   PROCALCITONIN ng/mL  0.08*  0.08*                       Imaging:   Imaging Results (all)     Procedure Component Value Units Date/Time    XR Chest 1 View [[id number]] Collected:  11/21/18 1511     Updated:  11/21/18 1515    Narrative:       XR CHEST 1 VW-     Clinical: Cough     COMPARISON 11/19/2018     FINDINGS: Cardiomegaly similar to the previous examination. Increasing  right base opacity consistent with progressive air space disease with  now consolidation at this location. The left lung remains clear. No  edema or effusion identified. The remainder is unremarkable.     This report was finalized on 11/21/2018 3:11 PM by Dr. Darryl Mendez M.D.       XR Chest 1 View [[id number]] Collected:  11/19/18 1849     Updated:  11/19/18 1855    Narrative:       XR CHEST 1 VW-     HISTORY: Female who is 87 years-old,  cough     TECHNIQUE: Frontal view of the chest     COMPARISON: 4/23/2018     FINDINGS: Heart is enlarged. Aorta is tortuous, calcified. Sternotomy  wires are present. Pulmonary vasculature is unremarkable. Small likely  atelectasis or infiltrate at the right base, follow-up suggested. No  large pleural effusion, no pneumothorax. No acute osseous process.       Impression:       Small likely atelectasis or infiltrate at the right base,  follow-up suggested. Tortuous aorta.     This report was finalized on 11/19/2018 6:52 PM by Dr. Micky Olson M.D.                                 Medication Review:     budesonide 0.5 mg Nebulization Daily - RT   carvedilol 25 mg Oral BID With Meals   clopidogrel 75 mg Oral Daily   docusate sodium 100 mg Oral Daily   donepezil 10 mg Oral Nightly   enoxaparin  30 mg Subcutaneous Q24H   escitalopram 10 mg Oral Daily   furosemide 40 mg Oral Daily   [START ON 11/25/2018] influenza vaccine 0.5 mL Intramuscular Once   insulin glargine 25 Units Subcutaneous Nightly   insulin lispro 0-7 Units Subcutaneous 4x Daily With Meals & Nightly   ipratropium-albuterol 3 mL Nebulization 4x Daily - RT   mupirocin  Topical BID   pantoprazole 40 mg Oral QAM AC   polyethylene glycol 17 g Oral Daily   potassium chloride 20 mEq Oral BID   potassium chloride 40 mEq Oral Once   sodium chloride 4 mL Nebulization BID - RT            ASSESSMENT:   1. Atelectasis vs silent aspiration   2. 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 Severe Mitral regurgitation  4. Alzheimer's dementia  5. Hypertension   6. Dysphagia  7. Hypokalemia     PLAN:  Patient stable from pulmonary standpoint   We'll continue to monitor off antibiotics, aggressive pulmonary hygiene  Given overall generalized debility, multiple cardiac issues she is at HIGH risk for recurrent respiratory failure.      Otis Fay MD  11/24/18  4:23 PM

## 2018-11-24 NOTE — PLAN OF CARE
Problem: Patient Care Overview  Goal: Plan of Care Review  Outcome: Ongoing (interventions implemented as appropriate)   11/24/18 8033   Coping/Psychosocial   Plan of Care Reviewed With patient   Plan of Care Review   Progress no change   OTHER   Outcome Summary Patient continues to have congested cough. No sputum produced. Potassium continues to remain low. Attempting to replace but patient will only drink part of liquid that potassium powder is mixed in. Patient sleeping throughout day but is easily awakened.      Goal: Individualization and Mutuality  Outcome: Ongoing (interventions implemented as appropriate)    Goal: Discharge Needs Assessment  Outcome: Ongoing (interventions implemented as appropriate)    Goal: Interprofessional Rounds/Family Conf  Outcome: Ongoing (interventions implemented as appropriate)      Problem: Fall Risk (Adult)  Goal: Absence of Fall  Outcome: Ongoing (interventions implemented as appropriate)      Problem: Skin Injury Risk (Adult)  Goal: Skin Health and Integrity  Outcome: Ongoing (interventions implemented as appropriate)      Problem: Breathing Pattern Ineffective (Adult)  Goal: Effective Oxygenation/Ventilation  Outcome: Ongoing (interventions implemented as appropriate)    Goal: Anxiety/Fear Reduction  Outcome: Ongoing (interventions implemented as appropriate)

## 2018-11-24 NOTE — PLAN OF CARE
Problem: Patient Care Overview  Goal: Plan of Care Review  Outcome: Ongoing (interventions implemented as appropriate)   11/24/18 0519   Coping/Psychosocial   Plan of Care Reviewed With patient   Plan of Care Review   Progress no change   OTHER   Outcome Summary Pt still confused and uncooperative at times during night shift, VS stable, but pt very anxious, not wanting to be alone very often, refused a full assessment both times at night, awake for good portion of shift, asleep late this am, will continue to monitor     Goal: Individualization and Mutuality  Outcome: Ongoing (interventions implemented as appropriate)    Goal: Discharge Needs Assessment  Outcome: Ongoing (interventions implemented as appropriate)    Goal: Interprofessional Rounds/Family Conf  Outcome: Ongoing (interventions implemented as appropriate)      Problem: Fall Risk (Adult)  Goal: Absence of Fall  Outcome: Ongoing (interventions implemented as appropriate)      Problem: Skin Injury Risk (Adult)  Goal: Skin Health and Integrity  Outcome: Ongoing (interventions implemented as appropriate)      Problem: Breathing Pattern Ineffective (Adult)  Goal: Effective Oxygenation/Ventilation  Outcome: Ongoing (interventions implemented as appropriate)    Goal: Anxiety/Fear Reduction  Outcome: Ongoing (interventions implemented as appropriate)

## 2018-11-24 NOTE — PROGRESS NOTES
Progress Note  Alex Johnson MD    Patient ID:  Name:  Bernardo Cox  MRN:  [id number]  4/26/1931  87 y.o.  female            CC/Reason for visit:  CHF exacerbation,Severe Mitral Regurg, Issues C Diff possibiliy,Dysphagia,Dementa    Interval history: Seems much better today,eating,fall asleep easily in the middle of the conversation    Vitals:  Vitals:    11/24/18 0750 11/24/18 1416 11/24/18 1431 11/24/18 1437   BP: 148/66 102/54     BP Location: Left arm Right arm     Patient Position: Lying Lying     Pulse: 89 70 73 71   Resp: 16 16 16 16   Temp: 97.2 °F (36.2 °C) 97.9 °F (36.6 °C)     TempSrc: Oral Oral     SpO2:  99% 99% 100%   Weight:       Height:               Body mass index is 21.87 kg/m².  No intake or output data in the 24 hours ending 11/24/18 1717    Exam:  GEN:  No distress, appears stated age  EYES:   EOM-i, anicteric sclera bilat  ENT:    External ears/nose normal, OP clear  NECK:  Supple, midline trachea  LUNGS: Ronchii  bilat basilary, nonlabored breathing  CV:  Normal S1S2, without murmur, no edema  ABD:  Non tender, no enlarged liver or masses  EXT:  No cyanosis or clubbing    Scheduled meds:    budesonide 0.5 mg Nebulization Daily - RT   carvedilol 25 mg Oral BID With Meals   clopidogrel 75 mg Oral Daily   docusate sodium 100 mg Oral Daily   donepezil 10 mg Oral Nightly   enoxaparin 30 mg Subcutaneous Q24H   escitalopram 10 mg Oral Daily   furosemide 40 mg Oral Daily   [START ON 11/25/2018] influenza vaccine 0.5 mL Intramuscular Once   insulin glargine 30 Units Subcutaneous Nightly   insulin lispro 0-7 Units Subcutaneous 4x Daily With Meals & Nightly   insulin lispro 8 Units Subcutaneous Once   ipratropium-albuterol 3 mL Nebulization 4x Daily - RT   mupirocin  Topical BID   pantoprazole 40 mg Oral QAM AC   polyethylene glycol 17 g Oral Daily   potassium chloride 20 mEq Oral BID   potassium chloride 40 mEq Oral Once   sodium chloride 4 mL Nebulization BID - RT     IV meds:                            Data Review:   I reviewed the patient's medications and new clinical results.  Lab Results   Component Value Date    CALCIUM 8.3 (L) 11/24/2018    PHOS 2.7 04/20/2018     Results from last 7 days   Lab Units  11/24/18   0436  11/23/18   0508  11/22/18   0252  11/21/18   0351   11/19/18 2019 11/19/18   1741   SODIUM mmol/L  134*  137  135*  132*   < >  132*   --    POTASSIUM mmol/L  3.2*  3.1*  3.0*  3.5   < >  4.5   --    CHLORIDE mmol/L  94*  93*  92*  93*   < >  92*   --    CO2 mmol/L  28.4  29.8*  31.0*  26.6   < >  29.3*   --    BUN mg/dL  16  16  18  23   < >  30*   --    CREATININE mg/dL  0.96  1.09*  0.90  0.89   < >  1.12*   --    CALCIUM mg/dL  8.3*  8.1*  8.5*  8.7   < >  8.8   --    BILIRUBIN mg/dL   --    --   0.5   --    --   0.5   --    ALK PHOS U/L   --    --   126*   --    --   151*   --    ALT (SGPT) U/L   --    --   9   --    --   9   --    AST (SGOT) U/L   --    --   8   --    --   10   --    GLUCOSE mg/dL  81  178*  124*  290*   < >  422*   --    WBC 10*3/mm3  8.06   --   10.63  10.16   < >   --   14.78*   HEMOGLOBIN g/dL  9.8*   --   10.3*  10.1*   < >   --   11.0*   PLATELETS 10*3/mm3  328   --   325  290   < >   --   316   PROBNP pg/mL   --    --    --   32,283.0*   --    --   24,570.0*   PROCALCITONIN ng/mL   --    --   0.08*   --    --   0.08*   --     < > = values in this interval not displayed.             Results from last 7 days   Lab Units  11/19/18   1741   TROPONIN T ng/mL  0.034*           Estimated Creatinine Clearance: 38.8 mL/min (by C-G formula based on SCr of 0.96 mg/dL).    WEIGHTS:     Wt Readings from Last 1 Encounters:   11/19/18 2236 59.6 kg (131 lb 6.4 oz)   11/19/18 1845 66.2 kg (146 lb)         ASSESSMENT:     Acute on chronic systolic CHF (congestive heart failure) (CMS/HCC)    Essential hypertension    DM (diabetes mellitus) (CMS/HCC)    Toxic metabolic encephalopathy    Alzheimer disease    Elevated troponin    Nonrheumatic mitral valve insufficiency    Edema  of both legs      PLAN:     Continue to monitor off antibiotics for now.   Continue aggressive pulmonary hygiene measures with TCDB, OPEP, chest PT, hypertonic saline and bronchodilators. Add yonker and consider NT suction if no improvement.   Aspiration precautions and keep HOB elevated at 30 degrees.  Continue diet per speech recommendations and if any further difficulty with swallowing will go back to NPO.   Diuretics per cardiology  Consider repeat CXR if worsening respiratory status or in a few days.   Check labs in am  Await VFSS results  Order PT  Start potassium protocol and magnrsium protocol  Cardiology plus pulmonary note appreciated  PT consulted  Discussed with the nurse Juanito.            Alex Johnson MD  11/24/2018

## 2018-11-24 NOTE — PROGRESS NOTES
"CC: CHF    Interval History: Resting comfortably. Poor historian. Denies shortness of breath or chest pain.      Vital Signs  Temp:  [97.2 °F (36.2 °C)-98.3 °F (36.8 °C)] 97.2 °F (36.2 °C)  Heart Rate:  [79-89] 89  Resp:  [16-18] 16  BP: (119-148)/(59-66) 148/66    Intake/Output Summary (Last 24 hours) at 11/24/2018 1205  Last data filed at 11/23/2018 1239  Gross per 24 hour   Intake --   Output 10 ml   Net -10 ml     Flowsheet Rows      First Filed Value   Admission Height  165.1 cm (65\") Documented at 11/19/2018 1645   Admission Weight  66.2 kg (146 lb) Documented at 11/19/2018 1845          PHYSICAL EXAM:  General: No acute distress  Resp:NL Rate, unlabored, scattered rhonci  CV:NL rate and rhythm, NL PMI, Nl S1 and S2, II/ VI SM, no gallop, no rub, No JVD. Normal pedal pulses  ABD:Nl sounds, no masses or tenderness, nondistended, no guarding or rebound  Neuro: alert,cooperative and oriented  Extr: No edema or cyanosis, moves all extremities      Results Review:    Results from last 7 days   Lab Units  11/24/18   0436   SODIUM mmol/L  134*   POTASSIUM mmol/L  3.2*   CHLORIDE mmol/L  94*   CO2 mmol/L  28.4   BUN mg/dL  16   CREATININE mg/dL  0.96   GLUCOSE mg/dL  81   CALCIUM mg/dL  8.3*     Results from last 7 days   Lab Units  11/19/18   1741   TROPONIN T ng/mL  0.034*     Results from last 7 days   Lab Units  11/24/18   0436   WBC 10*3/mm3  8.06   HEMOGLOBIN g/dL  9.8*   HEMATOCRIT %  32.2*   PLATELETS 10*3/mm3  328             Results from last 7 days   Lab Units  11/24/18   0436   MAGNESIUM mg/dL  1.7         I reviewed the patient's new clinical results.  I personally viewed and interpreted the patient's EKG/Telemetry data- NSR with PACs        Medication Review:   Meds reviewed         Assessment/Plan  1.Acute on chronic diastolic heart failure, Echo 01/2018 EF 67 % appears euvolemic  On current regimen.  2.Severe mitral regurgitation,continue conservative management.   3. History of coronary artery " disease with prior CABG,  no angina. Continue medical therapy.   4.Hypertension stable  5.RLL  Infiltrate- aspiration pneumonitis or edema- pulmonary following  6. Aortic stenosis- mild   7. Alzheimer's dementia  8. Hypokalemia- she received extra dose yesterday with increase in baseline to start today. Continue to follow    Sarah Vega, CLAUDIA  11/24/18  12:05 PM

## 2018-11-24 NOTE — THERAPY TREATMENT NOTE
Acute Care - Physical Therapy Treatment Note  [facility]     Patient Name: Bernardo Cox  : 1931  MRN: [id number]  Today's Date: 2018  Onset of Illness/Injury or Date of Surgery: 18  Date of Referral to PT: 18  Referring Physician: Hadley    Admit Date: 2018    Visit Dx:    ICD-10-CM ICD-9-CM   1. Congestive heart failure, unspecified HF chronicity, unspecified heart failure type (CMS/Formerly [facility]) I50.9 428.0   2. Elevated troponin R74.8 790.6   3. JASVIR (acute kidney injury) (CMS/Formerly [facility]) N17.9 584.9   4. Hyperglycemia R73.9 790.29   5. Impaired mobility Z74.09 799.89   6. Difficulty walking R26.2 719.7     Patient Active Problem List   Diagnosis   • Acute respiratory failure with hypoxia (CMS/[facility]ly [facility])   • Essential hypertension   • Syncope and collapse   • Bacteriuria   • HCAP (healthcare-associated pneumonia)   • Hypoglycemia   • DM (diabetes mellitus) (CMS/[facility]ly [facility])   • Acute on chronic systolic CHF (congestive heart failure) (CMS/[facility]ly [facility])   • Toxic metabolic encephalopathy   • Alzheimer disease   • Pneumonia of right lower lobe due to infectious organism (CMS/[facility])   • Acute hypoxemic respiratory failure (CMS/[facility])   • CHF (congestive heart failure), NYHA class I, acute on chronic, diastolic (CMS/[facility])   • Dehydration   • Elevated troponin   • Nonrheumatic mitral valve insufficiency   • Edema of both legs       Therapy Treatment    Rehabilitation Treatment Summary     Row Name 18 1300             Treatment Time/Intention    Discipline  physical therapy assistant  -SI      Document Type  therapy note (daily note)  -SI      Subjective Information  complains of c/0 being thirsty and c/o LE pain if touched or moved  -SI      Patient/Family Observations  -- BLE's ace wrapped  -SI      Therapy Frequency (PT Clinical Impression)  daily  -SI      Patient Effort  poor  -SI      Existing Precautions/Restrictions  fall swallowing precautions  -SI      Recorded by [SI] Nimisha Chambers, PTA 18 1354      Row  Name 11/24/18 1300             Cognitive Assessment/Intervention- PT/OT    Orientation Status (Cognition)  oriented to;person  -SI      Follows Commands (Cognition)  does not follow one step commands  -SI      Recorded by [SI] Nimisha Chambers, PTA 11/24/18 1354      Row Name 11/24/18 1300             Bed Mobility Assessment/Treatment    Supine-Sit [place] (Bed Mobility)  moderate assist (50% patient effort);2 person assist  -SI      Sit-Supine [place] (Bed Mobility)  moderate assist (50% patient effort);2 person assist  -SI      Bed Mobility, Safety Issues  -- pain LE's  -SI      Recorded by [SI] Nimisha Chambers, PTA 11/24/18 1354      Row Name 11/24/18 1300             Functional Mobility    Functional Mobility- Comment  poor sitting balance edge of bed with poor head control  -SI      Recorded by [SI] Nimisha Chambers, PTA 11/24/18 1354      Row Name 11/24/18 1300             Sit-Stand Transfer    Assistive Device (Sit-Stand Transfers)  -- did not feel appropriate today  -SI      Recorded by [SI] Nimisha Chambers, PTA 11/24/18 1354      Row Name 11/24/18 1300             Pain Scale: FACES Pre/Post-Treatment    Pain: FACES Scale, Pretreatment  6-->hurts even more  -SI      Pain: FACES Scale, Post-Treatment  6-->hurts even more  -SI      Pre/Post Treatment Pain Comment  no sign of pain at rest only with movement LE's  -SI      Recorded by [SI] Nimisha Chambers, PTA 11/24/18 1354      Row Name                Wound 11/20/18 1630 Right anterior;lower leg blisters;other (see comments)    Wound - Properties Group Date first assessed: 11/20/18 [TD] Time first assessed: 1630 [TD] Side: Right [TD] Orientation: anterior;lower [TD] Location: leg [TD] Type: blisters;other (see comments) [TD] Recorded by:  [TD] Edda Schwab RN 11/20/18 1712    Row Name                Wound 11/20/18 1630 Left anterior;lower;proximal leg other (see comments)    Wound - Properties Group Date first assessed: 11/20/18 [TD] Time first  assessed: 1630 [TD] Side: Left [TD] Orientation: anterior;lower;proximal [TD] Location: leg [TD] Type: other (see comments) [TD] Recorded by:  [TD] Edda Schwab RN 11/20/18 1713    Row Name                Wound 11/21/18 1317 Left gluteal pressure injury    Wound - Properties Group Date first assessed: 11/21/18 [SK] Time first assessed: 1317 [SK] Present On Admission : picture taken [SK] Side: Left [SK] Location: gluteal [SK] Type: pressure injury [SK] Stage, Pressure Injury: Stage 2 [SK] Recorded by:  [SK] Damaris Herndon RN 11/21/18 1318      User Key  (r) = Recorded By, (t) = Taken By, (c) = Cosigned By    Initials Name Effective Dates Discipline    SI Nimisha Chambers, PTA 05/18/15 -  PT    Damaris Roldan RN 01/19/17 -  Nurse    TD Edda Schwab RN 10/12/18 -  Nurse          Wound 11/20/18 1630 Right anterior;lower leg blisters;other (see comments) (Active)   Dressing Appearance dry;intact 11/24/2018  9:56 AM   Closure PADMINI 11/24/2018  9:56 AM   Base dressing in place, unable to visualize 11/24/2018  9:56 AM   Care, Wound other (see comments) 11/23/2018  8:17 PM       Wound 11/20/18 1630 Left anterior;lower;proximal leg other (see comments) (Active)   Dressing Appearance dry;intact 11/24/2018  9:56 AM   Closure PADMINI 11/24/2018  9:56 AM   Base dressing in place, unable to visualize 11/24/2018  9:56 AM   Care, Wound other (see comments) 11/23/2018  8:17 PM       Wound 11/21/18 1317 Left gluteal pressure injury (Active)   Dressing Appearance open to air 11/23/2018  2:06 PM   Base pink;moist 11/23/2018  2:06 PM   Drainage Amount none 11/23/2018  2:06 PM           Physical Therapy Education     Title: PT OT SLP Therapies (Active)     Topic: Physical Therapy (Active)     Point: Mobility training (Active)     Learning Progress Summary           Patient Acceptance, E, NR by LAKEISHA at 11/24/2018  1:55 PM    Comment:  upright sit posture and hold head up    Acceptance, EEAGLE,NR by EF at 11/23/2018  2:36 PM    Nonacceptance,  E,TB, NR by  at 11/21/2018 10:31 AM   Caregiver Acceptance, E, VU,NR by  at 11/23/2018  2:36 PM                   Point: Home exercise program (Active)     Learning Progress Summary           Patient Acceptance, E, NR by SI at 11/24/2018  1:55 PM    Comment:  upright sit posture and hold head up    Acceptance, E, VU,NR by EF at 11/23/2018  2:36 PM    Nonacceptance, E,TB, NR by  at 11/21/2018 10:31 AM   Caregiver Acceptance, E, VU,NR by EF at 11/23/2018  2:36 PM                   Point: Body mechanics (Active)     Learning Progress Summary           Patient Acceptance, E, NR by SI at 11/24/2018  1:55 PM    Comment:  upright sit posture and hold head up    Acceptance, E, VU,NR by EF at 11/23/2018  2:36 PM    Nonacceptance, E,TB, NR by  at 11/21/2018 10:31 AM   Caregiver Acceptance, E, VU,NR by  at 11/23/2018  2:36 PM                   Point: Precautions (Active)     Learning Progress Summary           Patient Acceptance, E, NR by  at 11/24/2018  1:55 PM    Comment:  upright sit posture and hold head up    Acceptance, E, VU,NR by  at 11/23/2018  2:36 PM    Nonacceptance, E,TB, NR by  at 11/21/2018 10:31 AM   Caregiver Acceptance, E, VU,NR by  at 11/23/2018  2:36 PM                               User Key     Initials Effective Dates Name Provider Type Discipline    EF 06/08/18 -  Aspen Abebe, PT Physical Therapist PT     05/18/15 -  Nimisha Chambers, PTA Physical Therapy Assistant PT     04/03/18 -  Supriya Boyd, PT Physical Therapist PT                PT Recommendation and Plan  Therapy Frequency (PT Clinical Impression): daily     Outcome Measures     Row Name 11/24/18 1300 11/23/18 1400          How much help from another person do you currently need...    Turning from your back to your side while in flat bed without using bedrails?  2  -SI  3  -EF     Moving from lying on back to sitting on the side of a flat bed without bedrails?  2  -SI  3  -EF     Moving to and from a bed to a chair  (including a wheelchair)?  2  -SI  3  -EF     Standing up from a chair using your arms (e.g., wheelchair, bedside chair)?  2  -SI  3  -EF     Climbing 3-5 steps with a railing?  1  -SI  1  -EF     To walk in hospital room?  2  -SI  2  -EF     AM-PAC 6 Clicks Score  11  -SI  15  -EF        Functional Assessment    Outcome Measure Options  AM-PAC 6 Clicks Basic Mobility (PT)  -SI  AM-PAC 6 Clicks Basic Mobility (PT)  -EF       User Key  (r) = Recorded By, (t) = Taken By, (c) = Cosigned By    Initials Name Provider Type    EF Aspen Abebe, PT Physical Therapist    Nimisha Alexis PTA Physical Therapy Assistant         Time Calculation:   PT Charges     Row Name 11/24/18 1357             Time Calculation    Start Time  1335  -SI      Stop Time  1357  -SI      Time Calculation (min)  22 min  -SI      PT Received On  11/24/18  -SI      PT - Next Appointment  11/25/18  -SI         Time Calculation- PT    Total Timed Code Minutes- PT  5 minute(s)  -SI        User Key  (r) = Recorded By, (t) = Taken By, (c) = Cosigned By    Initials Name Provider Type    Nimisha Alexis PTA Physical Therapy Assistant        Therapy Suggested Charges     Code   Minutes Charges    None           Therapy Charges for Today     Code Description Service Date Service Provider Modifiers Qty    26694713322 HC PT THER PROC EA 15 MIN 11/24/2018 Nimisha Chambers PTA GP 1    57872716469 HC PT THER SUPP EA 15 MIN 11/24/2018 Nimisha Chambers PTA GP 1          PT G-Codes  Outcome Measure Options: AM-PAC 6 Clicks Basic Mobility (PT)  AM-PAC 6 Clicks Score: 11    Nimisha Chambers PTA  11/24/2018

## 2018-11-24 NOTE — PLAN OF CARE
Problem: Patient Care Overview  Goal: Plan of Care Review  Failed attempts at PT today as far as pt participation.  Mod 2 to sit side of bed and move LE's.  Poor sit balance and head control.  Pt only equest was fluids

## 2018-11-25 NOTE — PLAN OF CARE
Problem: Patient Care Overview  Goal: Plan of Care Review  Outcome: Ongoing (interventions implemented as appropriate)   11/25/18 0434   Coping/Psychosocial   Plan of Care Reviewed With patient   Plan of Care Review   Progress improving   OTHER   Outcome Summary no complaints, much calmer tonight, benadryl for sleep may have helped as she has slept for most of the night, room air, wraps in place, will continue to monitor     Goal: Individualization and Mutuality  Outcome: Ongoing (interventions implemented as appropriate)    Goal: Discharge Needs Assessment  Outcome: Ongoing (interventions implemented as appropriate)      Problem: Fall Risk (Adult)  Goal: Absence of Fall  Outcome: Ongoing (interventions implemented as appropriate)      Problem: Skin Injury Risk (Adult)  Goal: Skin Health and Integrity  Outcome: Ongoing (interventions implemented as appropriate)

## 2018-11-25 NOTE — PLAN OF CARE
Problem: Patient Care Overview  Goal: Plan of Care Review  Outcome: Ongoing (interventions implemented as appropriate)    Goal: Individualization and Mutuality  Outcome: Ongoing (interventions implemented as appropriate)    Goal: Discharge Needs Assessment  Outcome: Ongoing (interventions implemented as appropriate)    Goal: Interprofessional Rounds/Family Conf  Outcome: Ongoing (interventions implemented as appropriate)      Problem: Fall Risk (Adult)  Goal: Absence of Fall  Outcome: Ongoing (interventions implemented as appropriate)      Problem: Skin Injury Risk (Adult)  Goal: Skin Health and Integrity  Outcome: Ongoing (interventions implemented as appropriate)      Problem: Breathing Pattern Ineffective (Adult)  Goal: Effective Oxygenation/Ventilation  Outcome: Ongoing (interventions implemented as appropriate)    Goal: Anxiety/Fear Reduction  Outcome: Ongoing (interventions implemented as appropriate)

## 2018-11-25 NOTE — PLAN OF CARE
Problem: Patient Care Overview  Goal: Plan of Care Review   11/25/18 1524   OTHER   Outcome Summary Pt refused morning medication. Crackles noted in lung bases. Repeat chest x-ray ordered. Wound care to be completed.

## 2018-11-25 NOTE — PLAN OF CARE
Problem: Patient Care Overview  Goal: Plan of Care Review  Outcome: Ongoing (interventions implemented as appropriate)   11/25/18 1140   Coping/Psychosocial   Plan of Care Reviewed With patient   Plan of Care Review   Progress declining   OTHER   Outcome Summary Pt lethargic this morning. Participating for very brief periods only. Pt reports Ronan ankle pain while attempting to peform seated heel slides and declined further attempt at rom. RN notified. Pt may benefit from sup ex prior to attempt to sit tomorrow. Rn to assess ronan ankles/heels.

## 2018-11-25 NOTE — PROGRESS NOTES
Progress Note  Alex Johnson MD    Patient ID:  Name:  Bernardo Cox  MRN:  [id number]  4/26/1931  87 y.o.  female            CC/Reason for visit:  CHF exacerbation,Severe Mitral Regurg, Issues C Diff possibiliy,Dysphagia,Dementa     Interval history: Seems much better today,eating,got good night sleep post Benedryl       Vitals:  Vitals:    11/25/18 0655 11/25/18 0750 11/25/18 1052 11/25/18 1109   BP:  132/71     BP Location:  Left arm     Patient Position:  Lying     Pulse: 70 75 82 82   Resp: 14 16 16 16   Temp:  97.2 °F (36.2 °C)     TempSrc:  Oral     SpO2: 100% 96% 98% 99%   Weight:       Height:               Body mass index is 21.87 kg/m².  No intake or output data in the 24 hours ending 11/25/18 1142    Exam:  GEN:  No distress, appears stated age  EYES:   EOM-i, anicteric sclera bilat  ENT:    External ears/nose normal, OP clear  NECK:  Supple, midline trachea  LUNGS: Diminished breath sounds bilat basilary, nonlabored breathing  CV:  Normal S1S2, without murmur, no edema  ABD:  Non tender, no enlarged liver or masses  EXT:  No cyanosis or clubbing    Scheduled meds:    budesonide 0.5 mg Nebulization Daily - RT   carvedilol 25 mg Oral BID With Meals   clopidogrel 75 mg Oral Daily   docusate sodium 100 mg Oral Daily   donepezil 10 mg Oral Nightly   enoxaparin 30 mg Subcutaneous Q24H   escitalopram 10 mg Oral Daily   furosemide 40 mg Oral Daily   influenza vaccine 0.5 mL Intramuscular Once   insulin glargine 30 Units Subcutaneous Nightly   insulin lispro 0-7 Units Subcutaneous 4x Daily With Meals & Nightly   ipratropium-albuterol 3 mL Nebulization 4x Daily - RT   mupirocin  Topical BID   pantoprazole 40 mg Oral QAM AC   polyethylene glycol 17 g Oral Daily   potassium chloride 20 mEq Oral BID   potassium chloride 40 mEq Oral Once   sodium chloride 4 mL Nebulization BID - RT     IV meds:                           Data Review:   I reviewed the patient's medications and new clinical results.  Lab Results   Component  Value Date    CALCIUM 8.3 (L) 11/24/2018    PHOS 2.7 04/20/2018     Results from last 7 days   Lab Units  11/24/18   2155  11/24/18   0436  11/23/18   0508  11/22/18   0252  11/21/18   0351   11/19/18 2019 11/19/18   1741   SODIUM mmol/L   --   134*  137  135*  132*   < >  132*   --    POTASSIUM mmol/L  4.7  3.2*  3.1*  3.0*  3.5   < >  4.5   --    CHLORIDE mmol/L   --   94*  93*  92*  93*   < >  92*   --    CO2 mmol/L   --   28.4  29.8*  31.0*  26.6   < >  29.3*   --    BUN mg/dL   --   16 16 18  23   < >  30*   --    CREATININE mg/dL   --   0.96  1.09*  0.90  0.89   < >  1.12*   --    CALCIUM mg/dL   --   8.3*  8.1*  8.5*  8.7   < >  8.8   --    BILIRUBIN mg/dL   --    --    --   0.5   --    --   0.5   --    ALK PHOS U/L   --    --    --   126*   --    --   151*   --    ALT (SGPT) U/L   --    --    --   9   --    --   9   --    AST (SGOT) U/L   --    --    --   8   --    --   10   --    GLUCOSE mg/dL   --   81  178*  124*  290*   < >  422*   --    WBC 10*3/mm3   --   8.06   --   10.63  10.16   < >   --   14.78*   HEMOGLOBIN g/dL   --   9.8*   --   10.3*  10.1*   < >   --   11.0*   PLATELETS 10*3/mm3   --   328   --   325  290   < >   --   316   PROBNP pg/mL   --    --    --    --   32,283.0*   --    --   24,570.0*   PROCALCITONIN ng/mL   --    --    --   0.08*   --    --   0.08*   --     < > = values in this interval not displayed.             Results from last 7 days   Lab Units  11/19/18   1741   TROPONIN T ng/mL  0.034*           Estimated Creatinine Clearance: 38.8 mL/min (by C-G formula based on SCr of 0.96 mg/dL).    WEIGHTS:     Wt Readings from Last 1 Encounters:   11/19/18 2236 59.6 kg (131 lb 6.4 oz)   11/19/18 1845 66.2 kg (146 lb)     VFSS 11/23/2018    Spill of contents into the vallecula with multiple consistencies. There  is deep penetration during thin liquids with possible trace aspiration.    ASSESSMENT:     Acute on chronic systolic CHF (congestive heart failure) (CMS/HCC)    Essential  hypertension    DM (diabetes mellitus) (CMS/HCC)    Toxic metabolic encephalopathy    Alzheimer disease    Elevated troponin    Nonrheumatic mitral valve insufficiency    Edema of both legs      PLAN:    Continue to monitor off antibiotics for now.   Continue aggressive pulmonary hygiene measures with TCDB, OPEP, chest PT, hypertonic saline and bronchodilators. Add yonker and consider NT suction if no improvement.   Aspiration precautions and keep HOB elevated at 30 degrees.  Continue diet per speech recommendations and if any further difficulty with swallowing will go back to NPO.   Diuretics per cardiology  Consider repeat CXR if worsening respiratory status or in a few days.   Check labs in am  Await VFSS results  Order PT  Start potassium protocol and magnrsium protocol  Cardiology plus pulmonary note appreciated  PT ti keep working with you  Discussed with the nurse.        Alex Johnson MD  11/25/2018

## 2018-11-25 NOTE — PROGRESS NOTES
"  PROGRESS NOTE  Patient Name: Bernardo Cox  Age/Sex: 87 y.o. female  : 1931  MRN: [id number]    Date of Admission: 2018  Date of Encounter Visit: 18   LOS: 5 days   Patient Care Team:  Tay Lynch MD as PCP - General (Family Medicine)  Sophie Zuniga, RN as Care Coordinator (Population Health)    Chief Complaint: follow up on RLL infiltrate suspecting aspiration pneumonitis    Interval History: Patient seen and examined this a.m.  Says that she feels okay.  Does not have significant cough or worsening shortness of breath.  Waiting to participate in physical therapy    REVIEW OF SYSTEMS:   Cannot to obtain due to dementia     Ventilator/Non-Invasive Ventilation Settings (From admission, onward)    On RA            Vital Signs  Temp:  [97.2 °F (36.2 °C)-98 °F (36.7 °C)] 97.2 °F (36.2 °C)  Heart Rate:  [68-88] 78  Resp:  [14-16] 16  BP: (104-144)/(62-94) 138/62  SpO2:  [95 %-100 %] 97 %  on    Device (Oxygen Therapy): room air    Intake/Output Summary (Last 24 hours) at 2018 1723  Last data filed at 2018 1641  Gross per 24 hour   Intake 960 ml   Output 0 ml   Net 960 ml     Flowsheet Rows      First Filed Value   Admission Height  165.1 cm (65\") Documented at 2018 1645   Admission Weight  66.2 kg (146 lb) Documented at 2018 1845        Body mass index is 21.87 kg/m².      18  1845 186   Weight: 66.2 kg (146 lb) 59.6 kg (131 lb 6.4 oz)       Physical Exam:  GEN: Elderly cachectic white female   No acute distress, alert, on room air   EYES:   Sclera clear. No icterus. PERRL.   ENT:   External ears/nose normal, no oral lesions, no thrush, mucous membranes moist  NECK:  Supple, midline trachea  LUNGS: Normal chest on inspection, diminished, No wheezes/crackles. Respirations regular, even and unlabored.   CV:  Regular rhythm and rate. Normal S1/S2. 2/6 systolic mitral murmur  No gallops, or rubs noted.  ABD:  Soft, non-tender and non-distended. Normal " bowel sounds. No guarding  EXT:  Moves all extremities well. No cyanosis. No redness. trace edema. BLL edema wrapped and drsg was not removed.     Skin: dry, intact, no bleeding    Results Review:      Results from last 7 days   Lab Units  11/24/18 2155  11/24/18   0436  11/23/18   0508  11/22/18   0252  11/21/18   0351  11/20/18   0517 11/19/18 2019   SODIUM mmol/L   --   134*  137  135*  132*  130*  132*   POTASSIUM mmol/L  4.7  3.2*  3.1*  3.0*  3.5  4.1  4.5   CHLORIDE mmol/L   --   94*  93*  92*  93*  93*  92*   CO2 mmol/L   --   28.4  29.8*  31.0*  26.6  25.0  29.3*   BUN mg/dL   --   16  16  18  23  28*  30*   CREATININE mg/dL   --   0.96  1.09*  0.90  0.89  0.96  1.12*   CALCIUM mg/dL   --   8.3*  8.1*  8.5*  8.7  8.8  8.8   AST (SGOT) U/L   --    --    --   8   --    --   10   ALT (SGPT) U/L   --    --    --   9   --    --   9   ANION GAP mmol/L   --   11.6  14.2  12.0  12.4  12.0  10.7   ALBUMIN g/dL   --    --    --   2.60*   --    --   2.80*     Results from last 7 days   Lab Units  11/19/18   1741   TROPONIN T ng/mL  0.034*         Results from last 7 days   Lab Units  11/25/18   0851  11/21/18   0351  11/19/18   1741   PROBNP pg/mL  14,513.0*  32,283.0*  24,570.0*     Results from last 7 days   Lab Units  11/24/18   0436  11/22/18   0252  11/21/18   0351  11/20/18   0517  11/19/18   1741   WBC 10*3/mm3  8.06  10.63  10.16  11.56*  14.78*   HEMOGLOBIN g/dL  9.8*  10.3*  10.1*  10.1*  11.0*   HEMATOCRIT %  32.2*  35.3*  34.1*  33.5*  39.0   PLATELETS 10*3/mm3  328  325  290  337  316   MCV fL  82.1  85.7  84.8  82.9  88.4   NEUTROPHIL % %  68.6  76.7*   --    --   85.4*   LYMPHOCYTE % %  18.5*  13.1*   --    --   6.5*   MONOCYTES % %  11.4  8.9   --    --   5.5   EOSINOPHIL % %  1.4  0.5   --    --   0.3   BASOPHIL % %  0.1  0.1   --    --   0.3   IMM GRAN % %  0.5  0.7*   --    --   2.0*         Results from last 7 days   Lab Units  11/25/18   0851  11/24/18   2155  11/24/18   0436  11/22/18   0252    MAGNESIUM mg/dL  2.9*  1.6  1.7  1.5*           Invalid input(s): LDLCALC          Glucose   Date/Time Value Ref Range Status   11/25/2018 1604 389 (H) 70 - 130 mg/dL Final   11/25/2018 1106 200 (H) 70 - 130 mg/dL Final   11/25/2018 0607 70 70 - 130 mg/dL Final   11/24/2018 2030 237 (H) 70 - 130 mg/dL Final   11/24/2018 1639 544 (C) 70 - 130 mg/dL Final   11/24/2018 0954 198 (H) 70 - 130 mg/dL Final   11/24/2018 0631 76 70 - 130 mg/dL Final   11/23/2018 2100 420 (H) 70 - 130 mg/dL Final     Results from last 7 days   Lab Units  11/22/18   0252  11/19/18 2019   PROCALCITONIN ng/mL  0.08*  0.08*                       Imaging:   Imaging Results (all)     Procedure Component Value Units Date/Time    XR Chest 1 View [[id number]] Collected:  11/21/18 1511     Updated:  11/21/18 1515    Narrative:       XR CHEST 1 VW-     Clinical: Cough     COMPARISON 11/19/2018     FINDINGS: Cardiomegaly similar to the previous examination. Increasing  right base opacity consistent with progressive air space disease with  now consolidation at this location. The left lung remains clear. No  edema or effusion identified. The remainder is unremarkable.     This report was finalized on 11/21/2018 3:11 PM by Dr. Darryl Mendez M.D.       XR Chest 1 View [[id number]] Collected:  11/19/18 1849     Updated:  11/19/18 1855    Narrative:       XR CHEST 1 VW-     HISTORY: Female who is 87 years-old,  cough     TECHNIQUE: Frontal view of the chest     COMPARISON: 4/23/2018     FINDINGS: Heart is enlarged. Aorta is tortuous, calcified. Sternotomy  wires are present. Pulmonary vasculature is unremarkable. Small likely  atelectasis or infiltrate at the right base, follow-up suggested. No  large pleural effusion, no pneumothorax. No acute osseous process.       Impression:       Small likely atelectasis or infiltrate at the right base,  follow-up suggested. Tortuous aorta.     This report was finalized on 11/19/2018 6:52 PM by Dr. Micky Olson  M.D.                                 Medication Review:     budesonide 0.5 mg Nebulization Daily - RT   carvedilol 25 mg Oral BID With Meals   clopidogrel 75 mg Oral Daily   docusate sodium 100 mg Oral Daily   donepezil 10 mg Oral Nightly   enoxaparin 30 mg Subcutaneous Q24H   escitalopram 10 mg Oral Daily   furosemide 40 mg Oral Daily   influenza vaccine 0.5 mL Intramuscular Once   insulin glargine 30 Units Subcutaneous Nightly   insulin lispro 0-7 Units Subcutaneous 4x Daily With Meals & Nightly   ipratropium-albuterol 3 mL Nebulization 4x Daily - RT   mupirocin  Topical BID   pantoprazole 40 mg Oral QAM AC   polyethylene glycol 17 g Oral Daily   potassium chloride 20 mEq Oral BID   potassium chloride 40 mEq Oral Once   sodium chloride 4 mL Nebulization BID - RT               ASSESSMENT:   1. Atelectasis vs silent aspiration   2. 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 Severe Mitral regurgitation  4. Alzheimer's dementia  5. Hypertension   6. Dysphagia  7. Hypokalemia     PLAN:  Patient stable from pulmonary standpoint on RA.   CXR ordered by cards and reviewed - improving RLL infiltrate.   We'll continue to monitor off antibiotics, aggressive pulmonary hygiene  Given overall generalized debility, multiple cardiac issues she is at HIGH risk for recurrent respiratory failure.  Would d/w pt/ family about GOC. Will defer to primary.  Ok to dc from my end.     Otis Fay MD  11/25/18  5:23 PM

## 2018-11-25 NOTE — PROGRESS NOTES
"    Patient Name: Bernardo Cox  :1931  87 y.o.      Patient Care Team:  Tay Lynch MD as PCP - General (Family Medicine)  Sophie Zuniga, RN as Care Coordinator (Population Health)    Chief Complaint:  CHF    Interval History: Remains SOA.  Productive cough. Feels weak.        Objective   Vital Signs  Temp:  [97.2 °F (36.2 °C)-98 °F (36.7 °C)] 97.2 °F (36.2 °C)  Heart Rate:  [68-88] 82  Resp:  [14-16] 16  BP: (102-144)/(54-94) 132/71    Intake/Output Summary (Last 24 hours) at 2018 1316  Last data filed at 2018 1130  Gross per 24 hour   Intake 960 ml   Output --   Net 960 ml     Flowsheet Rows      First Filed Value   Admission Height  165.1 cm (65\") Documented at 2018 1645   Admission Weight  66.2 kg (146 lb) Documented at 2018 1845            Physical Exam   Constitutional: Vital signs are normal. She appears well-developed and well-nourished. She is active.   Eyes: Conjunctivae are normal.   Neck: Carotid bruit is not present.   Cardiovascular: Normal rate, regular rhythm and normal heart sounds.   Pulmonary/Chest: Breath sounds normal.   Crackles in bilateral bases   Abdominal: Normal appearance.   Musculoskeletal:   No cyanosis, clubbing, edema  Normal ROM   Neurological: She is alert. GCS eye subscore is 4. GCS verbal subscore is 5. GCS motor subscore is 6.   Confused, at baseline   Skin: Skin is warm, dry and intact.   Psychiatric: She has a normal mood and affect. Her speech is normal and behavior is normal. Judgment and thought content normal. Cognition and memory are normal.       Results Review:    Results from last 7 days   Lab Units  18   2155  18   0436   SODIUM mmol/L   --   134*   POTASSIUM mmol/L  4.7  3.2*   CHLORIDE mmol/L   --   94*   CO2 mmol/L   --   28.4   BUN mg/dL   --   16   CREATININE mg/dL   --   0.96   GLUCOSE mg/dL   --   81   CALCIUM mg/dL   --   8.3*     Results from last 7 days   Lab Units  18   1741   TROPONIN T ng/mL  0.034* "     Results from last 7 days   Lab Units  11/24/18   0436   WBC 10*3/mm3  8.06   HEMOGLOBIN g/dL  9.8*   HEMATOCRIT %  32.2*   PLATELETS 10*3/mm3  328         Results from last 7 days   Lab Units  11/25/18   0851   MAGNESIUM mg/dL  2.9*                   Medication Review:     budesonide 0.5 mg Nebulization Daily - RT   carvedilol 25 mg Oral BID With Meals   clopidogrel 75 mg Oral Daily   docusate sodium 100 mg Oral Daily   donepezil 10 mg Oral Nightly   enoxaparin 30 mg Subcutaneous Q24H   escitalopram 10 mg Oral Daily   furosemide 40 mg Oral Daily   influenza vaccine 0.5 mL Intramuscular Once   insulin glargine 30 Units Subcutaneous Nightly   insulin lispro 0-7 Units Subcutaneous 4x Daily With Meals & Nightly   ipratropium-albuterol 3 mL Nebulization 4x Daily - RT   mupirocin  Topical BID   pantoprazole 40 mg Oral QAM AC   polyethylene glycol 17 g Oral Daily   potassium chloride 20 mEq Oral BID   potassium chloride 40 mEq Oral Once   sodium chloride 4 mL Nebulization BID - RT             Assessment/Plan   1. Acute on chronic diastolic heart failure: Echo 1/18 EF 67%.  Appears euvolemic.  Continue current dosing of Lasix. Will check BNP and CXR.  Pt remains on oxygen.  Crackles in bases.   2. Severe mitral regurgitation: Conservative management  3. CAD: Prior CABG.  No angina.  Continue current regimen.  4. Hypertension: Blood pressure stable  5. Aspiration pneumonitis versus edema: Right lower lobe infiltrate.  Pulmonology following  6. Alzheimer's dementia  7. Aortic stenosis: Mild  8. Hypokalemia: Potassium 4.7 after electrolyte replacement yesterday.    CLAUDIA Cordova  [facility]  11/25/18  1:16 PM

## 2018-11-25 NOTE — THERAPY TREATMENT NOTE
Acute Care - Physical Therapy Treatment Note  [facility]     Patient Name: Bernardo Cox  : 1931  MRN: [id number]  Today's Date: 2018  Onset of Illness/Injury or Date of Surgery: 18  Date of Referral to PT: 18  Referring Physician: Hadley    Admit Date: 2018    Visit Dx:    ICD-10-CM ICD-9-CM   1. Congestive heart failure, unspecified HF chronicity, unspecified heart failure type (CMS/[facility]) I50.9 428.0   2. Elevated troponin R74.8 790.6   3. JASVIR (acute kidney injury) (CMS/[facility]) N17.9 584.9   4. Hyperglycemia R73.9 790.29   5. Impaired mobility Z74.09 799.89   6. Difficulty walking R26.2 719.7     Patient Active Problem List   Diagnosis   • Acute respiratory failure with hypoxia (CMS/[facility])   • Essential hypertension   • Syncope and collapse   • Bacteriuria   • HCAP (healthcare-associated pneumonia)   • Hypoglycemia   • DM (diabetes mellitus) (CMS/[facility])   • Acute on chronic systolic CHF (congestive heart failure) (CMS/[facility])   • Toxic metabolic encephalopathy   • Alzheimer disease   • Pneumonia of right lower lobe due to infectious organism (CMS/[facility])   • Acute hypoxemic respiratory failure (CMS/[facility])   • CHF (congestive heart failure), NYHA class I, acute on chronic, diastolic (CMS/[facility])   • Dehydration   • Elevated troponin   • Nonrheumatic mitral valve insufficiency   • Edema of both legs       Therapy Treatment    Rehabilitation Treatment Summary     Row Name 18 1120             Treatment Time/Intention    Discipline  physical therapist  -SV      Document Type  therapy note (daily note)  -SV      Subjective Information  complains of;pain  -SV      Mode of Treatment  individual therapy  -SV      Patient/Family Observations  none present  -SV      Patient Effort  poor  -SV      Comment  Pt sleeping through respiratory chest PT prior to therapy  -SV      Treatment Considerations/Comments  lethargic today, eyes close intermittently reports BLE/foot pain  -SV      Recorded by [SV] Rolo  Nimisha MCKENZIE, PT 11/25/18 1140      Row Name 11/25/18 1120             Cognitive Assessment/Intervention- PT/OT    Orientation Status (Cognition)  oriented to;person  -SV      Follows Commands (Cognition)  follows one step commands;0-24% accuracy  -SV      Recorded by [SV] Nimisha Seth, PT 11/25/18 1140      Row Name 11/25/18 1120             Bed Mobility Assessment/Treatment    Supine-Sit Wolfe (Bed Mobility)  moderate assist (50% patient effort);maximum assist (25% patient effort)  -SV      Sit-Supine Wolfe (Bed Mobility)  moderate assist (50% patient effort);maximum assist (25% patient effort);2 person assist  -SV      Comment (Bed Mobility)  sat at eob x 5 minutes flexed posture, forward head: no corretion with verbal cues  -SV      Recorded by [SV] Nimisha Seth, PT 11/25/18 1140      Row Name 11/25/18 1120             Transfer Assessment/Treatment    Comment (Transfers)  attempted briefly but pt not participating: returned to supine  -SV      Recorded by [SV] Nimisha Seth, PT 11/25/18 1140      Row Name 11/25/18 1120             Motor Skills Assessment/Interventions    Additional Documentation  -- reports foot pain, attempted hs in sitting for ankle rom:ref  -SV      Recorded by [SV] Nimisha Seth, PT 11/25/18 1140      Row Name 11/25/18 1120             Positioning and Restraints    Pre-Treatment Position  in bed  -SV      In Bed  notified nsg;fowlers;call light within reach;encouraged to call for assist;exit alarm on  -SV      Recorded by [SV] Nimisha Seth, PT 11/25/18 1140      Row Name 11/25/18 1120             Pain Assessment    Additional Documentation  -- reports BLE/foot pain, stiffness noted: ref rom  -SV      Recorded by [SV] Niimsha Seth, PT 11/25/18 1140      Row Name                Wound 11/20/18 1630 Right anterior;lower leg blisters;other (see comments)    Wound - Properties Group Date first assessed: 11/20/18 [TD] Time first assessed: 1630 [TD] Side: Right [TD]  Orientation: anterior;lower [TD] Location: leg [TD] Type: blisters;other (see comments) [TD] Recorded by:  [TD] Edda Schwab RN 11/20/18 1712    Row Name                Wound 11/20/18 1630 Left anterior;lower;proximal leg other (see comments)    Wound - Properties Group Date first assessed: 11/20/18 [TD] Time first assessed: 1630 [TD] Side: Left [TD] Orientation: anterior;lower;proximal [TD] Location: leg [TD] Type: other (see comments) [TD] Recorded by:  [TD] Edda Schwab RN 11/20/18 1713    Row Name                Wound 11/21/18 1317 Left gluteal pressure injury    Wound - Properties Group Date first assessed: 11/21/18 [SK] Time first assessed: 1317 [SK] Present On Admission : picture taken [SK] Side: Left [SK] Location: gluteal [SK] Type: pressure injury [SK] Stage, Pressure Injury: Stage 2 [SK] Recorded by:  [SK] Damaris Herndon RN 11/21/18 1318      User Key  (r) = Recorded By, (t) = Taken By, (c) = Cosigned By    Initials Name Effective Dates Discipline    SV Nimisha Seth, PT 04/03/18 -  PT    Damaris Roldan RN 01/19/17 -  Nurse    TD Edda Schwab RN 10/12/18 -  Nurse          Wound 11/20/18 1630 Right anterior;lower leg blisters;other (see comments) (Active)   Dressing Appearance dry;intact 11/25/2018  9:27 AM   Closure PADMINI 11/25/2018  9:27 AM   Base dressing in place, unable to visualize 11/25/2018  9:27 AM       Wound 11/20/18 1630 Left anterior;lower;proximal leg other (see comments) (Active)   Dressing Appearance dry;intact 11/25/2018  9:27 AM   Closure PADMINI 11/25/2018  9:27 AM   Base dressing in place, unable to visualize 11/25/2018  9:27 AM       Wound 11/21/18 1317 Left gluteal pressure injury (Active)   Dressing Appearance open to air 11/25/2018 10:29 AM   Closure Open to air 11/25/2018 10:29 AM   Base pink;moist 11/25/2018 10:29 AM   Dressing Care, Wound skin barrier agent applied;open to air 11/25/2018 10:29 AM           Physical Therapy Education     Title: PT OT SLP Therapies (In  Progress)     Topic: Physical Therapy (In Progress)     Point: Mobility training (In Progress)     Learning Progress Summary           Patient Acceptance, E, NR by SI at 11/24/2018  1:55 PM    Comment:  upright sit posture and hold head up    Acceptance, E, VU,NR by  at 11/23/2018  2:36 PM    Nonacceptance, E,TB, NR by  at 11/21/2018 10:31 AM   Caregiver Acceptance, E, VU,NR by EF at 11/23/2018  2:36 PM                   Point: Home exercise program (In Progress)     Learning Progress Summary           Patient Acceptance, E, NR by SI at 11/24/2018  1:55 PM    Comment:  upright sit posture and hold head up    Acceptance, E, VU,NR by EF at 11/23/2018  2:36 PM    Nonacceptance, E,TB, NR by  at 11/21/2018 10:31 AM   Caregiver Acceptance, E, VU,NR by EF at 11/23/2018  2:36 PM                   Point: Body mechanics (In Progress)     Learning Progress Summary           Patient Acceptance, E, NR by SI at 11/24/2018  1:55 PM    Comment:  upright sit posture and hold head up    Acceptance, E, VU,NR by  at 11/23/2018  2:36 PM    Nonacceptance, E,TB, NR by  at 11/21/2018 10:31 AM   Caregiver Acceptance, E, VU,NR by  at 11/23/2018  2:36 PM                   Point: Precautions (In Progress)     Learning Progress Summary           Patient Acceptance, E, NR by SI at 11/24/2018  1:55 PM    Comment:  upright sit posture and hold head up    Acceptance, E, VU,NR by  at 11/23/2018  2:36 PM    Nonacceptance, E,TB, NR by  at 11/21/2018 10:31 AM   Caregiver Acceptance, E, VU,NR by  at 11/23/2018  2:36 PM                               User Key     Initials Effective Dates Name Provider Type Discipline     06/08/18 -  Aspen Abebe, PT Physical Therapist PT     05/18/15 -  Nimisha Chambers, PTA Physical Therapy Assistant PT     04/03/18 -  Supriya Boyd, PT Physical Therapist PT                PT Recommendation and Plan     Plan of Care Reviewed With: patient  Progress: declining  Outcome Summary: Pt lethargic  this morning. Participating for very brief periods only. Pt reports Ronan ankle pain while attempting to peform seated  heel slides and declined further attempt at rom. RN notified. Pt may benefit from sup ex prior to attempt to sit tomorrow. Rn to assess ronan ankles/heels.   Outcome Measures     Row Name 11/25/18 1100 11/24/18 1300 11/23/18 1400       How much help from another person do you currently need...    Turning from your back to your side while in flat bed without using bedrails?  1  -SV  2  -SI  3  -EF    Moving from lying on back to sitting on the side of a flat bed without bedrails?  1  -SV  2  -SI  3  -EF    Moving to and from a bed to a chair (including a wheelchair)?  1  -SV  2  -SI  3  -EF    Standing up from a chair using your arms (e.g., wheelchair, bedside chair)?  1  -SV  2  -SI  3  -EF    Climbing 3-5 steps with a railing?  1  -SV  1  -SI  1  -EF    To walk in hospital room?  1  -SV  2  -SI  2  -EF    AM-PAC 6 Clicks Score  6  -SV  11  -SI  15  -EF       Functional Assessment    Outcome Measure Options  --  AM-PAC 6 Clicks Basic Mobility (PT)  -SI  AM-PAC 6 Clicks Basic Mobility (PT)  -EF      User Key  (r) = Recorded By, (t) = Taken By, (c) = Cosigned By    Initials Name Provider Type    EF Aspen Abebe, PT Physical Therapist    Nimisha Alexis, PTA Physical Therapy Assistant    SV Nimisha Seth, PT Physical Therapist         Time Calculation:   PT Charges     Row Name 11/25/18 1144             Time Calculation    Start Time  1130  -SV      Stop Time  1145  -SV      Time Calculation (min)  15 min  -SV      PT Received On  11/25/18  -SV      PT - Next Appointment  11/26/18  -SV        User Key  (r) = Recorded By, (t) = Taken By, (c) = Cosigned By    Initials Name Provider Type    Nimisha Llanos, PT Physical Therapist        Therapy Suggested Charges     Code   Minutes Charges    None           Therapy Charges for Today     Code Description Service Date Service Provider Modifiers  Qty    16335115207  PT THER PROC EA 15 MIN 11/25/2018 Nimisha Seth, PT GP 1    11722400799 HC PT THER SUPP EA 15 MIN 11/25/2018 Nimisha Seth, PT GP 1          PT G-Codes  Outcome Measure Options: AM-PAC 6 Clicks Basic Mobility (PT)  AM-PAC 6 Clicks Score: 6    Nimisha Seth, PT  11/25/2018

## 2018-11-26 NOTE — PROGRESS NOTES
[facility]    Physicians Statement of Medical Necessity for Ambulance Transportation    It is medically necessary for:    Patient Name: Bernardo Cox    Insurance Information:      To be transported by ambulance:    From (if nursing facility, specify level of care: skilled, alf, etc): [facility]  To (specify level of care if nursing facility): [facility]    Date of Service: 11/17/18    For dialysis patients state date dialysis began:   Diagnosis: Acute Heart Failure    Past Medical/Surgical History:  Past Medical History:   Diagnosis Date   • Alzheimer disease    • Atherosclerotic heart disease    • Bronchitis    • CHF (congestive heart failure) (CMS/[facility])     EF = 25%   • COPD (chronic obstructive pulmonary disease) (CMS/[facility])    • Dementia    • Depression    • Diabetes mellitus (CMS/[facility])    • GERD (gastroesophageal reflux disease)    • HTN (hypertension)    • Hypokalemia    • Insomnia    • Metabolic encephalopathy    • Neuropathy    • OA (osteoarthritis)    • Pneumonia    • Renal disorder     stage three   • TIA (transient ischemic attack)    • Upper respiratory infection       Past Surgical History:   Procedure Laterality Date   • CARDIAC SURGERY          Current Objective Medical Evidence(including physical exam finding to support reason for limitations):    End stage Alzheimer's disease  Bedridden    Other:     Physician Signature:           (RN,NP,PA,CAN, Discharge Planner) Date/Time:     Printed Name:    __________________________________    [facility]   Phone: [phone] Phone: [phone]   Fax: [phone] Fax: [phone]

## 2018-11-26 NOTE — PROGRESS NOTES
Continued Stay Note  [facility]     Patient Name: Bernardo Cox  MRN: [id number]  Today's Date: 11/26/2018    Admit Date: 11/19/2018    Discharge Plan     Row Name 11/26/18 1509       Plan    Plan  [facility] 11/27 [facility] at 1300    Patient/Family in Agreement with Plan  yes    Plan Comments  Per Dr Butcher he plans DC 11/27.  Spoke with Joleen/ [facility] and [facility] accepts back and she has a bed and is LTC.  Spoke with son/ POREVA Morley [phone], he agrees with return and ambulance transport.  [facility] booked for 1300 11/27.......................Becky Barba RN         Discharge Codes    No documentation.             Becky Barba RN

## 2018-11-26 NOTE — PROGRESS NOTES
Adult Nutrition  Assessment/PES    Patient Name:  Bernardo Cox  YOB: 1931  MRN: [id number]  Admit Date:  11/19/2018    Assessment Date:  11/26/2018    Comments:  Completed nutrition assessment triggered by Stage II pressure injury left gluteal. Intake from meal trays averaged 30% x 14 meals. Will add Boost pudding two times per day. Recommend multivitamin/mineral supplement.    Reason for Assessment     Row Name 11/26/18 0842          Reason for Assessment    Reason For Assessment  identified at risk by screening criteria     Diagnosis  cardiac disease     Identified At Risk by Screening Criteria  large or nonhealing wound, burn or pressure injury           Anthropometrics     Row Name 11/26/18 0843 11/26/18 0519       Anthropometrics    Weight  --  61.6 kg (135 lb 11.2 oz)       Admit Weight    Admit Weight Method  measured  --    Admit Weight  59.6 kg (131 lb 6.3 oz)  --       Body Mass Index (BMI)    BMI Assessment  BMI 18.5-24.9: normal  --        Labs/Tests/Procedures/Meds     Row Name 11/26/18 0843          Labs/Procedures/Meds    Lab Results Reviewed  reviewed        Diagnostic Tests/Procedures    Diagnostic Test/Procedure Reviewed  reviewed     Diagnostic Test/Procedures Comments  VFSS with SLP 11/23        Medications    Pertinent Medications Reviewed  reviewed     Pertinent Medications Comments  antacid, diuretic, insulin, laxative         Physical Findings     Row Name 11/26/18 0846          Physical Findings    Skin  pressure injury;other (see comments) Stage II pressure injury L gluteal; Gliles score 16         Estimated/Assessed Needs     Row Name 11/26/18 0846          Calculation Measurements    Weight Used For Calculations  61.6 kg (135 lb 12.9 oz)        Estimated/Assessed Needs    Additional Documentation  Calorie Requirements (Group);Protein Requirements (Group);Fluid Requirements (Group)        Calorie Requirements    Weight Used For Calorie Calculations  61.6 kg (135 lb 12.9 oz)      Estimated Calorie Requirement (kcal/day)  1848     Estimated Calorie Need Method  kcal/kg     Estimated Calorie Requirement Comment  30 pilo/kg        KCAL/KG    14 Kcal/Kg (kcal)  862.4     15 Kcal/Kg (kcal)  924     18 Kcal/Kg (kcal)  1108.8     20 Kcal/Kg (kcal)  1232     25 Kcal/Kg (kcal)  1540     30 Kcal/Kg (kcal)  1848     35 Kcal/Kg (kcal)  2156     40 Kcal/Kg (kcal)  2464     45 Kcal/Kg (kcal)  2772     50 Kcal/Kg (kcal)  3080        [place]-St. Jeor Equation    RMR ([place]-St. Jeor Equation)  1051.88        Protein Requirements    Weight Used For Protein Calculations  61.6 kg (135 lb 12.9 oz)     Est Protein Requirement Amount (gms/kg)  1.2 gm protein     Estimated Protein Requirements (gms/day)  73.92        Fluid Requirements    Estimated Fluid Requirements (mL/day)  -- 6240-3655 ml/24 hr d/t dx of CHF     Debra-Fannie Method (over 20 kg)  2732         Nutrition Prescription Ordered     Row Name 11/26/18 0847          Nutrition Prescription PO    Current PO Diet  Dysphagia     Dysphagia Level  4  Mechanical soft no mixed consistencies     Fluid Consistency  Nectar/syrup thick     Common Modifiers  Cardiac     Other Modifiers  No Straws         Evaluation of Received Nutrient/Fluid Intake     Row Name 11/26/18 0848 11/26/18 0846       Calculation Measurements    Weight Used For Calculations  --  61.6 kg (135 lb 12.9 oz)       PO Evaluation    Number of Days PO Intake Evaluated  Other (comment) 6 days  --    Number of Meals  14  --    % PO Intake  averaged 30%  --        Evaluation of Prescribed Nutrient/Fluid Intake     Row Name 11/26/18 0846          Calculation Measurements    Weight Used For Calculations  61.6 kg (135 lb 12.9 oz)             Problem/Interventions:  Problem 1     Row Name 11/26/18 0848          Nutrition Diagnoses Problem 1    Problem 1  Nutrition Appropriate for Condition at this Time     Etiology (related to)  Functional Diagnosis     Functional Diagnosis  Dysphagia      Signs/Symptoms (evidenced by)  SLP/Swallow eval     Swallow eval status  Done     Type of SLP Evaluation  VFSS                 Intervention Goal     Row Name 11/26/18 0849          Intervention Goal    General  Maintain nutrition     PO  Tolerate PO;Increase intake;PO intake (%)     PO Intake %  75 %         Nutrition Intervention     Row Name 11/26/18 0849          Nutrition Intervention    RD/Tech Action  Supplement provided;Follow Tx progress;Care plan reviewd         Nutrition Prescription     Row Name 11/26/18 0849          Nutrition Prescription PO    PO Prescription  Begin/change supplement     Supplement  Boost Pudding     Supplement Frequency  2 times a day     New PO Prescription Ordered?  Yes        Other Orders    Supplement  Vitamin mineral supplement     Supplement Ordered?  No, recommended         Education/Evaluation     Row Name 11/26/18 0849          Education    Education  Other (comment) N?A-fr [facility]        Monitor/Evaluation    Monitor  Per protocol           Electronically signed by:  Swathi Stephenson RD  11/26/18 8:51 AM

## 2018-11-26 NOTE — PROGRESS NOTES
[facility], [facility]  Progress Note      Patient ID:  Name:  Bernardo Cox  MRN:  [id number]  4/26/1931  87 y.o.  female            CC/Reason for visit:  CHF exacerbation,Severe Mitral Regurg, Issues C Diff possibiliy,Dysphagia,Dementa     Interval history:   Feeling better, Sat in RA 94%. Le edema much better as well. Eating OK, sleeping better.  No N,V,D,C,CP,SOB       Vitals:  Vitals:    11/26/18 0807 11/26/18 0925 11/26/18 1142 11/26/18 1148   BP: 149/65      BP Location: Left arm      Patient Position: Sitting      Pulse: 93   69   Resp: 18  16 16   Temp: 98.2 °F (36.8 °C)      TempSrc: Oral      SpO2:  97% 93%    Weight:       Height:               Body mass index is 22.58 kg/m².    Intake/Output Summary (Last 24 hours) at 11/26/2018 1418  Last data filed at 11/25/2018 1836  Gross per 24 hour   Intake 360 ml   Output 0 ml   Net 360 ml       Exam:  GEN:  No distress, appears stated age  EYES:   EOM-i, anicteric sclera bilat  ENT:    External ears/nose normal, OP clear  NECK:  Supple, midline trachea  LUNGS: Diminished breath sounds bilat basilary, nonlabored breathing  CV:  Normal S1S2, without murmur  ABD:  Non tender, no enlarged liver or masses  EXT:  No cyanosis or clubbing. ++ chronic edema, much improved    Scheduled meds:      budesonide 0.5 mg Nebulization Daily - RT   carvedilol 25 mg Oral BID With Meals   clopidogrel 75 mg Oral Daily   docusate sodium 100 mg Oral Daily   donepezil 10 mg Oral Nightly   enoxaparin 30 mg Subcutaneous Q24H   escitalopram 10 mg Oral Daily   furosemide 40 mg Oral Daily   influenza vaccine 0.5 mL Intramuscular Once   insulin glargine 30 Units Subcutaneous Nightly   insulin lispro 0-7 Units Subcutaneous 4x Daily With Meals & Nightly   ipratropium-albuterol 3 mL Nebulization 4x Daily - RT   mupirocin  Topical BID   pantoprazole 40 mg Oral QAM AC   polyethylene glycol 17 g Oral Daily   potassium chloride 20 mEq Oral BID   potassium chloride 40 mEq Oral Once   sodium  chloride 4 mL Nebulization BID - RT     IV meds:                           Data Review:   I reviewed the patient's medications and new clinical results.  Lab Results   Component Value Date    CALCIUM 8.3 (L) 11/24/2018    PHOS 2.7 04/20/2018     Results from last 7 days   Lab Units  11/25/18   0851  11/24/18   2155  11/24/18   0436  11/23/18   0508  11/22/18   0252  11/21/18   0351   11/19/18 2019 11/19/18   1741   SODIUM mmol/L   --    --   134*  137  135*  132*   < >  132*   --    POTASSIUM mmol/L   --   4.7  3.2*  3.1*  3.0*  3.5   < >  4.5   --    CHLORIDE mmol/L   --    --   94*  93*  92*  93*   < >  92*   --    CO2 mmol/L   --    --   28.4  29.8*  31.0*  26.6   < >  29.3*   --    BUN mg/dL   --    --   16 16  18  23   < >  30*   --    CREATININE mg/dL   --    --   0.96  1.09*  0.90  0.89   < >  1.12*   --    CALCIUM mg/dL   --    --   8.3*  8.1*  8.5*  8.7   < >  8.8   --    BILIRUBIN mg/dL   --    --    --    --   0.5   --    --   0.5   --    ALK PHOS U/L   --    --    --    --   126*   --    --   151*   --    ALT (SGPT) U/L   --    --    --    --   9   --    --   9   --    AST (SGOT) U/L   --    --    --    --   8   --    --   10   --    GLUCOSE mg/dL   --    --   81  178*  124*  290*   < >  422*   --    WBC 10*3/mm3   --    --   8.06   --   10.63  10.16   < >   --   14.78*   HEMOGLOBIN g/dL   --    --   9.8*   --   10.3*  10.1*   < >   --   11.0*   PLATELETS 10*3/mm3   --    --   328   --   325  290   < >   --   316   PROBNP pg/mL  14,513.0*   --    --    --    --   32,283.0*   --    --   24,570.0*   PROCALCITONIN ng/mL   --    --    --    --   0.08*   --    --   0.08*   --     < > = values in this interval not displayed.             Results from last 7 days   Lab Units  11/19/18   1741   TROPONIN T ng/mL  0.034*           Estimated Creatinine Clearance: 40.1 mL/min (by C-G formula based on SCr of 0.96 mg/dL).    WEIGHTS:     Wt Readings from Last 1 Encounters:   11/26/18 0519 61.6 kg (135 lb 11.2 oz)    11/19/18 2236 59.6 kg (131 lb 6.4 oz)   11/19/18 1845 66.2 kg (146 lb)     VFSS 11/23/2018    Spill of contents into the vallecula with multiple consistencies. There  is deep penetration during thin liquids with possible trace aspiration.    ASSESSMENT:     Acute on chronic systolic CHF (congestive heart failure) (CMS/HCC)    Elevated troponin    Essential hypertension    DM (diabetes mellitus) (CMS/[facility])    Toxic metabolic encephalopathy    Alzheimer disease    Nonrheumatic mitral valve insufficiency    Edema of both legs  Dysphagia    PLAN:    Continue to monitor off antibiotics for now.   Continue aggressive pulmonary hygiene measures with TCDB, OPEP, chest PT, hypertonic saline and bronchodilators.   Aspiration precautions and keep HOB elevated at 30 degrees.  Continue diet per speech recommendations  Continue current diuretics  Continue PT  Continue potassium protocol and magnrsium protocol  DC in am, patient stable        Sanjay Butcher MD  11/26/2018

## 2018-11-26 NOTE — PLAN OF CARE
Problem: Patient Care Overview  Goal: Plan of Care Review  Outcome: Ongoing (interventions implemented as appropriate)   11/26/18 3741   Coping/Psychosocial   Plan of Care Reviewed With patient   Plan of Care Review   Progress improving     Goal: Individualization and Mutuality  Outcome: Ongoing (interventions implemented as appropriate)    Goal: Discharge Needs Assessment  Outcome: Ongoing (interventions implemented as appropriate)    Goal: Interprofessional Rounds/Family Conf  Outcome: Ongoing (interventions implemented as appropriate)      Problem: Fall Risk (Adult)  Goal: Absence of Fall  Outcome: Ongoing (interventions implemented as appropriate)      Problem: Skin Injury Risk (Adult)  Goal: Skin Health and Integrity  Outcome: Ongoing (interventions implemented as appropriate)      Problem: Breathing Pattern Ineffective (Adult)  Goal: Effective Oxygenation/Ventilation  Outcome: Ongoing (interventions implemented as appropriate)    Goal: Anxiety/Fear Reduction  Outcome: Ongoing (interventions implemented as appropriate)

## 2018-11-26 NOTE — PROGRESS NOTES
"  PROGRESS NOTE  Patient Name: Bernardo Cox  Age/Sex: 87 y.o. female  : 1931  MRN: [id number]    Date of Admission: 2018  Date of Encounter Visit: 18   LOS: 6 days   Patient Care Team:  Tay Lynch MD as PCP - General (Family Medicine)  Sophie Zuniga, RN as Care Coordinator (Population Health)    Chief Complaint: follow up on RLL infiltrate suspecting aspiration pneumonitis    Interval History: Patient seen and examined this a.m. Says that she feels okay.  Does not have any worsening cough or worsening shortness of breath.     REVIEW OF SYSTEMS:   Cannot to obtain due to dementia     Ventilator/Non-Invasive Ventilation Settings (From admission, onward)    On RA            Vital Signs  Temp:  [97.5 °F (36.4 °C)-98.2 °F (36.8 °C)] 98.2 °F (36.8 °C)  Heart Rate:  [69-93] 69  Resp:  [16-18] 16  BP: (136-151)/(65-76) 149/65  SpO2:  [93 %-100 %] 93 %  on    Device (Oxygen Therapy): room air    Intake/Output Summary (Last 24 hours) at 2018 1432  Last data filed at 2018 1836  Gross per 24 hour   Intake 360 ml   Output 0 ml   Net 360 ml     Flowsheet Rows      First Filed Value   Admission Height  165.1 cm (65\") Documented at 2018 1645   Admission Weight  66.2 kg (146 lb) Documented at 2018 1845        Body mass index is 22.58 kg/m².      18  1845 18  2236 18  0519   Weight: 66.2 kg (146 lb) 59.6 kg (131 lb 6.4 oz) 61.6 kg (135 lb 11.2 oz)       Physical Exam:  GEN: Elderly cachectic white female   No acute distress, alert, on room air   EYES:   Sclera clear. No icterus. PERRL.   ENT:   External ears/nose normal, no oral lesions, no thrush, mucous membranes moist  NECK:  Supple, midline trachea  LUNGS: Normal chest on inspection, diminished, No wheezes/crackles. Respirations regular, even and unlabored.   CV:  Regular rhythm and rate. Normal S1/S2. 2/6 systolic mitral murmur  No gallops, or rubs noted.  ABD:  Soft, non-tender and non-distended. Normal " bowel sounds. No guarding  EXT:  Moves all extremities well. No cyanosis. No redness. trace edema.BLL edema wrapped and drsg was not removed.     Skin: dry, intact, no bleeding    Results Review:      Results from last 7 days   Lab Units  11/24/18 2155  11/24/18   0436  11/23/18   0508  11/22/18   0252  11/21/18   0351  11/20/18   0517 11/19/18 2019   SODIUM mmol/L   --   134*  137  135*  132*  130*  132*   POTASSIUM mmol/L  4.7  3.2*  3.1*  3.0*  3.5  4.1  4.5   CHLORIDE mmol/L   --   94*  93*  92*  93*  93*  92*   CO2 mmol/L   --   28.4  29.8*  31.0*  26.6  25.0  29.3*   BUN mg/dL   --   16  16  18  23  28*  30*   CREATININE mg/dL   --   0.96  1.09*  0.90  0.89  0.96  1.12*   CALCIUM mg/dL   --   8.3*  8.1*  8.5*  8.7  8.8  8.8   AST (SGOT) U/L   --    --    --   8   --    --   10   ALT (SGPT) U/L   --    --    --   9   --    --   9   ANION GAP mmol/L   --   11.6  14.2  12.0  12.4  12.0  10.7   ALBUMIN g/dL   --    --    --   2.60*   --    --   2.80*     Results from last 7 days   Lab Units  11/19/18   1741   TROPONIN T ng/mL  0.034*         Results from last 7 days   Lab Units  11/25/18   0851  11/21/18   0351  11/19/18   1741   PROBNP pg/mL  14,513.0*  32,283.0*  24,570.0*     Results from last 7 days   Lab Units  11/24/18   0436  11/22/18   0252  11/21/18   0351  11/20/18   0517  11/19/18   1741   WBC 10*3/mm3  8.06  10.63  10.16  11.56*  14.78*   HEMOGLOBIN g/dL  9.8*  10.3*  10.1*  10.1*  11.0*   HEMATOCRIT %  32.2*  35.3*  34.1*  33.5*  39.0   PLATELETS 10*3/mm3  328  325  290  337  316   MCV fL  82.1  85.7  84.8  82.9  88.4   NEUTROPHIL % %  68.6  76.7*   --    --   85.4*   LYMPHOCYTE % %  18.5*  13.1*   --    --   6.5*   MONOCYTES % %  11.4  8.9   --    --   5.5   EOSINOPHIL % %  1.4  0.5   --    --   0.3   BASOPHIL % %  0.1  0.1   --    --   0.3   IMM GRAN % %  0.5  0.7*   --    --   2.0*         Results from last 7 days   Lab Units  11/25/18   0851  11/24/18   2155  11/24/18   0436  11/22/18   0252    MAGNESIUM mg/dL  2.9*  1.6  1.7  1.5*           Invalid input(s): LDLCALC          Glucose   Date/Time Value Ref Range Status   11/26/2018 1058 145 (H) 70 - 130 mg/dL Final   11/26/2018 0605 97 70 - 130 mg/dL Final   11/25/2018 2054 359 (H) 70 - 130 mg/dL Final   11/25/2018 1604 389 (H) 70 - 130 mg/dL Final   11/25/2018 1106 200 (H) 70 - 130 mg/dL Final   11/25/2018 0607 70 70 - 130 mg/dL Final   11/24/2018 2030 237 (H) 70 - 130 mg/dL Final   11/24/2018 1639 544 (C) 70 - 130 mg/dL Final     Results from last 7 days   Lab Units  11/22/18   0252  11/19/18 2019   PROCALCITONIN ng/mL  0.08*  0.08*                            Medication Review:     budesonide 0.5 mg Nebulization Daily - RT   carvedilol 25 mg Oral BID With Meals   clopidogrel 75 mg Oral Daily   docusate sodium 100 mg Oral Daily   donepezil 10 mg Oral Nightly   enoxaparin 30 mg Subcutaneous Q24H   escitalopram 10 mg Oral Daily   furosemide 40 mg Oral Daily   influenza vaccine 0.5 mL Intramuscular Once   insulin glargine 30 Units Subcutaneous Nightly   insulin lispro 0-7 Units Subcutaneous 4x Daily With Meals & Nightly   ipratropium-albuterol 3 mL Nebulization 4x Daily - RT   mupirocin  Topical BID   pantoprazole 40 mg Oral QAM AC   polyethylene glycol 17 g Oral Daily   potassium chloride 20 mEq Oral BID   potassium chloride 40 mEq Oral Once   sodium chloride 4 mL Nebulization BID - RT               ASSESSMENT:   1. Atelectasis vs silent aspiration   2. 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 Severe Mitral regurgitation  4. Alzheimer's dementia  5. Hypertension   6. Dysphagia  7. Hypokalemia     PLAN:  Patient stable from pulmonary standpoint  CXR - improving RLL infiltrate.   We'll continue to monitor off antibiotics, aggressive pulmonary hygiene  Given overall generalized debility, multiple cardiac issues she is at HIGH risk for recurrent respiratory failure.  Would d/w pt/ family about GOC. Will defer to primary.  Ok to dc from my end.      Otis Fay MD  11/26/18  2:32 PM

## 2018-11-26 NOTE — PLAN OF CARE
Problem: Patient Care Overview  Goal: Plan of Care Review  Outcome: Ongoing (interventions implemented as appropriate)   11/26/18 0424   Coping/Psychosocial   Plan of Care Reviewed With patient   Plan of Care Review   Progress no change   OTHER   Outcome Summary no complaints, bedtime blood sugar was 359 sliding scale insulin was held, long acting given only with a bedtime snack and her blood sugar this morning was 97,  becoming more restless and uncooperative, room air, up with standby assist and walker, will continue to monitor     Goal: Individualization and Mutuality  Outcome: Ongoing (interventions implemented as appropriate)    Goal: Discharge Needs Assessment  Outcome: Ongoing (interventions implemented as appropriate)      Problem: Fall Risk (Adult)  Goal: Absence of Fall  Outcome: Ongoing (interventions implemented as appropriate)      Problem: Skin Injury Risk (Adult)  Goal: Skin Health and Integrity  Outcome: Ongoing (interventions implemented as appropriate)      Problem: Breathing Pattern Ineffective (Adult)  Goal: Effective Oxygenation/Ventilation  Outcome: Ongoing (interventions implemented as appropriate)    Goal: Anxiety/Fear Reduction  Outcome: Ongoing (interventions implemented as appropriate)

## 2018-11-26 NOTE — PROGRESS NOTES
[facility]  Progress note: 2018    Patient Identification:  Name:Bernardo Cox  Age:87 y.o.  Sex: female  :  1931  MRN: [id number]           CC:  Cough, shortness of breath, congestive heart failure    Interval history:  Vitals stable.  BNP 14,513.  Patient asking for more food and not receptive to any other discussion.    Vital Signs:   Temp:  [97.4 °F (36.3 °C)-98.2 °F (36.8 °C)] 97.4 °F (36.3 °C)  Heart Rate:  [69-93] 73  Resp:  [16-18] 18  BP: (118-151)/(59-76) 118/59    Intake/Output Summary (Last 24 hours) at 2018 1522  Last data filed at 2018 1836  Gross per 24 hour   Intake 360 ml   Output 0 ml   Net 360 ml       Physical Examination:    General Appearance No acute distress   Neck No adenopathy, supple, trachea midline, no thyromegaly, no carotid bruit, no JVD   Lungs Decreased inspiratory effort,respirations regular, even and unlabored   Heart Regular rhythm and normal rate, normal S1 and S2, no murmur, no gallop, no rub, no click   Chest wall No abnormalities observed   Abdomen Normal bowel sounds, no masses, no hepatomegaly, soft   Extremities Moves all extremities well, no edema, no cyanosis, no redness   Neurological Alert and oriented x 3     Lab Review:  Personally reviewed the labs, radiology imaging and other cardiac procedures. Results from last 7 days   Lab Units  18   2155  18   0436   18   0252   SODIUM mmol/L   --   134*   < >  135*   POTASSIUM mmol/L  4.7  3.2*   < >  3.0*   CHLORIDE mmol/L   --   94*   < >  92*   CO2 mmol/L   --   28.4   < >  31.0*   BUN mg/dL   --   16   < >  18   CREATININE mg/dL   --   0.96   < >  0.90   CALCIUM mg/dL   --   8.3*   < >  8.5*   BILIRUBIN mg/dL   --    --    --   0.5   ALK PHOS U/L   --    --    --   126*   ALT (SGPT) U/L   --    --    --   9   AST (SGOT) U/L   --    --    --   8   GLUCOSE mg/dL   --   81   < >  124*    < > = values in this interval not displayed.     Results from last 7 days   Lab Units   11/19/18   1741   TROPONIN T ng/mL  0.034*       Results from last 7 days   Lab Units  11/24/18   0436  11/22/18   0252  11/21/18   0351   WBC 10*3/mm3  8.06  10.63  10.16   HEMOGLOBIN g/dL  9.8*  10.3*  10.1*   HEMATOCRIT %  32.2*  35.3*  34.1*   PLATELETS 10*3/mm3  328  325  290     Medication Review:   Meds reviewed  Scheduled Meds:  budesonide 0.5 mg Nebulization Daily - RT   carvedilol 25 mg Oral BID With Meals   clopidogrel 75 mg Oral Daily   docusate sodium 100 mg Oral Daily   donepezil 10 mg Oral Nightly   enoxaparin 30 mg Subcutaneous Q24H   escitalopram 10 mg Oral Daily   furosemide 40 mg Oral Daily   influenza vaccine 0.5 mL Intramuscular Once   insulin glargine 30 Units Subcutaneous Nightly   insulin lispro 0-7 Units Subcutaneous 4x Daily With Meals & Nightly   ipratropium-albuterol 3 mL Nebulization 4x Daily - RT   mupirocin  Topical BID   pantoprazole 40 mg Oral QAM AC   polyethylene glycol 17 g Oral Daily   potassium chloride 20 mEq Oral BID   potassium chloride 40 mEq Oral Once   sodium chloride 4 mL Nebulization BID - RT     I personally viewed and interpreted the patient's EKG/Telemetry data    Assessment and Plan  1.  Acute on chronic congestive heart failure.  ProBNP very high yesterday and exam is difficult..  We'll give Lasix 40 mg IV today and an additional 40 mg once a potassium.  2.  Severe mitral regurgitation  3.  Coronary artery disease  4.  Hypertension  5.  Aspiration pneumonitis  6.  Alzheimer's      Mini Fields  11/26/20183:22 PM  25min spent in reviewing records, discussion and examination of the patient and discussion with other members of the patient's medical team.     Dictated utilizing Dragon dictation

## 2018-11-27 NOTE — PROGRESS NOTES
"    Patient Name: Bernardo Cox  :1931  87 y.o.      Patient Care Team:  Tay Lynch MD as PCP - General (Family Medicine)  Sophie Zuniga, RN as Care Coordinator (Population Health)    Chief Complaint: follow up heart failure    Interval History: she is sitting up in the chair. States she feels better than yesterday but a little difficult to obtain complete history. She just wants to know when she is going home. She received one dose of Iv lasix yesterday. I/O is not accurate. Weight is down. She is on room air.        Objective   Vital Signs  Temp:  [97.3 °F (36.3 °C)-97.4 °F (36.3 °C)] 97.3 °F (36.3 °C)  Heart Rate:  [69-82] 73  Resp:  [16-18] 18  BP: (108-129)/(44-93) 108/44    Intake/Output Summary (Last 24 hours) at 2018 1049  Last data filed at 2018 2100  Gross per 24 hour   Intake --   Output 500 ml   Net -500 ml     Flowsheet Rows      First Filed Value   Admission Height  165.1 cm (65\") Documented at 2018 1645   Admission Weight  66.2 kg (146 lb) Documented at 2018 1845          Physical Exam:   General Appearance:    Alert, cooperative, in no acute distress   Lungs:     diminished to auscultation.  Normal respiratory effort and rate.      Heart:    Regular rhythm and normal rate, normal S1 and S2, no murmurs, gallops or rubs.     Chest Wall:    No abnormalities observed   Abdomen:     Soft, nontender, positive bowel sounds.     Extremities:   no cyanosis, clubbing or edema.  No marked joint deformities.  Adequate musculoskeletal strength.       Results Review:    Results from last 7 days   Lab Units  18   0339   SODIUM mmol/L  131*   POTASSIUM mmol/L  4.9   CHLORIDE mmol/L  94*   CO2 mmol/L  25.1   BUN mg/dL  19   CREATININE mg/dL  1.00   GLUCOSE mg/dL  152*   CALCIUM mg/dL  8.9         Results from last 7 days   Lab Units  18   0339   WBC 10*3/mm3  7.22   HEMOGLOBIN g/dL  9.4*   HEMATOCRIT %  31.9*   PLATELETS 10*3/mm3  314         Results from last 7 " days   Lab Units  11/25/18   0851   MAGNESIUM mg/dL  2.9*                   Medication Review:     budesonide 0.5 mg Nebulization Daily - RT   carvedilol 25 mg Oral BID With Meals   clopidogrel 75 mg Oral Daily   docusate sodium 100 mg Oral Daily   donepezil 10 mg Oral Nightly   enoxaparin 30 mg Subcutaneous Q24H   escitalopram 10 mg Oral Daily   furosemide 40 mg Oral Daily   influenza vaccine 0.5 mL Intramuscular Once   insulin glargine 30 Units Subcutaneous Nightly   insulin lispro 0-7 Units Subcutaneous 4x Daily With Meals & Nightly   ipratropium-albuterol 3 mL Nebulization 4x Daily - RT   mupirocin  Topical BID   pantoprazole 40 mg Oral QAM AC   polyethylene glycol 17 g Oral Daily   potassium chloride 20 mEq Oral BID   potassium chloride 40 mEq Oral Once   sodium chloride 4 mL Nebulization BID - RT             Assessment/Plan   1. Acute on chronic diastolic heart failure - likely due to severe MR. . Normal LV systolic function on last echo 01/2018. Seems better after IV lasix yesterday. Weight down and on room air. Continue oral dose of lasix.      2. Severe mitral regurgitation - conservative management.      3. History of coronary artery disease - prior CABG - no angina. Continue medical therapy.      4. Dementia - nursing home resident.      5. History of HTN - stable      6. Hyponatremia - likely from heart failure. Still on the low side.     Ok to discharge from cardiac standpoint. Will sign off. Please call with any additional questions.     CLAUDIA Diaz  [facility]  11/27/18  10:49 AM

## 2018-11-27 NOTE — PLAN OF CARE
Problem: Patient Care Overview  Goal: Plan of Care Review  Outcome: Ongoing (interventions implemented as appropriate)   11/27/18 1447   Coping/Psychosocial   Plan of Care Reviewed With patient   Plan of Care Review   Progress improving   OTHER   Outcome Summary patient to be d/c back to [facility] today      Goal: Individualization and Mutuality  Outcome: Ongoing (interventions implemented as appropriate)    Goal: Discharge Needs Assessment  Outcome: Ongoing (interventions implemented as appropriate)    Goal: Interprofessional Rounds/Family Conf  Outcome: Ongoing (interventions implemented as appropriate)      Problem: Fall Risk (Adult)  Goal: Absence of Fall  Outcome: Ongoing (interventions implemented as appropriate)      Problem: Skin Injury Risk (Adult)  Goal: Skin Health and Integrity  Outcome: Ongoing (interventions implemented as appropriate)      Problem: Breathing Pattern Ineffective (Adult)  Goal: Effective Oxygenation/Ventilation  Outcome: Ongoing (interventions implemented as appropriate)    Goal: Anxiety/Fear Reduction  Outcome: Ongoing (interventions implemented as appropriate)      Problem: Cardiac: Heart Failure (Adult)  Goal: Signs and Symptoms of Listed Potential Problems Will be Absent, Minimized or Managed (Cardiac: Heart Failure)  Outcome: Outcome(s) achieved Date Met: 11/27/18

## 2018-11-27 NOTE — PLAN OF CARE
Problem: Patient Care Overview  Goal: Plan of Care Review  Outcome: Ongoing (interventions implemented as appropriate)   11/27/18 0442   Coping/Psychosocial   Plan of Care Reviewed With patient   Plan of Care Review   Progress no change   OTHER   Outcome Summary VSS throughout night, pt fairly active throughout the night. Frequent urination, room air, standby assist with walker. Will continue to monitor.        Problem: Fall Risk (Adult)  Goal: Absence of Fall  Outcome: Ongoing (interventions implemented as appropriate)      Problem: Skin Injury Risk (Adult)  Goal: Skin Health and Integrity  Outcome: Ongoing (interventions implemented as appropriate)      Problem: Breathing Pattern Ineffective (Adult)  Goal: Effective Oxygenation/Ventilation  Outcome: Ongoing (interventions implemented as appropriate)    Goal: Anxiety/Fear Reduction  Outcome: Ongoing (interventions implemented as appropriate)      Problem: Cardiac: Heart Failure (Adult)  Goal: Signs and Symptoms of Listed Potential Problems Will be Absent, Minimized or Managed (Cardiac: Heart Failure)  Outcome: Ongoing (interventions implemented as appropriate)

## 2018-11-27 NOTE — PROGRESS NOTES
Continued Stay Note  [facility]     Patient Name: Bernardo Cox  MRN: [id number]  Today's Date: 11/27/2018    Admit Date: 11/19/2018    Discharge Plan     Row Name 11/27/18 1537       Plan    Plan  [facility] long term care bed    Patient/Family in Agreement with Plan  yes    Plan Comments  Spoke with son, Bereket Morley [phone].  He agrees with [facility] for transport to [facility].  [facility] accepts back.  [facility] is booked for 1845.  DC summary is faxed and given to nursing.....................Becky Barba RN        Discharge Codes    No documentation.       Expected Discharge Date and Time     Expected Discharge Date Expected Discharge Time    Nov 27, 2018             Becky Barba RN

## 2018-11-27 NOTE — DISCHARGE SUMMARY
PHYSICIAN DISCHARGE SUMMARY  [facility]    Patient Identification:    Name: Bernardo Cox  Age: 87 y.o.  Sex: female  :  1931  MRN: [id number]    Primary Care Physician: Tay Lynch MD    Admit date: 2018    Discharge date and time:2018    Discharged Condition: fair    Discharge Diagnoses:    Acute on chronic systolic CHF (congestive heart failure) (CMS/[facility])    Elevated troponin    Essential hypertension    DM (diabetes mellitus) (CMS/[facility])    Toxic metabolic encephalopathy    Nonrheumatic mitral valve insufficiency    Edema of both legs    Alzheimer disease    Patient Active Problem List   Diagnosis Code   • Acute respiratory failure with hypoxia (CMS/[facility]) J96.01   • Essential hypertension I10   • Syncope and collapse R55   • Bacteriuria R82.71   • HCAP (healthcare-associated pneumonia) J18.9   • Hypoglycemia E16.2   • DM (diabetes mellitus) (CMS/[facility]) E11.9   • Acute on chronic systolic CHF (congestive heart failure) (CMS/[facility]) I50.23   • Toxic metabolic encephalopathy G92   • Alzheimer disease G30.9, F02.80   • Pneumonia of right lower lobe due to infectious organism (CMS/[facility]) J18.1   • Acute hypoxemic respiratory failure (CMS/[facility]) J96.01   • CHF (congestive heart failure), NYHA class I, acute on chronic, diastolic (CMS/[facility]) I50.33   • Dehydration E86.0   • Elevated troponin R74.8   • Nonrheumatic mitral valve insufficiency I34.0   • Edema of both legs R60.0          Hospital Course: Bernardo Cox  is a 86 y/o female, she has h/o CHF, Alzheimer dementia, COPD, DM, TIA. She developed worsening LE edema up to 4+, as well as cough and SOB. Labs done at NH revealed elevated BNP; 3162. She was sent to ER, she was found to have [place] [address], WBC 14.78, BS, BUN and creatinine elevated. CXR revealed atelectasis in lower bases. Patient was admitted for further management.    Upon admission patient was given IV diuretics gently,  "cardiology was consulted and recommendations were followed.  Blood sugar was handled with home medications as well as Accu-Cheks and sliding scale insulin.  Physical therapy was following the patient.  Her electrolytes were corrected accordingly.  Patient did develop a possible aspiration pneumonitis, pulmonary was consulted and it was felt that it was chf and atelectasis, she improved with conservative treatment. Patient was doing very well without antibiotics.  Patient's at this time is stable, is on room air, saturation 94-95 percent.  She is eating well.  She is ready to be back to the nursing home.        PMHX:   Past Medical History:   Diagnosis Date   • Alzheimer disease    • Atherosclerotic heart disease    • Bronchitis    • CHF (congestive heart failure) (CMS/[facility])     EF = 25%   • COPD (chronic obstructive pulmonary disease) (CMS/[facility])    • Dementia    • Depression    • Diabetes mellitus (CMS/[facility])    • GERD (gastroesophageal reflux disease)    • HTN (hypertension)    • Hypokalemia    • Insomnia    • Metabolic encephalopathy    • Neuropathy    • OA (osteoarthritis)    • Pneumonia    • Renal disorder     stage three   • TIA (transient ischemic attack)    • Upper respiratory infection      PSHX:   Past Surgical History:   Procedure Laterality Date   • CARDIAC SURGERY             Consults:     Consults     Date and Time Order Name Status Description    11/21/2018 1121 Inpatient Pulmonology Consult Completed     11/20/2018 0826 Inpatient Cardiology Consult Completed     11/19/2018 2141 Family Medicine Consult Completed           Discharge Exam:    /44 (BP Location: Left arm, Patient Position: Lying)   Pulse 65   Temp 97.3 °F (36.3 °C) (Oral)   Resp 16   Ht 165.1 cm (65\")   Wt 57.6 kg (127 lb)   SpO2 99%   BMI 21.13 kg/m²   GEN:               No distress, appears stated age  EYES:              EOM-i, anicteric sclera bilat  ENT:                External ears/nose normal, OP clear  NECK:             " Supple, midline trachea  LUNGS:           Diminished breath sounds bilat basilary, nonlabored breathing  CV:                  Normal S1S2, without murmur  ABD:                Non tender, no enlarged liver or masses  EXT:                No cyanosis or clubbing. + chronic edema, much improved.  Lower extremities are wrapped.           Data Review:        Results from last 7 days   Lab Units  11/27/18 0339 11/24/18   0436  11/22/18   0252   WBC 10*3/mm3  7.22  8.06  10.63   HEMOGLOBIN g/dL  9.4*  9.8*  10.3*   HEMATOCRIT %  31.9*  32.2*  35.3*   PLATELETS 10*3/mm3  314  328  325       Results from last 7 days   Lab Units  11/27/18   0339 11/24/18   2155 11/24/18 0436  11/23/18   0508  11/22/18   0252   SODIUM mmol/L  131*   --   134*  137  135*   POTASSIUM mmol/L  4.9  4.7  3.2*  3.1*  3.0*   CHLORIDE mmol/L  94*   --   94*  93*  92*   CO2 mmol/L  25.1   --   28.4  29.8*  31.0*   BUN mg/dL  19   --   16  16  18   CREATININE mg/dL  1.00   --   0.96  1.09*  0.90   CALCIUM mg/dL  8.9   --   8.3*  8.1*  8.5*   BILIRUBIN mg/dL   --    --    --    --   0.5   ALK PHOS U/L   --    --    --    --   126*   ALT (SGPT) U/L   --    --    --    --   9   AST (SGOT) U/L   --    --    --    --   8   GLUCOSE mg/dL  152*   --   81  178*  124*           Lab Results   Lab Value Date/Time    TROPONINT 0.034 (H) 11/19/2018 1741    TROPONINT <0.010 04/15/2018 0151    TROPONINT <0.010 03/26/2018 0401    TROPONINT <0.010 03/23/2018 0613    TROPONINT 0.035 (H) 01/26/2018 1040    TROPONINT 0.023 11/03/2017 0510    TROPONINT 0.052 (H) 11/02/2017 0302    TROPONINT 0.054 (H) 11/01/2017 2105    TROPONINT 0.084 (H) 11/01/2017 1320    TROPONINT 0.017 07/04/2017 1653    TROPONINT <0.010 08/07/2016 2158    TROPONINT <0.01 01/19/2016 1405       Microbiology Results (last 10 days)     ** No results found for the last 240 hours. **           Imaging Results (all)     Procedure Component Value Units Date/Time    XR Chest PA & Lateral [[id number]]  Collected:  11/25/18 1457     Updated:  11/25/18 1501    Narrative:       XR CHEST PA AND LATERAL-     Clinical: Shortness of breath     COMPARISON 11/21/2018     FINDINGS: There are breast implants in position. There are median  sternotomy wires and vascular markers in place. There is stable cardiac  enlargement. No pulmonary edema or pleural effusion. Right base airspace  disease diminished within the interim. This is located within the right  lower lobe.     CONCLUSION: Right lower lobe airspace disease diminished since  11/21/2018     This report was finalized on 11/25/2018 2:58 PM by Dr. Darryl Mendez M.D.       FL Video Swallow [[id number]] Collected:  11/23/18 1207     Updated:  11/23/18 1422    Narrative:       VIDEO SWALLOW STUDY     HISTORY: Dysphagia.     2 minutes 35 seconds fluoroscopy was provided for the speech pathologist  during a video swallow study. 2456 images were saved.     Spill of contents into the vallecula with multiple consistencies. There  is deep penetration during thin liquids with possible trace aspiration.       Impression:       Fluoroscopy was provided for the speech pathologist during a  video swallow study. For full details please see the speech pathology  report     This report was finalized on 11/23/2018 2:19 PM by Dr. Naeem Hargrove M.D.       XR Chest 1 View [[id number]] Collected:  11/21/18 1511     Updated:  11/21/18 1515    Narrative:       XR CHEST 1 VW-     Clinical: Cough     COMPARISON 11/19/2018     FINDINGS: Cardiomegaly similar to the previous examination. Increasing  right base opacity consistent with progressive air space disease with  now consolidation at this location. The left lung remains clear. No  edema or effusion identified. The remainder is unremarkable.     This report was finalized on 11/21/2018 3:11 PM by Dr. Darryl Mendez M.D.       XR Chest 1 View [[id number]] Collected:  11/19/18 1849     Updated:  11/19/18 1855    Narrative:       XR CHEST 1 VW-      HISTORY: Female who is 87 years-old,  cough     TECHNIQUE: Frontal view of the chest     COMPARISON: 4/23/2018     FINDINGS: Heart is enlarged. Aorta is tortuous, calcified. Sternotomy  wires are present. Pulmonary vasculature is unremarkable. Small likely  atelectasis or infiltrate at the right base, follow-up suggested. No  large pleural effusion, no pneumothorax. No acute osseous process.       Impression:       Small likely atelectasis or infiltrate at the right base,  follow-up suggested. Tortuous aorta.     This report was finalized on 11/19/2018 6:52 PM by Dr. Micky Olosn M.D.               Disposition:    Skilled nursing facility    Patient Instructions:        Discharge Medications      Changes to Medications      Instructions Start Date   furosemide 40 MG tablet  Commonly known as:  LASIX  What changed:    · medication strength  · how much to take  · Another medication with the same name was removed. Continue taking this medication, and follow the directions you see here.   40 mg, Oral, Daily      insulin glargine 100 UNIT/ML injection  Commonly known as:  LANTUS  What changed:  how much to take   8 Units, Subcutaneous, Daily         Continue These Medications      Instructions Start Date   acetaminophen 500 MG tablet  Commonly known as:  TYLENOL   1,000 mg, Oral, Every 6 Hours PRN      budesonide 0.5 MG/2ML nebulizer solution  Commonly known as:  PULMICORT   0.5 mg, Nebulization, 2 Times Daily      carvedilol 25 MG tablet  Commonly known as:  COREG   25 mg, Oral, 2 Times Daily With Meals, Hold for SBP <100      clopidogrel 75 MG tablet  Commonly known as:  PLAVIX   75 mg, Oral, Daily      docusate sodium 250 MG capsule  Commonly known as:  COLACE   250 mg, Oral, Daily      donepezil 10 MG tablet  Commonly known as:  ARICEPT   10 mg, Oral, Every Night at Bedtime      escitalopram 10 MG tablet  Commonly known as:  LEXAPRO   20 mg, Oral, Daily      GLUCAGON EMERGENCY 1 MG injection  Generic drug:   glucagon   1 mg, Subcutaneous, Once As Needed      insulin aspart 100 UNIT/ML injection  Commonly known as:  novoLOG   Subcutaneous, 3 Times Daily Before Meals, 200-299= 3 units 300-399= 5 units 400-499= 7 units >500= 9 units       ipratropium-albuterol 0.5-2.5 mg/3 ml nebulizer  Commonly known as:  DUO-NEB   3 mL, Nebulization, 4 Times Daily      mupirocin 2 % cream  Commonly known as:  BACTROBAN   Topical, 2 Times Daily      NAMENDA XR 28 MG capsule sustained-release 24 hr extended release capsule  Generic drug:  memantine   28 mg, Oral, Daily      nitroglycerin 0.4 MG SL tablet  Commonly known as:  NITROSTAT   0.4 mg, Sublingual, Every 5 Minutes PRN, Take no more than 3 doses in 15 minutes.      pantoprazole 40 MG EC tablet  Commonly known as:  PROTONIX   40 mg, Oral, Daily      polyethylene glycol packet  Commonly known as:  MIRALAX   17 g, Oral, Daily      potassium chloride ER 20 MEQ tablet controlled-release ER tablet  Commonly known as:  K-TAB   20 mEq, Oral, Daily         Stop These Medications    BENADRYL EXTRA STRENGTH 2-0.1 % cream  Generic drug:  diphenhydrAMINE-zinc acetate     dextromethorphan-guaifenesin  MG/5ML syrup  Commonly known as:  ROBITUSSIN-DM     diphenhydrAMINE 25 mg capsule  Commonly known as:  BENADRYL     levoFLOXacin 500 MG tablet  Commonly known as:  LEVAQUIN     mirtazapine 15 MG tablet  Commonly known as:  REMERON     predniSONE 10 MG tablet  Commonly known as:  DELTASONE     QUEtiapine 25 MG tablet  Commonly known as:  SEROquel     SITagliptin 100 MG tablet  Commonly known as:  JANUVIA            Discharge Order (From admission, onward)    Start     Ordered    11/27/18 1359  Discharge patient  Once     Expected Discharge Date:  11/27/18    Expected Discharge Time:  Evening    Discharge Disposition:  Skilled Nursing Facility (DC - External)    Physician of Record for Attribution - Please select from Treatment Team:  ALLYSON CHIU [[id number]]    Review needed by CMO to determine  Physician of Record:  No       Question Answer Comment   Physician of Record for Attribution - Please select from Treatment Team ALLYSON BUTCHER    Review needed by CMO to determine Physician of Record No        11/27/18 8376          Follow-up Information     [facility] Follow up.    Specialties:  Skilled Nursing Facility, Intermediate Care Facility  Contact information:  [address] [facility] [address] [phone]           Tay Lynch MD .    Specialty:  Family Medicine  Contact information:  [address] [address]  [phone]                   Total time spent discharging patient including evaluation,post hospitalization follow up,  medication and post hospitalization instructions and education total time exceeds 30 minutes.    Signed:  Allyosn Butcher MD  11/27/2018  2:02 PM

## 2018-11-27 NOTE — PROGRESS NOTES
"  PROGRESS NOTE  Patient Name: Bernardo Cox  Age/Sex: 87 y.o. female  : 1931  MRN: [id number]    Date of Admission: 2018  Date of Encounter Visit: 18   LOS: 7 days   Patient Care Team:  Tay Lynch MD as PCP - General (Family Medicine)  Sophie Zuniga, RN as Care Coordinator (Population Health)    Chief Complaint: follow up on RLL infiltrate suspecting aspiration pneumonitis    Interval History: Patient seen and examined this a.m. Says that she feels okay.  Does not have any worsening cough or worsening shortness of breath.     REVIEW OF SYSTEMS:   Cannot to obtain due to dementia     Ventilator/Non-Invasive Ventilation Settings (From admission, onward)    On RA            Vital Signs  Temp:  [97.3 °F (36.3 °C)-97.4 °F (36.3 °C)] 97.3 °F (36.3 °C)  Heart Rate:  [65-82] 65  Resp:  [16-18] 16  BP: (108-129)/(44-93) 108/44  SpO2:  [97 %-99 %] 99 %  on    Device (Oxygen Therapy): room air    Intake/Output Summary (Last 24 hours) at 2018 1427  Last data filed at 2018 2100  Gross per 24 hour   Intake --   Output 500 ml   Net -500 ml     Flowsheet Rows      First Filed Value   Admission Height  165.1 cm (65\") Documented at 2018 1645   Admission Weight  66.2 kg (146 lb) Documented at 2018 1845        Body mass index is 21.13 kg/m².      18  2236 18  0519 18  0415   Weight: 59.6 kg (131 lb 6.4 oz) 61.6 kg (135 lb 11.2 oz) 57.6 kg (127 lb)       Physical Exam:  GEN: Elderly cachectic white female   No acute distress, alert, on room air   EYES:   Sclera clear. No icterus. PERRL.   ENT:   External ears/nose normal, no oral lesions, no thrush, mucous membranes moist  NECK:  Supple, midline trachea  LUNGS: Normal chest on inspection, diminished, No wheezes/crackles. Respirations regular, even and unlabored.   CV:  Regular rhythm and rate. Normal S1/S2. 2/6 systolic mitral murmur  No gallops, or rubs noted.  ABD:  Soft, non-tender and non-distended. Normal bowel " sounds. No guarding  EXT:  Moves all extremities well. No cyanosis. No redness. trace edema.BLL edema wrapped and drsg was not removed.     Skin: dry, intact, no bleeding    Results Review:      Results from last 7 days   Lab Units  11/27/18 0339 11/24/18 2155 11/24/18 0436  11/23/18   0508  11/22/18   0252  11/21/18   0351   SODIUM mmol/L  131*   --   134*  137  135*  132*   POTASSIUM mmol/L  4.9  4.7  3.2*  3.1*  3.0*  3.5   CHLORIDE mmol/L  94*   --   94*  93*  92*  93*   CO2 mmol/L  25.1   --   28.4  29.8*  31.0*  26.6   BUN mg/dL  19   --   16  16  18  23   CREATININE mg/dL  1.00   --   0.96  1.09*  0.90  0.89   CALCIUM mg/dL  8.9   --   8.3*  8.1*  8.5*  8.7   AST (SGOT) U/L   --    --    --    --   8   --    ALT (SGPT) U/L   --    --    --    --   9   --    ANION GAP mmol/L  11.9   --   11.6  14.2  12.0  12.4   ALBUMIN g/dL   --    --    --    --   2.60*   --              Results from last 7 days   Lab Units  11/27/18 0339 11/25/18   0851  11/21/18   0351   PROBNP pg/mL  14,838.0*  14,513.0*  32,283.0*     Results from last 7 days   Lab Units  11/27/18 0339 11/24/18 0436  11/22/18   0252  11/21/18   0351   WBC 10*3/mm3  7.22  8.06  10.63  10.16   HEMOGLOBIN g/dL  9.4*  9.8*  10.3*  10.1*   HEMATOCRIT %  31.9*  32.2*  35.3*  34.1*   PLATELETS 10*3/mm3  314  328  325  290   MCV fL  84.6  82.1  85.7  84.8   NEUTROPHIL % %   --   68.6  76.7*   --    LYMPHOCYTE % %   --   18.5*  13.1*   --    MONOCYTES % %   --   11.4  8.9   --    EOSINOPHIL % %   --   1.4  0.5   --    BASOPHIL % %   --   0.1  0.1   --    IMM GRAN % %   --   0.5  0.7*   --          Results from last 7 days   Lab Units  11/25/18   0851  11/24/18   2155  11/24/18   0436  11/22/18   0252   MAGNESIUM mg/dL  2.9*  1.6  1.7  1.5*           Invalid input(s): LDLCALC          Glucose   Date/Time Value Ref Range Status   11/27/2018 1108 123 70 - 130 mg/dL Final   11/27/2018 0622 82 70 - 130 mg/dL Final   11/26/2018 2144 338 (H) 70 - 130  mg/dL Final   11/26/2018 1624 149 (H) 70 - 130 mg/dL Final   11/26/2018 1058 145 (H) 70 - 130 mg/dL Final   11/26/2018 0605 97 70 - 130 mg/dL Final   11/25/2018 2054 359 (H) 70 - 130 mg/dL Final   11/25/2018 1604 389 (H) 70 - 130 mg/dL Final     Results from last 7 days   Lab Units  11/22/18   0252   PROCALCITONIN ng/mL  0.08*                            Medication Review:     budesonide 0.5 mg Nebulization Daily - RT   carvedilol 25 mg Oral BID With Meals   clopidogrel 75 mg Oral Daily   docusate sodium 100 mg Oral Daily   donepezil 10 mg Oral Nightly   enoxaparin 30 mg Subcutaneous Q24H   escitalopram 10 mg Oral Daily   furosemide 40 mg Oral Daily   influenza vaccine 0.5 mL Intramuscular Once   insulin glargine 30 Units Subcutaneous Nightly   insulin lispro 0-7 Units Subcutaneous 4x Daily With Meals & Nightly   ipratropium-albuterol 3 mL Nebulization 4x Daily - RT   mupirocin  Topical BID   pantoprazole 40 mg Oral QAM AC   polyethylene glycol 17 g Oral Daily   potassium chloride 20 mEq Oral BID   potassium chloride 40 mEq Oral Once   sodium chloride 4 mL Nebulization BID - RT               ASSESSMENT:   1. Atelectasis vs silent aspiration   2. 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 Severe Mitral regurgitation  4. Alzheimer's dementia  5. Hypertension   6. Dysphagia  7. Hypokalemia     PLAN:  Patient stable from pulmonary standpoint  CXR - improving RLL infiltrate.   C/w pulmonary hygiene  Given overall generalized debility, multiple cardiac issues she is at HIGH risk for recurrent respiratory failure.  Would d/w pt/ family about GOC. Will defer to primary.  Ok to dc from my end.     Otis Fay MD  11/27/18  2:27 PM

## 2018-12-04 PROBLEM — N39.0 COMPLICATED UTI (URINARY TRACT INFECTION): Status: ACTIVE | Noted: 2018-01-01

## 2018-12-04 PROBLEM — F03.918 DEMENTIA WITH BEHAVIORAL DISTURBANCE (HCC): Status: ACTIVE | Noted: 2018-01-01

## 2018-12-04 NOTE — ED NOTES
Patient from [facility], had unwitnessed fall this am from fall.  Patient normally alert and oriented x2, cognitive status unchanged.  Patient on blood thinners.  Denies any pain at this time.      Garry Ashford RN  12/04/18 0916

## 2018-12-04 NOTE — ED NOTES
RN spoke with Santhosh LECHUGA at [facility].  RN states that the patient was last seen in bed at 0640, states that a CNA was making rounds and at 0650 found the patient on the ground beside her bed.   States that the CNA didn't say the patient was unconscious.   RN stated that the patient will sometimes know her name.      Bianka Solitario RN  12/04/18 5611

## 2018-12-04 NOTE — ED PROVIDER NOTES
EMERGENCY DEPARTMENT ENCOUNTER    CHIEF COMPLAINT  Chief Complaint: fall  History given by:RN per EMS  History limited by:Pt with Alzheimer's  Time Seen: 0924  Room Number: E449/1  PMD: Tay Lynch MD      HPI:  Pt is a 87 y.o. female with hx of Alzheimer's who presents from [facility] via EMS after an unwitnessed fall. Per RN per EMS, pt is at baseline A+Ox2.  Pt was reported to have been found on the ground by her bed this morning, unknown how long the pt was down or when the pt was last checked by facility staff.  Pt denies any pain currently. Pt anticoagulated on plavix.    Duration: unknown  Timing:unknown  Location:generalized  Radiation:n/a  Quality:unwitnessed fall  Intensity/Severity:moderate  Progression:unknown  Associated Symptoms:unknown  Aggravating Factors:unknown  Alleviating Factors:unknown  Previous Episodes:unknown  Treatment before arrival:Pt from [facility] via EMS.  She is anticoagulated on plavix.    PAST MEDICAL HISTORY  Active Ambulatory Problems     Diagnosis Date Noted   • Acute respiratory failure with hypoxia (CMS/[facility]) 08/08/2016   • Essential hypertension 08/12/2016   • Syncope and collapse 11/01/2017   • Bacteriuria 01/26/2018   • HCAP (healthcare-associated pneumonia) 03/22/2018   • Hypoglycemia 03/22/2018   • DM (diabetes mellitus) (CMS/[facility]) 03/22/2018   • Acute on chronic systolic CHF (congestive heart failure) (CMS/[facility]) 03/22/2018   • Toxic metabolic encephalopathy 03/23/2018   • Alzheimer disease 03/27/2018   • Pneumonia of right lower lobe due to infectious organism (CMS/[facility]) 04/15/2018   • Acute hypoxemic respiratory failure (CMS/[facility]) 04/15/2018   • CHF (congestive heart failure), NYHA class I, acute on chronic, diastolic (CMS/[facility]) 04/15/2018   • Dehydration 04/18/2018   • Elevated troponin 11/21/2018   • Nonrheumatic mitral valve insufficiency 11/21/2018   • Edema of both legs 11/21/2018     Resolved Ambulatory Problems     Diagnosis Date Noted   • No  Resolved Ambulatory Problems     Past Medical History:   Diagnosis Date   • Alzheimer disease    • Atherosclerotic heart disease    • Bronchitis    • CHF (congestive heart failure) (CMS/[facility])    • COPD (chronic obstructive pulmonary disease) (CMS/[facility])    • Dementia    • Depression    • Diabetes mellitus (CMS/[facility])    • GERD (gastroesophageal reflux disease)    • HTN (hypertension)    • Hypokalemia    • Insomnia    • Metabolic encephalopathy    • Neuropathy    • OA (osteoarthritis)    • Pneumonia    • Renal disorder    • TIA (transient ischemic attack)    • Upper respiratory infection        PAST SURGICAL HISTORY  Past Surgical History:   Procedure Laterality Date   • CARDIAC SURGERY         FAMILY HISTORY  History reviewed. No pertinent family history.    SOCIAL HISTORY  Social History     Socioeconomic History   • Marital status: Single     Spouse name: Not on file   • Number of children: 3   • Years of education: Not on file   • Highest education level: Not on file   Social Needs   • Financial resource strain: Not on file   • Food insecurity - worry: Not on file   • Food insecurity - inability: Not on file   • Transportation needs - medical: Not on file   • Transportation needs - non-medical: Not on file   Occupational History     Comment: [employer]   Tobacco Use   • Smoking status: Former Smoker     Last attempt to quit: 1980     Years since quittin.9   • Smokeless tobacco: Never Used   • Tobacco comment: unable to assess   Substance and Sexual Activity   • Alcohol use: No   • Drug use: No   • Sexual activity: Defer   Other Topics Concern   • Not on file   Social History Narrative   • Not on file         ALLERGIES  Codeine; Latex; Other; Soma [carisoprodol]; and Sulfa antibiotics    REVIEW OF SYSTEMS  Review of Systems   Unable to perform ROS: Dementia       PHYSICAL EXAM  ED Triage Vitals [18 0900]   Temp Heart Rate Resp BP SpO2   97.3 °F (36.3 °C) 100 18 136/70 96 %      Temp src Heart Rate Source  Patient Position BP Location FiO2 (%)   -- -- -- -- --       Physical Exam   Constitutional: She is well-developed, well-nourished, and in no distress. No distress.   HENT:   Head: Normocephalic and atraumatic.   Mouth/Throat: Oropharynx is clear and moist and mucous membranes are normal.   Eyes: Pupils are equal, round, and reactive to light.   Neck: Normal range of motion.   Cardiovascular: Normal rate, regular rhythm and normal heart sounds.   Pulmonary/Chest: Effort normal and breath sounds normal. She has no wheezes.   Abdominal: Soft. Bowel sounds are normal. There is no tenderness.   Musculoskeletal: Normal range of motion. She exhibits no edema.   Neurological: She is alert. She is disoriented.   Follows simple commands (squeeze hands)  Will not respond verbally    Skin: Skin is warm and dry.   Chronic wounds over bilateral upper and lower extremities and top of scalp from chronic picking    Nursing note and vitals reviewed.      LAB RESULTS  Recent Results (from the past 24 hour(s))   Comprehensive Metabolic Panel    Collection Time: 12/04/18  9:41 AM   Result Value Ref Range    Glucose 288 (H) 65 - 99 mg/dL    BUN 14 8 - 23 mg/dL    Creatinine 1.01 (H) 0.57 - 1.00 mg/dL    Sodium 130 (L) 136 - 145 mmol/L    Potassium 4.8 3.5 - 5.2 mmol/L    Chloride 91 (L) 98 - 107 mmol/L    CO2 26.7 22.0 - 29.0 mmol/L    Calcium 9.2 8.6 - 10.5 mg/dL    Total Protein 6.5 6.0 - 8.5 g/dL    Albumin 3.20 (L) 3.50 - 5.20 g/dL    ALT (SGPT) 10 1 - 33 U/L    AST (SGOT) 10 1 - 32 U/L    Alkaline Phosphatase 127 (H) 39 - 117 U/L    Total Bilirubin 0.8 0.1 - 1.2 mg/dL    eGFR Non African Amer 52 (L) >60 mL/min/1.73    Globulin 3.3 gm/dL    A/G Ratio 1.0 g/dL    BUN/Creatinine Ratio 13.9 7.0 - 25.0    Anion Gap 12.3 mmol/L   Troponin    Collection Time: 12/04/18  9:41 AM   Result Value Ref Range    Troponin T 0.012 0.000 - 0.030 ng/mL   BNP    Collection Time: 12/04/18  9:41 AM   Result Value Ref Range    proBNP 34,903.0 (H)  0.0-1,800.0 pg/mL   CK    Collection Time: 12/04/18  9:41 AM   Result Value Ref Range    Creatine Kinase 28 20 - 180 U/L   CBC Auto Differential    Collection Time: 12/04/18  9:41 AM   Result Value Ref Range    WBC 10.56 4.50 - 10.70 10*3/mm3    RBC 3.89 (L) 3.90 - 5.20 10*6/mm3    Hemoglobin 9.9 (L) 11.9 - 15.5 g/dL    Hematocrit 34.0 (L) 35.6 - 45.5 %    MCV 87.4 80.5 - 98.2 fL    MCH 25.4 (L) 26.9 - 32.0 pg    MCHC 29.1 (L) 32.4 - 36.3 g/dL    RDW 17.0 (H) 11.7 - 13.0 %    RDW-SD 53.2 37.0 - 54.0 fl    MPV 9.0 6.0 - 12.0 fL    Platelets 278 140 - 500 10*3/mm3    Neutrophil % 82.0 (H) 42.7 - 76.0 %    Lymphocyte % 9.5 (L) 19.6 - 45.3 %    Monocyte % 7.1 5.0 - 12.0 %    Eosinophil % 0.8 0.3 - 6.2 %    Basophil % 0.2 0.0 - 1.5 %    Immature Grans % 0.4 0.0 - 0.5 %    Neutrophils, Absolute 8.67 (H) 1.90 - 8.10 10*3/mm3    Lymphocytes, Absolute 1.00 0.90 - 4.80 10*3/mm3    Monocytes, Absolute 0.75 0.20 - 1.20 10*3/mm3    Eosinophils, Absolute 0.08 0.00 - 0.70 10*3/mm3    Basophils, Absolute 0.02 0.00 - 0.20 10*3/mm3    Immature Grans, Absolute 0.04 (H) 0.00 - 0.03 10*3/mm3   Urinalysis With Culture If Indicated - Urine, Catheter In/Out    Collection Time: 12/04/18  9:45 AM   Result Value Ref Range    Color, UA Dark Yellow (A) Yellow, Straw    Appearance, UA Cloudy (A) Clear    pH, UA <=5.0 5.0 - 8.0    Specific Gravity, UA 1.026 1.005 - 1.030    Glucose, UA Negative Negative    Ketones, UA Trace (A) Negative    Bilirubin, UA Negative Negative    Blood, UA Negative Negative    Protein,  mg/dL (2+) (A) Negative    Leuk Esterase, UA Small (1+) (A) Negative    Nitrite, UA Negative Negative    Urobilinogen, UA 0.2 E.U./dL 0.2 - 1.0 E.U./dL   Urinalysis, Microscopic Only - Urine, Catheter In/Out    Collection Time: 12/04/18  9:45 AM   Result Value Ref Range    RBC, UA 0-2 None Seen, 0-2 /HPF    WBC, UA 6-12 (A) None Seen, 0-2 /HPF    Bacteria, UA 4+ (A) None Seen /HPF    Squamous Epithelial Cells, UA 0-2 None Seen,  0-2 /HPF    Hyaline Casts, UA None Seen None Seen /LPF    Amorphous Crystals, UA Moderate/2+ None Seen /HPF    Methodology Manual Light Microscopy        I ordered the above labs and reviewed the results    RADIOLOGY  CT Head Without Contrast   Preliminary Result       1. There is moderate small vessel disease in the cerebral white matter,   a 4 mm old lacunar infarct in the right putamen, extensive calcified   atherosclerotic plaques in the intracranial segments of the distal   vertebral arteries and cavernous segments of internal carotid arteries   bilaterally.       2. There is subtotal opacification of the left frontal and anterior   ethmoid sinus with fluid and mucosal thickening and mild mucosal   thickening of the inferior left maxillary sinus.       The remainder of the head CT is within normal limits. Specifically no   acute skull fracture or intracranial hemorrhage is identified.           CERVICAL SPINE CT        TECHNIQUE: Spiral CT images were obtained from the skull base down to   the T2 thoracic level and images were reformatted and are submitted in 2   mm thick axial, sagittal and coronal CT sections with soft tissue   algorithm, 1 mm thick axial, sagittal and coronal CT sections with   high-resolution bone algorithm.       FINDINGS: There are arthritic changes at the atlantodental interval with   marginal spurring off the anterior ring of C1 and the odontoid and there   are some arthritic changes at the articulation of the right lateral   masses of C1 and C2 with joint space narrowing and marginal spurring.       At C2-3 there is moderate right facet overgrowth with minimal right   foraminal narrowing. There is mild posterior disc bulge and minimal   central canal narrowing. There is no left foraminal narrowing.       At C3-4 there is mild-to-moderate right and moderate-to-severe left   facet overgrowth. The posterior disc margin is normal. There is no canal   or right foraminal narrowing. There is  mild left foraminal narrowing.       At C4-5 there is moderate bilateral facet overgrowth, disc space   narrowing, degenerative endplate change, diffuse posterior disc   osteophyte complex that abuts and deforms the ventral surface of the   cord, at least mild-to-moderate up to moderately narrows the canal.   There is some uncovertebral joint spurring and there is mild-to-moderate   bilateral bony foraminal narrowing.       At C5-6 there is mild bilateral facet overgrowth, moderate disc space   narrowing and degenerative endplate changes, diffuse posterior disc   osteophyte complex abuts and flattens the ventral surface of the cord   moderate-to-severely narrowing the canal. There is bilateral   uncovertebral joint hypertrophy and there is mild-to-moderate bilateral   bony foraminal narrowing.       At C6-7 there is mild disc space narrowing, degenerative endplate   change, mild facet overgrowth, mild posterior disc osteophyte complex,   mild canal and there is moderate right and minimal if any left foraminal   narrowing.       At C7-T1 there is mild left and moderate right facet overgrowth. There   is mild right bony foraminal narrowing and no canal or left foraminal   narrowing.       No acute fracture is seen in the cervical spine.       IMPRESSION:   No acute fracture is seen in the cervical spine. There is cervical   spondylosis as described in great detail above.       Radiation dose reduction techniques were utilized, including automated   exposure control and exposure modulation based on body size.              CT Cervical Spine Without Contrast   Preliminary Result       1. There is moderate small vessel disease in the cerebral white matter,   a 4 mm old lacunar infarct in the right putamen, extensive calcified   atherosclerotic plaques in the intracranial segments of the distal   vertebral arteries and cavernous segments of internal carotid arteries   bilaterally.       2. There is subtotal opacification  of the left frontal and anterior   ethmoid sinus with fluid and mucosal thickening and mild mucosal   thickening of the inferior left maxillary sinus.       The remainder of the head CT is within normal limits. Specifically no   acute skull fracture or intracranial hemorrhage is identified.           CERVICAL SPINE CT        TECHNIQUE: Spiral CT images were obtained from the skull base down to   the T2 thoracic level and images were reformatted and are submitted in 2   mm thick axial, sagittal and coronal CT sections with soft tissue   algorithm, 1 mm thick axial, sagittal and coronal CT sections with   high-resolution bone algorithm.       FINDINGS: There are arthritic changes at the atlantodental interval with   marginal spurring off the anterior ring of C1 and the odontoid and there   are some arthritic changes at the articulation of the right lateral   masses of C1 and C2 with joint space narrowing and marginal spurring.       At C2-3 there is moderate right facet overgrowth with minimal right   foraminal narrowing. There is mild posterior disc bulge and minimal   central canal narrowing. There is no left foraminal narrowing.       At C3-4 there is mild-to-moderate right and moderate-to-severe left   facet overgrowth. The posterior disc margin is normal. There is no canal   or right foraminal narrowing. There is mild left foraminal narrowing.       At C4-5 there is moderate bilateral facet overgrowth, disc space   narrowing, degenerative endplate change, diffuse posterior disc   osteophyte complex that abuts and deforms the ventral surface of the   cord, at least mild-to-moderate up to moderately narrows the canal.   There is some uncovertebral joint spurring and there is mild-to-moderate   bilateral bony foraminal narrowing.       At C5-6 there is mild bilateral facet overgrowth, moderate disc space   narrowing and degenerative endplate changes, diffuse posterior disc   osteophyte complex abuts and flattens the  ventral surface of the cord   moderate-to-severely narrowing the canal. There is bilateral   uncovertebral joint hypertrophy and there is mild-to-moderate bilateral   bony foraminal narrowing.       At C6-7 there is mild disc space narrowing, degenerative endplate   change, mild facet overgrowth, mild posterior disc osteophyte complex,   mild canal and there is moderate right and minimal if any left foraminal   narrowing.       At C7-T1 there is mild left and moderate right facet overgrowth. There   is mild right bony foraminal narrowing and no canal or left foraminal   narrowing.       No acute fracture is seen in the cervical spine.       IMPRESSION:   No acute fracture is seen in the cervical spine. There is cervical   spondylosis as described in great detail above.       Radiation dose reduction techniques were utilized, including automated   exposure control and exposure modulation based on body size.              XR Chest 1 View   Preliminary Result   Cardiomegaly.       AP PELVIS AND BILATERAL HIPS 2 VIEWS OF EACH       FINDINGS: There is deformity of the right pubic rami from old fractures   which were seen on the 10/05/2013 study. Surgical screws are seen in the   right femoral head and neck.       No acute fractures or dislocations are seen in the bilateral hips and   pelvis.       IMPRESSION:    1. Old pubic rami fractures on the right.   2. No acute process identified.          XR Hips Bilateral With or Without Pelvis 2 View   Preliminary Result   Cardiomegaly.       AP PELVIS AND BILATERAL HIPS 2 VIEWS OF EACH       FINDINGS: There is deformity of the right pubic rami from old fractures   which were seen on the 10/05/2013 study. Surgical screws are seen in the   right femoral head and neck.       No acute fractures or dislocations are seen in the bilateral hips and   pelvis.       IMPRESSION:    1. Old pubic rami fractures on the right.   2. No acute process identified.          CT Head shows NAD.    I  "ordered the above noted radiological studies and reviewed the images on the PACS system.  Spoke with Dr. Yuen regarding CT scan results        EKG    ekg was interpreted by Dr. Solis.      MEDICAL RECORD REVIEW  Reviewed pt's labs and note from her admission under Dr. Butcher 11/27/18.      PROGRESS AND CONSULTS    0930: [facility] staff called for more information. RN spoke to ALEXANDREA Trevizo at pt's facility, who reports pt was last seen normal in bed at 0640, then found conscious on the floor at 0650.  Pt is only oriented to her name occasionally at baseline per Santhosh.    0950: Bereket, pt's son, has been notified pt in the ER and reports pt has behavioral issues with her dementia.    1116: Pt's BNP elevated and UA shows UTI.  Rocephin and lasix have been ordered.  Placed call to Dr. Butcher.    1118: Discussed pt's case with Dr. Butcher ([facility]) who agrees to admit the pt to tele.    1120: Reviewed pt's history and workup with Dr. Solis.  After a bedside evaluation; Dr Solis agrees with the plan of care    COURSE & MEDICAL DECISION MAKING  Pertinent Labs and Imaging studies that were ordered and reviewed are noted above.  Results were reviewed/discussed with the patient and they were also made aware of online assess.   Pt also made aware that some labs, such as cultures, will not be resulted during ER visit and follow up with PMD is necessary.     MEDICATIONS GIVEN IN ER  Medications   cefTRIAXone (ROCEPHIN) IVPB 1 g (0 g Intravenous Stopped 12/4/18 1209)   furosemide (LASIX) injection 20 mg (20 mg Intravenous Given 12/4/18 1209)       /84   Pulse 97   Temp 97.3 °F (36.3 °C)   Resp 20   Ht 165.1 cm (65\")   Wt 62.9 kg (138 lb 9.6 oz)   SpO2 95%   BMI 23.06 kg/m²     ADMISSION    Discussed treatment plan and reason for admission with pt/family and admitting physician.  Pt/family voiced understanding of the plan for admission for further testing/treatment as needed.      DIAGNOSIS  Final " diagnoses:   Dementia with behavioral disturbance, unspecified dementia type   Urinary tract infection without hematuria, site unspecified   Acute on chronic congestive heart failure, unspecified heart failure type (CMS/[facility])   Fall, initial encounter           Documentation assistance provided by dayday Payne for CLAUDIA Jhaveri.  Information recorded by the scribe was done at my direction and has been verified and validated by me.                Dianelys Payne  12/04/18 1127       Phyllis Fung APRN  12/04/18 [id number]

## 2018-12-04 NOTE — PLAN OF CARE
Problem: Fall Risk (Adult)  Goal: Identify Related Risk Factors and Signs and Symptoms  Outcome: Ongoing (interventions implemented as appropriate)    Goal: Absence of Fall  Outcome: Ongoing (interventions implemented as appropriate)      Problem: Patient Care Overview  Goal: Plan of Care Review  Outcome: Ongoing (interventions implemented as appropriate)   12/04/18 1630   Coping/Psychosocial   Plan of Care Reviewed With patient;guardian   Plan of Care Review   Progress no change   OTHER   Outcome Summary Appear at baseline cognition, IV lasix in ER, IV ABX in ER, wound care, awaiting speech, MD orders, and wound eval.     Goal: Individualization and Mutuality  Outcome: Ongoing (interventions implemented as appropriate)    Goal: Discharge Needs Assessment  Outcome: Ongoing (interventions implemented as appropriate)    Goal: Interprofessional Rounds/Family Conf  Outcome: Ongoing (interventions implemented as appropriate)      Problem: Urinary Tract Infection (Adult)  Goal: Signs and Symptoms of Listed Potential Problems Will be Absent, Minimized or Managed (Urinary Tract Infection)  Outcome: Ongoing (interventions implemented as appropriate)

## 2018-12-04 NOTE — ED PROVIDER NOTES
MD ATTESTATION NOTE    The CLARISSA and I have discussed this patient's history, physical exam, and treatment plan.  I have reviewed the documentation and personally had a face to face interaction with the patient. I affirm the documentation and agree with the treatment and plan.  The attached note describes my personal findings.      Pt has h/o dementia and currently resides in a nursing home. Pt was reportedly found down at the edge of her bed by nursing home staff this AM. It is unknown for how long pt was down.    On physical exam, pt is alert, but appears pleasantly confused. Heart is RRR. Lungs are CTAB. Abdomen is soft.     Pt's BNP is 34,903 and UA suggests UTI. CT Head is negative acute. Pt was administered Lasix and Rocephin in the ER. Pt was admitted to Dr. Butcher, on-call for Dr. Lynch, PMD, for further evaluation and management/treatment.        EKG          EKG time: 09:39 AM  Rhythm/Rate: NSR rate 94  P waves and [place]: Normal P waves  QRS, axis: LBBB   ST and T waves: Normal ST     Interpreted Contemporaneously by me, independently viewed  Unchanged compared to prior 11/22/18          Documentation assistance provided by Ariadne Cisse. Information recorded by the scribe was done at my direction and has been verified and validated by me.     Entered by Ariadne Cisse, acting as scribe for Dr. Irma MD.           Ariadne Cisse  12/04/18 [id number]       Roldan Solis MD  12/04/18 0654

## 2018-12-04 NOTE — H&P
HISTORY AND PHYSICAL   [facility], [facility]      2018    Patient Identification:    Name: Bernardo Cox  Age: 87 y.o.  Sex: female  :  1931  MRN: [id number]                       Primary Care Physician: Tay Lynch MD    Chief Complaint:      Chief Complaint   Patient presents with   • Fall         History of Present Illness:     Patient is a 86 y/o female, resident at a NH. She has h/o dementia, CHF (EF 25%), COPD, DM, HTN. She was recently hospitalized due to a/c CHF, was dc to NH in stable condition. This am she had an unwitnessed fall and was found on the side of the bed. No LOC. She was sent to ER for evaluation. In ER she was found to have elevated BNP, positive urine for UTI, was given Lasix and started on Rocephin, patient was admitted for further management.    Past Medical History:  Past Medical History:   Diagnosis Date   • Alzheimer disease    • Atherosclerotic heart disease    • Bronchitis    • CHF (congestive heart failure) (CMS/[facility])     EF = 25%   • COPD (chronic obstructive pulmonary disease) (CMS/HCC)    • Dementia    • Depression    • Diabetes mellitus (CMS/HCC)    • GERD (gastroesophageal reflux disease)    • HTN (hypertension)    • Hypokalemia    • Insomnia    • Metabolic encephalopathy    • Neuropathy    • OA (osteoarthritis)    • Pneumonia    • Renal disorder     stage three   • TIA (transient ischemic attack)    • Upper respiratory infection      Past Surgical History:  Past Surgical History:   Procedure Laterality Date   • CARDIAC SURGERY        Home Meds:  Medications Prior to Admission   Medication Sig Dispense Refill Last Dose   • acetaminophen (TYLENOL) 500 MG tablet Take 1,000 mg by mouth Every 6 (Six) Hours As Needed for Mild Pain .   Past Week at Unknown time   • budesonide (PULMICORT) 0.5 MG/2ML nebulizer solution Take 0.5 mg by nebulization 2 (Two) Times a Day.   Past Month at Unknown time   • carvedilol (COREG) 25 MG tablet Take 25 mg by mouth 2 (two)  times a day with meals. Hold for SBP <100   12/4/2018 at Unknown time   • clopidogrel (PLAVIX) 75 MG tablet Take 75 mg by mouth daily.   12/4/2018 at Unknown time   • docusate sodium (COLACE) 250 MG capsule Take 250 mg by mouth Daily.   12/4/2018 at Unknown time   • donepezil (ARICEPT) 10 MG tablet Take 10 mg by mouth every night at bedtime.   12/4/2018 at Unknown time   • escitalopram (LEXAPRO) 10 MG tablet Take 20 mg by mouth Daily.   12/4/2018 at Unknown time   • furosemide (LASIX) 40 MG tablet Take 1 tablet by mouth Daily. 30 tablet 3 12/4/2018 at Unknown time   • glucagon (GLUCAGON EMERGENCY) 1 MG injection Inject 1 mg under the skin 1 (One) Time As Needed for Low Blood Sugar.   Past Month at Unknown time   • insulin aspart (novoLOG) 100 UNIT/ML injection Inject  under the skin into the appropriate area as directed 3 (Three) Times a Day Before Meals. 200-299= 3 units  300-399= 5 units  400-499= 7 units  >500= 9 units   12/4/2018 at Unknown time   • insulin glargine (LANTUS) 100 UNIT/ML injection Inject 8 Units under the skin Daily. (Patient taking differently: Inject 20 Units under the skin into the appropriate area as directed Daily.) 240 Units 0 12/3/2018 at Unknown time   • ipratropium-albuterol (DUO-NEB) 0.5-2.5 mg/mL nebulizer Take 3 mL by nebulization 4 (Four) Times a Day.   Past Month at Unknown time   • memantine (NAMENDA XR) 28 MG capsule sustained-release 24 hr extended release capsule Take 28 mg by mouth Daily.   12/4/2018 at Unknown time   • mupirocin (BACTROBAN) 2 % cream Apply  topically to the appropriate area as directed 2 (Two) Times a Day.   Past Week at Unknown time   • nitroglycerin (NITROSTAT) 0.4 MG SL tablet Place 0.4 mg under the tongue Every 5 (Five) Minutes As Needed for Chest Pain. Take no more than 3 doses in 15 minutes.   Past Month at Unknown time   • pantoprazole (PROTONIX) 40 MG EC tablet Take 40 mg by mouth Daily.   12/4/2018 at Unknown time   • polyethylene glycol (MIRALAX)  "packet Take 17 g by mouth Daily.   12/3/2018 at Unknown time   • potassium chloride ER (K-TAB) 20 MEQ tablet controlled-release ER tablet Take 20 mEq by mouth Daily.   2018 at Unknown time       Allergies:  Allergies   Allergen Reactions   • Codeine    • Latex    • Other      Plastic tape, canned fish   • Soma [Carisoprodol]    • Sulfa Antibiotics      Immunizations:  Immunization History   Administered Date(s) Administered   • flucelvax quad pfs =>4 YRS 2018     Social History:   Social History     Social History Narrative   • Not on file     Social History     Tobacco Use   • Smoking status: Former Smoker     Last attempt to quit: 1980     Years since quittin.9   • Smokeless tobacco: Never Used   • Tobacco comment: unable to assess   Substance Use Topics   • Alcohol use: No     Family History:  History reviewed. No pertinent family history.     Review of Systems  See history of present illness and past medical history.    Unable to perform ROS: Dementia       Objective:    Exam:    tMax 24 hrs: Temp (24hrs), Av.4 °F (36.3 °C), Min:97.3 °F (36.3 °C), Max:97.5 °F (36.4 °C)    Vitals Ranges:   Temp:  [97.3 °F (36.3 °C)-97.5 °F (36.4 °C)] 97.5 °F (36.4 °C)  Heart Rate:  [] 99  Resp:  [18-20] 20  BP: (136-159)/(70-95) 159/94    /94   Pulse 99   Temp 97.5 °F (36.4 °C) (Oral)   Resp 20   Ht 165.1 cm (65\")   Wt 62.9 kg (138 lb 9.6 oz)   SpO2 94%   BMI 23.06 kg/m²     General: Alert, oriented x 1. Cooperative, no distress, appears stated age  HEENT:    Head: Normocephalic, without obvious abnormality, atraumatic  Eyes: EOM are normal. Pupils are equal, round, and reactive to light.   Oropharynx: Mucosa and tongue normal  Neck: Supple, symmetrical, trachea midline, no adenopathy;              thyroid:  no enlargement/tenderness/nodules;              no carotid bruit or JVD  Cardiovascular: Normal rate, regular rhythm and intact distal pulses.              Exam reveals no gallop and no " friction rub. No murmur heard  Chest wall: No tenderness or deformity  Pulmonary: Clear to auscultation bilaterally, respirations unlabored.               No rhonchi, wheezing or rales.   Abdominal: Soft. Soft, non-tender, bowel sounds active all four quadrants,     no masses, no hepatomegaly, no splenomegaly.   Extremities: Normal, atraumatic, no cyanosis. Trace edema (better than last admission)  Pulses: 2 + symmetric all extremities  Neurological: Patient is alert and oriented to person. No new focal sensory motor deficit  Skin: Skin color, texture, normal. Turgor is decreased. No rashes or lesions      Data Review:    Results from last 7 days   Lab Units  12/04/18   0941   WBC 10*3/mm3  10.56   HEMOGLOBIN g/dL  9.9*   HEMATOCRIT %  34.0*   PLATELETS 10*3/mm3  278       Results from last 7 days   Lab Units  12/04/18   0941   SODIUM mmol/L  130*   POTASSIUM mmol/L  4.8   CHLORIDE mmol/L  91*   CO2 mmol/L  26.7   BUN mg/dL  14   CREATININE mg/dL  1.01*   CALCIUM mg/dL  9.2   BILIRUBIN mg/dL  0.8   ALK PHOS U/L  127*   ALT (SGPT) U/L  10   AST (SGOT) U/L  10   GLUCOSE mg/dL  288*                 Lab Results   Lab Value Date/Time    TROPONINT 0.012 12/04/2018 0941    TROPONINT 0.034 (H) 11/19/2018 1741    TROPONINT <0.010 04/15/2018 0151    TROPONINT <0.010 03/26/2018 0401    TROPONINT <0.010 03/23/2018 0613    TROPONINT 0.035 (H) 01/26/2018 1040    TROPONINT 0.023 11/03/2017 0510    TROPONINT 0.052 (H) 11/02/2017 0302    TROPONINT 0.054 (H) 11/01/2017 2105    TROPONINT 0.084 (H) 11/01/2017 1320    TROPONINT 0.017 07/04/2017 1653    TROPONINT <0.010 08/07/2016 2158    TROPONINT <0.01 01/19/2016 1405       Brief Urine Lab Results  (Last result in the past 365 days)      Color   Clarity   Blood   Leuk Est   Nitrite   Protein   CREAT   Urine HCG        12/04/18 0945 Dark Yellow Cloudy Negative Small (1+) Negative 100 mg/dL (2+)                Imaging Results (all)     Procedure Component Value Units Date/Time    CT Head  Without Contrast [[id number]] Collected:  12/04/18 1116     Updated:  12/04/18 1601    Narrative:       EMERGENCY NONCONTRAST HEAD CT AND NONCONTRAST CERVICAL SPINE CT  12/04/2018     CLINICAL HISTORY: Head trauma to top of head, headache and neck pain.     HEAD CT      TECHNIQUE: Spiral CT images were obtained from the base of the skull to  the vertex without intravenous contrast. Images were reformatted and  submitted in 3 mm thick axial CT sections with brain algorithm. 2 mm  thick axial CT sections with high-resolution bone algorithm and 2 mm  thick sagittal and coronal reconstructions were performed with brain  algorithm.     This is correlated to a noncontrast head CT 03/27/2018.     FINDINGS: There are patchy areas of low density in periventricular and  throughout the subcortical white matter of the cerebral hemispheres  consistent with moderate small vessel disease. There is a tiny old  lacunar infarction in the superior right putamen. The remainder of the  brain parenchyma is normal in attenuation. The ventricles are normal in  size. I see no focal mass effect and no midline shift. No extra-axial  fluid collections are identified. There is no evidence of acute  intracranial hemorrhage. There is subtotal opacification of the left  frontal sinus and anterior ethmoid sinus with fluid and mucosal  thickening, minimal mucosal thickening in the inferior left maxillary  sinus. The remainder of the paranasal sinuses and mastoid air cells and  middle ear cavities are clear. No acute skull fracture is seen. There  are extensive calcified atherosclerotic plaques in the  intracranial  segments of the distal vertebral arteries and cavernous segments of the  internal carotid arteries bilaterally.       Impression:          1. There is moderate small vessel disease in the cerebral white matter,  a 4 mm old lacunar infarct in the right putamen, extensive calcified  atherosclerotic plaques in the intracranial segments of the  distal  vertebral arteries and cavernous segments of internal carotid arteries  bilaterally.     2. There is subtotal opacification of the left frontal and anterior  ethmoid sinus with fluid and mucosal thickening and mild mucosal  thickening of the inferior left maxillary sinus.     The remainder of the head CT is within normal limits. Specifically no  acute skull fracture or intracranial hemorrhage is identified.        CERVICAL SPINE CT      TECHNIQUE: Spiral CT images were obtained from the skull base down to  the T2 thoracic level and images were reformatted and are submitted in 2  mm thick axial, sagittal and coronal CT sections with soft tissue  algorithm, 1 mm thick axial, sagittal and coronal CT sections with  high-resolution bone algorithm.     FINDINGS: There are arthritic changes at the atlantodental interval with  marginal spurring off the anterior ring of C1 and the odontoid and there  are some arthritic changes at the articulation of the right lateral  masses of C1 and C2 with joint space narrowing and marginal spurring.     At C2-3 there is moderate right facet overgrowth with minimal right  foraminal narrowing. There is mild posterior disc bulge and minimal  central canal narrowing. There is no left foraminal narrowing.     At C3-4 there is mild-to-moderate right and moderate-to-severe left  facet overgrowth. The posterior disc margin is normal. There is no canal  or right foraminal narrowing. There is mild left foraminal narrowing.     At C4-5 there is moderate bilateral facet overgrowth, disc space  narrowing, degenerative endplate change, diffuse posterior disc  osteophyte complex that abuts and deforms the ventral surface of the  cord, at least mild-to-moderate up to moderately narrows the canal.  There is some uncovertebral joint spurring and there is mild-to-moderate  bilateral bony foraminal narrowing.     At C5-6 there is mild bilateral facet overgrowth, moderate disc space  narrowing and  degenerative endplate changes, diffuse posterior disc  osteophyte complex abuts and flattens the ventral surface of the cord  moderate-to-severely narrowing the canal. There is bilateral  uncovertebral joint hypertrophy and there is mild-to-moderate bilateral  bony foraminal narrowing.     At C6-7 there is mild disc space narrowing, degenerative endplate  change, mild facet overgrowth, mild posterior disc osteophyte complex,  mild canal and there is moderate right and minimal if any left foraminal  narrowing.     At C7-T1 there is mild left and moderate right facet overgrowth. There  is mild right bony foraminal narrowing and no canal or left foraminal  narrowing.     No acute fracture is seen in the cervical spine.     IMPRESSION:  No acute fracture is seen in the cervical spine. There is cervical  spondylosis as described in great detail above.     Radiation dose reduction techniques were utilized, including automated  exposure control and exposure modulation based on body size.     This report was finalized on 12/4/2018 3:58 PM by Dr. Robert Yuen M.D.       CT Cervical Spine Without Contrast [[id number]] Collected:  12/04/18 1116     Updated:  12/04/18 1601    Narrative:       EMERGENCY NONCONTRAST HEAD CT AND NONCONTRAST CERVICAL SPINE CT  12/04/2018     CLINICAL HISTORY: Head trauma to top of head, headache and neck pain.     HEAD CT      TECHNIQUE: Spiral CT images were obtained from the base of the skull to  the vertex without intravenous contrast. Images were reformatted and  submitted in 3 mm thick axial CT sections with brain algorithm. 2 mm  thick axial CT sections with high-resolution bone algorithm and 2 mm  thick sagittal and coronal reconstructions were performed with brain  algorithm.     This is correlated to a noncontrast head CT 03/27/2018.     FINDINGS: There are patchy areas of low density in periventricular and  throughout the subcortical white matter of the cerebral hemispheres  consistent  with moderate small vessel disease. There is a tiny old  lacunar infarction in the superior right putamen. The remainder of the  brain parenchyma is normal in attenuation. The ventricles are normal in  size. I see no focal mass effect and no midline shift. No extra-axial  fluid collections are identified. There is no evidence of acute  intracranial hemorrhage. There is subtotal opacification of the left  frontal sinus and anterior ethmoid sinus with fluid and mucosal  thickening, minimal mucosal thickening in the inferior left maxillary  sinus. The remainder of the paranasal sinuses and mastoid air cells and  middle ear cavities are clear. No acute skull fracture is seen. There  are extensive calcified atherosclerotic plaques in the  intracranial  segments of the distal vertebral arteries and cavernous segments of the  internal carotid arteries bilaterally.       Impression:          1. There is moderate small vessel disease in the cerebral white matter,  a 4 mm old lacunar infarct in the right putamen, extensive calcified  atherosclerotic plaques in the intracranial segments of the distal  vertebral arteries and cavernous segments of internal carotid arteries  bilaterally.     2. There is subtotal opacification of the left frontal and anterior  ethmoid sinus with fluid and mucosal thickening and mild mucosal  thickening of the inferior left maxillary sinus.     The remainder of the head CT is within normal limits. Specifically no  acute skull fracture or intracranial hemorrhage is identified.        CERVICAL SPINE CT      TECHNIQUE: Spiral CT images were obtained from the skull base down to  the T2 thoracic level and images were reformatted and are submitted in 2  mm thick axial, sagittal and coronal CT sections with soft tissue  algorithm, 1 mm thick axial, sagittal and coronal CT sections with  high-resolution bone algorithm.     FINDINGS: There are arthritic changes at the atlantodental interval with  marginal  spurring off the anterior ring of C1 and the odontoid and there  are some arthritic changes at the articulation of the right lateral  masses of C1 and C2 with joint space narrowing and marginal spurring.     At C2-3 there is moderate right facet overgrowth with minimal right  foraminal narrowing. There is mild posterior disc bulge and minimal  central canal narrowing. There is no left foraminal narrowing.     At C3-4 there is mild-to-moderate right and moderate-to-severe left  facet overgrowth. The posterior disc margin is normal. There is no canal  or right foraminal narrowing. There is mild left foraminal narrowing.     At C4-5 there is moderate bilateral facet overgrowth, disc space  narrowing, degenerative endplate change, diffuse posterior disc  osteophyte complex that abuts and deforms the ventral surface of the  cord, at least mild-to-moderate up to moderately narrows the canal.  There is some uncovertebral joint spurring and there is mild-to-moderate  bilateral bony foraminal narrowing.     At C5-6 there is mild bilateral facet overgrowth, moderate disc space  narrowing and degenerative endplate changes, diffuse posterior disc  osteophyte complex abuts and flattens the ventral surface of the cord  moderate-to-severely narrowing the canal. There is bilateral  uncovertebral joint hypertrophy and there is mild-to-moderate bilateral  bony foraminal narrowing.     At C6-7 there is mild disc space narrowing, degenerative endplate  change, mild facet overgrowth, mild posterior disc osteophyte complex,  mild canal and there is moderate right and minimal if any left foraminal  narrowing.     At C7-T1 there is mild left and moderate right facet overgrowth. There  is mild right bony foraminal narrowing and no canal or left foraminal  narrowing.     No acute fracture is seen in the cervical spine.     IMPRESSION:  No acute fracture is seen in the cervical spine. There is cervical  spondylosis as described in great  detail above.     Radiation dose reduction techniques were utilized, including automated  exposure control and exposure modulation based on body size.     This report was finalized on 12/4/2018 3:58 PM by Dr. Robert Yuen M.D.       XR Chest 1 View [[id number]] Collected:  12/04/18 1048     Updated:  12/04/18 1431    Narrative:       HISTORY: Cough. Bilateral hip pain. Fell. 12/04/2018     AP CHEST      FINDINGS: Heart size is mildly enlarged. There is mild to moderate  interstitial disease of the lungs which has progressed somewhat since  the 11/25/2018 study.     Lungs otherwise appear free of acute infiltrates. Bilateral breast  implants are seen. Sternotomy wires and mediastinal surgical clips are  seen. There is some splenic artery calcification.             Impression:       Cardiomegaly. Mild to moderate interstitial disease of the lungs could  represent an element of mild pulmonary edema. Please correlate.           AP PELVIS AND BILATERAL HIPS 2 VIEWS OF EACH     FINDINGS: There is deformity of the right pubic rami from old fractures  which were seen on the 10/05/2013 study. Surgical screws are seen in the  right femoral head and neck.     No acute fractures or dislocations are seen in the bilateral hips and  pelvis.     IMPRESSION:   1. Old pubic rami fractures on the right.  2. No acute process identified.     This report was finalized on 12/4/2018 2:27 PM by Dr. Rey Morris M.D.       XR Hips Bilateral With or Without Pelvis 2 View [[id number]] Collected:  12/04/18 1048     Updated:  12/04/18 1431    Narrative:       HISTORY: Cough. Bilateral hip pain. Fell. 12/04/2018     AP CHEST      FINDINGS: Heart size is mildly enlarged. There is mild to moderate  interstitial disease of the lungs which has progressed somewhat since  the 11/25/2018 study.     Lungs otherwise appear free of acute infiltrates. Bilateral breast  implants are seen. Sternotomy wires and mediastinal surgical clips are  seen. There  is some splenic artery calcification.             Impression:       Cardiomegaly. Mild to moderate interstitial disease of the lungs could  represent an element of mild pulmonary edema. Please correlate.           AP PELVIS AND BILATERAL HIPS 2 VIEWS OF EACH     FINDINGS: There is deformity of the right pubic rami from old fractures  which were seen on the 10/05/2013 study. Surgical screws are seen in the  right femoral head and neck.     No acute fractures or dislocations are seen in the bilateral hips and  pelvis.     IMPRESSION:   1. Old pubic rami fractures on the right.  2. No acute process identified.     This report was finalized on 12/4/2018 2:27 PM by Dr. Rey Morris M.D.             Assessment:      Complicated UTI (urinary tract infection)    Essential hypertension    DM (diabetes mellitus) (CMS/[facility])    Toxic metabolic encephalopathy    Dementia with behavioral disturbance    Alzheimer disease      Patient Active Problem List   Diagnosis Code   • Acute respiratory failure with hypoxia (CMS/[facility]) J96.01   • Essential hypertension I10   • Syncope and collapse R55   • Bacteriuria R82.71   • HCAP (healthcare-associated pneumonia) J18.9   • Hypoglycemia E16.2   • DM (diabetes mellitus) (CMS/[facility]) E11.9   • Acute on chronic systolic CHF (congestive heart failure) (CMS/[facility]) I50.23   • Toxic metabolic encephalopathy G92   • Alzheimer disease G30.9, F02.80   • Pneumonia of right lower lobe due to infectious organism (CMS/[facility]) J18.1   • Acute hypoxemic respiratory failure (CMS/[facility]) J96.01   • CHF (congestive heart failure), NYHA class I, acute on chronic, diastolic (CMS/[facility]) I50.33   • Dehydration E86.0   • Elevated troponin R74.8   • Nonrheumatic mitral valve insufficiency I34.0   • Edema of both legs R60.0   • Dementia with behavioral disturbance F03.91   • Complicated UTI (urinary tract infection) N39.0       Plan:    Inpatient admission  IV diuretics prn, on a daily bases  IV antibiotics for UTI  Monitor  and correct electrolytes  Accucheck and SSI  Monitor mental status  Home medications  DVT/stress ulcer prophylaxis  PT  consult  Labs in am        Sanjay Butcher MD  12/4/2018

## 2018-12-05 NOTE — DISCHARGE PLACEMENT REQUEST
"Artie Cox (87 y.o. Female)     Date of Birth Social Security Number Address Home Phone MRN    04/26/1931  [facility]  [address] [facility] [id number] [phone] [id number]    [employer] Status          [employer] Single       Admission Date Admission Type Admitting Provider Attending Provider Department, Room/Bed    12/4/18 Emergency Sanjay Butcher MD [place], Marlon R, MD [address], E449/1    Discharge Date Discharge Disposition Discharge Destination                       Attending Provider:  Sanjay Butcher MD    Allergies:  Codeine, Latex, Other, Soma [Carisoprodol], Sulfa Antibiotics    Isolation:  None   Infection:  None   Code Status:  Prior    Ht:  165.1 cm (65\")   Wt:  58.8 kg (129 lb 11.2 oz)    Admission Cmt:  None   Principal Problem:  Complicated UTI (urinary tract infection) [N39.0]                 Active Insurance as of 12/4/2018     Primary Coverage     Payor Plan Insurance Group Employer/Plan Group    MEDICARE MEDICARE A & B      Payor Plan Address Payor Plan Phone Number Payor Plan Fax Number Effective Dates    [address] [phone]  4/1/1996 - None Entered    [facility] [id number]       Subscriber Name Subscriber Birth Date Member ID       ARTIE COX 4/26/1931 [id number]           Secondary Coverage     Payor Plan Insurance Group Employer/Plan Group    [place]      Pay[place] Plan Address Payor Plan Phone Number Payor Plan Fax Number Effective Dates    [address] [phone]  3/1/2018 - None Entered    [place] [address]       Subscriber Name Subscriber Birth Date Member ID       ARTIE COX 4/26/1931 [id number]                 Emergency Contacts      (Rel.) Home Phone Work Phone Mobile Phone    Bereket Licona(Poa) (Son) [phone] -- --    Byron Licona (Son) [phone] -- [phone]    Levi Becerra (Spouse) [phone] -- --              "

## 2018-12-05 NOTE — PLAN OF CARE
Problem: Fall Risk (Adult)  Goal: Identify Related Risk Factors and Signs and Symptoms  Outcome: Outcome(s) achieved Date Met: 12/05/18    Goal: Absence of Fall  Outcome: Ongoing (interventions implemented as appropriate)      Problem: Patient Care Overview  Goal: Plan of Care Review  Outcome: Ongoing (interventions implemented as appropriate)   12/05/18 0332   Coping/Psychosocial   Plan of Care Reviewed With patient   Plan of Care Review   Progress no change   OTHER   Outcome Summary VSS. No c/o pain. Pt alert, disoriented to situation and place. High falls risk.Tried to get of bed couple of times. PO lasix given. Wound care provided. Wound RN to see pt. Melatonin given for sleep. Purewick in place. Will cont to monitor.     Goal: Individualization and Mutuality  Outcome: Ongoing (interventions implemented as appropriate)    Goal: Discharge Needs Assessment  Outcome: Ongoing (interventions implemented as appropriate)    Goal: Interprofessional Rounds/Family Conf  Outcome: Ongoing (interventions implemented as appropriate)      Problem: Urinary Tract Infection (Adult)  Goal: Signs and Symptoms of Listed Potential Problems Will be Absent, Minimized or Managed (Urinary Tract Infection)  Outcome: Ongoing (interventions implemented as appropriate)

## 2018-12-05 NOTE — PLAN OF CARE
Problem: Patient Care Overview  Goal: Plan of Care Review  Outcome: Ongoing (interventions implemented as appropriate)   12/05/18 1512   Coping/Psychosocial   Plan of Care Reviewed With patient   OTHER   Outcome Summary Bedside swallow eval completed. Recommend continue with mech soft no mixed consistencies and nectar thick liquids. Meds whole or crushed with puree. No straws. ST to follow for diet tolerance PRN. Recommend slow rate intake and upright for all PO.

## 2018-12-05 NOTE — NURSING NOTE
CWOCN consult for BLE and buttock. Patient has venous stasis wounds to BLE. The wound on the LLE is larger than on the RLE. Both are improved from last admission related to the photos. Her skin is dry however. Both wounds are moist, pink, with very thin yellow to the wound bed. Recommend opticel ag lightly moistened with silicone border dressing- every other day. Daily, apply Eucerin cream, kerlix and ACE wraps. Check heels- both heels are red but are blanching. There is a dry scab on the left heel and dry skin on the right heel. They are tender, per patient. I floated them off the bed. Discussed with RN- will have to see what patient tolerates best- either floating the heels or the heel protective boots.   Patient has partial thickness wound to left buttock likely related to pressure  (stage 2) + friction measuring 0.6x0.6x0.1cm. It is moist, pink and appears improved from last admission's photo as well. Barrier ointment is being applied and this is appropriate care.   Discussed all with RN.

## 2018-12-05 NOTE — THERAPY EVALUATION
Acute Care - Speech Language Pathology   Swallow Initial Evaluation [facility]     Patient Name: Bernardo Cox  : 1931  MRN: [id number]  Today's Date: 2018               Admit Date: 2018    Visit Dx:     ICD-10-CM ICD-9-CM   1. Dementia with behavioral disturbance, unspecified dementia type F03.91 294.21   2. Urinary tract infection without hematuria, site unspecified N39.0 599.0   3. Acute on chronic congestive heart failure, unspecified heart failure type (CMS/[facility]) I50.9 428.0   4. Fall, initial encounter W19.XXXA E888.9     Patient Active Problem List   Diagnosis   • Acute respiratory failure with hypoxia (CMS/[facility])   • Essential hypertension   • Syncope and collapse   • Bacteriuria   • HCAP (healthcare-associated pneumonia)   • Hypoglycemia   • DM (diabetes mellitus) (CMS/[facility])   • Acute on chronic systolic CHF (congestive heart failure) (CMS/[facility])   • Toxic metabolic encephalopathy   • Alzheimer disease   • Pneumonia of right lower lobe due to infectious organism (CMS/[facility])   • Acute hypoxemic respiratory failure (CMS/H[facility])   • CHF (congestive heart failure), NYHA class I, acute on chronic, diastolic (CMS/[facility])   • Dehydration   • Elevated troponin   • Nonrheumatic mitral valve insufficiency   • Edema of both legs   • Dementia with behavioral disturbance   • Complicated UTI (urinary tract infection)     Past Medical History:   Diagnosis Date   • Alzheimer disease    • Atherosclerotic heart disease    • Bronchitis    • CHF (congestive heart failure) (CMS/[facility])     EF = 25%   • COPD (chronic obstructive pulmonary disease) (CMS/[facility])    • Dementia    • Depression    • Diabetes mellitus (CMS/[facility])    • GERD (gastroesophageal reflux disease)    • HTN (hypertension)    • Hypokalemia    • Insomnia    • Metabolic encephalopathy    • Neuropathy    • OA (osteoarthritis)    • Pneumonia    • Renal disorder     stage three   • TIA (transient ischemic attack)    • Upper respiratory infection      Past Surgical  History:   Procedure Laterality Date   • CARDIAC SURGERY          SWALLOW EVALUATION (last 72 hours)      SLP Adult Swallow Evaluation     Row Name 12/05/18 1500          Document Type  evaluation  -OC    Subjective Information  no complaints  -OC    Patient Observations  alert;cooperative;agree to therapy  -OC    Patient Effort  fair  -OC    Symptoms Noted During/After Treatment  none  -OC          Patient Profile Reviewed  yes  -OC    Pertinent History Of Current Problem  Pt admitted for complicated UTI, dementia. Recent VFSS recommend NTL and mech soft no mixed.  -OC    Current Method of Nutrition  soft textures;chopped;nectar/syrup-thick liquids  -OC    Precautions/Limitations, Vision  WFL with corrective lenses  -OC    Precautions/Limitations, Hearing  WFL  -OC    Prior Level of Function-Communication  cognitive-linguistic impairment  -OC    Prior Level of Function-Swallowing  no diet consistency restrictions  -OC    Plans/Goals Discussed with  patient;other (see comments) care giver present  -OC    Barriers to Rehab  cognitive status  -OC    Patient's Goals for Discharge  patient did not state  -OC          Additional Documentation  Pain Scale: Numbers Pre/Post-Treatment (Group)  -OC          Pain Scale: Numbers, Pretreatment  0/10 - no pain  -OC    Pain Scale: Numbers, Post-Treatment  0/10 - no pain  -OC          Dentition Assessment  natural, present and adequate  -OC    Secretion Management  WNL/WFL  -OC    Mucosal Quality  moist, healthy  -OC    Volitional Swallow  WFL  -OC    Volitional Cough  weak  -OC          Oral Motor General Assessment  generalized oral motor weakness  -OC          Clinical Swallow Evaluation Summary  Limited bedside swallow eval completed. Pt agreeable to limited trials this date. No overt s/s aspiration with nectar thick, puree, and mech soft no mixed.    -OC          SLP Swallowing Diagnosis  mild;oral dysfunction;suspected pharyngeal dysfunction;other (see comments) secondary to  recent VFSS results  -OC    Functional Impact  risk of aspiration/pneumonia  -OC    Rehab Potential/Prognosis, Swallowing  good, to achieve stated therapy goals  -OC    Swallow Criteria for Skilled Therapeutic Interventions Met  demonstrates skilled criteria  -OC          Therapy Frequency (Swallow)  PRN  -OC    Predicted Duration Therapy Intervention (Days)  until discharge  -OC    SLP Diet Recommendation  mechanical soft with no mixed consistencies;nectar thick liquids  -OC    Recommended Precautions and Strategies upright posture during/after eating;small bites of food and sips of liquid;no straw;alternate between small bites of food and sips of liquid  -OC    SLP Rec. for Method of Medication Administration  meds whole;meds crushed;with pudding or applesauce  -OC    Monitor for Signs of Aspiration  yes;notify SLP if any concerns  -OC    Anticipated Dischage Disposition  skilled nursing facility  -OC          Oral Nutrition/Hydration Goal Selection (SLP)  oral nutrition/hydration, SLP goal 1  -OC          Oral Nutrition/Hydration Goal 1, SLP  tolerate least restrictive no overt s/s aspiration  -OC    Time Frame (Oral Nutrition/Hydration Goal 1, SLP)  by discharge  -OC      User Key  (r) = Recorded By, (t) = Taken By, (c) = Cosigned By    Initials Name Effective Dates    OC Amie, NIALL Ji,[facility]-SLP 06/08/18 -           EDUCATION  The patient has been educated in the following areas:   Dysphagia (Swallowing Impairment).    SLP Recommendation and Plan  SLP Swallowing Diagnosis: mild, oral dysfunction, suspected pharyngeal dysfunction, other (see comments)(secondary to recent VFSS results)  SLP Diet Recommendation: mechanical soft with no mixed consistencies, nectar thick liquids  Recommended Precautions and Strategies: upright posture during/after eating, small bites of food and sips of liquid, no straw, alternate between small bites of food and sips of liquid     Monitor for Signs of Aspiration: yes, notify SLP if  any concerns     Swallow Criteria for Skilled Therapeutic Interventions Met: demonstrates skilled criteria  Anticipated Dischage Disposition: skilled nursing facility  Rehab Potential/Prognosis, Swallowing: good, to achieve stated therapy goals  Therapy Frequency (Swallow): PRN  Predicted Duration Therapy Intervention (Days): until discharge       Plan of Care Reviewed With: patient  Plan of Care Review  Plan of Care Reviewed With: patient  Outcome Summary: Bedside swallow eval completed. Recommend continue with mech soft no mixed consistencies and nectar thick liquids. Meds whole or crushed with puree. No straws. ST to follow for diet tolerance PRN. Recommend slow rate intake and upright for all PO.     SLP GOALS     Row Name 12/05/18 1500             Oral Nutrition/Hydration Goal 1 (SLP)    Oral Nutrition/Hydration Goal 1, SLP  tolerate least restrictive no overt s/s aspiration  -OC      Time Frame (Oral Nutrition/Hydration Goal 1, SLP)  by discharge  -OC        User Key  (r) = Recorded By, (t) = Taken By, (c) = Cosigned By    Initials Name Provider Type    Margy Amaya MA, CCC-SLP Speech and Language Pathologist           SLP Outcome Measures (last 72 hours)      SLP Outcome Measures     Row Name 12/05/18 1515             SLP Outcome Measures    Outcome Measure Used?  Adult NOMS  -OC         Adult FCM Scores    FCM Chosen  Swallowing  -OC      Swallowing FCM Score  4  -OC        User Key  (r) = Recorded By, (t) = Taken By, (c) = Cosigned By    Initials Name Effective Dates    Margy Amaya MA, CCC-SLP 06/08/18 -            Time Calculation:   Time Calculation- SLP     Row Name 12/05/18 1520             Time Calculation- SLP    SLP Start Time  1415  -OC      SLP Received On  12/05/18  -OC        User Key  (r) = Recorded By, (t) = Taken By, (c) = Cosigned By    Initials Name Provider Type    Margy Amaya MA,ALINE-SLP Speech and Language Pathologist          Therapy Charges for Today     Code Description  Service Date Service Provider Modifiers Qty    [id number]  ST EVAL ORAL PHARYNG SWALLOW 4 12/5/2018 Margy Holloway MA,CCC-SLP GN 1               Margy Holloway MA,CCC-SLP  12/5/2018

## 2018-12-05 NOTE — PROGRESS NOTES
Discharge Planning Assessment  [facility]     Patient Name: Bernardo Cox  MRN: [id number]  Today's Date: 12/5/2018    Admit Date: 12/4/2018    Discharge Needs Assessment     Row Name 12/05/18 1004       Living Environment    Lives With  facility resident    Current Living Arrangements  extended care facility    Potentially Unsafe Housing Conditions  other (see comments) no concerns     Primary Care Provided by  other (see comments) facility     Provides Primary Care For  no one, unable/limited ability to care for self    Family Caregiver if Needed  child(rommel), adult    Quality of Family Relationships  helpful;involved;supportive    Able to Return to Prior Arrangements  yes       Resource/Environmental Concerns    Resource/Environmental Concerns  none       Transition Planning    Patient/Family Anticipates Transition to  long term care facility    Patient/Family Anticipated Services at Transition  none       Discharge Needs Assessment    Readmission Within the Last 30 Days  current reason for admission unrelated to previous admission    Concerns to be Addressed  denies needs/concerns at this time;no discharge needs identified    Equipment Currently Used at Home  walker, rolling    Anticipated Changes Related to Illness  none    Equipment Needed After Discharge  none    Discharge Facility/Level of Care Needs  nursing facility, intermediate        Discharge Plan     Row Name 12/05/18 1005       Plan    Plan  From [facility]     Patient/Family in Agreement with Plan  yes    Plan Comments  CCP reviewed notes; patient confused and has diagnosis of dementia. CCP made outbound call to Bereket Licona ([phone] POA on file). CCP role explained and discharge planning discussed. Face sheet verified and IMM given. Patient is a resident at [facility] and has lived there for 5 years. CCP left message with Cornelia regarding level of care and bed hold status. Patient's son states patient normally uses a  walker for mobility and the facility provides her with all her medications. Patient's son states the plan is for patient to return to [facility] and will need ambulance transportation. CCP explained no guarantee insurance coverage for ambulance transportation; son verbalized understanding. CCP will follow for level of care and bed hold status at [facility]. Michelle Young CSW         Destination      No service coordination in this encounter.      Durable Medical Equipment      No service coordination in this encounter.      Dialysis/Infusion      No service coordination in this encounter.      Home Medical Care      No service coordination in this encounter.      Community Resources      No service coordination in this encounter.          Demographic Summary     Row Name 12/05/18 1003       General Information    Admission Type  inpatient    Arrived From  emergency department    Required Notices Provided  Important Message from Medicare    Referral Source  admission list    Reason for Consult  discharge planning    Preferred Language  English     Used During This Interaction  no        Functional Status     Row Name 12/05/18 1003       Functional Status    Usual Activity Tolerance  moderate    Current Activity Tolerance  moderate       Functional Status, IADL    Medications  assistive equipment    Meal Preparation  assistive equipment    Housekeeping  assistive equipment    Laundry  assistive equipment    Shopping  assistive equipment       Mental Status    General Appearance WDL  WDL       Mental Status Summary    Recent Changes in Mental Status/Cognitive Functioning  no changes        Psychosocial    No documentation.       Abuse/Neglect    No documentation.       Legal     Row Name 12/05/18 1003       Financial/Legal    Finance Comments  [facility]re[facility] Licona [phone] POA on file         Substance Abuse    No documentation.       Patient Forms    No documentation.           Sarah Young,  CSW

## 2018-12-05 NOTE — SIGNIFICANT NOTE
12/05/18 1416   Rehab Time/Intention   Evaluation Not Performed patient/family declined evaluation  (pt states she is too tired & wants something to eat. nsg is working on getting pt something to eat. will check on pt tomorrow.)   Rehab Treatment   Discipline physical therapist

## 2018-12-05 NOTE — SIGNIFICANT NOTE
12/05/18 0923   Rehab Time/Intention   Evaluation Not Performed patient/family declined evaluation  (Attempted PT eval- declined despite encouragement. Became agitated at times. PT will check back later today as time allows.)   Rehab Treatment   Discipline physical therapist   Recommendation   PT - Next Appointment 12/06/18

## 2018-12-05 NOTE — PROGRESS NOTES
Continued Stay Note  [facility]     Patient Name: Bernardo Cox  MRN: [id number]  Today's Date: 12/5/2018    Admit Date: 12/4/2018    Discharge Plan     Row Name 12/05/18 1156       Plan    Plan  LTC at [facility]     Patient/Family in Agreement with Plan  yes    Plan Comments  CCP spoke with Cornelia; patient is from LTC and is out of bed hold days but can return at d/c. CCP will need to call day before d/c to ensure bed is available. Michelle PAIGEW     Row Name 12/05/18 1005       Plan    Plan  From [facility]     Patient/Family in Agreement with Plan  yes    Plan Comments  CCP reviewed notes; patient confused and has diagnosis of dementia. CCP made outbound call to Bereket Licona ([phone] POA on file). CCP role explained and discharge planning discussed. Face sheet verified and IMM given. Patient is a resident at [facility] and has lived there for 5 years. CCP left message with Cornelia regarding level of care and bed hold status. Patient's son states patient normally uses a walker for mobility and the facility provides her with all her medications. Patient's son states the plan is for patient to return to [facility] and will need ambulance transportation. CCP explained no guarantee insurance coverage for ambulance transportation; son verbalized understanding. CCP will follow for level of care and bed hold status at [facility]. Michelle HUGGINS         Discharge Codes    No documentation.             JOSELUIS Baez

## 2018-12-05 NOTE — THERAPY DISCHARGE NOTE
Acute Care - Occupational Therapy Initial Eval/Discharge  [facility]     Patient Name: Bernardo Cox  : 1931  MRN: [id number]  Today's Date: 2018               Admit Date: 2018       ICD-10-CM ICD-9-CM   1. Dementia with behavioral disturbance, unspecified dementia type F03.91 294.21   2. Urinary tract infection without hematuria, site unspecified N39.0 599.0   3. Acute on chronic congestive heart failure, unspecified heart failure type (CMS/[facility]) I50.9 428.0   4. Fall, initial encounter W19.XXXA E888.9     Patient Active Problem List   Diagnosis   • Acute respiratory failure with hypoxia (CMS/[facility])   • Essential hypertension   • Syncope and collapse   • Bacteriuria   • HCAP (healthcare-associated pneumonia)   • Hypoglycemia   • DM (diabetes mellitus) (CMS/[facility])   • Acute on chronic systolic CHF (congestive heart failure) (CMS/[facility])   • Toxic metabolic encephalopathy   • Alzheimer disease   • Pneumonia of right lower lobe due to infectious organism (CMS/[facility])   • Acute hypoxemic respiratory failure (CMS/[facility])   • CHF (congestive heart failure), NYHA class I, acute on chronic, diastolic (CMS/[facility])   • Dehydration   • Elevated troponin   • Nonrheumatic mitral valve insufficiency   • Edema of both legs   • Dementia with behavioral disturbance   • Complicated UTI (urinary tract infection)     Past Medical History:   Diagnosis Date   • Alzheimer disease    • Atherosclerotic heart disease    • Bronchitis    • CHF (congestive heart failure) (CMS/[facility])     EF = 25%   • COPD (chronic obstructive pulmonary disease) (CMS/[facility])    • Dementia    • Depression    • Diabetes mellitus (CMS/[facility])    • GERD (gastroesophageal reflux disease)    • HTN (hypertension)    • Hypokalemia    • Insomnia    • Metabolic encephalopathy    • Neuropathy    • OA (osteoarthritis)    • Pneumonia    • Renal disorder     stage three   • TIA (transient ischemic attack)    • Upper respiratory infection      Past Surgical History:   Procedure  Laterality Date   • CARDIAC SURGERY            OT ASSESSMENT FLOWSHEET (last 72 hours)      Occupational Therapy Evaluation     Row Name 12/05/18 1527                   OT Evaluation Time/Intention    Document Type  evaluation  -SG        Mode of Treatment  occupational therapy  -SG           General Information    Patient Profile Reviewed?  yes  -SG        Patient Observations  alert  -SG        Patient/Family Observations  sitting in bed  -SG        Prior Level of Function  -- caregiver present, states pt received assist with adls PTA  -SG           Cognitive Assessment/Intervention- PT/OT    Follows Commands (Cognition)  follows one step commands pt focused on eating and pillow being adjusted  -SG           Transfer Assessment/Treatment    Comment (Transfers)  pt refuses due to attempting to eat  -SG           ADL Assessment/Intervention    BADL Assessment/Intervention  feeding  -SG           Self-Feeding Assessment/Training    Comment (Feeding)  caregiver assisting with self feeding   -SG           General ROM    GENERAL ROM COMMENTS  moving BUE's actively but difficult to fully assess as pt does not participate with ROM  -SG           Positioning and Restraints    Pre-Treatment Position  in bed  -SG        Post Treatment Position  bed  -SG        In Bed  call light within reach;encouraged to call for assist;exit alarm on;with family/caregiver  -SG           Pain Scale: Numbers Pre/Post-Treatment    Pain Scale: Numbers, Pretreatment  0/10 - no pain  -SG        Pain Scale: Numbers, Post-Treatment  0/10 - no pain  -SG           Wound 11/20/18 1630 Right anterior;lower leg blisters;other (see comments)    Wound - Properties Group Date first assessed: 11/20/18  -TD Time first assessed: 1630  -TD Side: Right  -TD Orientation: anterior;lower  -TD Location: leg  -TD Type: blisters;other (see comments)  -TD       Wound 11/20/18 1630 Left anterior;lower;proximal leg other (see comments)    Wound - Properties Group Date  first assessed: 11/20/18  -TD Time first assessed: 1630  -TD Side: Left  -TD Orientation: anterior;lower;proximal  -TD Location: leg  -TD Type: other (see comments)  -TD       Wound 11/21/18 1317 Left gluteal pressure injury    Wound - Properties Group Date first assessed: 11/21/18  -SK Time first assessed: 1317  -SK Present On Admission : picture taken  -SK Side: Left  -SK Location: gluteal  -SK Type: pressure injury  -SK Stage, Pressure Injury: Stage 2  -SK       Clinical Impression (OT)    OT Diagnosis  need for assist with personal care  -          User Key  (r) = Recorded By, (t) = Taken By, (c) = Cosigned By    Initials Name Effective Dates    Dulce Maria De La Rosa, OTR 06/08/18 -     SK Damaris Herndon RN 01/19/17 -     TD Edda Schwab RN 10/12/18 -           Occupational Therapy Education     Title: PT OT SLP Therapies (Not Started)     Topic: Occupational Therapy (Resolved)     Point: ADL training (Resolved)     Description: Instruct learner(s) on proper safety adaptation and remediation techniques during self care or transfers.   Instruct in proper use of assistive devices.    Learning Progress Summary           Patient Acceptance, E, VU by  at 12/5/2018  3:33 PM    Comment:  pt/caregiver states has assist with adls PTA and assist as needed   Caregiver Acceptance, E, VU by  at 12/5/2018  3:33 PM    Comment:  pt/caregiver states has assist with adls PTA and assist as needed                               User Key     Initials Effective Dates Name Provider Type Discipline     06/08/18 -  Dulce Maria Leblanc, OTR Occupational Therapist OT                OT Recommendation and Plan     Plan of Care Review  Plan of Care Reviewed With: patient, caregiver  Plan of Care Reviewed With: patient, caregiver  Outcome Summary: Pt caregiver states pt received assist with adls PTA. Will not follow for OT at this time         Outcome Measures     Row Name 12/05/18 4259             How much help from another is currently  needed...    Putting on and taking off regular lower body clothing?  1  -SG      Bathing (including washing, rinsing, and drying)  1  -SG      Toileting (which includes using toilet bed pan or urinal)  1  -SG      Putting on and taking off regular upper body clothing  1  -SG      Taking care of personal grooming (such as brushing teeth)  1  -SG      Eating meals  1  -SG      Score  6  -SG         Functional Assessment    Outcome Measure Options  AM-PAC 6 Clicks Daily Activity (OT)  -SG        User Key  (r) = Recorded By, (t) = Taken By, (c) = Cosigned By    Initials Name Provider Type    Dulce Maria De La Rosa OTR Occupational Therapist          Time Calculation:   Time Calculation- OT     Row Name 12/05/18 1536             Time Calculation- OT    OT Start Time  1505  -SG      OT Stop Time  1514  -SG      OT Time Calculation (min)  9 min  -SG        User Key  (r) = Recorded By, (t) = Taken By, (c) = Cosigned By    Initials Name Provider Type    Dulce Maria De La Rosa OTR Occupational Therapist        Therapy Suggested Charges     Code   Minutes Charges    None           Therapy Charges for Today     Code Description Service Date Service Provider Modifiers Qty    [id number]  OT EVAL LOW COMPLEXITY 2 12/5/2018 Dulce Maria Leblanc OTR GO 1                    WILLA Harry  12/5/2018

## 2018-12-05 NOTE — PLAN OF CARE
Problem: Patient Care Overview  Goal: Plan of Care Review   12/05/18 1534   Coping/Psychosocial   Plan of Care Reviewed With patient;caregiver   OTHER   Outcome Summary Pt caregiver states pt received assist with adls PTA. Will not follow for OT at this time

## 2018-12-05 NOTE — PLAN OF CARE
Problem: Fall Risk (Adult)  Goal: Absence of Fall  Outcome: Ongoing (interventions implemented as appropriate)      Problem: Patient Care Overview  Goal: Plan of Care Review  Outcome: Ongoing (interventions implemented as appropriate)   12/05/18 1700   Coping/Psychosocial   Plan of Care Reviewed With patient;caregiver   Plan of Care Review   Progress no change   OTHER   Outcome Summary Pt transferred to this unti from . No c/o pain. Turn q2. Bed alarm. Confused.      Goal: Individualization and Mutuality  Outcome: Ongoing (interventions implemented as appropriate)    Goal: Discharge Needs Assessment  Outcome: Ongoing (interventions implemented as appropriate)    Goal: Interprofessional Rounds/Family Conf  Outcome: Outcome(s) achieved Date Met: 12/05/18      Problem: Urinary Tract Infection (Adult)  Goal: Signs and Symptoms of Listed Potential Problems Will be Absent, Minimized or Managed (Urinary Tract Infection)  Outcome: Ongoing (interventions implemented as appropriate)

## 2018-12-05 NOTE — SIGNIFICANT NOTE
12/05/18 0900   Rehab Time/Intention   Evaluation Not Performed patient/family declined evaluation  (Pt verbalized refusal to wake up for eval or agree to PO intake at this time. ST to continue to follow for bedside swallow eval as pt agreeable and aappropriate.)   Rehab Treatment   Discipline speech language pathologist

## 2018-12-05 NOTE — PROGRESS NOTES
"DAILY PROGRESS NOTE  [facility], [facility]    2018    Patient Identification:  Name: Bernardo Cox  Age: 87 y.o.  Sex: female  :  1931  MRN: [id number]           Primary Care Physician: Tay Lynch MD    Subjective:    Interval History:    Sleepy this am, no other events  Breathing better  On Rocephin for UTI  BS > 240  BP in low side      ROS:     No N, V,D,C,CP    Objective:    Scheduled Meds:    budesonide 0.5 mg Nebulization BID   carvedilol 25 mg Oral BID With Meals   ceftriaxone 1 g Intravenous Q24H   clopidogrel 75 mg Oral Daily   donepezil 10 mg Oral Nightly   enoxaparin 30 mg Subcutaneous Q24H   escitalopram 10 mg Oral Daily   furosemide 40 mg Oral Daily   insulin lispro 0-7 Units Subcutaneous 4x Daily With Meals & Nightly   ipratropium-albuterol 3 mL Nebulization 4x Daily   memantine 10 mg Oral Q12H   pantoprazole 40 mg Oral Daily   potassium chloride 20 mEq Oral Daily       Continuous Infusions:       PRN Meds:  •  acetaminophen  •  dextrose  •  dextrose  •  glucagon (human recombinant)  •  nitroglycerin    Intake/Output:    Intake/Output Summary (Last 24 hours) at 2018 1044  Last data filed at 2018 1018  Gross per 24 hour   Intake 170 ml   Output 700 ml   Net -530 ml         Exam:    tMax 24 hrs: Temp (24hrs), Av.8 °F (36.6 °C), Min:97.5 °F (36.4 °C), Max:98.1 °F (36.7 °C)    Vitals Ranges:   Temp:  [97.5 °F (36.4 °C)-98.1 °F (36.7 °C)] 98.1 °F (36.7 °C)  Heart Rate:  [] 75  Resp:  [16-20] 16  BP: (104-159)/(44-95) 113/44    /44 (BP Location: Right arm, Patient Position: Lying)   Pulse 75   Temp 98.1 °F (36.7 °C) (Oral)   Resp 16   Ht 165.1 cm (65\")   Wt 58.8 kg (129 lb 11.2 oz)   SpO2 97%   BMI 21.58 kg/m²     General: Sleepy at this time. Alert, oriented x 1 when awake. Cooperative, no distress, appears stated age  Neck: Supple, symmetrical, trachea midline, no adenopathy;              thyroid:  no " enlargement/tenderness/nodules;              no carotid bruit or JVD  Cardiovascular: Normal rate, regular rhythm and intact distal pulses.              Exam reveals no gallop and no friction rub. No murmur heard  Chest wall: No tenderness or deformity  Pulmonary:  diminished breath sounds bilaterally, respirations unlabored.               No rhonchi, wheezing or rales.   Abdominal: Soft. Soft, non-tender, bowel sounds active all four quadrants,     no masses, no hepatomegaly, no splenomegaly.   Extremities: Normal, atraumatic, no cyanosis. Trace edema (better than last admission)  Pulses: 2 + symmetric all extremities  Neurological: Patient is alert and oriented to person. No new focal sensory motor deficit  Skin: Skin color, texture, normal. Turgor is decreased. No rashes or lesions           Data Review:    Results from last 7 days   Lab Units  12/05/18   0544  12/04/18   0941   WBC 10*3/mm3  6.87  10.56   HEMOGLOBIN g/dL  9.9*  9.9*   HEMATOCRIT %  32.0*  34.0*   PLATELETS 10*3/mm3  271  278       Results from last 7 days   Lab Units  12/05/18   0544  12/04/18   0941   SODIUM mmol/L  132*  130*   POTASSIUM mmol/L  4.5  4.8   CHLORIDE mmol/L  91*  91*   CO2 mmol/L  27.8  26.7   BUN mg/dL  14  14   CREATININE mg/dL  1.03*  1.01*   CALCIUM mg/dL  8.8  9.2   BILIRUBIN mg/dL   --   0.8   ALK PHOS U/L   --   127*   ALT (SGPT) U/L   --   10   AST (SGOT) U/L   --   10   GLUCOSE mg/dL  254*  288*                 Lab Results   Lab Value Date/Time    TROPONINT 0.012 12/04/2018 0941    TROPONINT 0.034 (H) 11/19/2018 1741    TROPONINT <0.010 04/15/2018 0151    TROPONINT <0.010 03/26/2018 0401    TROPONINT <0.010 03/23/2018 0613    TROPONINT 0.035 (H) 01/26/2018 1040    TROPONINT 0.023 11/03/2017 0510    TROPONINT 0.052 (H) 11/02/2017 0302    TROPONINT 0.054 (H) 11/01/2017 2105    TROPONINT 0.084 (H) 11/01/2017 1320    TROPONINT 0.017 07/04/2017 1653    TROPONINT <0.010 08/07/2016 2158    TROPONINT <0.01 01/19/2016 1405        Microbiology Results (last 10 days)     Procedure Component Value - Date/Time    Urine Culture - Urine, Urine, Catheter In/Out [[id number]]  (Abnormal) Collected:  12/04/18 0945    Lab Status:  Preliminary result Specimen:  Urine, Catheter In/Out Updated:  12/05/18 0921     Urine Culture >100,000 CFU/mL Escherichia coli           Imaging Results (last 7 days)     Procedure Component Value Units Date/Time    CT Head Without Contrast [[id number]] Collected:  12/04/18 1116     Updated:  12/04/18 1601    Narrative:       EMERGENCY NONCONTRAST HEAD CT AND NONCONTRAST CERVICAL SPINE CT  12/04/2018     CLINICAL HISTORY: Head trauma to top of head, headache and neck pain.     HEAD CT      TECHNIQUE: Spiral CT images were obtained from the base of the skull to  the vertex without intravenous contrast. Images were reformatted and  submitted in 3 mm thick axial CT sections with brain algorithm. 2 mm  thick axial CT sections with high-resolution bone algorithm and 2 mm  thick sagittal and coronal reconstructions were performed with brain  algorithm.     This is correlated to a noncontrast head CT 03/27/2018.     FINDINGS: There are patchy areas of low density in periventricular and  throughout the subcortical white matter of the cerebral hemispheres  consistent with moderate small vessel disease. There is a tiny old  lacunar infarction in the superior right putamen. The remainder of the  brain parenchyma is normal in attenuation. The ventricles are normal in  size. I see no focal mass effect and no midline shift. No extra-axial  fluid collections are identified. There is no evidence of acute  intracranial hemorrhage. There is subtotal opacification of the left  frontal sinus and anterior ethmoid sinus with fluid and mucosal  thickening, minimal mucosal thickening in the inferior left maxillary  sinus. The remainder of the paranasal sinuses and mastoid air cells and  middle ear cavities are clear. No acute skull fracture is  seen. There  are extensive calcified atherosclerotic plaques in the  intracranial  segments of the distal vertebral arteries and cavernous segments of the  internal carotid arteries bilaterally.       Impression:          1. There is moderate small vessel disease in the cerebral white matter,  a 4 mm old lacunar infarct in the right putamen, extensive calcified  atherosclerotic plaques in the intracranial segments of the distal  vertebral arteries and cavernous segments of internal carotid arteries  bilaterally.     2. There is subtotal opacification of the left frontal and anterior  ethmoid sinus with fluid and mucosal thickening and mild mucosal  thickening of the inferior left maxillary sinus.     The remainder of the head CT is within normal limits. Specifically no  acute skull fracture or intracranial hemorrhage is identified.        CERVICAL SPINE CT      TECHNIQUE: Spiral CT images were obtained from the skull base down to  the T2 thoracic level and images were reformatted and are submitted in 2  mm thick axial, sagittal and coronal CT sections with soft tissue  algorithm, 1 mm thick axial, sagittal and coronal CT sections with  high-resolution bone algorithm.     FINDINGS: There are arthritic changes at the atlantodental interval with  marginal spurring off the anterior ring of C1 and the odontoid and there  are some arthritic changes at the articulation of the right lateral  masses of C1 and C2 with joint space narrowing and marginal spurring.     At C2-3 there is moderate right facet overgrowth with minimal right  foraminal narrowing. There is mild posterior disc bulge and minimal  central canal narrowing. There is no left foraminal narrowing.     At C3-4 there is mild-to-moderate right and moderate-to-severe left  facet overgrowth. The posterior disc margin is normal. There is no canal  or right foraminal narrowing. There is mild left foraminal narrowing.     At C4-5 there is moderate bilateral facet  overgrowth, disc space  narrowing, degenerative endplate change, diffuse posterior disc  osteophyte complex that abuts and deforms the ventral surface of the  cord, at least mild-to-moderate up to moderately narrows the canal.  There is some uncovertebral joint spurring and there is mild-to-moderate  bilateral bony foraminal narrowing.     At C5-6 there is mild bilateral facet overgrowth, moderate disc space  narrowing and degenerative endplate changes, diffuse posterior disc  osteophyte complex abuts and flattens the ventral surface of the cord  moderate-to-severely narrowing the canal. There is bilateral  uncovertebral joint hypertrophy and there is mild-to-moderate bilateral  bony foraminal narrowing.     At C6-7 there is mild disc space narrowing, degenerative endplate  change, mild facet overgrowth, mild posterior disc osteophyte complex,  mild canal and there is moderate right and minimal if any left foraminal  narrowing.     At C7-T1 there is mild left and moderate right facet overgrowth. There  is mild right bony foraminal narrowing and no canal or left foraminal  narrowing.     No acute fracture is seen in the cervical spine.     IMPRESSION:  No acute fracture is seen in the cervical spine. There is cervical  spondylosis as described in great detail above.     Radiation dose reduction techniques were utilized, including automated  exposure control and exposure modulation based on body size.     This report was finalized on 12/4/2018 3:58 PM by Dr. Robert Yuen M.D.       CT Cervical Spine Without Contrast [[id number]] Collected:  12/04/18 1116     Updated:  12/04/18 1601    Narrative:       EMERGENCY NONCONTRAST HEAD CT AND NONCONTRAST CERVICAL SPINE CT  12/04/2018     CLINICAL HISTORY: Head trauma to top of head, headache and neck pain.     HEAD CT      TECHNIQUE: Spiral CT images were obtained from the base of the skull to  the vertex without intravenous contrast. Images were reformatted and  submitted in  3 mm thick axial CT sections with brain algorithm. 2 mm  thick axial CT sections with high-resolution bone algorithm and 2 mm  thick sagittal and coronal reconstructions were performed with brain  algorithm.     This is correlated to a noncontrast head CT 03/27/2018.     FINDINGS: There are patchy areas of low density in periventricular and  throughout the subcortical white matter of the cerebral hemispheres  consistent with moderate small vessel disease. There is a tiny old  lacunar infarction in the superior right putamen. The remainder of the  brain parenchyma is normal in attenuation. The ventricles are normal in  size. I see no focal mass effect and no midline shift. No extra-axial  fluid collections are identified. There is no evidence of acute  intracranial hemorrhage. There is subtotal opacification of the left  frontal sinus and anterior ethmoid sinus with fluid and mucosal  thickening, minimal mucosal thickening in the inferior left maxillary  sinus. The remainder of the paranasal sinuses and mastoid air cells and  middle ear cavities are clear. No acute skull fracture is seen. There  are extensive calcified atherosclerotic plaques in the  intracranial  segments of the distal vertebral arteries and cavernous segments of the  internal carotid arteries bilaterally.       Impression:          1. There is moderate small vessel disease in the cerebral white matter,  a 4 mm old lacunar infarct in the right putamen, extensive calcified  atherosclerotic plaques in the intracranial segments of the distal  vertebral arteries and cavernous segments of internal carotid arteries  bilaterally.     2. There is subtotal opacification of the left frontal and anterior  ethmoid sinus with fluid and mucosal thickening and mild mucosal  thickening of the inferior left maxillary sinus.     The remainder of the head CT is within normal limits. Specifically no  acute skull fracture or intracranial hemorrhage is identified.         CERVICAL SPINE CT      TECHNIQUE: Spiral CT images were obtained from the skull base down to  the T2 thoracic level and images were reformatted and are submitted in 2  mm thick axial, sagittal and coronal CT sections with soft tissue  algorithm, 1 mm thick axial, sagittal and coronal CT sections with  high-resolution bone algorithm.     FINDINGS: There are arthritic changes at the atlantodental interval with  marginal spurring off the anterior ring of C1 and the odontoid and there  are some arthritic changes at the articulation of the right lateral  masses of C1 and C2 with joint space narrowing and marginal spurring.     At C2-3 there is moderate right facet overgrowth with minimal right  foraminal narrowing. There is mild posterior disc bulge and minimal  central canal narrowing. There is no left foraminal narrowing.     At C3-4 there is mild-to-moderate right and moderate-to-severe left  facet overgrowth. The posterior disc margin is normal. There is no canal  or right foraminal narrowing. There is mild left foraminal narrowing.     At C4-5 there is moderate bilateral facet overgrowth, disc space  narrowing, degenerative endplate change, diffuse posterior disc  osteophyte complex that abuts and deforms the ventral surface of the  cord, at least mild-to-moderate up to moderately narrows the canal.  There is some uncovertebral joint spurring and there is mild-to-moderate  bilateral bony foraminal narrowing.     At C5-6 there is mild bilateral facet overgrowth, moderate disc space  narrowing and degenerative endplate changes, diffuse posterior disc  osteophyte complex abuts and flattens the ventral surface of the cord  moderate-to-severely narrowing the canal. There is bilateral  uncovertebral joint hypertrophy and there is mild-to-moderate bilateral  bony foraminal narrowing.     At C6-7 there is mild disc space narrowing, degenerative endplate  change, mild facet overgrowth, mild posterior disc osteophyte  complex,  mild canal and there is moderate right and minimal if any left foraminal  narrowing.     At C7-T1 there is mild left and moderate right facet overgrowth. There  is mild right bony foraminal narrowing and no canal or left foraminal  narrowing.     No acute fracture is seen in the cervical spine.     IMPRESSION:  No acute fracture is seen in the cervical spine. There is cervical  spondylosis as described in great detail above.     Radiation dose reduction techniques were utilized, including automated  exposure control and exposure modulation based on body size.     This report was finalized on 12/4/2018 3:58 PM by Dr. Robert Yuen M.D.       XR Chest 1 View [[id number]] Collected:  12/04/18 1048     Updated:  12/04/18 1431    Narrative:       HISTORY: Cough. Bilateral hip pain. Fell. 12/04/2018     AP CHEST      FINDINGS: Heart size is mildly enlarged. There is mild to moderate  interstitial disease of the lungs which has progressed somewhat since  the 11/25/2018 study.     Lungs otherwise appear free of acute infiltrates. Bilateral breast  implants are seen. Sternotomy wires and mediastinal surgical clips are  seen. There is some splenic artery calcification.             Impression:       Cardiomegaly. Mild to moderate interstitial disease of the lungs could  represent an element of mild pulmonary edema. Please correlate.           AP PELVIS AND BILATERAL HIPS 2 VIEWS OF EACH     FINDINGS: There is deformity of the right pubic rami from old fractures  which were seen on the 10/05/2013 study. Surgical screws are seen in the  right femoral head and neck.     No acute fractures or dislocations are seen in the bilateral hips and  pelvis.     IMPRESSION:   1. Old pubic rami fractures on the right.  2. No acute process identified.     This report was finalized on 12/4/2018 2:27 PM by Dr. Rey Morris M.D.       XR Hips Bilateral With or Without Pelvis 2 View [[id number]] Collected:  12/04/18 1048     Updated:   12/04/18 1431    Narrative:       HISTORY: Cough. Bilateral hip pain. Fell. 12/04/2018     AP CHEST      FINDINGS: Heart size is mildly enlarged. There is mild to moderate  interstitial disease of the lungs which has progressed somewhat since  the 11/25/2018 study.     Lungs otherwise appear free of acute infiltrates. Bilateral breast  implants are seen. Sternotomy wires and mediastinal surgical clips are  seen. There is some splenic artery calcification.             Impression:       Cardiomegaly. Mild to moderate interstitial disease of the lungs could  represent an element of mild pulmonary edema. Please correlate.           AP PELVIS AND BILATERAL HIPS 2 VIEWS OF EACH     FINDINGS: There is deformity of the right pubic rami from old fractures  which were seen on the 10/05/2013 study. Surgical screws are seen in the  right femoral head and neck.     No acute fractures or dislocations are seen in the bilateral hips and  pelvis.     IMPRESSION:   1. Old pubic rami fractures on the right.  2. No acute process identified.     This report was finalized on 12/4/2018 2:27 PM by Dr. Rey Morris M.D.               Assessment:        Complicated UTI (urinary tract infection)    Essential hypertension    DM (diabetes mellitus) (CMS/[facility])    Toxic metabolic encephalopathy    Dementia with behavioral disturbance    Alzheimer disease      Patient Active Problem List   Diagnosis Code   • Acute respiratory failure with hypoxia (CMS/[facility]) J96.01   • Essential hypertension I10   • Syncope and collapse R55   • Bacteriuria R82.71   • HCAP (healthcare-associated pneumonia) J18.9   • Hypoglycemia E16.2   • DM (diabetes mellitus) (CMS/[facility]) E11.9   • Acute on chronic systolic CHF (congestive heart failure) (CMS/[facility]) I50.23   • Toxic metabolic encephalopathy G92   • Alzheimer disease G30.9, F02.80   • Pneumonia of right lower lobe due to infectious organism (CMS/[facility]) J18.1   • Acute hypoxemic respiratory failure (CMS/[facility]) J96.01    • CHF (congestive heart failure), NYHA class I, acute on chronic, diastolic (CMS/HCC) I50.33   • Dehydration E86.0   • Elevated troponin R74.8   • Nonrheumatic mitral valve insufficiency I34.0   • Edema of both legs R60.0   • Dementia with behavioral disturbance F03.91   • Complicated UTI (urinary tract infection) N39.0       Plan:      Continue IV antibiotics for UTI  Back to po diuretics  Monitor and correct electrolytes  Adjust insulin as needed  Monitor mental status  Continue home medications  PT to see pt, St f/u  DVT/stress ulcer prophylaxis  Labs in       Sanjay Butcher MD  12/5/2018  10:44 AM

## 2018-12-06 NOTE — PLAN OF CARE
"Problem: Fall Risk (Adult)  Goal: Absence of Fall  Outcome: Ongoing (interventions implemented as appropriate)   18 154   Fall Risk (Adult)   Absence of Fall making progress toward outcome       Problem: Patient Care Overview  Goal: Plan of Care Review  Outcome: Ongoing (interventions implemented as appropriate)   18   Coping/Psychosocial   Plan of Care Reviewed With patient   OTHER   Outcome Summary pt was able to tell me her name, , location and the reason she is here. Pt was very polite at the beginning of the shift and towards the end became very demanding and forgetful. Pt refused heel elevation and waffle boots . Pt Q2hr turns. Pt pivots to [facility]. Pt wanted this RN to \"hit\" her on the back to try and help her loosen up phlem. Explained why i could not do that. Will continue to monitor pt.      Goal: Individualization and Mutuality  Outcome: Ongoing (interventions implemented as appropriate)   18   Individualization   Patient Specific Preferences Pt likes to be patted on the back to try and get phlem up   Patient Specific Goals (Include Timeframe) To go back to [facility]       Problem: Urinary Tract Infection (Adult)  Goal: Signs and Symptoms of Listed Potential Problems Will be Absent, Minimized or Managed (Urinary Tract Infection)  Outcome: Ongoing (interventions implemented as appropriate)   18 154   Goal/Outcome Evaluation   Problems Assessed (Urinary Tract Infection) all   Problems Present (UTI) none         "

## 2018-12-06 NOTE — PLAN OF CARE
Problem: Fall Risk (Adult)  Goal: Absence of Fall  Outcome: Ongoing (interventions implemented as appropriate)      Problem: Patient Care Overview  Goal: Plan of Care Review  Outcome: Ongoing (interventions implemented as appropriate)   18 0253   Coping/Psychosocial   Plan of Care Reviewed With patient   Plan of Care Review   Progress no change   OTHER   Outcome Summary patient can state name/, where she is and why she is here; But forgetful and demanding; turn Q2hrs, attempted to elevate heels, patient refused; stand/pivot to [facility] - no purewick       Problem: Urinary Tract Infection (Adult)  Goal: Signs and Symptoms of Listed Potential Problems Will be Absent, Minimized or Managed (Urinary Tract Infection)  Outcome: Ongoing (interventions implemented as appropriate)

## 2018-12-06 NOTE — PROGRESS NOTES
"DAILY PROGRESS NOTE  [facility], [facility]    2018    Patient Identification:  Name: Bernardo Cox  Age: 87 y.o.  Sex: female  :  1931  MRN: [id number]           Primary Care Physician: Tay Lynch MD    Subjective:    Interval History:    Still is sleepy this morning.  No new events.  Brochure is still slightly elevated.  Creatinine 1.35. Baseline is 0.9.  Not eating much.  Blood pressure better this morning.        ROS:     No N, V,D,C,CP    Objective:    Scheduled Meds:    budesonide 0.5 mg Nebulization BID   carvedilol 25 mg Oral BID With Meals   ceftriaxone 1 g Intravenous Q24H   clopidogrel 75 mg Oral Daily   donepezil 10 mg Oral Nightly   enoxaparin 30 mg Subcutaneous Q24H   escitalopram 10 mg Oral Daily   furosemide 40 mg Oral Daily   hydrocerin  Topical Daily   insulin lispro 0-7 Units Subcutaneous 4x Daily With Meals & Nightly   ipratropium-albuterol 3 mL Nebulization 4x Daily - RT   memantine 10 mg Oral Q12H   pantoprazole 40 mg Oral Daily   potassium chloride 20 mEq Oral Daily       Continuous Infusions:       PRN Meds:  •  acetaminophen  •  dextrose  •  dextrose  •  glucagon (human recombinant)  •  nitroglycerin  •  ondansetron    Intake/Output:    Intake/Output Summary (Last 24 hours) at 2018 0835  Last data filed at 2018 0602  Gross per 24 hour   Intake 520 ml   Output 160 ml   Net 360 ml         Exam:    tMax 24 hrs: Temp (24hrs), Av.2 °F (36.2 °C), Min:96.8 °F (36 °C), Max:97.9 °F (36.6 °C)    Vitals Ranges:   Temp:  [96.8 °F (36 °C)-97.9 °F (36.6 °C)] 97 °F (36.1 °C)  Heart Rate:  [58-80] 80  Resp:  [16-18] 18  BP: ()/(52-73) 128/60    /60 (BP Location: Right arm, Patient Position: Lying)   Pulse 80   Temp 97 °F (36.1 °C) (Oral)   Resp 18   Ht 165.1 cm (65\")   Wt 58.8 kg (129 lb 11.2 oz)   SpO2 90%   BMI 21.58 kg/m²     General: Sleepy at this time. Alert, oriented x 1 when awake. Cooperative, no distress, " appears stated age  Neck: Supple, symmetrical, trachea midline, no adenopathy;              thyroid:  no enlargement/tenderness/nodules;              no carotid bruit or JVD  Cardiovascular: Normal rate, regular rhythm and intact distal pulses.              Exam reveals no gallop and no friction rub. No murmur heard  Chest wall: No tenderness or deformity  Pulmonary:  diminished breath sounds bilaterally, respirations unlabored.               No rhonchi, wheezing or rales.   Abdominal: Soft. Soft, non-tender, bowel sounds active all four quadrants,     no masses, no hepatomegaly, no splenomegaly.   Extremities: Normal, atraumatic, no cyanosis. Trace edema (better than last admission)  Pulses: 2 + symmetric all extremities  Neurological: Patient is alert and oriented to person. No new focal sensory motor deficit  Skin: Skin color, texture, normal. Turgor is decreased. No rashes or lesions           Data Review:    Results from last 7 days   Lab Units  12/06/18 0443  12/05/18   0544  12/04/18   0941   WBC 10*3/mm3  7.41  6.87  10.56   HEMOGLOBIN g/dL  10.2*  9.9*  9.9*   HEMATOCRIT %  35.0*  32.0*  34.0*   PLATELETS 10*3/mm3  278  271  278       Results from last 7 days   Lab Units  12/06/18   0443  12/05/18   0544  12/04/18   0941   SODIUM mmol/L  130*  132*  130*   POTASSIUM mmol/L  4.3  4.5  4.8   CHLORIDE mmol/L  91*  91*  91*   CO2 mmol/L  25.9  27.8  26.7   BUN mg/dL  17  14  14   CREATININE mg/dL  1.35*  1.03*  1.01*   CALCIUM mg/dL  8.2*  8.8  9.2   BILIRUBIN mg/dL   --    --   0.8   ALK PHOS U/L   --    --   127*   ALT (SGPT) U/L   --    --   10   AST (SGOT) U/L   --    --   10   GLUCOSE mg/dL  175*  254*  288*                 Lab Results   Lab Value Date/Time    TROPONINT 0.012 12/04/2018 0941    TROPONINT 0.034 (H) 11/19/2018 1741    TROPONINT <0.010 04/15/2018 0151    TROPONINT <0.010 03/26/2018 0401    TROPONINT <0.010 03/23/2018 0613    TROPONINT 0.035 (H) 01/26/2018 1040    TROPONINT 0.023 11/03/2017  0510    TROPONINT 0.052 (H) 11/02/2017 0302    TROPONINT 0.054 (H) 11/01/2017 2105    TROPONINT 0.084 (H) 11/01/2017 1320    TROPONINT 0.017 07/04/2017 1653    TROPONINT <0.010 08/07/2016 2158    TROPONINT <0.01 01/19/2016 1405       Microbiology Results (last 10 days)     Procedure Component Value - Date/Time    Urine Culture - Urine, Urine, Catheter In/Out [[id number]]  (Abnormal)  (Susceptibility) Collected:  12/04/18 0945    Lab Status:  Final result Specimen:  Urine, Catheter In/Out Updated:  12/06/18 0828     Urine Culture >100,000 CFU/mL Escherichia coli    Susceptibility      Escherichia coli     ARTHUR     Ampicillin Resistant     Ampicillin + Sulbactam Susceptible     Cefazolin Susceptible     Cefepime Susceptible     Ceftriaxone Susceptible     Ciprofloxacin Susceptible     Ertapenem Susceptible     Gentamicin Susceptible     Levofloxacin Susceptible     Nitrofurantoin Susceptible     Piperacillin + Tazobactam Susceptible     Tetracycline Resistant     Trimethoprim + Sulfamethoxazole Susceptible                           Imaging Results (last 7 days)     Procedure Component Value Units Date/Time    CT Head Without Contrast [[id number]] Collected:  12/04/18 1116     Updated:  12/04/18 1601    Narrative:       EMERGENCY NONCONTRAST HEAD CT AND NONCONTRAST CERVICAL SPINE CT  12/04/2018     CLINICAL HISTORY: Head trauma to top of head, headache and neck pain.     HEAD CT      TECHNIQUE: Spiral CT images were obtained from the base of the skull to  the vertex without intravenous contrast. Images were reformatted and  submitted in 3 mm thick axial CT sections with brain algorithm. 2 mm  thick axial CT sections with high-resolution bone algorithm and 2 mm  thick sagittal and coronal reconstructions were performed with brain  algorithm.     This is correlated to a noncontrast head CT 03/27/2018.     FINDINGS: There are patchy areas of low density in periventricular and  throughout the subcortical white matter of the  cerebral hemispheres  consistent with moderate small vessel disease. There is a tiny old  lacunar infarction in the superior right putamen. The remainder of the  brain parenchyma is normal in attenuation. The ventricles are normal in  size. I see no focal mass effect and no midline shift. No extra-axial  fluid collections are identified. There is no evidence of acute  intracranial hemorrhage. There is subtotal opacification of the left  frontal sinus and anterior ethmoid sinus with fluid and mucosal  thickening, minimal mucosal thickening in the inferior left maxillary  sinus. The remainder of the paranasal sinuses and mastoid air cells and  middle ear cavities are clear. No acute skull fracture is seen. There  are extensive calcified atherosclerotic plaques in the  intracranial  segments of the distal vertebral arteries and cavernous segments of the  internal carotid arteries bilaterally.       Impression:          1. There is moderate small vessel disease in the cerebral white matter,  a 4 mm old lacunar infarct in the right putamen, extensive calcified  atherosclerotic plaques in the intracranial segments of the distal  vertebral arteries and cavernous segments of internal carotid arteries  bilaterally.     2. There is subtotal opacification of the left frontal and anterior  ethmoid sinus with fluid and mucosal thickening and mild mucosal  thickening of the inferior left maxillary sinus.     The remainder of the head CT is within normal limits. Specifically no  acute skull fracture or intracranial hemorrhage is identified.        CERVICAL SPINE CT      TECHNIQUE: Spiral CT images were obtained from the skull base down to  the T2 thoracic level and images were reformatted and are submitted in 2  mm thick axial, sagittal and coronal CT sections with soft tissue  algorithm, 1 mm thick axial, sagittal and coronal CT sections with  high-resolution bone algorithm.     FINDINGS: There are arthritic changes at the  atlantodental interval with  marginal spurring off the anterior ring of C1 and the odontoid and there  are some arthritic changes at the articulation of the right lateral  masses of C1 and C2 with joint space narrowing and marginal spurring.     At C2-3 there is moderate right facet overgrowth with minimal right  foraminal narrowing. There is mild posterior disc bulge and minimal  central canal narrowing. There is no left foraminal narrowing.     At C3-4 there is mild-to-moderate right and moderate-to-severe left  facet overgrowth. The posterior disc margin is normal. There is no canal  or right foraminal narrowing. There is mild left foraminal narrowing.     At C4-5 there is moderate bilateral facet overgrowth, disc space  narrowing, degenerative endplate change, diffuse posterior disc  osteophyte complex that abuts and deforms the ventral surface of the  cord, at least mild-to-moderate up to moderately narrows the canal.  There is some uncovertebral joint spurring and there is mild-to-moderate  bilateral bony foraminal narrowing.     At C5-6 there is mild bilateral facet overgrowth, moderate disc space  narrowing and degenerative endplate changes, diffuse posterior disc  osteophyte complex abuts and flattens the ventral surface of the cord  moderate-to-severely narrowing the canal. There is bilateral  uncovertebral joint hypertrophy and there is mild-to-moderate bilateral  bony foraminal narrowing.     At C6-7 there is mild disc space narrowing, degenerative endplate  change, mild facet overgrowth, mild posterior disc osteophyte complex,  mild canal and there is moderate right and minimal if any left foraminal  narrowing.     At C7-T1 there is mild left and moderate right facet overgrowth. There  is mild right bony foraminal narrowing and no canal or left foraminal  narrowing.     No acute fracture is seen in the cervical spine.     IMPRESSION:  No acute fracture is seen in the cervical spine. There is  cervical  spondylosis as described in great detail above.     Radiation dose reduction techniques were utilized, including automated  exposure control and exposure modulation based on body size.     This report was finalized on 12/4/2018 3:58 PM by Dr. Robert Yuen M.D.       CT Cervical Spine Without Contrast [[id number]] Collected:  12/04/18 1116     Updated:  12/04/18 1601    Narrative:       EMERGENCY NONCONTRAST HEAD CT AND NONCONTRAST CERVICAL SPINE CT  12/04/2018     CLINICAL HISTORY: Head trauma to top of head, headache and neck pain.     HEAD CT      TECHNIQUE: Spiral CT images were obtained from the base of the skull to  the vertex without intravenous contrast. Images were reformatted and  submitted in 3 mm thick axial CT sections with brain algorithm. 2 mm  thick axial CT sections with high-resolution bone algorithm and 2 mm  thick sagittal and coronal reconstructions were performed with brain  algorithm.     This is correlated to a noncontrast head CT 03/27/2018.     FINDINGS: There are patchy areas of low density in periventricular and  throughout the subcortical white matter of the cerebral hemispheres  consistent with moderate small vessel disease. There is a tiny old  lacunar infarction in the superior right putamen. The remainder of the  brain parenchyma is normal in attenuation. The ventricles are normal in  size. I see no focal mass effect and no midline shift. No extra-axial  fluid collections are identified. There is no evidence of acute  intracranial hemorrhage. There is subtotal opacification of the left  frontal sinus and anterior ethmoid sinus with fluid and mucosal  thickening, minimal mucosal thickening in the inferior left maxillary  sinus. The remainder of the paranasal sinuses and mastoid air cells and  middle ear cavities are clear. No acute skull fracture is seen. There  are extensive calcified atherosclerotic plaques in the  intracranial  segments of the distal vertebral arteries and  cavernous segments of the  internal carotid arteries bilaterally.       Impression:          1. There is moderate small vessel disease in the cerebral white matter,  a 4 mm old lacunar infarct in the right putamen, extensive calcified  atherosclerotic plaques in the intracranial segments of the distal  vertebral arteries and cavernous segments of internal carotid arteries  bilaterally.     2. There is subtotal opacification of the left frontal and anterior  ethmoid sinus with fluid and mucosal thickening and mild mucosal  thickening of the inferior left maxillary sinus.     The remainder of the head CT is within normal limits. Specifically no  acute skull fracture or intracranial hemorrhage is identified.        CERVICAL SPINE CT      TECHNIQUE: Spiral CT images were obtained from the skull base down to  the T2 thoracic level and images were reformatted and are submitted in 2  mm thick axial, sagittal and coronal CT sections with soft tissue  algorithm, 1 mm thick axial, sagittal and coronal CT sections with  high-resolution bone algorithm.     FINDINGS: There are arthritic changes at the atlantodental interval with  marginal spurring off the anterior ring of C1 and the odontoid and there  are some arthritic changes at the articulation of the right lateral  masses of C1 and C2 with joint space narrowing and marginal spurring.     At C2-3 there is moderate right facet overgrowth with minimal right  foraminal narrowing. There is mild posterior disc bulge and minimal  central canal narrowing. There is no left foraminal narrowing.     At C3-4 there is mild-to-moderate right and moderate-to-severe left  facet overgrowth. The posterior disc margin is normal. There is no canal  or right foraminal narrowing. There is mild left foraminal narrowing.     At C4-5 there is moderate bilateral facet overgrowth, disc space  narrowing, degenerative endplate change, diffuse posterior disc  osteophyte complex that abuts and deforms  the ventral surface of the  cord, at least mild-to-moderate up to moderately narrows the canal.  There is some uncovertebral joint spurring and there is mild-to-moderate  bilateral bony foraminal narrowing.     At C5-6 there is mild bilateral facet overgrowth, moderate disc space  narrowing and degenerative endplate changes, diffuse posterior disc  osteophyte complex abuts and flattens the ventral surface of the cord  moderate-to-severely narrowing the canal. There is bilateral  uncovertebral joint hypertrophy and there is mild-to-moderate bilateral  bony foraminal narrowing.     At C6-7 there is mild disc space narrowing, degenerative endplate  change, mild facet overgrowth, mild posterior disc osteophyte complex,  mild canal and there is moderate right and minimal if any left foraminal  narrowing.     At C7-T1 there is mild left and moderate right facet overgrowth. There  is mild right bony foraminal narrowing and no canal or left foraminal  narrowing.     No acute fracture is seen in the cervical spine.     IMPRESSION:  No acute fracture is seen in the cervical spine. There is cervical  spondylosis as described in great detail above.     Radiation dose reduction techniques were utilized, including automated  exposure control and exposure modulation based on body size.     This report was finalized on 12/4/2018 3:58 PM by Dr. Robert Yuen M.D.       XR Chest 1 View [[id number]] Collected:  12/04/18 1048     Updated:  12/04/18 1431    Narrative:       HISTORY: Cough. Bilateral hip pain. Fell. 12/04/2018     AP CHEST      FINDINGS: Heart size is mildly enlarged. There is mild to moderate  interstitial disease of the lungs which has progressed somewhat since  the 11/25/2018 study.     Lungs otherwise appear free of acute infiltrates. Bilateral breast  implants are seen. Sternotomy wires and mediastinal surgical clips are  seen. There is some splenic artery calcification.             Impression:       Cardiomegaly.  Mild to moderate interstitial disease of the lungs could  represent an element of mild pulmonary edema. Please correlate.           AP PELVIS AND BILATERAL HIPS 2 VIEWS OF EACH     FINDINGS: There is deformity of the right pubic rami from old fractures  which were seen on the 10/05/2013 study. Surgical screws are seen in the  right femoral head and neck.     No acute fractures or dislocations are seen in the bilateral hips and  pelvis.     IMPRESSION:   1. Old pubic rami fractures on the right.  2. No acute process identified.     This report was finalized on 12/4/2018 2:27 PM by Dr. Rey Morris M.D.       XR Hips Bilateral With or Without Pelvis 2 View [[id number]] Collected:  12/04/18 1048     Updated:  12/04/18 1431    Narrative:       HISTORY: Cough. Bilateral hip pain. Fell. 12/04/2018     AP CHEST      FINDINGS: Heart size is mildly enlarged. There is mild to moderate  interstitial disease of the lungs which has progressed somewhat since  the 11/25/2018 study.     Lungs otherwise appear free of acute infiltrates. Bilateral breast  implants are seen. Sternotomy wires and mediastinal surgical clips are  seen. There is some splenic artery calcification.             Impression:       Cardiomegaly. Mild to moderate interstitial disease of the lungs could  represent an element of mild pulmonary edema. Please correlate.           AP PELVIS AND BILATERAL HIPS 2 VIEWS OF EACH     FINDINGS: There is deformity of the right pubic rami from old fractures  which were seen on the 10/05/2013 study. Surgical screws are seen in the  right femoral head and neck.     No acute fractures or dislocations are seen in the bilateral hips and  pelvis.     IMPRESSION:   1. Old pubic rami fractures on the right.  2. No acute process identified.     This report was finalized on 12/4/2018 2:27 PM by Dr. Rey Morris M.D.               Assessment:        Complicated UTI (urinary tract infection)    Essential hypertension    DM  (diabetes mellitus) (CMS/HCC)    Toxic metabolic encephalopathy    Dementia with behavioral disturbance    Alzheimer disease      Patient Active Problem List   Diagnosis Code   • Acute respiratory failure with hypoxia (CMS/HCC) J96.01   • Essential hypertension I10   • Syncope and collapse R55   • Bacteriuria R82.71   • HCAP (healthcare-associated pneumonia) J18.9   • Hypoglycemia E16.2   • DM (diabetes mellitus) (CMS/HCC) E11.9   • Acute on chronic systolic CHF (congestive heart failure) (CMS/HCC) I50.23   • Toxic metabolic encephalopathy G92   • Alzheimer disease G30.9, F02.80   • Pneumonia of right lower lobe due to infectious organism (CMS/HCC) J18.1   • Acute hypoxemic respiratory failure (CMS/HCC) J96.01   • CHF (congestive heart failure), NYHA class I, acute on chronic, diastolic (CMS/HCC) I50.33   • Dehydration E86.0   • Elevated troponin R74.8   • Nonrheumatic mitral valve insufficiency I34.0   • Edema of both legs R60.0   • Dementia with behavioral disturbance F03.91   • Complicated UTI (urinary tract infection) N39.0       Plan:      Continue IV antibiotics for UTI  Hold diuretics, Cr elevated  Monitor and correct electrolytes  Adjust insulin as needed  Monitor mental status  Continue home medications  PT to see pt, St f/u  DVT/stress ulcer prophylaxis  Labs in am      Sanjay Butcher MD  12/6/2018  8:35 AM

## 2018-12-06 NOTE — SIGNIFICANT NOTE
12/06/18 1112   Rehab Time/Intention   Evaluation Not Performed other (see comments);patient/family declined evaluation  (Pt declines need for PT. She states she lives in a NH and just wants to go back, but does not want physical therapy at this time. RNsusana, present and aware of pt's request. PT will sign off.)   Rehab Treatment   Discipline physical therapist

## 2018-12-07 NOTE — PLAN OF CARE
Problem: Fall Risk (Adult)  Goal: Absence of Fall  Outcome: Ongoing (interventions implemented as appropriate)      Problem: Patient Care Overview  Goal: Plan of Care Review  Outcome: Ongoing (interventions implemented as appropriate)   12/07/18 0502   Coping/Psychosocial   Plan of Care Reviewed With patient   Plan of Care Review   Progress no change   OTHER   Outcome Summary patient was disagreeable and combative at times, although could answer orientation questions correctly; MD contacted, Ativan x1 given - patient rested throughout rest of shift; patient refused turns and heel elevation, picked at scabs on legs (bleeding)       Problem: Urinary Tract Infection (Adult)  Goal: Signs and Symptoms of Listed Potential Problems Will be Absent, Minimized or Managed (Urinary Tract Infection)  Outcome: Ongoing (interventions implemented as appropriate)      Problem: Skin Injury Risk (Adult)  Goal: Skin Health and Integrity  Outcome: Ongoing (interventions implemented as appropriate)

## 2018-12-07 NOTE — PROGRESS NOTES
"DAILY PROGRESS NOTE  [facility], [facility]    2018    Patient Identification:  Name: Bernardo Cox  Age: 87 y.o.  Sex: female  :  1931  MRN: [id number]           Primary Care Physician: Tay Lynch MD    Subjective:    Interval History:    Was very agitated last night, received 0.5 mg IV ativan, calmer today.  BP better today  Not eating much.  Cr 1.6, Na 131  Urine cx with pansensitive E coli  BS > 300      ROS:     No N, V,D,C,CP    Objective:    Scheduled Meds:    budesonide 0.5 mg Nebulization BID   carvedilol 25 mg Oral BID With Meals   ceftriaxone 1 g Intravenous Q24H   clopidogrel 75 mg Oral Daily   donepezil 10 mg Oral Nightly   enoxaparin 30 mg Subcutaneous Q24H   escitalopram 10 mg Oral Daily   hydrocerin  Topical Daily   insulin lispro 0-7 Units Subcutaneous 4x Daily With Meals & Nightly   ipratropium-albuterol 3 mL Nebulization 4x Daily - RT   memantine 5 mg Oral Q12H   pantoprazole 40 mg Oral Daily   potassium chloride 20 mEq Oral Daily       Continuous Infusions:       PRN Meds:  •  acetaminophen  •  dextrose  •  dextrose  •  glucagon (human recombinant)  •  nitroglycerin  •  ondansetron    Intake/Output:    Intake/Output Summary (Last 24 hours) at 2018 1340  Last data filed at 2018 0544  Gross per 24 hour   Intake 810 ml   Output --   Net 810 ml         Exam:    tMax 24 hrs: Temp (24hrs), Av.9 °F (36.1 °C), Min:96.7 °F (35.9 °C), Max:97.1 °F (36.2 °C)    Vitals Ranges:   Temp:  [96.7 °F (35.9 °C)-97.1 °F (36.2 °C)] 97 °F (36.1 °C)  Heart Rate:  [61-76] 76  Resp:  [16-28] 28  BP: ()/(55-63) 128/63    /63 (BP Location: Right arm, Patient Position: Lying)   Pulse 76   Temp 97 °F (36.1 °C) (Oral)   Resp 28   Ht 165.1 cm (65\")   Wt 58.8 kg (129 lb 11.2 oz)   SpO2 98%   BMI 21.58 kg/m²     General: Sleepy at this time. Alert, oriented x 1 when awake. Cooperative, no distress, appears stated age  Neck: Supple, " symmetrical, trachea midline, no adenopathy;              thyroid:  no enlargement/tenderness/nodules;              no carotid bruit or JVD  Cardiovascular: Normal rate, regular rhythm and intact distal pulses.              Exam reveals no gallop and no friction rub. No murmur heard  Chest wall: No tenderness or deformity  Pulmonary:  diminished breath sounds bilaterally, respirations unlabored.               No rhonchi, wheezing or rales.   Abdominal: Soft. Soft, non-tender, bowel sounds active all four quadrants,     no masses, no hepatomegaly, no splenomegaly.   Extremities: Normal, atraumatic, no cyanosis. Trace edema (better than last admission)  Pulses: 2 + symmetric all extremities  Neurological: Patient is alert and oriented to person when she is awake No new focal sensory motor deficit  Skin: Skin color, texture, normal. Turgor is decreased. No rashes or lesions           Data Review:    Results from last 7 days   Lab Units  12/07/18   0536  12/06/18   0443  12/05/18   0544   WBC 10*3/mm3  4.39*  7.41  6.87   HEMOGLOBIN g/dL  10.3*  10.2*  9.9*   HEMATOCRIT %  36.2  35.0*  32.0*   PLATELETS 10*3/mm3  272  278  271       Results from last 7 days   Lab Units  12/07/18   0536  12/06/18   0443  12/05/18   0544  12/04/18   0941   SODIUM mmol/L  131*  130*  132*  130*   POTASSIUM mmol/L  4.4  4.3  4.5  4.8   CHLORIDE mmol/L  92*  91*  91*  91*   CO2 mmol/L  25.5  25.9  27.8  26.7   BUN mg/dL  18  17  14  14   CREATININE mg/dL  1.62*  1.35*  1.03*  1.01*   CALCIUM mg/dL  8.1*  8.2*  8.8  9.2   BILIRUBIN mg/dL   --    --    --   0.8   ALK PHOS U/L   --    --    --   127*   ALT (SGPT) U/L   --    --    --   10   AST (SGOT) U/L   --    --    --   10   GLUCOSE mg/dL  380*  175*  254*  288*                 Lab Results   Lab Value Date/Time    TROPONINT 0.012 12/04/2018 0941    TROPONINT 0.034 (H) 11/19/2018 1741    TROPONINT <0.010 04/15/2018 0151    TROPONINT <0.010 03/26/2018 0401    TROPONINT <0.010 03/23/2018 0613     TROPONINT 0.035 (H) 01/26/2018 1040    TROPONINT 0.023 11/03/2017 0510    TROPONINT 0.052 (H) 11/02/2017 0302    TROPONINT 0.054 (H) 11/01/2017 2105    TROPONINT 0.084 (H) 11/01/2017 1320    TROPONINT 0.017 07/04/2017 1653    TROPONINT <0.010 08/07/2016 2158    TROPONINT <0.01 01/19/2016 1405       Microbiology Results (last 10 days)     Procedure Component Value - Date/Time    Urine Culture - Urine, Urine, Catheter In/Out [[id number]]  (Abnormal)  (Susceptibility) Collected:  12/04/18 0945    Lab Status:  Final result Specimen:  Urine, Catheter In/Out Updated:  12/06/18 0828     Urine Culture >100,000 CFU/mL Escherichia coli    Susceptibility      Escherichia coli     ARTHUR     Ampicillin Resistant     Ampicillin + Sulbactam Susceptible     Cefazolin Susceptible     Cefepime Susceptible     Ceftriaxone Susceptible     Ciprofloxacin Susceptible     Ertapenem Susceptible     Gentamicin Susceptible     Levofloxacin Susceptible     Nitrofurantoin Susceptible     Piperacillin + Tazobactam Susceptible     Tetracycline Resistant     Trimethoprim + Sulfamethoxazole Susceptible                           Imaging Results (last 7 days)     Procedure Component Value Units Date/Time    CT Head Without Contrast [[id number]] Collected:  12/04/18 1116     Updated:  12/04/18 1601    Narrative:       EMERGENCY NONCONTRAST HEAD CT AND NONCONTRAST CERVICAL SPINE CT  12/04/2018     CLINICAL HISTORY: Head trauma to top of head, headache and neck pain.     HEAD CT      TECHNIQUE: Spiral CT images were obtained from the base of the skull to  the vertex without intravenous contrast. Images were reformatted and  submitted in 3 mm thick axial CT sections with brain algorithm. 2 mm  thick axial CT sections with high-resolution bone algorithm and 2 mm  thick sagittal and coronal reconstructions were performed with brain  algorithm.     This is correlated to a noncontrast head CT 03/27/2018.     FINDINGS: There are patchy areas of low density in  periventricular and  throughout the subcortical white matter of the cerebral hemispheres  consistent with moderate small vessel disease. There is a tiny old  lacunar infarction in the superior right putamen. The remainder of the  brain parenchyma is normal in attenuation. The ventricles are normal in  size. I see no focal mass effect and no midline shift. No extra-axial  fluid collections are identified. There is no evidence of acute  intracranial hemorrhage. There is subtotal opacification of the left  frontal sinus and anterior ethmoid sinus with fluid and mucosal  thickening, minimal mucosal thickening in the inferior left maxillary  sinus. The remainder of the paranasal sinuses and mastoid air cells and  middle ear cavities are clear. No acute skull fracture is seen. There  are extensive calcified atherosclerotic plaques in the  intracranial  segments of the distal vertebral arteries and cavernous segments of the  internal carotid arteries bilaterally.       Impression:          1. There is moderate small vessel disease in the cerebral white matter,  a 4 mm old lacunar infarct in the right putamen, extensive calcified  atherosclerotic plaques in the intracranial segments of the distal  vertebral arteries and cavernous segments of internal carotid arteries  bilaterally.     2. There is subtotal opacification of the left frontal and anterior  ethmoid sinus with fluid and mucosal thickening and mild mucosal  thickening of the inferior left maxillary sinus.     The remainder of the head CT is within normal limits. Specifically no  acute skull fracture or intracranial hemorrhage is identified.        CERVICAL SPINE CT      TECHNIQUE: Spiral CT images were obtained from the skull base down to  the T2 thoracic level and images were reformatted and are submitted in 2  mm thick axial, sagittal and coronal CT sections with soft tissue  algorithm, 1 mm thick axial, sagittal and coronal CT sections with  high-resolution  bone algorithm.     FINDINGS: There are arthritic changes at the atlantodental interval with  marginal spurring off the anterior ring of C1 and the odontoid and there  are some arthritic changes at the articulation of the right lateral  masses of C1 and C2 with joint space narrowing and marginal spurring.     At C2-3 there is moderate right facet overgrowth with minimal right  foraminal narrowing. There is mild posterior disc bulge and minimal  central canal narrowing. There is no left foraminal narrowing.     At C3-4 there is mild-to-moderate right and moderate-to-severe left  facet overgrowth. The posterior disc margin is normal. There is no canal  or right foraminal narrowing. There is mild left foraminal narrowing.     At C4-5 there is moderate bilateral facet overgrowth, disc space  narrowing, degenerative endplate change, diffuse posterior disc  osteophyte complex that abuts and deforms the ventral surface of the  cord, at least mild-to-moderate up to moderately narrows the canal.  There is some uncovertebral joint spurring and there is mild-to-moderate  bilateral bony foraminal narrowing.     At C5-6 there is mild bilateral facet overgrowth, moderate disc space  narrowing and degenerative endplate changes, diffuse posterior disc  osteophyte complex abuts and flattens the ventral surface of the cord  moderate-to-severely narrowing the canal. There is bilateral  uncovertebral joint hypertrophy and there is mild-to-moderate bilateral  bony foraminal narrowing.     At C6-7 there is mild disc space narrowing, degenerative endplate  change, mild facet overgrowth, mild posterior disc osteophyte complex,  mild canal and there is moderate right and minimal if any left foraminal  narrowing.     At C7-T1 there is mild left and moderate right facet overgrowth. There  is mild right bony foraminal narrowing and no canal or left foraminal  narrowing.     No acute fracture is seen in the cervical spine.     IMPRESSION:  No  acute fracture is seen in the cervical spine. There is cervical  spondylosis as described in great detail above.     Radiation dose reduction techniques were utilized, including automated  exposure control and exposure modulation based on body size.     This report was finalized on 12/4/2018 3:58 PM by Dr. Robert Yuen M.D.       CT Cervical Spine Without Contrast [[id number]] Collected:  12/04/18 1116     Updated:  12/04/18 1601    Narrative:       EMERGENCY NONCONTRAST HEAD CT AND NONCONTRAST CERVICAL SPINE CT  12/04/2018     CLINICAL HISTORY: Head trauma to top of head, headache and neck pain.     HEAD CT      TECHNIQUE: Spiral CT images were obtained from the base of the skull to  the vertex without intravenous contrast. Images were reformatted and  submitted in 3 mm thick axial CT sections with brain algorithm. 2 mm  thick axial CT sections with high-resolution bone algorithm and 2 mm  thick sagittal and coronal reconstructions were performed with brain  algorithm.     This is correlated to a noncontrast head CT 03/27/2018.     FINDINGS: There are patchy areas of low density in periventricular and  throughout the subcortical white matter of the cerebral hemispheres  consistent with moderate small vessel disease. There is a tiny old  lacunar infarction in the superior right putamen. The remainder of the  brain parenchyma is normal in attenuation. The ventricles are normal in  size. I see no focal mass effect and no midline shift. No extra-axial  fluid collections are identified. There is no evidence of acute  intracranial hemorrhage. There is subtotal opacification of the left  frontal sinus and anterior ethmoid sinus with fluid and mucosal  thickening, minimal mucosal thickening in the inferior left maxillary  sinus. The remainder of the paranasal sinuses and mastoid air cells and  middle ear cavities are clear. No acute skull fracture is seen. There  are extensive calcified atherosclerotic plaques in the   intracranial  segments of the distal vertebral arteries and cavernous segments of the  internal carotid arteries bilaterally.       Impression:          1. There is moderate small vessel disease in the cerebral white matter,  a 4 mm old lacunar infarct in the right putamen, extensive calcified  atherosclerotic plaques in the intracranial segments of the distal  vertebral arteries and cavernous segments of internal carotid arteries  bilaterally.     2. There is subtotal opacification of the left frontal and anterior  ethmoid sinus with fluid and mucosal thickening and mild mucosal  thickening of the inferior left maxillary sinus.     The remainder of the head CT is within normal limits. Specifically no  acute skull fracture or intracranial hemorrhage is identified.        CERVICAL SPINE CT      TECHNIQUE: Spiral CT images were obtained from the skull base down to  the T2 thoracic level and images were reformatted and are submitted in 2  mm thick axial, sagittal and coronal CT sections with soft tissue  algorithm, 1 mm thick axial, sagittal and coronal CT sections with  high-resolution bone algorithm.     FINDINGS: There are arthritic changes at the atlantodental interval with  marginal spurring off the anterior ring of C1 and the odontoid and there  are some arthritic changes at the articulation of the right lateral  masses of C1 and C2 with joint space narrowing and marginal spurring.     At C2-3 there is moderate right facet overgrowth with minimal right  foraminal narrowing. There is mild posterior disc bulge and minimal  central canal narrowing. There is no left foraminal narrowing.     At C3-4 there is mild-to-moderate right and moderate-to-severe left  facet overgrowth. The posterior disc margin is normal. There is no canal  or right foraminal narrowing. There is mild left foraminal narrowing.     At C4-5 there is moderate bilateral facet overgrowth, disc space  narrowing, degenerative endplate change, diffuse  posterior disc  osteophyte complex that abuts and deforms the ventral surface of the  cord, at least mild-to-moderate up to moderately narrows the canal.  There is some uncovertebral joint spurring and there is mild-to-moderate  bilateral bony foraminal narrowing.     At C5-6 there is mild bilateral facet overgrowth, moderate disc space  narrowing and degenerative endplate changes, diffuse posterior disc  osteophyte complex abuts and flattens the ventral surface of the cord  moderate-to-severely narrowing the canal. There is bilateral  uncovertebral joint hypertrophy and there is mild-to-moderate bilateral  bony foraminal narrowing.     At C6-7 there is mild disc space narrowing, degenerative endplate  change, mild facet overgrowth, mild posterior disc osteophyte complex,  mild canal and there is moderate right and minimal if any left foraminal  narrowing.     At C7-T1 there is mild left and moderate right facet overgrowth. There  is mild right bony foraminal narrowing and no canal or left foraminal  narrowing.     No acute fracture is seen in the cervical spine.     IMPRESSION:  No acute fracture is seen in the cervical spine. There is cervical  spondylosis as described in great detail above.     Radiation dose reduction techniques were utilized, including automated  exposure control and exposure modulation based on body size.     This report was finalized on 12/4/2018 3:58 PM by Dr. Robert Yuen M.D.       XR Chest 1 View [[id number]] Collected:  12/04/18 1048     Updated:  12/04/18 1431    Narrative:       HISTORY: Cough. Bilateral hip pain. Fell. 12/04/2018     AP CHEST      FINDINGS: Heart size is mildly enlarged. There is mild to moderate  interstitial disease of the lungs which has progressed somewhat since  the 11/25/2018 study.     Lungs otherwise appear free of acute infiltrates. Bilateral breast  implants are seen. Sternotomy wires and mediastinal surgical clips are  seen. There is some splenic artery  calcification.             Impression:       Cardiomegaly. Mild to moderate interstitial disease of the lungs could  represent an element of mild pulmonary edema. Please correlate.           AP PELVIS AND BILATERAL HIPS 2 VIEWS OF EACH     FINDINGS: There is deformity of the right pubic rami from old fractures  which were seen on the 10/05/2013 study. Surgical screws are seen in the  right femoral head and neck.     No acute fractures or dislocations are seen in the bilateral hips and  pelvis.     IMPRESSION:   1. Old pubic rami fractures on the right.  2. No acute process identified.     This report was finalized on 12/4/2018 2:27 PM by Dr. Rey Morris M.D.       XR Hips Bilateral With or Without Pelvis 2 View [[id number]] Collected:  12/04/18 1048     Updated:  12/04/18 1431    Narrative:       HISTORY: Cough. Bilateral hip pain. Fell. 12/04/2018     AP CHEST      FINDINGS: Heart size is mildly enlarged. There is mild to moderate  interstitial disease of the lungs which has progressed somewhat since  the 11/25/2018 study.     Lungs otherwise appear free of acute infiltrates. Bilateral breast  implants are seen. Sternotomy wires and mediastinal surgical clips are  seen. There is some splenic artery calcification.             Impression:       Cardiomegaly. Mild to moderate interstitial disease of the lungs could  represent an element of mild pulmonary edema. Please correlate.           AP PELVIS AND BILATERAL HIPS 2 VIEWS OF EACH     FINDINGS: There is deformity of the right pubic rami from old fractures  which were seen on the 10/05/2013 study. Surgical screws are seen in the  right femoral head and neck.     No acute fractures or dislocations are seen in the bilateral hips and  pelvis.     IMPRESSION:   1. Old pubic rami fractures on the right.  2. No acute process identified.     This report was finalized on 12/4/2018 2:27 PM by Dr. Rey Morris M.D.               Assessment:        Complicated UTI  (urinary tract infection)    Essential hypertension    DM (diabetes mellitus) (CMS/[facility])    Toxic metabolic encephalopathy    Dementia with behavioral disturbance    Alzheimer disease      Patient Active Problem List   Diagnosis Code   • Acute respiratory failure with hypoxia (CMS/HCC) J96.01   • Essential hypertension I10   • Syncope and collapse R55   • Bacteriuria R82.71   • HCAP (healthcare-associated pneumonia) J18.9   • Hypoglycemia E16.2   • DM (diabetes mellitus) (CMS/HCC) E11.9   • Acute on chronic systolic CHF (congestive heart failure) (CMS/HCC) I50.23   • Toxic metabolic encephalopathy G92   • Alzheimer disease G30.9, F02.80   • Pneumonia of right lower lobe due to infectious organism (CMS/HCC) J18.1   • Acute hypoxemic respiratory failure (CMS/HCC) J96.01   • CHF (congestive heart failure), NYHA class I, acute on chronic, diastolic (CMS/HCC) I50.33   • Dehydration E86.0   • Elevated troponin R74.8   • Nonrheumatic mitral valve insufficiency I34.0   • Edema of both legs R60.0   • Dementia with behavioral disturbance F03.91   • Complicated UTI (urinary tract infection) N39.0       Plan:      Continue IV antibiotics for UTI  Continue to hold diuretics, Cr worse today and not eating much  Monitor and correct electrolytes  Adjust insulin as needed, BS > 300  Monitor mental status  Continue home medications  PT to see pt, St f/u  DVT/stress ulcer prophylaxis  Labs in       Sanjay Butcher MD  12/7/2018  1:40 PM

## 2018-12-08 NOTE — PROGRESS NOTES
DAILY PROGRESS NOTE  [facility], [facility]    2018    Patient Identification:  Name: Bernardo Cox  Age: 87 y.o.  Sex: female  :  1931  MRN: [id number]           Primary Care Physician: Tay Lynch MD    Subjective:    Interval History:    Not eating or drinking much, creatinine went up yesterday, so IV fluids started. Is back down to 1.02, her baseline.  She was agitated and restless last night, Ativan 0.5 mg IV given calm after that  Calm this morning.    Has refused most medications.  Blood sugar still elevated yesterday, better this morning.      ROS:     No N, V,D,C,CP    Objective:    Scheduled Meds:    budesonide 0.5 mg Nebulization BID   carvedilol 25 mg Oral BID With Meals   ceftriaxone 1 g Intravenous Q24H   clopidogrel 75 mg Oral Daily   donepezil 10 mg Oral Nightly   enoxaparin 30 mg Subcutaneous Q24H   escitalopram 10 mg Oral Daily   hydrocerin  Topical Daily   insulin lispro 0-7 Units Subcutaneous 4x Daily With Meals & Nightly   ipratropium-albuterol 3 mL Nebulization 4x Daily - RT   LORazepam 0.5 mg Oral Nightly   memantine 5 mg Oral Q12H   pantoprazole 40 mg Oral Daily   potassium chloride 20 mEq Oral Daily       Continuous Infusions:    sodium chloride 100 mL/hr Last Rate: 100 mL/hr (18 0407)       PRN Meds:  •  acetaminophen  •  dextrose  •  dextrose  •  glucagon (human recombinant)  •  nitroglycerin  •  ondansetron    Intake/Output:    Intake/Output Summary (Last 24 hours) at 2018 1014  Last data filed at 2018 0920  Gross per 24 hour   Intake 360 ml   Output --   Net 360 ml         Exam:    tMax 24 hrs: Temp (24hrs), Av.5 °F (36.4 °C), Min:97.3 °F (36.3 °C), Max:97.7 °F (36.5 °C)    Vitals Ranges:   Temp:  [97.3 °F (36.3 °C)-97.7 °F (36.5 °C)] 97.7 °F (36.5 °C)  Heart Rate:  [74-87] 86  Resp:  [18-28] 22  BP: (140-152)/(79-85) 152/85    /85 (BP Location: Right arm, Patient Position: Lying)   Pulse 86   Temp 97.7  "°F (36.5 °C) (Oral)   Resp 22   Ht 165.1 cm (65\")   Wt 58.8 kg (129 lb 11.2 oz)   SpO2 97%   BMI 21.58 kg/m²     General: awake, calm this morning, oriented x 1 when awake; no distress, appears stated age  Neck: Supple, symmetrical, trachea midline, no adenopathy;              thyroid:  no enlargement/tenderness/nodules;              no carotid bruit or JVD  Cardiovascular: Normal rate, regular rhythm and intact distal pulses.              Exam reveals no gallop and no friction rub. No murmur heard  Chest wall: No tenderness or deformity  Pulmonary:  diminished breath sounds bilaterally, respirations unlabored.               No rhonchi, wheezing or rales.   Abdominal: Soft. Soft, non-tender, bowel sounds active all four quadrants,     no masses, no hepatomegaly, no splenomegaly.   Extremities: Normal, atraumatic, no cyanosis. Trace edema (better than last admission)  Pulses: 2 + symmetric all extremities  Neurological: Patient is alert and oriented to person when she is awake No new focal sensory motor deficit  Skin: Skin color, texture, normal. Turgor is decreased. No rashes or lesions           Data Review:    Results from last 7 days   Lab Units  12/08/18   0545  12/07/18   0536  12/06/18   0443   WBC 10*3/mm3  7.05  4.39*  7.41   HEMOGLOBIN g/dL  10.4*  10.3*  10.2*   HEMATOCRIT %  34.9*  36.2  35.0*   PLATELETS 10*3/mm3  279  272  278       Results from last 7 days   Lab Units  12/08/18   0545  12/07/18   0536  12/06/18   0443   12/04/18   0941   SODIUM mmol/L  134*  131*  130*   < >  130*   POTASSIUM mmol/L  4.1  4.4  4.3   < >  4.8   CHLORIDE mmol/L  96*  92*  91*   < >  91*   CO2 mmol/L  25.1  25.5  25.9   < >  26.7   BUN mg/dL  15  18  17   < >  14   CREATININE mg/dL  1.02*  1.62*  1.35*   < >  1.01*   CALCIUM mg/dL  8.3*  8.1*  8.2*   < >  9.2   BILIRUBIN mg/dL   --    --    --    --   0.8   ALK PHOS U/L   --    --    --    --   127*   ALT (SGPT) U/L   --    --    --    --   10   AST (SGOT) U/L   --   "  --    --    --   10   GLUCOSE mg/dL  140*  380*  175*   < >  288*    < > = values in this interval not displayed.                 Lab Results   Lab Value Date/Time    TROPONINT 0.012 12/04/2018 0941    TROPONINT 0.034 (H) 11/19/2018 1741    TROPONINT <0.010 04/15/2018 0151    TROPONINT <0.010 03/26/2018 0401    TROPONINT <0.010 03/23/2018 0613    TROPONINT 0.035 (H) 01/26/2018 1040    TROPONINT 0.023 11/03/2017 0510    TROPONINT 0.052 (H) 11/02/2017 0302    TROPONINT 0.054 (H) 11/01/2017 2105    TROPONINT 0.084 (H) 11/01/2017 1320    TROPONINT 0.017 07/04/2017 1653    TROPONINT <0.010 08/07/2016 2158    TROPONINT <0.01 01/19/2016 1405       Microbiology Results (last 10 days)     Procedure Component Value - Date/Time    Urine Culture - Urine, Urine, Catheter In/Out [[id number]]  (Abnormal)  (Susceptibility) Collected:  12/04/18 0945    Lab Status:  Final result Specimen:  Urine, Catheter In/Out Updated:  12/06/18 0828     Urine Culture >100,000 CFU/mL Escherichia coli    Susceptibility      Escherichia coli     ARTHUR     Ampicillin Resistant     Ampicillin + Sulbactam Susceptible     Cefazolin Susceptible     Cefepime Susceptible     Ceftriaxone Susceptible     Ciprofloxacin Susceptible     Ertapenem Susceptible     Gentamicin Susceptible     Levofloxacin Susceptible     Nitrofurantoin Susceptible     Piperacillin + Tazobactam Susceptible     Tetracycline Resistant     Trimethoprim + Sulfamethoxazole Susceptible                           Imaging Results (last 7 days)     Procedure Component Value Units Date/Time    CT Head Without Contrast [[id number]] Collected:  12/04/18 1116     Updated:  12/04/18 1601    Narrative:       EMERGENCY NONCONTRAST HEAD CT AND NONCONTRAST CERVICAL SPINE CT  12/04/2018     CLINICAL HISTORY: Head trauma to top of head, headache and neck pain.     HEAD CT      TECHNIQUE: Spiral CT images were obtained from the base of the skull to  the vertex without intravenous contrast. Images were  reformatted and  submitted in 3 mm thick axial CT sections with brain algorithm. 2 mm  thick axial CT sections with high-resolution bone algorithm and 2 mm  thick sagittal and coronal reconstructions were performed with brain  algorithm.     This is correlated to a noncontrast head CT 03/27/2018.     FINDINGS: There are patchy areas of low density in periventricular and  throughout the subcortical white matter of the cerebral hemispheres  consistent with moderate small vessel disease. There is a tiny old  lacunar infarction in the superior right putamen. The remainder of the  brain parenchyma is normal in attenuation. The ventricles are normal in  size. I see no focal mass effect and no midline shift. No extra-axial  fluid collections are identified. There is no evidence of acute  intracranial hemorrhage. There is subtotal opacification of the left  frontal sinus and anterior ethmoid sinus with fluid and mucosal  thickening, minimal mucosal thickening in the inferior left maxillary  sinus. The remainder of the paranasal sinuses and mastoid air cells and  middle ear cavities are clear. No acute skull fracture is seen. There  are extensive calcified atherosclerotic plaques in the  intracranial  segments of the distal vertebral arteries and cavernous segments of the  internal carotid arteries bilaterally.       Impression:          1. There is moderate small vessel disease in the cerebral white matter,  a 4 mm old lacunar infarct in the right putamen, extensive calcified  atherosclerotic plaques in the intracranial segments of the distal  vertebral arteries and cavernous segments of internal carotid arteries  bilaterally.     2. There is subtotal opacification of the left frontal and anterior  ethmoid sinus with fluid and mucosal thickening and mild mucosal  thickening of the inferior left maxillary sinus.     The remainder of the head CT is within normal limits. Specifically no  acute skull fracture or intracranial  hemorrhage is identified.        CERVICAL SPINE CT      TECHNIQUE: Spiral CT images were obtained from the skull base down to  the T2 thoracic level and images were reformatted and are submitted in 2  mm thick axial, sagittal and coronal CT sections with soft tissue  algorithm, 1 mm thick axial, sagittal and coronal CT sections with  high-resolution bone algorithm.     FINDINGS: There are arthritic changes at the atlantodental interval with  marginal spurring off the anterior ring of C1 and the odontoid and there  are some arthritic changes at the articulation of the right lateral  masses of C1 and C2 with joint space narrowing and marginal spurring.     At C2-3 there is moderate right facet overgrowth with minimal right  foraminal narrowing. There is mild posterior disc bulge and minimal  central canal narrowing. There is no left foraminal narrowing.     At C3-4 there is mild-to-moderate right and moderate-to-severe left  facet overgrowth. The posterior disc margin is normal. There is no canal  or right foraminal narrowing. There is mild left foraminal narrowing.     At C4-5 there is moderate bilateral facet overgrowth, disc space  narrowing, degenerative endplate change, diffuse posterior disc  osteophyte complex that abuts and deforms the ventral surface of the  cord, at least mild-to-moderate up to moderately narrows the canal.  There is some uncovertebral joint spurring and there is mild-to-moderate  bilateral bony foraminal narrowing.     At C5-6 there is mild bilateral facet overgrowth, moderate disc space  narrowing and degenerative endplate changes, diffuse posterior disc  osteophyte complex abuts and flattens the ventral surface of the cord  moderate-to-severely narrowing the canal. There is bilateral  uncovertebral joint hypertrophy and there is mild-to-moderate bilateral  bony foraminal narrowing.     At C6-7 there is mild disc space narrowing, degenerative endplate  change, mild facet overgrowth, mild  posterior disc osteophyte complex,  mild canal and there is moderate right and minimal if any left foraminal  narrowing.     At C7-T1 there is mild left and moderate right facet overgrowth. There  is mild right bony foraminal narrowing and no canal or left foraminal  narrowing.     No acute fracture is seen in the cervical spine.     IMPRESSION:  No acute fracture is seen in the cervical spine. There is cervical  spondylosis as described in great detail above.     Radiation dose reduction techniques were utilized, including automated  exposure control and exposure modulation based on body size.     This report was finalized on 12/4/2018 3:58 PM by Dr. Robert Yuen M.D.       CT Cervical Spine Without Contrast [[id number]] Collected:  12/04/18 1116     Updated:  12/04/18 1601    Narrative:       EMERGENCY NONCONTRAST HEAD CT AND NONCONTRAST CERVICAL SPINE CT  12/04/2018     CLINICAL HISTORY: Head trauma to top of head, headache and neck pain.     HEAD CT      TECHNIQUE: Spiral CT images were obtained from the base of the skull to  the vertex without intravenous contrast. Images were reformatted and  submitted in 3 mm thick axial CT sections with brain algorithm. 2 mm  thick axial CT sections with high-resolution bone algorithm and 2 mm  thick sagittal and coronal reconstructions were performed with brain  algorithm.     This is correlated to a noncontrast head CT 03/27/2018.     FINDINGS: There are patchy areas of low density in periventricular and  throughout the subcortical white matter of the cerebral hemispheres  consistent with moderate small vessel disease. There is a tiny old  lacunar infarction in the superior right putamen. The remainder of the  brain parenchyma is normal in attenuation. The ventricles are normal in  size. I see no focal mass effect and no midline shift. No extra-axial  fluid collections are identified. There is no evidence of acute  intracranial hemorrhage. There is subtotal opacification of  the left  frontal sinus and anterior ethmoid sinus with fluid and mucosal  thickening, minimal mucosal thickening in the inferior left maxillary  sinus. The remainder of the paranasal sinuses and mastoid air cells and  middle ear cavities are clear. No acute skull fracture is seen. There  are extensive calcified atherosclerotic plaques in the  intracranial  segments of the distal vertebral arteries and cavernous segments of the  internal carotid arteries bilaterally.       Impression:          1. There is moderate small vessel disease in the cerebral white matter,  a 4 mm old lacunar infarct in the right putamen, extensive calcified  atherosclerotic plaques in the intracranial segments of the distal  vertebral arteries and cavernous segments of internal carotid arteries  bilaterally.     2. There is subtotal opacification of the left frontal and anterior  ethmoid sinus with fluid and mucosal thickening and mild mucosal  thickening of the inferior left maxillary sinus.     The remainder of the head CT is within normal limits. Specifically no  acute skull fracture or intracranial hemorrhage is identified.        CERVICAL SPINE CT      TECHNIQUE: Spiral CT images were obtained from the skull base down to  the T2 thoracic level and images were reformatted and are submitted in 2  mm thick axial, sagittal and coronal CT sections with soft tissue  algorithm, 1 mm thick axial, sagittal and coronal CT sections with  high-resolution bone algorithm.     FINDINGS: There are arthritic changes at the atlantodental interval with  marginal spurring off the anterior ring of C1 and the odontoid and there  are some arthritic changes at the articulation of the right lateral  masses of C1 and C2 with joint space narrowing and marginal spurring.     At C2-3 there is moderate right facet overgrowth with minimal right  foraminal narrowing. There is mild posterior disc bulge and minimal  central canal narrowing. There is no left foraminal  narrowing.     At C3-4 there is mild-to-moderate right and moderate-to-severe left  facet overgrowth. The posterior disc margin is normal. There is no canal  or right foraminal narrowing. There is mild left foraminal narrowing.     At C4-5 there is moderate bilateral facet overgrowth, disc space  narrowing, degenerative endplate change, diffuse posterior disc  osteophyte complex that abuts and deforms the ventral surface of the  cord, at least mild-to-moderate up to moderately narrows the canal.  There is some uncovertebral joint spurring and there is mild-to-moderate  bilateral bony foraminal narrowing.     At C5-6 there is mild bilateral facet overgrowth, moderate disc space  narrowing and degenerative endplate changes, diffuse posterior disc  osteophyte complex abuts and flattens the ventral surface of the cord  moderate-to-severely narrowing the canal. There is bilateral  uncovertebral joint hypertrophy and there is mild-to-moderate bilateral  bony foraminal narrowing.     At C6-7 there is mild disc space narrowing, degenerative endplate  change, mild facet overgrowth, mild posterior disc osteophyte complex,  mild canal and there is moderate right and minimal if any left foraminal  narrowing.     At C7-T1 there is mild left and moderate right facet overgrowth. There  is mild right bony foraminal narrowing and no canal or left foraminal  narrowing.     No acute fracture is seen in the cervical spine.     IMPRESSION:  No acute fracture is seen in the cervical spine. There is cervical  spondylosis as described in great detail above.     Radiation dose reduction techniques were utilized, including automated  exposure control and exposure modulation based on body size.     This report was finalized on 12/4/2018 3:58 PM by Dr. Robert Yuen M.D.       XR Chest 1 View [[id number]] Collected:  12/04/18 1048     Updated:  12/04/18 1431    Narrative:       HISTORY: Cough. Bilateral hip pain. Fell. 12/04/2018     AP CHEST       FINDINGS: Heart size is mildly enlarged. There is mild to moderate  interstitial disease of the lungs which has progressed somewhat since  the 11/25/2018 study.     Lungs otherwise appear free of acute infiltrates. Bilateral breast  implants are seen. Sternotomy wires and mediastinal surgical clips are  seen. There is some splenic artery calcification.             Impression:       Cardiomegaly. Mild to moderate interstitial disease of the lungs could  represent an element of mild pulmonary edema. Please correlate.           AP PELVIS AND BILATERAL HIPS 2 VIEWS OF EACH     FINDINGS: There is deformity of the right pubic rami from old fractures  which were seen on the 10/05/2013 study. Surgical screws are seen in the  right femoral head and neck.     No acute fractures or dislocations are seen in the bilateral hips and  pelvis.     IMPRESSION:   1. Old pubic rami fractures on the right.  2. No acute process identified.     This report was finalized on 12/4/2018 2:27 PM by Dr. Rey Morris M.D.       XR Hips Bilateral With or Without Pelvis 2 View [[id number]] Collected:  12/04/18 1048     Updated:  12/04/18 1431    Narrative:       HISTORY: Cough. Bilateral hip pain. Fell. 12/04/2018     AP CHEST      FINDINGS: Heart size is mildly enlarged. There is mild to moderate  interstitial disease of the lungs which has progressed somewhat since  the 11/25/2018 study.     Lungs otherwise appear free of acute infiltrates. Bilateral breast  implants are seen. Sternotomy wires and mediastinal surgical clips are  seen. There is some splenic artery calcification.             Impression:       Cardiomegaly. Mild to moderate interstitial disease of the lungs could  represent an element of mild pulmonary edema. Please correlate.           AP PELVIS AND BILATERAL HIPS 2 VIEWS OF EACH     FINDINGS: There is deformity of the right pubic rami from old fractures  which were seen on the 10/05/2013 study. Surgical screws are seen in  the  right femoral head and neck.     No acute fractures or dislocations are seen in the bilateral hips and  pelvis.     IMPRESSION:   1. Old pubic rami fractures on the right.  2. No acute process identified.     This report was finalized on 12/4/2018 2:27 PM by Dr. Rey Morris M.D.               Assessment:        Complicated UTI (urinary tract infection)    Essential hypertension    DM (diabetes mellitus) (CMS/[facility])    Toxic metabolic encephalopathy    Dementia with behavioral disturbance    Alzheimer disease      Patient Active Problem List   Diagnosis Code   • Acute respiratory failure with hypoxia (CMS/HCC) J96.01   • Essential hypertension I10   • Syncope and collapse R55   • Bacteriuria R82.71   • HCAP (healthcare-associated pneumonia) J18.9   • Hypoglycemia E16.2   • DM (diabetes mellitus) (CMS/[facility]) E11.9   • Acute on chronic systolic CHF (congestive heart failure) (CMS/HCC) I50.23   • Toxic metabolic encephalopathy G92   • Alzheimer disease G30.9, F02.80   • Pneumonia of right lower lobe due to infectious organism (CMS/HCC) J18.1   • Acute hypoxemic respiratory failure (CMS/HCC) J96.01   • CHF (congestive heart failure), NYHA class I, acute on chronic, diastolic (CMS/HCC) I50.33   • Dehydration E86.0   • Elevated troponin R74.8   • Nonrheumatic mitral valve insufficiency I34.0   • Edema of both legs R60.0   • Dementia with behavioral disturbance F03.91   • Complicated UTI (urinary tract infection) N39.0       Plan:      Continue IV antibiotics for UTI  Continue to hold diuretics. Cr better today.  After IV fluids.  Will adjust patient's psychiatric medications since she still restless and agitated.  Add depakote and qhs remeron  Monitor and correct electrolytes  Adjust insulin as needed, BS better this am  Monitor mental status  Continue home medications  PT to see pt, St f/u  DVT/stress ulcer prophylaxis  Labs in am      Sanjay Butcher MD  12/8/2018  10:14 AM

## 2018-12-08 NOTE — PLAN OF CARE
Problem: Fall Risk (Adult)  Goal: Absence of Fall  Outcome: Ongoing (interventions implemented as appropriate)      Problem: Patient Care Overview  Goal: Plan of Care Review  Outcome: Ongoing (interventions implemented as appropriate)   12/08/18 0522   Coping/Psychosocial   Plan of Care Reviewed With patient   Plan of Care Review   Progress no change   OTHER   Outcome Summary pt is only alert to self, very agitated/restless beginning of the shift, 1x dose IV ativan given per Dr. Butcher, no falls, bed alarm on, IV antibitoic/fluids, turn, continue to monitor     Goal: Individualization and Mutuality  Outcome: Ongoing (interventions implemented as appropriate)    Goal: Discharge Needs Assessment  Outcome: Ongoing (interventions implemented as appropriate)      Problem: Urinary Tract Infection (Adult)  Goal: Signs and Symptoms of Listed Potential Problems Will be Absent, Minimized or Managed (Urinary Tract Infection)  Outcome: Ongoing (interventions implemented as appropriate)      Problem: Wound (Includes Pressure Injury) (Adult)  Goal: Signs and Symptoms of Listed Potential Problems Will be Absent, Minimized or Managed (Wound)  Outcome: Ongoing (interventions implemented as appropriate)

## 2018-12-08 NOTE — PLAN OF CARE
"Problem: Patient Care Overview  Goal: Plan of Care Review  Outcome: Ongoing (interventions implemented as appropriate)   12/07/18 2113   Coping/Psychosocial   Plan of Care Reviewed With patient   Plan of Care Review   Progress no change   OTHER   Outcome Summary Pt labile throughout the day today - at times calm and a bit sleepy, at times could be verbally abusive and agitated. Repeatedly states she wants to go \"back to my other place\". Wound care to BLE. Swelling of BLE is much improved. Poor appetite and fluid intake - pt does not like thickened liquids or the mechanical soft diet. Also refusing to take pills crushed today. Dr. Butcher aware.        Problem: Urinary Tract Infection (Adult)  Goal: Signs and Symptoms of Listed Potential Problems Will be Absent, Minimized or Managed (Urinary Tract Infection)  Outcome: Ongoing (interventions implemented as appropriate)      Problem: Wound (Includes Pressure Injury) (Adult)  Goal: Signs and Symptoms of Listed Potential Problems Will be Absent, Minimized or Managed (Wound)  Outcome: Ongoing (interventions implemented as appropriate)        "

## 2018-12-08 NOTE — PLAN OF CARE
Problem: Patient Care Overview  Goal: Plan of Care Review  Outcome: Ongoing (interventions implemented as appropriate)   12/08/18 1514   Coping/Psychosocial   Plan of Care Reviewed With patient   OTHER   Outcome Summary Pt is alert only to self. Very agitated towards any activites. Pt was restless and tried to get out of bed on multiple occasions. Bed alarm is activated. Pt was up to the chair once today because she refused to stay in the bed. Stayed for a short time. Pt has been repositioned often because she would not stay in bed. Will continue to monitor pt.      Goal: Individualization and Mutuality  Outcome: Ongoing (interventions implemented as appropriate)   12/08/18 1514   Individualization   Patient Specific Preferences Pt does not like to be messed with or turned; explained to pt the importance       Problem: Wound (Includes Pressure Injury) (Adult)  Goal: Signs and Symptoms of Listed Potential Problems Will be Absent, Minimized or Managed (Wound)  Outcome: Ongoing (interventions implemented as appropriate)   12/08/18 1514   Goal/Outcome Evaluation   Problems Assessed (Wound) all   Problems Present (Wound) delayed wound healing;pain;situational response

## 2018-12-08 NOTE — NURSING NOTE
Pt has refused to take crushed pills throughout the day today. Attempted with applesauce, pudding, and softened ice cream. Pt would take a bit off of the spoon and then refuse the rest, so unsure of how much of her meds she was able to get in today. Dr. Butcher notified. Pt insisting on taking pills whole and grew very agitated that anyone would insist on crushing them. Per Dr. Butcher, OK to attempt small pills whole with water to see how pt does.

## 2018-12-09 NOTE — PROGRESS NOTES
[facility]  [address] [facility] [id number] [phone]                                 Progress Note    Time of the Session: 48 min    Name: Fer Wang     Date: 1/27/2017      Session Type (CPT code): 34566    Others Present: none    Intervention: CBT, Insight, Solution Focused    Patient Level of Functioning: No Change     Current GAF (1-100): 70    Suicide/Homicide/Violence Ideation: No    SUICIDE PREVENTION PLAN IMPLEMENTED - CALL 911, THERAPIST, REACH TO CLOSE PEOPLE IN CASE OF SUICIDAL IDEATION, PLAN, INTENT, URGE: N/A    If Yes, explain: NA    Change in Medication(s) Reported: No  If Yes, explain: N/A    Patient/Family Education Provided: Yes  Patient/Family Displays Understanding: Yes    If No, explain: N/A    Chief Complaint   Patient presents with   • Personal      Data: The patient stated: \"I am afraid to fall in deep into my relationships and I feel like to resist, that gives me anxiety.\" Patient reports feeling anxious because of his relationship ambiguity. Patient presents with anxiety, open and talkative. Patient opened for change, patient's insight is good and patient is able to engage in therapy and participate.     Assessment: Writer provided psychotherapy to patient. Patient is open while exploring his anxiety and ambivalence. Writer continued working w/ patient on understanding reasons for his relationship stress and fear of commitment.            Response: Patient is motivated and engaged, shared his personal information and feelings. Denies suicidal/homicidal ideations, open and oriented times 3, open, honest, communicative, good affect, emotionally open; agreeable to Treatment Plan.     Plan: Treatment Plan goals continued: exercise on regular basis, implementing healthy diet, read self-help literature, keep journal, take medications if/as prescribed psychiatrist, decrease of depression  Patient became combative and ripped mask off shortly after starting treatment - treatment terminated and incident reported.   and anxiety by 60%, make a list of strengths and how to utilize them, make a decision about the commitment in counseling, explore feelings of 'abandonement', keep a journal/planner, advance career of psychology and learn more about his own defenses, exploring positive aspects of exposing vulnerability and \"so called weaknesses\", identifying patterns of his enmeshment w/ mother, enroll into family therapy, short, pleasant and simple conversation w/ mother.      Next Session: 2 week    Need for Community Resources Assessed: Yes    Resources Needed: No    If Yes, what resources: NA    Treatment Plan: Update     Discharge Plan: Strategies Discussed to Maintain Gains    Diagnosis: (Primary first, followed by secondary) The primary encounter diagnosis was Generalized anxiety disorder. Diagnoses of Dysthymic disorder, Partner relationship problems, and Other insomnia were also pertinent to this visit.               Patient Active Problem List   Diagnosis   • Enthesopathy of ankle and tarsus, unspecified   • Allergic rhinitis, cause unspecified   • Acute sinusitis, unspecified   • Sprain of neck   • Neck muscle spasm     Provider: Jd Mcintosh M.S., LPC, CSAC, ICS

## 2018-12-09 NOTE — PLAN OF CARE
Problem: Patient Care Overview  Goal: Plan of Care Review  Outcome: Ongoing (interventions implemented as appropriate)   12/09/18 0532   Coping/Psychosocial   Plan of Care Reviewed With patient   Plan of Care Review   Progress no change   OTHER   Outcome Summary VSS. Alert to self only. Pleasant and cooperative and rude and uncooperative at times. BLE ace wrapped last changed 12/07. Changed every other day. RF turns. Ativan given at HS with remeron. Asst x 3 on BSC for BM. Incontinent at time. Bed alarm d/t impulsive behavior. Turn team. RF turns. Turn patient and she gets back on her back or tries to get out of bed. Perianal area red and excoriated. Z guard applied.

## 2018-12-09 NOTE — PROGRESS NOTES
"DAILY PROGRESS NOTE  [facility], [facility]    2018    Patient Identification:  Name: Bernardo Cox  Age: 87 y.o.  Sex: female  :  1931  MRN: [id number]           Primary Care Physician: Tay Lynch MD    Subjective:    Interval History:    Still combative last night, however she slept better.  No much cooperative.  Still not taking her medications.  Blood sugar still elevated.  She is however eating okay.      ROS:     No N, V,D,C,CP    Objective:    Scheduled Meds:    budesonide 0.5 mg Nebulization BID   carvedilol 25 mg Oral BID With Meals   ceftriaxone 1 g Intravenous Q24H   clopidogrel 75 mg Oral Daily   Divalproex Sodium 125 mg Oral Q12H   donepezil 10 mg Oral Nightly   enoxaparin 30 mg Subcutaneous Q24H   escitalopram 10 mg Oral Daily   hydrocerin  Topical Daily   insulin lispro 0-7 Units Subcutaneous 4x Daily With Meals & Nightly   ipratropium-albuterol 3 mL Nebulization 4x Daily - RT   memantine 5 mg Oral Q12H   mirtazapine 15 mg Oral Nightly   pantoprazole 40 mg Oral Daily   potassium chloride 20 mEq Oral Daily       Continuous Infusions:       PRN Meds:  •  acetaminophen  •  dextrose  •  dextrose  •  glucagon (human recombinant)  •  LORazepam  •  nitroglycerin  •  ondansetron    Intake/Output:    Intake/Output Summary (Last 24 hours) at 2018 0905  Last data filed at 2018 0600  Gross per 24 hour   Intake 3074.29 ml   Output --   Net 3074.29 ml         Exam:    tMax 24 hrs: Temp (24hrs), Av °F (36.1 °C), Min:96.8 °F (36 °C), Max:97.3 °F (36.3 °C)    Vitals Ranges:   Temp:  [96.8 °F (36 °C)-97.3 °F (36.3 °C)] 96.8 °F (36 °C)  Heart Rate:  [] 91  Resp:  [18-22] 20  BP: (133-177)/(69-90) 177/90    /90 (BP Location: Right arm, Patient Position: Lying)   Pulse 91   Temp 96.8 °F (36 °C) (Axillary)   Resp 20   Ht 165.1 cm (65\")   Wt 58.8 kg (129 lb 11.2 oz)   SpO2 90%   BMI 21.58 kg/m²     General: Sleepy this morning, " oriented x 1 when awake; no distress, appears stated age  Neck: Supple, symmetrical, trachea midline, no adenopathy;              thyroid:  no enlargement/tenderness/nodules;              no carotid bruit or JVD  Cardiovascular: Normal rate, regular rhythm and intact distal pulses.              Exam reveals no gallop and no friction rub. No murmur heard  Chest wall: No tenderness or deformity  Pulmonary:  diminished breath sounds bilaterally, respirations unlabored.               No rhonchi, wheezing or rales.   Abdominal: Soft. Soft, non-tender, bowel sounds active all four quadrants,     no masses, no hepatomegaly, no splenomegaly.   Extremities: Normal, atraumatic, no cyanosis. Trace edema (better than last admission)  Pulses: 2 + symmetric all extremities  Neurological: Patient is alert and oriented to person when she is awake No new focal sensory motor deficit  Skin: Skin color, texture, normal. Turgor is decreased. No rashes or lesions           Data Review:    Results from last 7 days   Lab Units  12/09/18   0630  12/08/18   0545  12/07/18   0536   WBC 10*3/mm3  6.38  7.05  4.39*   HEMOGLOBIN g/dL  11.0*  10.4*  10.3*   HEMATOCRIT %  36.6  34.9*  36.2   PLATELETS 10*3/mm3  252  279  272       Results from last 7 days   Lab Units  12/09/18   0630  12/08/18   0545  12/07/18   0536   12/04/18   0941   SODIUM mmol/L  134*  134*  131*   < >  130*   POTASSIUM mmol/L  4.7  4.1  4.4   < >  4.8   CHLORIDE mmol/L  98  96*  92*   < >  91*   CO2 mmol/L  23.8  25.1  25.5   < >  26.7   BUN mg/dL  11  15  18   < >  14   CREATININE mg/dL  1.05*  1.02*  1.62*   < >  1.01*   CALCIUM mg/dL  8.7  8.3*  8.1*   < >  9.2   BILIRUBIN mg/dL   --    --    --    --   0.8   ALK PHOS U/L   --    --    --    --   127*   ALT (SGPT) U/L   --    --    --    --   10   AST (SGOT) U/L   --    --    --    --   10   GLUCOSE mg/dL  301*  140*  380*   < >  288*    < > = values in this interval not displayed.                 Lab Results   Lab Value  Date/Time    TROPONINT 0.012 12/04/2018 0941    TROPONINT 0.034 (H) 11/19/2018 1741    TROPONINT <0.010 04/15/2018 0151    TROPONINT <0.010 03/26/2018 0401    TROPONINT <0.010 03/23/2018 0613    TROPONINT 0.035 (H) 01/26/2018 1040    TROPONINT 0.023 11/03/2017 0510    TROPONINT 0.052 (H) 11/02/2017 0302    TROPONINT 0.054 (H) 11/01/2017 2105    TROPONINT 0.084 (H) 11/01/2017 1320    TROPONINT 0.017 07/04/2017 1653    TROPONINT <0.010 08/07/2016 2158    TROPONINT <0.01 01/19/2016 1405       Microbiology Results (last 10 days)     Procedure Component Value - Date/Time    Urine Culture - Urine, Urine, Catheter In/Out [[id number]]  (Abnormal)  (Susceptibility) Collected:  12/04/18 0945    Lab Status:  Final result Specimen:  Urine, Catheter In/Out Updated:  12/06/18 0828     Urine Culture >100,000 CFU/mL Escherichia coli    Susceptibility      Escherichia coli     ARTHUR     Ampicillin Resistant     Ampicillin + Sulbactam Susceptible     Cefazolin Susceptible     Cefepime Susceptible     Ceftriaxone Susceptible     Ciprofloxacin Susceptible     Ertapenem Susceptible     Gentamicin Susceptible     Levofloxacin Susceptible     Nitrofurantoin Susceptible     Piperacillin + Tazobactam Susceptible     Tetracycline Resistant     Trimethoprim + Sulfamethoxazole Susceptible                           Imaging Results (last 7 days)     Procedure Component Value Units Date/Time    CT Head Without Contrast [[id number]] Collected:  12/04/18 1116     Updated:  12/04/18 1601    Narrative:       EMERGENCY NONCONTRAST HEAD CT AND NONCONTRAST CERVICAL SPINE CT  12/04/2018     CLINICAL HISTORY: Head trauma to top of head, headache and neck pain.     HEAD CT      TECHNIQUE: Spiral CT images were obtained from the base of the skull to  the vertex without intravenous contrast. Images were reformatted and  submitted in 3 mm thick axial CT sections with brain algorithm. 2 mm  thick axial CT sections with high-resolution bone algorithm and 2  mm  thick sagittal and coronal reconstructions were performed with brain  algorithm.     This is correlated to a noncontrast head CT 03/27/2018.     FINDINGS: There are patchy areas of low density in periventricular and  throughout the subcortical white matter of the cerebral hemispheres  consistent with moderate small vessel disease. There is a tiny old  lacunar infarction in the superior right putamen. The remainder of the  brain parenchyma is normal in attenuation. The ventricles are normal in  size. I see no focal mass effect and no midline shift. No extra-axial  fluid collections are identified. There is no evidence of acute  intracranial hemorrhage. There is subtotal opacification of the left  frontal sinus and anterior ethmoid sinus with fluid and mucosal  thickening, minimal mucosal thickening in the inferior left maxillary  sinus. The remainder of the paranasal sinuses and mastoid air cells and  middle ear cavities are clear. No acute skull fracture is seen. There  are extensive calcified atherosclerotic plaques in the  intracranial  segments of the distal vertebral arteries and cavernous segments of the  internal carotid arteries bilaterally.       Impression:          1. There is moderate small vessel disease in the cerebral white matter,  a 4 mm old lacunar infarct in the right putamen, extensive calcified  atherosclerotic plaques in the intracranial segments of the distal  vertebral arteries and cavernous segments of internal carotid arteries  bilaterally.     2. There is subtotal opacification of the left frontal and anterior  ethmoid sinus with fluid and mucosal thickening and mild mucosal  thickening of the inferior left maxillary sinus.     The remainder of the head CT is within normal limits. Specifically no  acute skull fracture or intracranial hemorrhage is identified.        CERVICAL SPINE CT      TECHNIQUE: Spiral CT images were obtained from the skull base down to  the T2 thoracic level and  images were reformatted and are submitted in 2  mm thick axial, sagittal and coronal CT sections with soft tissue  algorithm, 1 mm thick axial, sagittal and coronal CT sections with  high-resolution bone algorithm.     FINDINGS: There are arthritic changes at the atlantodental interval with  marginal spurring off the anterior ring of C1 and the odontoid and there  are some arthritic changes at the articulation of the right lateral  masses of C1 and C2 with joint space narrowing and marginal spurring.     At C2-3 there is moderate right facet overgrowth with minimal right  foraminal narrowing. There is mild posterior disc bulge and minimal  central canal narrowing. There is no left foraminal narrowing.     At C3-4 there is mild-to-moderate right and moderate-to-severe left  facet overgrowth. The posterior disc margin is normal. There is no canal  or right foraminal narrowing. There is mild left foraminal narrowing.     At C4-5 there is moderate bilateral facet overgrowth, disc space  narrowing, degenerative endplate change, diffuse posterior disc  osteophyte complex that abuts and deforms the ventral surface of the  cord, at least mild-to-moderate up to moderately narrows the canal.  There is some uncovertebral joint spurring and there is mild-to-moderate  bilateral bony foraminal narrowing.     At C5-6 there is mild bilateral facet overgrowth, moderate disc space  narrowing and degenerative endplate changes, diffuse posterior disc  osteophyte complex abuts and flattens the ventral surface of the cord  moderate-to-severely narrowing the canal. There is bilateral  uncovertebral joint hypertrophy and there is mild-to-moderate bilateral  bony foraminal narrowing.     At C6-7 there is mild disc space narrowing, degenerative endplate  change, mild facet overgrowth, mild posterior disc osteophyte complex,  mild canal and there is moderate right and minimal if any left foraminal  narrowing.     At C7-T1 there is mild left  and moderate right facet overgrowth. There  is mild right bony foraminal narrowing and no canal or left foraminal  narrowing.     No acute fracture is seen in the cervical spine.     IMPRESSION:  No acute fracture is seen in the cervical spine. There is cervical  spondylosis as described in great detail above.     Radiation dose reduction techniques were utilized, including automated  exposure control and exposure modulation based on body size.     This report was finalized on 12/4/2018 3:58 PM by Dr. Robert Yuen M.D.       CT Cervical Spine Without Contrast [[id number]] Collected:  12/04/18 1116     Updated:  12/04/18 1601    Narrative:       EMERGENCY NONCONTRAST HEAD CT AND NONCONTRAST CERVICAL SPINE CT  12/04/2018     CLINICAL HISTORY: Head trauma to top of head, headache and neck pain.     HEAD CT      TECHNIQUE: Spiral CT images were obtained from the base of the skull to  the vertex without intravenous contrast. Images were reformatted and  submitted in 3 mm thick axial CT sections with brain algorithm. 2 mm  thick axial CT sections with high-resolution bone algorithm and 2 mm  thick sagittal and coronal reconstructions were performed with brain  algorithm.     This is correlated to a noncontrast head CT 03/27/2018.     FINDINGS: There are patchy areas of low density in periventricular and  throughout the subcortical white matter of the cerebral hemispheres  consistent with moderate small vessel disease. There is a tiny old  lacunar infarction in the superior right putamen. The remainder of the  brain parenchyma is normal in attenuation. The ventricles are normal in  size. I see no focal mass effect and no midline shift. No extra-axial  fluid collections are identified. There is no evidence of acute  intracranial hemorrhage. There is subtotal opacification of the left  frontal sinus and anterior ethmoid sinus with fluid and mucosal  thickening, minimal mucosal thickening in the inferior left maxillary  sinus.  The remainder of the paranasal sinuses and mastoid air cells and  middle ear cavities are clear. No acute skull fracture is seen. There  are extensive calcified atherosclerotic plaques in the  intracranial  segments of the distal vertebral arteries and cavernous segments of the  internal carotid arteries bilaterally.       Impression:          1. There is moderate small vessel disease in the cerebral white matter,  a 4 mm old lacunar infarct in the right putamen, extensive calcified  atherosclerotic plaques in the intracranial segments of the distal  vertebral arteries and cavernous segments of internal carotid arteries  bilaterally.     2. There is subtotal opacification of the left frontal and anterior  ethmoid sinus with fluid and mucosal thickening and mild mucosal  thickening of the inferior left maxillary sinus.     The remainder of the head CT is within normal limits. Specifically no  acute skull fracture or intracranial hemorrhage is identified.        CERVICAL SPINE CT      TECHNIQUE: Spiral CT images were obtained from the skull base down to  the T2 thoracic level and images were reformatted and are submitted in 2  mm thick axial, sagittal and coronal CT sections with soft tissue  algorithm, 1 mm thick axial, sagittal and coronal CT sections with  high-resolution bone algorithm.     FINDINGS: There are arthritic changes at the atlantodental interval with  marginal spurring off the anterior ring of C1 and the odontoid and there  are some arthritic changes at the articulation of the right lateral  masses of C1 and C2 with joint space narrowing and marginal spurring.     At C2-3 there is moderate right facet overgrowth with minimal right  foraminal narrowing. There is mild posterior disc bulge and minimal  central canal narrowing. There is no left foraminal narrowing.     At C3-4 there is mild-to-moderate right and moderate-to-severe left  facet overgrowth. The posterior disc margin is normal. There is no  canal  or right foraminal narrowing. There is mild left foraminal narrowing.     At C4-5 there is moderate bilateral facet overgrowth, disc space  narrowing, degenerative endplate change, diffuse posterior disc  osteophyte complex that abuts and deforms the ventral surface of the  cord, at least mild-to-moderate up to moderately narrows the canal.  There is some uncovertebral joint spurring and there is mild-to-moderate  bilateral bony foraminal narrowing.     At C5-6 there is mild bilateral facet overgrowth, moderate disc space  narrowing and degenerative endplate changes, diffuse posterior disc  osteophyte complex abuts and flattens the ventral surface of the cord  moderate-to-severely narrowing the canal. There is bilateral  uncovertebral joint hypertrophy and there is mild-to-moderate bilateral  bony foraminal narrowing.     At C6-7 there is mild disc space narrowing, degenerative endplate  change, mild facet overgrowth, mild posterior disc osteophyte complex,  mild canal and there is moderate right and minimal if any left foraminal  narrowing.     At C7-T1 there is mild left and moderate right facet overgrowth. There  is mild right bony foraminal narrowing and no canal or left foraminal  narrowing.     No acute fracture is seen in the cervical spine.     IMPRESSION:  No acute fracture is seen in the cervical spine. There is cervical  spondylosis as described in great detail above.     Radiation dose reduction techniques were utilized, including automated  exposure control and exposure modulation based on body size.     This report was finalized on 12/4/2018 3:58 PM by Dr. Robert Yuen M.D.       XR Chest 1 View [[id number]] Collected:  12/04/18 1048     Updated:  12/04/18 1431    Narrative:       HISTORY: Cough. Bilateral hip pain. Fell. 12/04/2018     AP CHEST      FINDINGS: Heart size is mildly enlarged. There is mild to moderate  interstitial disease of the lungs which has progressed somewhat since  the  11/25/2018 study.     Lungs otherwise appear free of acute infiltrates. Bilateral breast  implants are seen. Sternotomy wires and mediastinal surgical clips are  seen. There is some splenic artery calcification.             Impression:       Cardiomegaly. Mild to moderate interstitial disease of the lungs could  represent an element of mild pulmonary edema. Please correlate.           AP PELVIS AND BILATERAL HIPS 2 VIEWS OF EACH     FINDINGS: There is deformity of the right pubic rami from old fractures  which were seen on the 10/05/2013 study. Surgical screws are seen in the  right femoral head and neck.     No acute fractures or dislocations are seen in the bilateral hips and  pelvis.     IMPRESSION:   1. Old pubic rami fractures on the right.  2. No acute process identified.     This report was finalized on 12/4/2018 2:27 PM by Dr. Rey Morris M.D.       XR Hips Bilateral With or Without Pelvis 2 View [[id number]] Collected:  12/04/18 1048     Updated:  12/04/18 1431    Narrative:       HISTORY: Cough. Bilateral hip pain. Fell. 12/04/2018     AP CHEST      FINDINGS: Heart size is mildly enlarged. There is mild to moderate  interstitial disease of the lungs which has progressed somewhat since  the 11/25/2018 study.     Lungs otherwise appear free of acute infiltrates. Bilateral breast  implants are seen. Sternotomy wires and mediastinal surgical clips are  seen. There is some splenic artery calcification.             Impression:       Cardiomegaly. Mild to moderate interstitial disease of the lungs could  represent an element of mild pulmonary edema. Please correlate.           AP PELVIS AND BILATERAL HIPS 2 VIEWS OF EACH     FINDINGS: There is deformity of the right pubic rami from old fractures  which were seen on the 10/05/2013 study. Surgical screws are seen in the  right femoral head and neck.     No acute fractures or dislocations are seen in the bilateral hips and  pelvis.     IMPRESSION:   1. Old  pubic rami fractures on the right.  2. No acute process identified.     This report was finalized on 12/4/2018 2:27 PM by Dr. Rey Morris M.D.               Assessment:        Complicated UTI (urinary tract infection)    Essential hypertension    DM (diabetes mellitus) (CMS/HCC)    Toxic metabolic encephalopathy    Dementia with behavioral disturbance    Alzheimer disease      Patient Active Problem List   Diagnosis Code   • Acute respiratory failure with hypoxia (CMS/HCC) J96.01   • Essential hypertension I10   • Syncope and collapse R55   • Bacteriuria R82.71   • HCAP (healthcare-associated pneumonia) J18.9   • Hypoglycemia E16.2   • DM (diabetes mellitus) (CMS/HCC) E11.9   • Acute on chronic systolic CHF (congestive heart failure) (CMS/HCC) I50.23   • Toxic metabolic encephalopathy G92   • Alzheimer disease G30.9, F02.80   • Pneumonia of right lower lobe due to infectious organism (CMS/HCC) J18.1   • Acute hypoxemic respiratory failure (CMS/HCC) J96.01   • CHF (congestive heart failure), NYHA class I, acute on chronic, diastolic (CMS/HCC) I50.33   • Dehydration E86.0   • Elevated troponin R74.8   • Nonrheumatic mitral valve insufficiency I34.0   • Edema of both legs R60.0   • Dementia with behavioral disturbance F03.91   • Complicated UTI (urinary tract infection) N39.0       Plan:      Finish  IV antibiotics for UTI  Continue to hold diuretics one more day, will resume in am.  Still restless and agitated and not taking psych medications all the time. Will give one dose of Geodon  Monitor and correct electrolytes  Adjust insulin as needed, BS better this am  Monitor mental status  Continue home medications  DVT/stress ulcer prophylaxis  Labs in am      Sanjay Butcher MD  12/9/2018  9:05 AM

## 2018-12-09 NOTE — PLAN OF CARE
Problem: Patient Care Overview  Goal: Plan of Care Review  Outcome: Ongoing (interventions implemented as appropriate)   12/09/18 1514   Coping/Psychosocial   Plan of Care Reviewed With patient   Plan of Care Review   Progress no change   OTHER   Outcome Summary VSS. Pt only alert to self. Pt can be pleasant and cooperative or she can be uncooperative and combative. BLE last changed 12-7. Pt would not allow me to change bandages. Refusing to be turned by turn team. Bed alarm active r/t impulsive behavior. Will continue to monitor pt.      Goal: Individualization and Mutuality  Outcome: Outcome(s) achieved Date Met: 12/09/18 12/06/18 1540 12/08/18 1514   Individualization   Patient Specific Preferences --  Pt does not like to be messed with or turned; explained to pt the importance   Patient Specific Goals (Include Timeframe) To go back to [facility] --

## 2018-12-10 NOTE — DISCHARGE SUMMARY
PHYSICIAN DISCHARGE SUMMARY  [facility], [facility]    Patient Identification:    Name: Bernardo Cox  Age: 87 y.o.  Sex: female  :  1931  MRN: [id number]    Primary Care Physician: Tay Lynch MD    Admit date: 2018    Discharge date and time:12/10/2018    Discharged Condition: fair    Discharge Diagnoses:    Complicated UTI (urinary tract infection)    Toxic metabolic encephalopathy    Essential hypertension    DM (diabetes mellitus) (CMS/formerly [facility])    Dementia with behavioral disturbance    Alzheimer disease    Patient Active Problem List   Diagnosis Code   • Acute respiratory failure with hypoxia (CMS/HCC) J96.01   • Essential hypertension I10   • Syncope and collapse R55   • Bacteriuria R82.71   • HCAP (healthcare-associated pneumonia) J18.9   • Hypoglycemia E16.2   • DM (diabetes mellitus) (CMS/formerly [facility]) E11.9   • Acute on chronic systolic CHF (congestive heart failure) (CMS/HCC) I50.23   • Toxic metabolic encephalopathy G92   • Alzheimer disease G30.9, F02.80   • Pneumonia of right lower lobe due to infectious organism (CMS/HCC) J18.1   • Acute hypoxemic respiratory failure (CMS/HCC) J96.01   • CHF (congestive heart failure), NYHA class I, acute on chronic, diastolic (CMS/HCC) I50.33   • Dehydration E86.0   • Elevated troponin R74.8   • Nonrheumatic mitral valve insufficiency I34.0   • Edema of both legs R60.0   • Dementia with behavioral disturbance F03.91   • Complicated UTI (urinary tract infection) N39.0          Hospital Course: Bernardo Cox  is a Patient is a 86 y/o female, resident at a NH. She has h/o dementia, CHF (EF 25%), COPD, DM, HTN. She was recently hospitalized due to a/c CHF, was dc to NH in stable condition. This am she had an unwitnessed fall and was found on the side of the bed. No LOC. She was sent to ER for evaluation. In ER she was found to have elevated BNP, positive urine for UTI, was given Lasix and started on Rocephin, patient was admitted  for further management    Patient was admitted and placed on IV diuretics as well as IV antibiotics for UTI.  Blood sugar was followed with Accu-Cheks and sliding-scale insulin.  She remained encephalopathic the next few days, she was agitated, had to receive 0.5 mg IV of Ativan at least every day, urine culture came back with pansensitive Escherichia coli.  Patient's psychiatric medication were rearranged, patient was placed on Depakote bid and Ativan qhs prn.  After these changes patient improved, today she is very pleasant, she's taking her medications, she is eating.  She states that she would like to go home.  Patient is stable enough to be discharged home.  Mental status is back to her baseline.  Nursing home physician have to adjust patient's Lasix according to her volume status on her intake.    PMHX:   Past Medical History:   Diagnosis Date   • Alzheimer disease    • Atherosclerotic heart disease    • Bronchitis    • CHF (congestive heart failure) (CMS/[facility])     EF = 25%   • COPD (chronic obstructive pulmonary disease) (CMS/[facility])    • Dementia    • Depression    • Diabetes mellitus (CMS/[facility])    • GERD (gastroesophageal reflux disease)    • HTN (hypertension)    • Hypokalemia    • Insomnia    • Metabolic encephalopathy    • Neuropathy    • OA (osteoarthritis)    • Pneumonia    • Renal disorder     stage three   • TIA (transient ischemic attack)    • Upper respiratory infection      PSHX:   Past Surgical History:   Procedure Laterality Date   • CARDIAC SURGERY             Consults:     Consults     Date and Time Order Name Status Description    12/4/2018 1114 IP General Consult (Use specialty-specific consult if known) Completed     11/21/2018 1121 Inpatient Pulmonology Consult Completed     11/20/2018 0826 Inpatient Cardiology Consult Completed     11/19/2018 6524 Family Medicine Consult Completed           Discharge Exam:    /92 (BP Location: Left arm, Patient Position: Lying)   Pulse 84   Temp 96.9  "°F (36.1 °C) (Oral)   Resp 18   Ht 165.1 cm (65\")   Wt 58.8 kg (129 lb 11.2 oz)   SpO2 96%   BMI 21.58 kg/m²     General: Sleepy this morning, oriented x 1 when awake; no distress, appears stated age  Neck: Supple, symmetrical, trachea midline, no adenopathy;              thyroid:  no enlargement/tenderness/nodules;              no carotid bruit or JVD  Cardiovascular: Normal rate, regular rhythm and intact distal pulses.              Exam reveals no gallop and no friction rub. No murmur heard  Chest wall: No tenderness or deformity  Pulmonary:  diminished breath sounds bilaterally, respirations unlabored.               No rhonchi, wheezing or rales.   Abdominal: Soft. Soft, non-tender, bowel sounds active all four quadrants,     no masses, no hepatomegaly, no splenomegaly.   Extremities: Normal, atraumatic, no cyanosis. Trace edema (better than last admission)  Pulses: 2 + symmetric all extremities  Neurological: Patient is alert and oriented to person when she is awake No new focal sensory motor deficit  Skin: Skin color, texture, normal. Turgor is decreased. No rashes or lesions        Data Review:        Results from last 7 days   Lab Units  12/10/18   0545  12/09/18   0630  12/08/18   0545   WBC 10*3/mm3  8.23  6.38  7.05   HEMOGLOBIN g/dL  11.0*  11.0*  10.4*   HEMATOCRIT %  37.1  36.6  34.9*   PLATELETS 10*3/mm3  252  252  279       Results from last 7 days   Lab Units  12/10/18   0545  12/09/18   0630  12/08/18   0545   12/04/18   0941   SODIUM mmol/L  133*  134*  134*   < >  130*   POTASSIUM mmol/L  4.4  4.7  4.1   < >  4.8   CHLORIDE mmol/L  100  98  96*   < >  91*   CO2 mmol/L  23.5  23.8  25.1   < >  26.7   BUN mg/dL  13  11  15   < >  14   CREATININE mg/dL  1.15*  1.05*  1.02*   < >  1.01*   CALCIUM mg/dL  8.5*  8.7  8.3*   < >  9.2   BILIRUBIN mg/dL   --    --    --    --   0.8   ALK PHOS U/L   --    --    --    --   127*   ALT (SGPT) U/L   --    --    --    --   10   AST (SGOT) U/L   --    --    " --    --   10   GLUCOSE mg/dL  240*  301*  140*   < >  288*    < > = values in this interval not displayed.           Lab Results   Lab Value Date/Time    TROPONINT 0.012 12/04/2018 0941    TROPONINT 0.034 (H) 11/19/2018 1741    TROPONINT <0.010 04/15/2018 0151    TROPONINT <0.010 03/26/2018 0401    TROPONINT <0.010 03/23/2018 0613    TROPONINT 0.035 (H) 01/26/2018 1040    TROPONINT 0.023 11/03/2017 0510    TROPONINT 0.052 (H) 11/02/2017 0302    TROPONINT 0.054 (H) 11/01/2017 2105    TROPONINT 0.084 (H) 11/01/2017 1320    TROPONINT 0.017 07/04/2017 1653    TROPONINT <0.010 08/07/2016 2158    TROPONINT <0.01 01/19/2016 1405       Microbiology Results (last 10 days)     Procedure Component Value - Date/Time    Urine Culture - Urine, Urine, Catheter In/Out [[id number]]  (Abnormal)  (Susceptibility) Collected:  12/04/18 0945    Lab Status:  Final result Specimen:  Urine, Catheter In/Out Updated:  12/06/18 0828     Urine Culture >100,000 CFU/mL Escherichia coli    Susceptibility      Escherichia coli     ARTHUR     Ampicillin Resistant     Ampicillin + Sulbactam Susceptible     Cefazolin Susceptible     Cefepime Susceptible     Ceftriaxone Susceptible     Ciprofloxacin Susceptible     Ertapenem Susceptible     Gentamicin Susceptible     Levofloxacin Susceptible     Nitrofurantoin Susceptible     Piperacillin + Tazobactam Susceptible     Tetracycline Resistant     Trimethoprim + Sulfamethoxazole Susceptible                           Imaging Results (all)     Procedure Component Value Units Date/Time    CT Head Without Contrast [[id number]] Collected:  12/04/18 1116     Updated:  12/04/18 1601    Narrative:       EMERGENCY NONCONTRAST HEAD CT AND NONCONTRAST CERVICAL SPINE CT  12/04/2018     CLINICAL HISTORY: Head trauma to top of head, headache and neck pain.     HEAD CT      TECHNIQUE: Spiral CT images were obtained from the base of the skull to  the vertex without intravenous contrast. Images were reformatted and  submitted  in 3 mm thick axial CT sections with brain algorithm. 2 mm  thick axial CT sections with high-resolution bone algorithm and 2 mm  thick sagittal and coronal reconstructions were performed with brain  algorithm.     This is correlated to a noncontrast head CT 03/27/2018.     FINDINGS: There are patchy areas of low density in periventricular and  throughout the subcortical white matter of the cerebral hemispheres  consistent with moderate small vessel disease. There is a tiny old  lacunar infarction in the superior right putamen. The remainder of the  brain parenchyma is normal in attenuation. The ventricles are normal in  size. I see no focal mass effect and no midline shift. No extra-axial  fluid collections are identified. There is no evidence of acute  intracranial hemorrhage. There is subtotal opacification of the left  frontal sinus and anterior ethmoid sinus with fluid and mucosal  thickening, minimal mucosal thickening in the inferior left maxillary  sinus. The remainder of the paranasal sinuses and mastoid air cells and  middle ear cavities are clear. No acute skull fracture is seen. There  are extensive calcified atherosclerotic plaques in the  intracranial  segments of the distal vertebral arteries and cavernous segments of the  internal carotid arteries bilaterally.       Impression:          1. There is moderate small vessel disease in the cerebral white matter,  a 4 mm old lacunar infarct in the right putamen, extensive calcified  atherosclerotic plaques in the intracranial segments of the distal  vertebral arteries and cavernous segments of internal carotid arteries  bilaterally.     2. There is subtotal opacification of the left frontal and anterior  ethmoid sinus with fluid and mucosal thickening and mild mucosal  thickening of the inferior left maxillary sinus.     The remainder of the head CT is within normal limits. Specifically no  acute skull fracture or intracranial hemorrhage is identified.         CERVICAL SPINE CT      TECHNIQUE: Spiral CT images were obtained from the skull base down to  the T2 thoracic level and images were reformatted and are submitted in 2  mm thick axial, sagittal and coronal CT sections with soft tissue  algorithm, 1 mm thick axial, sagittal and coronal CT sections with  high-resolution bone algorithm.     FINDINGS: There are arthritic changes at the atlantodental interval with  marginal spurring off the anterior ring of C1 and the odontoid and there  are some arthritic changes at the articulation of the right lateral  masses of C1 and C2 with joint space narrowing and marginal spurring.     At C2-3 there is moderate right facet overgrowth with minimal right  foraminal narrowing. There is mild posterior disc bulge and minimal  central canal narrowing. There is no left foraminal narrowing.     At C3-4 there is mild-to-moderate right and moderate-to-severe left  facet overgrowth. The posterior disc margin is normal. There is no canal  or right foraminal narrowing. There is mild left foraminal narrowing.     At C4-5 there is moderate bilateral facet overgrowth, disc space  narrowing, degenerative endplate change, diffuse posterior disc  osteophyte complex that abuts and deforms the ventral surface of the  cord, at least mild-to-moderate up to moderately narrows the canal.  There is some uncovertebral joint spurring and there is mild-to-moderate  bilateral bony foraminal narrowing.     At C5-6 there is mild bilateral facet overgrowth, moderate disc space  narrowing and degenerative endplate changes, diffuse posterior disc  osteophyte complex abuts and flattens the ventral surface of the cord  moderate-to-severely narrowing the canal. There is bilateral  uncovertebral joint hypertrophy and there is mild-to-moderate bilateral  bony foraminal narrowing.     At C6-7 there is mild disc space narrowing, degenerative endplate  change, mild facet overgrowth, mild posterior disc osteophyte  complex,  mild canal and there is moderate right and minimal if any left foraminal  narrowing.     At C7-T1 there is mild left and moderate right facet overgrowth. There  is mild right bony foraminal narrowing and no canal or left foraminal  narrowing.     No acute fracture is seen in the cervical spine.     IMPRESSION:  No acute fracture is seen in the cervical spine. There is cervical  spondylosis as described in great detail above.     Radiation dose reduction techniques were utilized, including automated  exposure control and exposure modulation based on body size.     This report was finalized on 12/4/2018 3:58 PM by Dr. Robert Yuen M.D.       CT Cervical Spine Without Contrast [[id number]] Collected:  12/04/18 1116     Updated:  12/04/18 1601    Narrative:       EMERGENCY NONCONTRAST HEAD CT AND NONCONTRAST CERVICAL SPINE CT  12/04/2018     CLINICAL HISTORY: Head trauma to top of head, headache and neck pain.     HEAD CT      TECHNIQUE: Spiral CT images were obtained from the base of the skull to  the vertex without intravenous contrast. Images were reformatted and  submitted in 3 mm thick axial CT sections with brain algorithm. 2 mm  thick axial CT sections with high-resolution bone algorithm and 2 mm  thick sagittal and coronal reconstructions were performed with brain  algorithm.     This is correlated to a noncontrast head CT 03/27/2018.     FINDINGS: There are patchy areas of low density in periventricular and  throughout the subcortical white matter of the cerebral hemispheres  consistent with moderate small vessel disease. There is a tiny old  lacunar infarction in the superior right putamen. The remainder of the  brain parenchyma is normal in attenuation. The ventricles are normal in  size. I see no focal mass effect and no midline shift. No extra-axial  fluid collections are identified. There is no evidence of acute  intracranial hemorrhage. There is subtotal opacification of the left  frontal sinus  and anterior ethmoid sinus with fluid and mucosal  thickening, minimal mucosal thickening in the inferior left maxillary  sinus. The remainder of the paranasal sinuses and mastoid air cells and  middle ear cavities are clear. No acute skull fracture is seen. There  are extensive calcified atherosclerotic plaques in the  intracranial  segments of the distal vertebral arteries and cavernous segments of the  internal carotid arteries bilaterally.       Impression:          1. There is moderate small vessel disease in the cerebral white matter,  a 4 mm old lacunar infarct in the right putamen, extensive calcified  atherosclerotic plaques in the intracranial segments of the distal  vertebral arteries and cavernous segments of internal carotid arteries  bilaterally.     2. There is subtotal opacification of the left frontal and anterior  ethmoid sinus with fluid and mucosal thickening and mild mucosal  thickening of the inferior left maxillary sinus.     The remainder of the head CT is within normal limits. Specifically no  acute skull fracture or intracranial hemorrhage is identified.        CERVICAL SPINE CT      TECHNIQUE: Spiral CT images were obtained from the skull base down to  the T2 thoracic level and images were reformatted and are submitted in 2  mm thick axial, sagittal and coronal CT sections with soft tissue  algorithm, 1 mm thick axial, sagittal and coronal CT sections with  high-resolution bone algorithm.     FINDINGS: There are arthritic changes at the atlantodental interval with  marginal spurring off the anterior ring of C1 and the odontoid and there  are some arthritic changes at the articulation of the right lateral  masses of C1 and C2 with joint space narrowing and marginal spurring.     At C2-3 there is moderate right facet overgrowth with minimal right  foraminal narrowing. There is mild posterior disc bulge and minimal  central canal narrowing. There is no left foraminal narrowing.     At C3-4  there is mild-to-moderate right and moderate-to-severe left  facet overgrowth. The posterior disc margin is normal. There is no canal  or right foraminal narrowing. There is mild left foraminal narrowing.     At C4-5 there is moderate bilateral facet overgrowth, disc space  narrowing, degenerative endplate change, diffuse posterior disc  osteophyte complex that abuts and deforms the ventral surface of the  cord, at least mild-to-moderate up to moderately narrows the canal.  There is some uncovertebral joint spurring and there is mild-to-moderate  bilateral bony foraminal narrowing.     At C5-6 there is mild bilateral facet overgrowth, moderate disc space  narrowing and degenerative endplate changes, diffuse posterior disc  osteophyte complex abuts and flattens the ventral surface of the cord  moderate-to-severely narrowing the canal. There is bilateral  uncovertebral joint hypertrophy and there is mild-to-moderate bilateral  bony foraminal narrowing.     At C6-7 there is mild disc space narrowing, degenerative endplate  change, mild facet overgrowth, mild posterior disc osteophyte complex,  mild canal and there is moderate right and minimal if any left foraminal  narrowing.     At C7-T1 there is mild left and moderate right facet overgrowth. There  is mild right bony foraminal narrowing and no canal or left foraminal  narrowing.     No acute fracture is seen in the cervical spine.     IMPRESSION:  No acute fracture is seen in the cervical spine. There is cervical  spondylosis as described in great detail above.     Radiation dose reduction techniques were utilized, including automated  exposure control and exposure modulation based on body size.     This report was finalized on 12/4/2018 3:58 PM by Dr. Robert Yuen M.D.       XR Chest 1 View [[id number]] Collected:  12/04/18 1048     Updated:  12/04/18 1431    Narrative:       HISTORY: Cough. Bilateral hip pain. Fell. 12/04/2018     AP CHEST      FINDINGS: Heart size  is mildly enlarged. There is mild to moderate  interstitial disease of the lungs which has progressed somewhat since  the 11/25/2018 study.     Lungs otherwise appear free of acute infiltrates. Bilateral breast  implants are seen. Sternotomy wires and mediastinal surgical clips are  seen. There is some splenic artery calcification.             Impression:       Cardiomegaly. Mild to moderate interstitial disease of the lungs could  represent an element of mild pulmonary edema. Please correlate.           AP PELVIS AND BILATERAL HIPS 2 VIEWS OF EACH     FINDINGS: There is deformity of the right pubic rami from old fractures  which were seen on the 10/05/2013 study. Surgical screws are seen in the  right femoral head and neck.     No acute fractures or dislocations are seen in the bilateral hips and  pelvis.     IMPRESSION:   1. Old pubic rami fractures on the right.  2. No acute process identified.     This report was finalized on 12/4/2018 2:27 PM by Dr. Rey Morris M.D.       XR Hips Bilateral With or Without Pelvis 2 View [[id number]] Collected:  12/04/18 1048     Updated:  12/04/18 1431    Narrative:       HISTORY: Cough. Bilateral hip pain. Fell. 12/04/2018     AP CHEST      FINDINGS: Heart size is mildly enlarged. There is mild to moderate  interstitial disease of the lungs which has progressed somewhat since  the 11/25/2018 study.     Lungs otherwise appear free of acute infiltrates. Bilateral breast  implants are seen. Sternotomy wires and mediastinal surgical clips are  seen. There is some splenic artery calcification.             Impression:       Cardiomegaly. Mild to moderate interstitial disease of the lungs could  represent an element of mild pulmonary edema. Please correlate.           AP PELVIS AND BILATERAL HIPS 2 VIEWS OF EACH     FINDINGS: There is deformity of the right pubic rami from old fractures  which were seen on the 10/05/2013 study. Surgical screws are seen in the  right femoral head  and neck.     No acute fractures or dislocations are seen in the bilateral hips and  pelvis.     IMPRESSION:   1. Old pubic rami fractures on the right.  2. No acute process identified.     This report was finalized on 12/4/2018 2:27 PM by Dr. Rey Morris M.D.               Disposition:    Skilled nursing facility    Patient Instructions:        Discharge Medications      New Medications      Instructions Start Date   Divalproex Sodium 125 MG capsule  Commonly known as:  DEPAKOTE SPRINKLE   125 mg, Oral, Every 12 Hours Scheduled      LORazepam 0.5 MG tablet  Commonly known as:  ATIVAN   0.5 mg, Oral, Nightly PRN      mirtazapine 15 MG disintegrating tablet  Commonly known as:  REMERON SOL-TAB   15 mg, Oral, Nightly      rivastigmine 4.6 MG/24HR patch  Commonly known as:  EXELON   1 patch, Transdermal, Daily         Changes to Medications      Instructions Start Date   escitalopram 10 MG tablet  Commonly known as:  LEXAPRO  What changed:  how much to take   10 mg, Oral, Daily      furosemide 40 MG tablet  Commonly known as:  LASIX  What changed:  how much to take   20 mg, Oral, Daily      insulin glargine 100 UNIT/ML injection  Commonly known as:  LANTUS  What changed:  how much to take   12 Units, Subcutaneous, Daily         Continue These Medications      Instructions Start Date   acetaminophen 500 MG tablet  Commonly known as:  TYLENOL   1,000 mg, Oral, Every 6 Hours PRN      budesonide 0.5 MG/2ML nebulizer solution  Commonly known as:  PULMICORT   0.5 mg, Nebulization, 2 Times Daily      carvedilol 25 MG tablet  Commonly known as:  COREG   25 mg, Oral, 2 Times Daily With Meals, Hold for SBP <100      clopidogrel 75 MG tablet  Commonly known as:  PLAVIX   75 mg, Oral, Daily      docusate sodium 250 MG capsule  Commonly known as:  COLACE   250 mg, Oral, Daily      GLUCAGON EMERGENCY 1 MG injection  Generic drug:  glucagon   1 mg, Subcutaneous, Once As Needed      insulin aspart 100 UNIT/ML  injection  Commonly known as:  novoLOG   Subcutaneous, 3 Times Daily Before Meals, 200-299= 3 units 300-399= 5 units 400-499= 7 units >500= 9 units       ipratropium-albuterol 0.5-2.5 mg/3 ml nebulizer  Commonly known as:  DUO-NEB   3 mL, Nebulization, 4 Times Daily      nitroglycerin 0.4 MG SL tablet  Commonly known as:  NITROSTAT   0.4 mg, Sublingual, Every 5 Minutes PRN, Take no more than 3 doses in 15 minutes.      polyethylene glycol packet  Commonly known as:  MIRALAX   17 g, Oral, Daily      potassium chloride ER 20 MEQ tablet controlled-release ER tablet  Commonly known as:  K-TAB   20 mEq, Oral, Daily         Stop These Medications    donepezil 10 MG tablet  Commonly known as:  ARICEPT     mupirocin 2 % cream  Commonly known as:  BACTROBAN     NAMENDA XR 28 MG capsule sustained-release 24 hr extended release capsule  Generic drug:  memantine        ASK your doctor about these medications      Instructions Start Date   pantoprazole 40 MG EC tablet  Commonly known as:  PROTONIX   40 mg, Oral, Daily             Discharge Order (From admission, onward)    Start     Ordered    12/10/18 1252  Discharge patient  Once     Expected Discharge Date:  12/10/18    Expected Discharge Time:  Afternoon    Discharge Disposition:  Skilled Nursing Facility (DC - External)    Physician of Record for Attribution - Please select from Treatment Team:  ALLYSON CHIU [[id number]]    Review needed by CMO to determine Physician of Record:  No       Question Answer Comment   Physician of Record for Attribution - Please select from Treatment Team ALLYSON CHIU    Review needed by CMO to determine Physician of Record No        12/10/18 1255          Follow-up Information     Tay Lynch MD Follow up.    Specialty:  Family Medicine  Contact information:  [address] [address]  [phone]             Moncho Stern III, NP-C Follow up.    Specialty:  Family Medicine  Contact information:  [address]  [place] [address]  [phone]             [facility] Follow up.    Specialties:  Skilled Nursing Facility, Intermediate Care Facility  Contact information:  [address]  [facility] [address] [phone]                 Total time spent discharging patient including evaluation,post hospitalization follow up,  medication and post hospitalization instructions and education total time exceeds 30 minutes.    Signed:  Sanjay Butcher MD  12/10/2018  1:00 PM

## 2018-12-10 NOTE — PLAN OF CARE
Problem: Fall Risk (Adult)  Goal: Absence of Fall  Outcome: Ongoing (interventions implemented as appropriate)      Problem: Patient Care Overview  Goal: Plan of Care Review  Outcome: Ongoing (interventions implemented as appropriate)   12/10/18 0501   Coping/Psychosocial   Plan of Care Reviewed With patient   Plan of Care Review   Progress improving   OTHER   Outcome Summary Initially combative, but more pleasant through majority of the shift. Frequent requests for food. A handful of attempts to get up on her own. Patient did not sleep much through the shift. Legs remained wrapped - dressings to be changed 12/11 (every other day). Mepilex remains on left leg. Alert to self and situation. Eager to go home, maybe even today, per MD notes. Bed alarm continued. Turned frequently. BM x2 - creamy and brown. Barrier cream applied to buttocks for redness. Labia are excoriated, z-guard applied. Infrequent congested cough. BS covered with Lantus and SSI. Lovenox continued. Ativan x1.       Problem: Urinary Tract Infection (Adult)  Goal: Signs and Symptoms of Listed Potential Problems Will be Absent, Minimized or Managed (Urinary Tract Infection)  Outcome: Ongoing (interventions implemented as appropriate)      Problem: Wound (Includes Pressure Injury) (Adult)  Goal: Signs and Symptoms of Listed Potential Problems Will be Absent, Minimized or Managed (Wound)  Outcome: Ongoing (interventions implemented as appropriate)

## 2018-12-10 NOTE — PROGRESS NOTES
Continued Stay Note  [facility]     Patient Name: Bernardo Cox  MRN: [id number]  Today's Date: 12/10/2018    Admit Date: 12/4/2018    Discharge Plan     Row Name 12/10/18 1813       Plan    Patient/Family in Agreement with Plan  yes    Plan Comments  Call placed to Joleen/Irma to inform of discharge today. States bed is available. Mejia/patient's RN placed call to patient's spouse to inform of dc. Told him [facility]CP would arrange ambulance but could not guarantee payment. States he verbalized understanding. [facility] arranged for pick-up at 2030. Packet given to RN to complete. No additional needs noted.    Row Name 12/10/18 1811       Plan    Plan  [facility]---LTC. Packet on chart.        Discharge Codes    No documentation.       Expected Discharge Date and Time     Expected Discharge Date Expected Discharge Time    Dec 10, 2018             Miriam Frazier RN

## 2018-12-10 NOTE — PROGRESS NOTES
[facility]    Physicians Statement of Medical Necessity for Ambulance Transportation    It is medically necessary for:    Patient Name: Bernardo Cox    Insurance Information:      To be transported by ambulance:    From (if nursing facility, specify level of care: skilled, detention, etc): [address]    To (specify level of care if nursing facility): [facility]    Date of Service: 12/10/18    For dialysis patients state date dialysis began: N/A     Diagnosis: Complicated UTI    Past Medical/Surgical History:  Past Medical History:   Diagnosis Date   • Alzheimer disease    • Atherosclerotic heart disease    • Bronchitis    • CHF (congestive heart failure) (CMS/[facility])     EF = 25%   • COPD (chronic obstructive pulmonary disease) (CMS/[facility])    • Dementia    • Depression    • Diabetes mellitus (CMS/[facility])    • GERD (gastroesophageal reflux disease)    • HTN (hypertension)    • Hypokalemia    • Insomnia    • Metabolic encephalopathy    • Neuropathy    • OA (osteoarthritis)    • Pneumonia    • Renal disorder     stage three   • TIA (transient ischemic attack)    • Upper respiratory infection       Past Surgical History:   Procedure Laterality Date   • CARDIAC SURGERY          Current Objective Medical Evidence(including physical exam finding to support reason for limitations):    End stage Alzheimer's disease  Confused/combative: may require restraints    Other: fall risk (recent fall)    Physician Signature:           (RN,NP,PA,CAN, Discharge Planner) Miriam Frazier RN, [facility] Date/Time: 12/10/18 @ 1318     Printed Name:    __________________________________    [facility]   Phone: [phone] Phone: [phone]   Fax: [phone] Fax: [phone]

## 2018-12-10 NOTE — PROGRESS NOTES
Started treatment - patient tore mask off stating she had to use the bathroom - called to .Patient seems confused - reported.

## 2018-12-11 NOTE — PROGRESS NOTES
Case Management Discharge Note    Final Note: [facility]--LTC    Destination - Selection Complete      Service Provider Request Status Selected Services Address Phone Number Fax Number    [facility] Selected Intermediate Care [address] [facility] [address] [phone]      Durable Medical Equipment      No service has been selected for the patient.      Dialysis/Infusion      No service has been selected for the patient.      Home Medical Care      No service has been selected for the patient.      Community Resources      No service has been selected for the patient.        [facility]: [facility]    Final Discharge Disposition Code: 04 - intermediate care facility

## 2019-01-01 ENCOUNTER — APPOINTMENT (OUTPATIENT)
Dept: GENERAL RADIOLOGY | Facility: HOSPITAL | Age: 84
End: 2019-01-01

## 2019-01-01 ENCOUNTER — HOSPITAL ENCOUNTER (INPATIENT)
Facility: HOSPITAL | Age: 84
LOS: 1 days | Discharge: HOSPICE | End: 2019-01-16
Attending: EMERGENCY MEDICINE | Admitting: INTERNAL MEDICINE

## 2019-01-01 ENCOUNTER — HOSPITAL ENCOUNTER (INPATIENT)
Facility: HOSPITAL | Age: 84
LOS: 3 days | End: 2019-01-19
Attending: INTERNAL MEDICINE | Admitting: INTERNAL MEDICINE

## 2019-01-01 ENCOUNTER — APPOINTMENT (OUTPATIENT)
Dept: CT IMAGING | Facility: HOSPITAL | Age: 84
End: 2019-01-01

## 2019-01-01 ENCOUNTER — OUTSIDE FACILITY SERVICE (OUTPATIENT)
Dept: INTERNAL MEDICINE | Facility: CLINIC | Age: 84
End: 2019-01-01

## 2019-01-01 ENCOUNTER — HOSPITAL ENCOUNTER (EMERGENCY)
Facility: HOSPITAL | Age: 84
Discharge: HOME | End: 2019-01-10
Attending: EMERGENCY MEDICINE | Admitting: EMERGENCY MEDICINE

## 2019-01-01 ENCOUNTER — HOSPITAL ENCOUNTER (EMERGENCY)
Facility: HOSPITAL | Age: 84
Discharge: HOME | End: 2019-01-03
Attending: EMERGENCY MEDICINE | Admitting: EMERGENCY MEDICINE

## 2019-01-01 VITALS
HEART RATE: 77 BPM | DIASTOLIC BLOOD PRESSURE: 56 MMHG | SYSTOLIC BLOOD PRESSURE: 111 MMHG | RESPIRATION RATE: 18 BRPM | HEIGHT: 64 IN | BODY MASS INDEX: 27.31 KG/M2 | WEIGHT: 160 LBS | TEMPERATURE: 97.6 F | OXYGEN SATURATION: 100 %

## 2019-01-01 VITALS
WEIGHT: 148.5 LBS | RESPIRATION RATE: 18 BRPM | HEART RATE: 98 BPM | TEMPERATURE: 97.2 F | DIASTOLIC BLOOD PRESSURE: 91 MMHG | BODY MASS INDEX: 24.74 KG/M2 | HEIGHT: 65 IN | OXYGEN SATURATION: 99 % | SYSTOLIC BLOOD PRESSURE: 148 MMHG

## 2019-01-01 VITALS — TEMPERATURE: 104.3 F | OXYGEN SATURATION: 93 % | DIASTOLIC BLOOD PRESSURE: 77 MMHG | SYSTOLIC BLOOD PRESSURE: 130 MMHG

## 2019-01-01 VITALS
SYSTOLIC BLOOD PRESSURE: 159 MMHG | DIASTOLIC BLOOD PRESSURE: 86 MMHG | WEIGHT: 157 LBS | TEMPERATURE: 97.9 F | HEIGHT: 67 IN | HEART RATE: 87 BPM | RESPIRATION RATE: 18 BRPM | BODY MASS INDEX: 24.64 KG/M2 | OXYGEN SATURATION: 98 %

## 2019-01-01 DIAGNOSIS — S09.90XA MILD CLOSED HEAD INJURY, INITIAL ENCOUNTER: ICD-10-CM

## 2019-01-01 DIAGNOSIS — S70.02XA CONTUSION OF LEFT HIP, INITIAL ENCOUNTER: ICD-10-CM

## 2019-01-01 DIAGNOSIS — N39.0 URINARY TRACT INFECTION WITHOUT HEMATURIA, SITE UNSPECIFIED: Primary | ICD-10-CM

## 2019-01-01 DIAGNOSIS — I50.9 ACUTE ON CHRONIC CONGESTIVE HEART FAILURE, UNSPECIFIED HEART FAILURE TYPE (HCC): Primary | ICD-10-CM

## 2019-01-01 DIAGNOSIS — J96.02 ACUTE RESPIRATORY FAILURE WITH HYPOXIA AND HYPERCAPNIA (HCC): Primary | ICD-10-CM

## 2019-01-01 DIAGNOSIS — S09.90XA INJURY OF HEAD, INITIAL ENCOUNTER: ICD-10-CM

## 2019-01-01 DIAGNOSIS — R41.82 ALTERED MENTAL STATUS, UNSPECIFIED ALTERED MENTAL STATUS TYPE: ICD-10-CM
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
## 2019-01-01 DIAGNOSIS — I50.9 ACUTE ON CHRONIC CONGESTIVE HEART FAILURE, UNSPECIFIED HEART FAILURE TYPE (HCC): ICD-10-CM

## 2019-01-01 DIAGNOSIS — J69.0 ASPIRATION PNEUMONIA OF BOTH LOWER LOBES, UNSPECIFIED ASPIRATION PNEUMONIA TYPE (HCC): ICD-10-CM

## 2019-01-01 DIAGNOSIS — J96.01 ACUTE RESPIRATORY FAILURE WITH HYPOXIA AND HYPERCAPNIA (HCC): Primary | ICD-10-CM

## 2019-01-01 LAB
ALBUMIN SERPL-MCNC: 2.9 G/DL (ref 3.5–5.2)
ALBUMIN SERPL-MCNC: 3 G/DL (ref 3.5–5.2)
ALBUMIN SERPL-MCNC: 3 G/DL (ref 3.5–5.2)
ALBUMIN/GLOB SERPL: 0.7 G/DL
ALBUMIN/GLOB SERPL: 0.8 G/DL
ALBUMIN/GLOB SERPL: 0.9 G/DL
ALP SERPL-CCNC: 103 U/L (ref 39–117)
ALP SERPL-CCNC: 115 U/L (ref 39–117)
ALP SERPL-CCNC: 125 U/L (ref 39–117)
ALT SERPL W P-5'-P-CCNC: 15 U/L (ref 1–33)
ALT SERPL W P-5'-P-CCNC: 15 U/L (ref 1–33)
ALT SERPL W P-5'-P-CCNC: 19 U/L (ref 1–33)
ANION GAP SERPL CALCULATED.3IONS-SCNC: 11.7 MMOL/L
ANION GAP SERPL CALCULATED.3IONS-SCNC: 17.8 MMOL/L
ANION GAP SERPL CALCULATED.3IONS-SCNC: 9.7 MMOL/L
ARTERIAL PATENCY WRIST A: ABNORMAL
AST SERPL-CCNC: 16 U/L (ref 1–32)
AST SERPL-CCNC: 24 U/L (ref 1–32)
AST SERPL-CCNC: 29 U/L (ref 1–32)
ATMOSPHERIC PRESS: 762.1 MMHG
BACTERIA BLD CULT: NORMAL
BACTERIA SPEC AEROBE CULT: ABNORMAL
BACTERIA SPEC AEROBE CULT: ABNORMAL
BACTERIA UR QL AUTO: ABNORMAL /HPF
BACTERIA UR QL AUTO: ABNORMAL /HPF
BASE EXCESS BLDA CALC-SCNC: 5 MMOL/L (ref 0–2)
BASOPHILS # BLD AUTO: 0.01 10*3/MM3 (ref 0–0.2)
BASOPHILS # BLD AUTO: 0.01 10*3/MM3 (ref 0–0.2)
BASOPHILS # BLD AUTO: 0.02 10*3/MM3 (ref 0–0.2)
BASOPHILS NFR BLD AUTO: 0.1 % (ref 0–1.5)
BASOPHILS NFR BLD AUTO: 0.1 % (ref 0–1.5)
BASOPHILS NFR BLD AUTO: 0.4 % (ref 0–1.5)
BDY SITE: ABNORMAL
BILIRUB SERPL-MCNC: 0.5 MG/DL (ref 0.1–1.2)
BILIRUB SERPL-MCNC: 0.7 MG/DL (ref 0.1–1.2)
BILIRUB SERPL-MCNC: 1 MG/DL (ref 0.1–1.2)
BILIRUB UR QL STRIP: NEGATIVE
BILIRUB UR QL STRIP: NEGATIVE
BUN BLD-MCNC: 28 MG/DL (ref 8–23)
BUN BLD-MCNC: 29 MG/DL (ref 8–23)
BUN BLD-MCNC: 32 MG/DL (ref 8–23)
BUN/CREAT SERPL: 27.6 (ref 7–25)
BUN/CREAT SERPL: 27.7 (ref 7–25)
BUN/CREAT SERPL: 31.7 (ref 7–25)
CALCIUM SPEC-SCNC: 8.3 MG/DL (ref 8.6–10.5)
CALCIUM SPEC-SCNC: 8.7 MG/DL (ref 8.6–10.5)
CALCIUM SPEC-SCNC: 9.5 MG/DL (ref 8.6–10.5)
CHLORIDE SERPL-SCNC: 101 MMOL/L (ref 98–107)
CHLORIDE SERPL-SCNC: 102 MMOL/L (ref 98–107)
CHLORIDE SERPL-SCNC: 96 MMOL/L (ref 98–107)
CLARITY UR: ABNORMAL
CLARITY UR: CLEAR
CO2 SERPL-SCNC: 23.2 MMOL/L (ref 22–29)
CO2 SERPL-SCNC: 24.3 MMOL/L (ref 22–29)
CO2 SERPL-SCNC: 28.3 MMOL/L (ref 22–29)
COLOR UR: ABNORMAL
COLOR UR: YELLOW
CREAT BLD-MCNC: 1.01 MG/DL (ref 0.57–1)
CREAT BLD-MCNC: 1.01 MG/DL (ref 0.57–1)
CREAT BLD-MCNC: 1.05 MG/DL (ref 0.57–1)
D-LACTATE SERPL-SCNC: 1.1 MMOL/L (ref 0.5–2)
DEPRECATED RDW RBC AUTO: 55 FL (ref 37–54)
DEPRECATED RDW RBC AUTO: 56.5 FL (ref 37–54)
DEPRECATED RDW RBC AUTO: 57.1 FL (ref 37–54)
EOSINOPHIL # BLD AUTO: 0.02 10*3/MM3 (ref 0–0.7)
EOSINOPHIL # BLD AUTO: 0.02 10*3/MM3 (ref 0–0.7)
EOSINOPHIL # BLD AUTO: 0.19 10*3/MM3 (ref 0–0.7)
EOSINOPHIL NFR BLD AUTO: 0.2 % (ref 0.3–6.2)
EOSINOPHIL NFR BLD AUTO: 0.2 % (ref 0.3–6.2)
EOSINOPHIL NFR BLD AUTO: 3.7 % (ref 0.3–6.2)
ERYTHROCYTE [DISTWIDTH] IN BLOOD BY AUTOMATED COUNT: 17 % (ref 11.7–13)
ERYTHROCYTE [DISTWIDTH] IN BLOOD BY AUTOMATED COUNT: 17.3 % (ref 11.7–13)
ERYTHROCYTE [DISTWIDTH] IN BLOOD BY AUTOMATED COUNT: 17.3 % (ref 11.7–13)
GFR SERPL CREATININE-BSD FRML MDRD: 50 ML/MIN/1.73
GFR SERPL CREATININE-BSD FRML MDRD: 52 ML/MIN/1.73
GFR SERPL CREATININE-BSD FRML MDRD: 52 ML/MIN/1.73
GLOBULIN UR ELPH-MCNC: 3.2 GM/DL
GLOBULIN UR ELPH-MCNC: 3.7 GM/DL
GLOBULIN UR ELPH-MCNC: 4.2 GM/DL
GLUCOSE BLD-MCNC: 275 MG/DL (ref 65–99)
GLUCOSE BLD-MCNC: 328 MG/DL (ref 65–99)
GLUCOSE BLD-MCNC: 374 MG/DL (ref 65–99)
GLUCOSE BLDC GLUCOMTR-MCNC: 408 MG/DL (ref 70–130)
GLUCOSE UR STRIP-MCNC: ABNORMAL MG/DL
GLUCOSE UR STRIP-MCNC: NEGATIVE MG/DL
GRAM STN SPEC: ABNORMAL
HCO3 BLDA-SCNC: 33.1 MMOL/L (ref 22–28)
HCT VFR BLD AUTO: 35.5 % (ref 35.6–45.5)
HCT VFR BLD AUTO: 38.3 % (ref 35.6–45.5)
HCT VFR BLD AUTO: 39.1 % (ref 35.6–45.5)
HGB BLD-MCNC: 10.8 G/DL (ref 11.9–15.5)
HGB BLD-MCNC: 11.1 G/DL (ref 11.9–15.5)
HGB BLD-MCNC: 11.3 G/DL (ref 11.9–15.5)
HGB UR QL STRIP.AUTO: ABNORMAL
HGB UR QL STRIP.AUTO: NEGATIVE
HOLD SPECIMEN: NORMAL
HOLD SPECIMEN: NORMAL
HOROWITZ INDEX BLD+IHG-RTO: 100 %
HYALINE CASTS UR QL AUTO: ABNORMAL /LPF
HYALINE CASTS UR QL AUTO: ABNORMAL /LPF
IMM GRANULOCYTES # BLD AUTO: 0.03 10*3/MM3 (ref 0–0.03)
IMM GRANULOCYTES # BLD AUTO: 0.05 10*3/MM3 (ref 0–0.03)
IMM GRANULOCYTES # BLD AUTO: 0.07 10*3/MM3 (ref 0–0.03)
IMM GRANULOCYTES NFR BLD AUTO: 0.5 % (ref 0–0.5)
IMM GRANULOCYTES NFR BLD AUTO: 0.6 % (ref 0–0.5)
IMM GRANULOCYTES NFR BLD AUTO: 0.6 % (ref 0–0.5)
INR PPP: 1.18 (ref 0.9–1.1)
ISOLATED FROM: ABNORMAL
KETONES UR QL STRIP: ABNORMAL
KETONES UR QL STRIP: NEGATIVE
LEUKOCYTE ESTERASE UR QL STRIP.AUTO: ABNORMAL
LEUKOCYTE ESTERASE UR QL STRIP.AUTO: ABNORMAL
LYMPHOCYTES # BLD AUTO: 1.48 10*3/MM3 (ref 0.9–4.8)
LYMPHOCYTES # BLD AUTO: 1.69 10*3/MM3 (ref 0.9–4.8)
LYMPHOCYTES # BLD AUTO: 1.99 10*3/MM3 (ref 0.9–4.8)
LYMPHOCYTES NFR BLD AUTO: 15.3 % (ref 19.6–45.3)
LYMPHOCYTES NFR BLD AUTO: 15.6 % (ref 19.6–45.3)
LYMPHOCYTES NFR BLD AUTO: 38.9 % (ref 19.6–45.3)
MCH RBC QN AUTO: 25.9 PG (ref 26.9–32)
MCH RBC QN AUTO: 26.3 PG (ref 26.9–32)
MCH RBC QN AUTO: 26.5 PG (ref 26.9–32)
MCHC RBC AUTO-ENTMCNC: 28.9 G/DL (ref 32.4–36.3)
MCHC RBC AUTO-ENTMCNC: 29 G/DL (ref 32.4–36.3)
MCHC RBC AUTO-ENTMCNC: 30.4 G/DL (ref 32.4–36.3)
MCV RBC AUTO: 86.6 FL (ref 80.5–98.2)
MCV RBC AUTO: 89.7 FL (ref 80.5–98.2)
MCV RBC AUTO: 91.4 FL (ref 80.5–98.2)
MODALITY: ABNORMAL
MONOCYTES # BLD AUTO: 0.41 10*3/MM3 (ref 0.2–1.2)
MONOCYTES # BLD AUTO: 0.75 10*3/MM3 (ref 0.2–1.2)
MONOCYTES # BLD AUTO: 1.08 10*3/MM3 (ref 0.2–1.2)
MONOCYTES NFR BLD AUTO: 10 % (ref 5–12)
MONOCYTES NFR BLD AUTO: 7.8 % (ref 5–12)
MONOCYTES NFR BLD AUTO: 8 % (ref 5–12)
NEUTROPHILS # BLD AUTO: 2.51 10*3/MM3 (ref 1.9–8.1)
NEUTROPHILS # BLD AUTO: 7.41 10*3/MM3 (ref 1.9–8.1)
NEUTROPHILS # BLD AUTO: 8.05 10*3/MM3 (ref 1.9–8.1)
NEUTROPHILS NFR BLD AUTO: 49 % (ref 42.7–76)
NEUTROPHILS NFR BLD AUTO: 74.1 % (ref 42.7–76)
NEUTROPHILS NFR BLD AUTO: 76.6 % (ref 42.7–76)
NITRITE UR QL STRIP: NEGATIVE
NITRITE UR QL STRIP: POSITIVE
NT-PROBNP SERPL-MCNC: ABNORMAL PG/ML (ref 0–1800)
NT-PROBNP SERPL-MCNC: ABNORMAL PG/ML (ref 0–1800)
O2 A-A PPRESDIFF RESPIRATORY: 0.7 MMHG
PCO2 BLDA: 65.5 MM HG (ref 35–45)
PH BLDA: 7.31 PH UNITS (ref 7.35–7.45)
PH UR STRIP.AUTO: <=5 [PH] (ref 5–8)
PH UR STRIP.AUTO: <=5 [PH] (ref 5–8)
PLATELET # BLD AUTO: 209 10*3/MM3 (ref 140–500)
PLATELET # BLD AUTO: 308 10*3/MM3 (ref 140–500)
PLATELET # BLD AUTO: 349 10*3/MM3 (ref 140–500)
PMV BLD AUTO: 10 FL (ref 6–12)
PMV BLD AUTO: 10.3 FL (ref 6–12)
PMV BLD AUTO: 9.6 FL (ref 6–12)
PO2 BLDA: 451.6 MM HG (ref 80–100)
POTASSIUM BLD-SCNC: 4.5 MMOL/L (ref 3.5–5.2)
POTASSIUM BLD-SCNC: 4.8 MMOL/L (ref 3.5–5.2)
POTASSIUM BLD-SCNC: 5.1 MMOL/L (ref 3.5–5.2)
PROCALCITONIN SERPL-MCNC: 0.07 NG/ML (ref 0.1–0.25)
PROT SERPL-MCNC: 6.2 G/DL (ref 6–8.5)
PROT SERPL-MCNC: 6.7 G/DL (ref 6–8.5)
PROT SERPL-MCNC: 7.1 G/DL (ref 6–8.5)
PROT UR QL STRIP: ABNORMAL
PROT UR QL STRIP: NEGATIVE
PROTHROMBIN TIME: 14.8 SECONDS (ref 11.7–14.2)
RBC # BLD AUTO: 4.1 10*6/MM3 (ref 3.9–5.2)
RBC # BLD AUTO: 4.19 10*6/MM3 (ref 3.9–5.2)
RBC # BLD AUTO: 4.36 10*6/MM3 (ref 3.9–5.2)
RBC # UR: ABNORMAL /HPF
RBC # UR: ABNORMAL /HPF
REF LAB TEST METHOD: ABNORMAL
REF LAB TEST METHOD: ABNORMAL
SAO2 % BLDCOA: 100 % (ref 92–99)
SET MECH RESP RATE: 18
SODIUM BLD-SCNC: 132 MMOL/L (ref 136–145)
SODIUM BLD-SCNC: 140 MMOL/L (ref 136–145)
SODIUM BLD-SCNC: 142 MMOL/L (ref 136–145)
SP GR UR STRIP: 1.02 (ref 1–1.03)
SP GR UR STRIP: 1.02 (ref 1–1.03)
SQUAMOUS #/AREA URNS HPF: ABNORMAL /HPF
SQUAMOUS #/AREA URNS HPF: ABNORMAL /HPF
TOTAL RATE: 21 BREATHS/MINUTE
TROPONIN T SERPL-MCNC: 0.01 NG/ML (ref 0–0.03)
TROPONIN T SERPL-MCNC: <0.01 NG/ML (ref 0–0.03)
UROBILINOGEN UR QL STRIP: ABNORMAL
UROBILINOGEN UR QL STRIP: ABNORMAL
VALPROATE SERPL-MCNC: 26 MCG/ML (ref 50–125)
VT ON VENT VENT: 306 ML
WBC CASTS #/AREA URNS LPF: ABNORMAL /LPF
WBC NRBC COR # BLD: 10.85 10*3/MM3 (ref 4.5–10.7)
WBC NRBC COR # BLD: 5.12 10*3/MM3 (ref 4.5–10.7)
WBC NRBC COR # BLD: 9.67 10*3/MM3 (ref 4.5–10.7)
WBC UR QL AUTO: ABNORMAL /HPF
WBC UR QL AUTO: ABNORMAL /HPF
WHOLE BLOOD HOLD SPECIMEN: NORMAL
WHOLE BLOOD HOLD SPECIMEN: NORMAL
YEAST URNS QL MICRO: ABNORMAL /HPF

## 2019-01-01 PROCEDURE — 99291 CRITICAL CARE FIRST HOUR: CPT

## 2019-01-01 PROCEDURE — 70450 CT HEAD/BRAIN W/O DYE: CPT

## 2019-01-01 PROCEDURE — 94799 UNLISTED PULMONARY SVC/PX: CPT

## 2019-01-01 PROCEDURE — 25010000002 MORPHINE (PF) 10 MG/ML SOLUTION: Performed by: INTERNAL MEDICINE

## 2019-01-01 PROCEDURE — 99285 EMERGENCY DEPT VISIT HI MDM: CPT

## 2019-01-01 PROCEDURE — 25010000002 FUROSEMIDE PER 20 MG

## 2019-01-01 PROCEDURE — 25010000002 CEFTRIAXONE PER 250 MG: Performed by: PHYSICIAN ASSISTANT

## 2019-01-01 PROCEDURE — 87186 SC STD MICRODIL/AGAR DIL: CPT | Performed by: PHYSICIAN ASSISTANT

## 2019-01-01 PROCEDURE — 71045 X-RAY EXAM CHEST 1 VIEW: CPT

## 2019-01-01 PROCEDURE — 87076 CULTURE ANAEROBE IDENT EACH: CPT | Performed by: EMERGENCY MEDICINE

## 2019-01-01 PROCEDURE — 87040 BLOOD CULTURE FOR BACTERIA: CPT | Performed by: EMERGENCY MEDICINE

## 2019-01-01 PROCEDURE — 25010000002 METHYLPREDNISOLONE PER 125 MG: Performed by: EMERGENCY MEDICINE

## 2019-01-01 PROCEDURE — 84145 PROCALCITONIN (PCT): CPT | Performed by: EMERGENCY MEDICINE

## 2019-01-01 PROCEDURE — 36600 WITHDRAWAL OF ARTERIAL BLOOD: CPT

## 2019-01-01 PROCEDURE — 85025 COMPLETE CBC W/AUTO DIFF WBC: CPT | Performed by: EMERGENCY MEDICINE

## 2019-01-01 PROCEDURE — 81001 URINALYSIS AUTO W/SCOPE: CPT | Performed by: EMERGENCY MEDICINE

## 2019-01-01 PROCEDURE — 72125 CT NECK SPINE W/O DYE: CPT

## 2019-01-01 PROCEDURE — 80164 ASSAY DIPROPYLACETIC ACD TOT: CPT | Performed by: EMERGENCY MEDICINE

## 2019-01-01 PROCEDURE — 25010000002 LORAZEPAM PER 2 MG: Performed by: INTERNAL MEDICINE

## 2019-01-01 PROCEDURE — 82962 GLUCOSE BLOOD TEST: CPT

## 2019-01-01 PROCEDURE — 80053 COMPREHEN METABOLIC PANEL: CPT | Performed by: EMERGENCY MEDICINE

## 2019-01-01 PROCEDURE — 96374 THER/PROPH/DIAG INJ IV PUSH: CPT

## 2019-01-01 PROCEDURE — P9612 CATHETERIZE FOR URINE SPEC: HCPCS

## 2019-01-01 PROCEDURE — 93010 ELECTROCARDIOGRAM REPORT: CPT | Performed by: INTERNAL MEDICINE

## 2019-01-01 PROCEDURE — 83880 ASSAY OF NATRIURETIC PEPTIDE: CPT | Performed by: EMERGENCY MEDICINE

## 2019-01-01 PROCEDURE — 93005 ELECTROCARDIOGRAM TRACING: CPT | Performed by: PHYSICIAN ASSISTANT

## 2019-01-01 PROCEDURE — 87086 URINE CULTURE/COLONY COUNT: CPT | Performed by: PHYSICIAN ASSISTANT

## 2019-01-01 PROCEDURE — 96365 THER/PROPH/DIAG IV INF INIT: CPT

## 2019-01-01 PROCEDURE — 94640 AIRWAY INHALATION TREATMENT: CPT

## 2019-01-01 PROCEDURE — 83605 ASSAY OF LACTIC ACID: CPT | Performed by: EMERGENCY MEDICINE

## 2019-01-01 PROCEDURE — 25010000002 PIPERACILLIN SOD-TAZOBACTAM PER 1 G: Performed by: EMERGENCY MEDICINE

## 2019-01-01 PROCEDURE — 85025 COMPLETE CBC W/AUTO DIFF WBC: CPT | Performed by: PHYSICIAN ASSISTANT

## 2019-01-01 PROCEDURE — 51702 INSERT TEMP BLADDER CATH: CPT

## 2019-01-01 PROCEDURE — 25010000002 FUROSEMIDE PER 20 MG: Performed by: EMERGENCY MEDICINE

## 2019-01-01 PROCEDURE — 94660 CPAP INITIATION&MGMT: CPT

## 2019-01-01 PROCEDURE — 73502 X-RAY EXAM HIP UNI 2-3 VIEWS: CPT

## 2019-01-01 PROCEDURE — 25010000002 PROMETHAZINE PER 50 MG: Performed by: INTERNAL MEDICINE

## 2019-01-01 PROCEDURE — 84484 ASSAY OF TROPONIN QUANT: CPT | Performed by: EMERGENCY MEDICINE

## 2019-01-01 PROCEDURE — 85610 PROTHROMBIN TIME: CPT | Performed by: EMERGENCY MEDICINE

## 2019-01-01 PROCEDURE — 99308 SBSQ NF CARE LOW MDM 20: CPT | Performed by: NURSE PRACTITIONER

## 2019-01-01 PROCEDURE — 93005 ELECTROCARDIOGRAM TRACING: CPT | Performed by: EMERGENCY MEDICINE

## 2019-01-01 PROCEDURE — 80053 COMPREHEN METABOLIC PANEL: CPT | Performed by: PHYSICIAN ASSISTANT

## 2019-01-01 PROCEDURE — 96361 HYDRATE IV INFUSION ADD-ON: CPT

## 2019-01-01 PROCEDURE — 84484 ASSAY OF TROPONIN QUANT: CPT | Performed by: PHYSICIAN ASSISTANT

## 2019-01-01 PROCEDURE — 87088 URINE BACTERIA CULTURE: CPT | Performed by: PHYSICIAN ASSISTANT

## 2019-01-01 PROCEDURE — 82803 BLOOD GASES ANY COMBINATION: CPT

## 2019-01-01 PROCEDURE — 36415 COLL VENOUS BLD VENIPUNCTURE: CPT

## 2019-01-01 PROCEDURE — 81001 URINALYSIS AUTO W/SCOPE: CPT | Performed by: PHYSICIAN ASSISTANT

## 2019-01-01 RX ORDER — MORPHINE SULFATE 20 MG/ML
10 SOLUTION ORAL
Status: CANCELLED | OUTPATIENT
Start: 2019-01-01 | End: 2019-01-25

## 2019-01-01 RX ORDER — LORAZEPAM 2 MG/ML
1 CONCENTRATE ORAL
Status: DISCONTINUED | OUTPATIENT
Start: 2019-01-01 | End: 2019-01-01 | Stop reason: HOSPADM

## 2019-01-01 RX ORDER — MORPHINE SULFATE 2 MG/ML
2 INJECTION, SOLUTION INTRAMUSCULAR; INTRAVENOUS
Status: CANCELLED | OUTPATIENT
Start: 2019-01-01 | End: 2019-01-25

## 2019-01-01 RX ORDER — LORAZEPAM 2 MG/ML
1 INJECTION INTRAMUSCULAR
Status: DISCONTINUED | OUTPATIENT
Start: 2019-01-01 | End: 2019-01-01 | Stop reason: HOSPADM

## 2019-01-01 RX ORDER — PROMETHAZINE HYDROCHLORIDE 25 MG/1
6.25 TABLET ORAL EVERY 4 HOURS PRN
Status: CANCELLED | OUTPATIENT
Start: 2019-01-01

## 2019-01-01 RX ORDER — FUROSEMIDE 10 MG/ML
40 INJECTION INTRAMUSCULAR; INTRAVENOUS ONCE
Status: COMPLETED | OUTPATIENT
Start: 2019-01-01 | End: 2019-01-01

## 2019-01-01 RX ORDER — DIPHENOXYLATE HYDROCHLORIDE AND ATROPINE SULFATE 2.5; .025 MG/1; MG/1
1 TABLET ORAL
Status: CANCELLED | OUTPATIENT
Start: 2019-01-01

## 2019-01-01 RX ORDER — HYDROMORPHONE HYDROCHLORIDE 1 MG/ML
0.5 INJECTION, SOLUTION INTRAMUSCULAR; INTRAVENOUS; SUBCUTANEOUS
Status: DISCONTINUED | OUTPATIENT
Start: 2019-01-01 | End: 2019-01-01 | Stop reason: HOSPADM

## 2019-01-01 RX ORDER — SODIUM CHLORIDE 0.9 % (FLUSH) 0.9 %
10 SYRINGE (ML) INJECTION AS NEEDED
Status: DISCONTINUED | OUTPATIENT
Start: 2019-01-01 | End: 2019-01-01 | Stop reason: HOSPADM

## 2019-01-01 RX ORDER — MORPHINE SULFATE 20 MG/ML
20 SOLUTION ORAL
Status: CANCELLED | OUTPATIENT
Start: 2019-01-01 | End: 2019-01-25

## 2019-01-01 RX ORDER — METOLAZONE 5 MG/1
5 TABLET ORAL DAILY
COMMUNITY
Start: 2019-01-01 | End: 2019-01-01

## 2019-01-01 RX ORDER — LORAZEPAM 2 MG/ML
2 CONCENTRATE ORAL
Status: CANCELLED | OUTPATIENT
Start: 2019-01-01 | End: 2019-01-25

## 2019-01-01 RX ORDER — MORPHINE SULFATE 2 MG/ML
2 INJECTION, SOLUTION INTRAMUSCULAR; INTRAVENOUS
Status: DISCONTINUED | OUTPATIENT
Start: 2019-01-01 | End: 2019-01-01 | Stop reason: HOSPADM

## 2019-01-01 RX ORDER — DIPHENHYDRAMINE HYDROCHLORIDE 50 MG/ML
25 INJECTION INTRAMUSCULAR; INTRAVENOUS EVERY 6 HOURS PRN
Status: CANCELLED | OUTPATIENT
Start: 2019-01-01

## 2019-01-01 RX ORDER — LORAZEPAM 2 MG/ML
2 INJECTION INTRAMUSCULAR
Status: CANCELLED | OUTPATIENT
Start: 2019-01-01 | End: 2019-01-25

## 2019-01-01 RX ORDER — GLYCOPYRROLATE 0.2 MG/ML
0.2 INJECTION INTRAMUSCULAR; INTRAVENOUS
Status: CANCELLED | OUTPATIENT
Start: 2019-01-01

## 2019-01-01 RX ORDER — DIPHENOXYLATE HYDROCHLORIDE AND ATROPINE SULFATE 2.5; .025 MG/1; MG/1
1 TABLET ORAL
Status: DISCONTINUED | OUTPATIENT
Start: 2019-01-01 | End: 2019-01-01 | Stop reason: HOSPADM

## 2019-01-01 RX ORDER — ACETAMINOPHEN 160 MG/5ML
650 SOLUTION ORAL EVERY 4 HOURS PRN
Status: DISCONTINUED | OUTPATIENT
Start: 2019-01-01 | End: 2019-01-01 | Stop reason: HOSPADM

## 2019-01-01 RX ORDER — LORAZEPAM 2 MG/ML
0.5 INJECTION INTRAMUSCULAR
Status: CANCELLED | OUTPATIENT
Start: 2019-01-01 | End: 2019-01-25

## 2019-01-01 RX ORDER — LORAZEPAM 2 MG/ML
0.5 CONCENTRATE ORAL
Status: CANCELLED | OUTPATIENT
Start: 2019-01-01 | End: 2019-01-25

## 2019-01-01 RX ORDER — FUROSEMIDE 10 MG/ML
80 INJECTION INTRAMUSCULAR; INTRAVENOUS ONCE
Status: COMPLETED | OUTPATIENT
Start: 2019-01-01 | End: 2019-01-01

## 2019-01-01 RX ORDER — PROMETHAZINE HYDROCHLORIDE 25 MG/ML
6.25 INJECTION, SOLUTION INTRAMUSCULAR; INTRAVENOUS EVERY 4 HOURS PRN
Status: CANCELLED | OUTPATIENT
Start: 2019-01-01

## 2019-01-01 RX ORDER — PROMETHAZINE HYDROCHLORIDE 6.25 MG/5ML
6.25 SYRUP ORAL EVERY 4 HOURS PRN
Status: CANCELLED | OUTPATIENT
Start: 2019-01-01

## 2019-01-01 RX ORDER — LORAZEPAM 2 MG/ML
1 CONCENTRATE ORAL
Status: CANCELLED | OUTPATIENT
Start: 2019-01-01 | End: 2019-01-25

## 2019-01-01 RX ORDER — PROMETHAZINE HYDROCHLORIDE 12.5 MG/1
6.25 SUPPOSITORY RECTAL EVERY 4 HOURS PRN
Status: DISCONTINUED | OUTPATIENT
Start: 2019-01-01 | End: 2019-01-01 | Stop reason: HOSPADM

## 2019-01-01 RX ORDER — MORPHINE SULFATE 10 MG/ML
6 INJECTION INTRAMUSCULAR; INTRAVENOUS; SUBCUTANEOUS
Status: DISCONTINUED | OUTPATIENT
Start: 2019-01-01 | End: 2019-01-01 | Stop reason: HOSPADM

## 2019-01-01 RX ORDER — GLYCOPYRROLATE 0.2 MG/ML
0.2 INJECTION INTRAMUSCULAR; INTRAVENOUS
Status: DISCONTINUED | OUTPATIENT
Start: 2019-01-01 | End: 2019-01-01 | Stop reason: HOSPADM

## 2019-01-01 RX ORDER — LORAZEPAM 2 MG/ML
2 CONCENTRATE ORAL
Status: DISCONTINUED | OUTPATIENT
Start: 2019-01-01 | End: 2019-01-01 | Stop reason: HOSPADM

## 2019-01-01 RX ORDER — FUROSEMIDE 40 MG/1
20 TABLET ORAL 2 TIMES DAILY
Qty: 30 TABLET | Refills: 0 | Status: SHIPPED | OUTPATIENT
Start: 2019-01-01

## 2019-01-01 RX ORDER — FUROSEMIDE 10 MG/ML
INJECTION INTRAMUSCULAR; INTRAVENOUS
Status: COMPLETED
Start: 2019-01-01 | End: 2019-01-01

## 2019-01-01 RX ORDER — ACETAMINOPHEN 325 MG/1
650 TABLET ORAL EVERY 4 HOURS PRN
Status: DISCONTINUED | OUTPATIENT
Start: 2019-01-01 | End: 2019-01-01 | Stop reason: HOSPADM

## 2019-01-01 RX ORDER — CEFTRIAXONE SODIUM 1 G/50ML
1 INJECTION, SOLUTION INTRAVENOUS ONCE
Status: COMPLETED | OUTPATIENT
Start: 2019-01-01 | End: 2019-01-01

## 2019-01-01 RX ORDER — GLYCOPYRROLATE 0.2 MG/ML
0.4 INJECTION INTRAMUSCULAR; INTRAVENOUS
Status: CANCELLED | OUTPATIENT
Start: 2019-01-01

## 2019-01-01 RX ORDER — UREA 10 %
4 LOTION (ML) TOPICAL NIGHTLY
COMMUNITY

## 2019-01-01 RX ORDER — LORAZEPAM 2 MG/ML
0.5 CONCENTRATE ORAL
Status: DISCONTINUED | OUTPATIENT
Start: 2019-01-01 | End: 2019-01-01 | Stop reason: HOSPADM

## 2019-01-01 RX ORDER — PROMETHAZINE HYDROCHLORIDE 25 MG/ML
6.25 INJECTION, SOLUTION INTRAMUSCULAR; INTRAVENOUS EVERY 4 HOURS PRN
Status: DISCONTINUED | OUTPATIENT
Start: 2019-01-01 | End: 2019-01-01 | Stop reason: HOSPADM

## 2019-01-01 RX ORDER — ACETAMINOPHEN 650 MG/1
650 SUPPOSITORY RECTAL EVERY 4 HOURS PRN
Status: CANCELLED | OUTPATIENT
Start: 2019-01-01

## 2019-01-01 RX ORDER — MORPHINE SULFATE 20 MG/ML
5 SOLUTION ORAL
Status: CANCELLED | OUTPATIENT
Start: 2019-01-01 | End: 2019-01-25

## 2019-01-01 RX ORDER — LORAZEPAM 0.5 MG/1
0.5 TABLET ORAL NIGHTLY PRN
Qty: 30 TABLET | Refills: 0 | Status: SHIPPED | OUTPATIENT
Start: 2019-01-01

## 2019-01-01 RX ORDER — MORPHINE SULFATE 20 MG/ML
20 SOLUTION ORAL
Status: DISCONTINUED | OUTPATIENT
Start: 2019-01-01 | End: 2019-01-01 | Stop reason: HOSPADM

## 2019-01-01 RX ORDER — PROMETHAZINE HYDROCHLORIDE 25 MG/1
6.25 TABLET ORAL EVERY 4 HOURS PRN
Status: DISCONTINUED | OUTPATIENT
Start: 2019-01-01 | End: 2019-01-01 | Stop reason: HOSPADM

## 2019-01-01 RX ORDER — MORPHINE SULFATE 20 MG/ML
5 SOLUTION ORAL
Status: DISCONTINUED | OUTPATIENT
Start: 2019-01-01 | End: 2019-01-01 | Stop reason: HOSPADM

## 2019-01-01 RX ORDER — PROMETHAZINE HYDROCHLORIDE 6.25 MG/5ML
6.25 SYRUP ORAL EVERY 4 HOURS PRN
Status: DISCONTINUED | OUTPATIENT
Start: 2019-01-01 | End: 2019-01-01 | Stop reason: HOSPADM

## 2019-01-01 RX ORDER — MORPHINE SULFATE 2 MG/ML
4 INJECTION, SOLUTION INTRAMUSCULAR; INTRAVENOUS
Status: DISCONTINUED | OUTPATIENT
Start: 2019-01-01 | End: 2019-01-01 | Stop reason: HOSPADM

## 2019-01-01 RX ORDER — DIPHENHYDRAMINE HCL 25 MG
25 CAPSULE ORAL EVERY 6 HOURS PRN
Status: CANCELLED | OUTPATIENT
Start: 2019-01-01

## 2019-01-01 RX ORDER — PROMETHAZINE HYDROCHLORIDE 12.5 MG/1
6.25 SUPPOSITORY RECTAL EVERY 4 HOURS PRN
Status: CANCELLED | OUTPATIENT
Start: 2019-01-01

## 2019-01-01 RX ORDER — METHYLPREDNISOLONE SODIUM SUCCINATE 125 MG/2ML
125 INJECTION, POWDER, LYOPHILIZED, FOR SOLUTION INTRAMUSCULAR; INTRAVENOUS ONCE
Status: COMPLETED | OUTPATIENT
Start: 2019-01-01 | End: 2019-01-01

## 2019-01-01 RX ORDER — MORPHINE SULFATE 20 MG/ML
10 SOLUTION ORAL
Status: DISCONTINUED | OUTPATIENT
Start: 2019-01-01 | End: 2019-01-01 | Stop reason: HOSPADM

## 2019-01-01 RX ORDER — LORAZEPAM 2 MG/ML
0.5 INJECTION INTRAMUSCULAR
Status: DISCONTINUED | OUTPATIENT
Start: 2019-01-01 | End: 2019-01-01 | Stop reason: HOSPADM

## 2019-01-01 RX ORDER — DIPHENHYDRAMINE HYDROCHLORIDE 50 MG/ML
25 INJECTION INTRAMUSCULAR; INTRAVENOUS EVERY 6 HOURS PRN
Status: DISCONTINUED | OUTPATIENT
Start: 2019-01-01 | End: 2019-01-01 | Stop reason: HOSPADM

## 2019-01-01 RX ORDER — GLYCOPYRROLATE 0.2 MG/ML
0.4 INJECTION INTRAMUSCULAR; INTRAVENOUS
Status: DISCONTINUED | OUTPATIENT
Start: 2019-01-01 | End: 2019-01-01 | Stop reason: HOSPADM

## 2019-01-01 RX ORDER — MORPHINE SULFATE 10 MG/ML
6 INJECTION INTRAMUSCULAR; INTRAVENOUS; SUBCUTANEOUS
Status: CANCELLED | OUTPATIENT
Start: 2019-01-01 | End: 2019-01-25

## 2019-01-01 RX ORDER — CEPHALEXIN 500 MG/1
500 CAPSULE ORAL 2 TIMES DAILY
Qty: 10 CAPSULE | Refills: 0 | Status: SHIPPED | OUTPATIENT
Start: 2019-01-01 | End: 2019-01-01

## 2019-01-01 RX ORDER — LORAZEPAM 2 MG/ML
2 INJECTION INTRAMUSCULAR
Status: DISCONTINUED | OUTPATIENT
Start: 2019-01-01 | End: 2019-01-01 | Stop reason: HOSPADM

## 2019-01-01 RX ORDER — LORAZEPAM 2 MG/ML
1 INJECTION INTRAMUSCULAR
Status: CANCELLED | OUTPATIENT
Start: 2019-01-01 | End: 2019-01-25

## 2019-01-01 RX ORDER — SCOLOPAMINE TRANSDERMAL SYSTEM 1 MG/1
1 PATCH, EXTENDED RELEASE TRANSDERMAL
Status: DISCONTINUED | OUTPATIENT
Start: 2019-01-01 | End: 2019-01-01 | Stop reason: HOSPADM

## 2019-01-01 RX ORDER — ACETAMINOPHEN 325 MG/1
650 TABLET ORAL EVERY 4 HOURS PRN
Status: CANCELLED | OUTPATIENT
Start: 2019-01-01

## 2019-01-01 RX ORDER — MORPHINE SULFATE 2 MG/ML
4 INJECTION, SOLUTION INTRAMUSCULAR; INTRAVENOUS
Status: CANCELLED | OUTPATIENT
Start: 2019-01-01 | End: 2019-01-25

## 2019-01-01 RX ORDER — FUROSEMIDE 10 MG/ML
INJECTION INTRAMUSCULAR; INTRAVENOUS
Status: DISPENSED
Start: 2019-01-01 | End: 2019-01-01

## 2019-01-01 RX ORDER — DIPHENHYDRAMINE HCL 25 MG
25 CAPSULE ORAL EVERY 6 HOURS PRN
Status: DISCONTINUED | OUTPATIENT
Start: 2019-01-01 | End: 2019-01-01 | Stop reason: HOSPADM

## 2019-01-01 RX ORDER — ACETAMINOPHEN 160 MG/5ML
650 SOLUTION ORAL EVERY 4 HOURS PRN
Status: CANCELLED | OUTPATIENT
Start: 2019-01-01

## 2019-01-01 RX ORDER — ACETAMINOPHEN 650 MG/1
650 SUPPOSITORY RECTAL EVERY 4 HOURS PRN
Status: DISCONTINUED | OUTPATIENT
Start: 2019-01-01 | End: 2019-01-01 | Stop reason: HOSPADM

## 2019-01-01 RX ORDER — SCOLOPAMINE TRANSDERMAL SYSTEM 1 MG/1
1 PATCH, EXTENDED RELEASE TRANSDERMAL
Status: CANCELLED | OUTPATIENT
Start: 2019-01-01

## 2019-01-01 RX ORDER — SODIUM CHLORIDE 0.9 % (FLUSH) 0.9 %
10 SYRINGE (ML) INJECTION AS NEEDED
Status: CANCELLED | OUTPATIENT
Start: 2019-01-01

## 2019-01-01 RX ORDER — IPRATROPIUM BROMIDE AND ALBUTEROL SULFATE 2.5; .5 MG/3ML; MG/3ML
3 SOLUTION RESPIRATORY (INHALATION) ONCE
Status: COMPLETED | OUTPATIENT
Start: 2019-01-01 | End: 2019-01-01

## 2019-01-01 RX ADMIN — MORPHINE SULFATE 2 MG: 10 INJECTION INTRAVENOUS at 17:01

## 2019-01-01 RX ADMIN — MORPHINE SULFATE 2 MG: 10 INJECTION INTRAVENOUS at 14:39

## 2019-01-01 RX ADMIN — LORAZEPAM 0.5 MG: 2 INJECTION INTRAMUSCULAR; INTRAVENOUS at 20:57

## 2019-01-01 RX ADMIN — GLYCOPYRROLATE 0.2 MG: 0.2 INJECTION, SOLUTION INTRAMUSCULAR; INTRAVENOUS at 07:53

## 2019-01-01 RX ADMIN — FUROSEMIDE 40 MG: 10 INJECTION INTRAMUSCULAR; INTRAVENOUS at 14:19

## 2019-01-01 RX ADMIN — GLYCOPYRROLATE 0.4 MG: 0.2 INJECTION, SOLUTION INTRAMUSCULAR; INTRAVENOUS at 17:07

## 2019-01-01 RX ADMIN — LORAZEPAM 0.5 MG: 2 INJECTION INTRAMUSCULAR; INTRAVENOUS at 00:26

## 2019-01-01 RX ADMIN — MORPHINE SULFATE 2 MG: 10 INJECTION INTRAVENOUS at 09:07

## 2019-01-01 RX ADMIN — TAZOBACTAM SODIUM AND PIPERACILLIN SODIUM 3.38 G: 375; 3 INJECTION, SOLUTION INTRAVENOUS at 11:30

## 2019-01-01 RX ADMIN — MORPHINE SULFATE 4 MG: 10 INJECTION INTRAVENOUS at 12:55

## 2019-01-01 RX ADMIN — LORAZEPAM 0.5 MG: 2 INJECTION INTRAMUSCULAR; INTRAVENOUS at 13:48

## 2019-01-01 RX ADMIN — FUROSEMIDE 40 MG: 10 INJECTION, SOLUTION INTRAMUSCULAR; INTRAVENOUS at 03:05

## 2019-01-01 RX ADMIN — MORPHINE SULFATE 2 MG: 10 INJECTION INTRAVENOUS at 04:50

## 2019-01-01 RX ADMIN — GLYCOPYRROLATE 0.2 MG: 0.2 INJECTION, SOLUTION INTRAMUSCULAR; INTRAVENOUS at 04:34

## 2019-01-01 RX ADMIN — MORPHINE SULFATE 4 MG: 10 INJECTION INTRAVENOUS at 16:48

## 2019-01-01 RX ADMIN — MORPHINE SULFATE 4 MG: 10 INJECTION INTRAVENOUS at 04:35

## 2019-01-01 RX ADMIN — LORAZEPAM 0.5 MG: 2 INJECTION INTRAMUSCULAR; INTRAVENOUS at 13:29

## 2019-01-01 RX ADMIN — IPRATROPIUM BROMIDE AND ALBUTEROL SULFATE 3 ML: 2.5; .5 SOLUTION RESPIRATORY (INHALATION) at 09:49

## 2019-01-01 RX ADMIN — MORPHINE SULFATE 2 MG: 10 INJECTION INTRAVENOUS at 09:15

## 2019-01-01 RX ADMIN — MORPHINE SULFATE 2 MG: 10 INJECTION INTRAVENOUS at 08:56

## 2019-01-01 RX ADMIN — MORPHINE SULFATE 2 MG: 10 INJECTION INTRAVENOUS at 17:08

## 2019-01-01 RX ADMIN — LORAZEPAM 0.5 MG: 2 INJECTION INTRAMUSCULAR; INTRAVENOUS at 19:39

## 2019-01-01 RX ADMIN — FUROSEMIDE 80 MG: 10 INJECTION, SOLUTION INTRAMUSCULAR; INTRAVENOUS at 10:06

## 2019-01-01 RX ADMIN — LORAZEPAM 0.5 MG: 2 INJECTION INTRAMUSCULAR; INTRAVENOUS at 09:15

## 2019-01-01 RX ADMIN — MORPHINE SULFATE 2 MG: 10 INJECTION INTRAVENOUS at 21:32

## 2019-01-01 RX ADMIN — CEFTRIAXONE SODIUM 1 G: 1 INJECTION, SOLUTION INTRAVENOUS at 17:01

## 2019-01-01 RX ADMIN — LORAZEPAM 0.5 MG: 2 INJECTION INTRAMUSCULAR; INTRAVENOUS at 17:08

## 2019-01-01 RX ADMIN — GLYCOPYRROLATE 0.2 MG: 0.2 INJECTION, SOLUTION INTRAMUSCULAR; INTRAVENOUS at 16:48

## 2019-01-01 RX ADMIN — PROMETHAZINE HYDROCHLORIDE 6.25 MG: 25 INJECTION INTRAMUSCULAR; INTRAVENOUS at 19:31

## 2019-01-01 RX ADMIN — MORPHINE SULFATE 2 MG: 10 INJECTION INTRAVENOUS at 17:45

## 2019-01-01 RX ADMIN — LORAZEPAM 0.5 MG: 2 INJECTION INTRAMUSCULAR; INTRAVENOUS at 17:01

## 2019-01-01 RX ADMIN — METHYLPREDNISOLONE SODIUM SUCCINATE 125 MG: 125 INJECTION, POWDER, FOR SOLUTION INTRAMUSCULAR; INTRAVENOUS at 10:06

## 2019-01-01 RX ADMIN — MORPHINE SULFATE 2 MG: 10 INJECTION INTRAVENOUS at 04:48

## 2019-01-01 RX ADMIN — MORPHINE SULFATE 2 MG: 10 INJECTION INTRAVENOUS at 20:58

## 2019-01-01 RX ADMIN — GLYCOPYRROLATE 0.2 MG: 0.2 INJECTION, SOLUTION INTRAMUSCULAR; INTRAVENOUS at 21:08

## 2019-01-01 RX ADMIN — MORPHINE SULFATE 2 MG: 10 INJECTION INTRAVENOUS at 19:29

## 2019-01-01 RX ADMIN — LORAZEPAM 0.5 MG: 2 INJECTION INTRAMUSCULAR; INTRAVENOUS at 21:33

## 2019-01-01 RX ADMIN — MORPHINE SULFATE 2 MG: 10 INJECTION INTRAVENOUS at 08:11

## 2019-01-01 RX ADMIN — MORPHINE SULFATE 2 MG: 10 INJECTION INTRAVENOUS at 13:48

## 2019-01-01 RX ADMIN — PROMETHAZINE HYDROCHLORIDE 6.25 MG: 25 INJECTION INTRAMUSCULAR; INTRAVENOUS at 14:39

## 2019-01-01 RX ADMIN — MORPHINE SULFATE 4 MG: 10 INJECTION INTRAVENOUS at 21:08

## 2019-01-01 RX ADMIN — LORAZEPAM 0.5 MG: 2 INJECTION INTRAMUSCULAR; INTRAVENOUS at 04:50

## 2019-01-01 RX ADMIN — MORPHINE SULFATE 2 MG: 10 INJECTION INTRAVENOUS at 13:29

## 2019-01-01 RX ADMIN — SODIUM CHLORIDE, POTASSIUM CHLORIDE, SODIUM LACTATE AND CALCIUM CHLORIDE 500 ML: 600; 310; 30; 20 INJECTION, SOLUTION INTRAVENOUS at 17:31

## 2019-01-01 RX ADMIN — FUROSEMIDE 40 MG: 10 INJECTION, SOLUTION INTRAMUSCULAR; INTRAVENOUS at 14:19

## 2019-01-01 RX ADMIN — GLYCOPYRROLATE 0.2 MG: 0.2 INJECTION, SOLUTION INTRAMUSCULAR; INTRAVENOUS at 00:49

## 2019-01-01 RX ADMIN — MORPHINE SULFATE 4 MG: 10 INJECTION INTRAVENOUS at 00:49

## 2019-01-01 RX ADMIN — LORAZEPAM 0.5 MG: 2 INJECTION INTRAMUSCULAR; INTRAVENOUS at 04:48

## 2019-01-01 RX ADMIN — MORPHINE SULFATE 2 MG: 10 INJECTION INTRAVENOUS at 00:25

## 2019-01-01 RX ADMIN — LORAZEPAM 0.5 MG: 2 INJECTION INTRAMUSCULAR; INTRAVENOUS at 08:10

## 2019-01-01 RX ADMIN — GLYCOPYRROLATE 0.2 MG: 0.2 INJECTION, SOLUTION INTRAMUSCULAR; INTRAVENOUS at 12:54

## 2019-01-01 RX ADMIN — GLYCOPYRROLATE 0.4 MG: 0.2 INJECTION, SOLUTION INTRAMUSCULAR; INTRAVENOUS at 13:29

## 2019-01-01 RX ADMIN — GLYCOPYRROLATE 0.2 MG: 0.2 INJECTION, SOLUTION INTRAMUSCULAR; INTRAVENOUS at 09:07

## 2019-01-03 NOTE — ED NOTES
"Pt arrived via [facility] from [facility] with c/o unwitnessed fall. Pt was left in the room on the floor for 30 minutes prior to calling EMS per staff at [facility].  On arrival pt was laying on the floor on right side complaining of left hip pain.  Pt also c/o head pain.  No obvious hematoma noted.  Pts left pupil is non reactive and pupils are unequal per EMS.  Pts legs are wrapped in ACE bandages r/t CHF and cellulitis.  Pt has hx of dementia but will answer questions appropriately.  Pt states her head back and hip hurt.  Pt states \"I fell straight on my head\" Pt is on Plavix     Kalyn Paige RN  01/02/19 3388       Royal, Kalyn M, RN  01/02/19 1138    "

## 2019-01-03 NOTE — ED NOTES
Pt removed from EMS backboard with assistance from 2 ED techs per Dr. Moy's approval.     Carola Mistry, RN  01/03/19 0141

## 2019-01-03 NOTE — ED PROVIDER NOTES
EMERGENCY DEPARTMENT ENCOUNTER    CHIEF COMPLAINT  Chief Complaint: Hip pain  History given by: Patient  History limited by: Dementia  Room Number: 09/09  PMD: Tay Lynch MD    HPI:  Pt is a 87 y.o. female who presents, from [facility], complaining of sudden L hip pain that occurred PTA when pt had an unwitnessed fall. She doesn't remember how or why she fell, but states she was leaving the restroom heading back to her bed when it occurred. Pt family states pt is not cleared to be ambulatory without assistance from NH staff.    Duration: Occurred PTA  Onset: Sudden  Timing: Constant  Location: L hip  Intensity/Severity: Moderate  Progression: Unchanged  Associated Symptoms: None stated  Previous Episodes: None stated  Treatment before arrival: None    PAST MEDICAL HISTORY  Active Ambulatory Problems     Diagnosis Date Noted   • Acute respiratory failure with hypoxia (CMS/[facility]) 08/08/2016   • Essential hypertension 08/12/2016   • Syncope and collapse 11/01/2017   • Bacteriuria 01/26/2018   • HCAP (healthcare-associated pneumonia) 03/22/2018   • Hypoglycemia 03/22/2018   • DM (diabetes mellitus) (CMS/[facility]) 03/22/2018   • Acute on chronic systolic CHF (congestive heart failure) (CMS/[facility]) 03/22/2018   • Toxic metabolic encephalopathy 03/23/2018   • Alzheimer disease 03/27/2018   • Pneumonia of right lower lobe due to infectious organism (CMS/[facility]) 04/15/2018   • Acute hypoxemic respiratory failure (CMS/[facility]) 04/15/2018   • CHF (congestive heart failure), NYHA class I, acute on chronic, diastolic (CMS/[facility]) 04/15/2018   • Dehydration 04/18/2018   • Elevated troponin 11/21/2018   • Nonrheumatic mitral valve insufficiency 11/21/2018   • Edema of both legs 11/21/2018   • Dementia with behavioral disturbance 12/04/2018   • Complicated UTI (urinary tract infection) 12/04/2018     Resolved Ambulatory Problems     Diagnosis Date Noted   • No Resolved Ambulatory Problems     Past Medical History:   Diagnosis Date    • Alzheimer disease    • Atherosclerotic heart disease    • Bronchitis    • CHF (congestive heart failure) (CMS/[facility])    • COPD (chronic obstructive pulmonary disease) (CMS/[facility])    • Dementia    • Depression    • Diabetes mellitus (CMS/[facility])    • GERD (gastroesophageal reflux disease)    • HTN (hypertension)    • Hypokalemia    • Insomnia    • Metabolic encephalopathy    • Neuropathy    • OA (osteoarthritis)    • Pneumonia    • Renal disorder    • TIA (transient ischemic attack)    • Upper respiratory infection        PAST SURGICAL HISTORY  Past Surgical History:   Procedure Laterality Date   • CARDIAC SURGERY         FAMILY HISTORY  No family history on file.    SOCIAL HISTORY  Social History     Socioeconomic History   • Marital status:      Spouse name: Not on file   • Number of children: 3   • Years of education: Not on file   • Highest education level: Not on file   Social Needs   • Financial resource strain: Not on file   • Food insecurity - worry: Not on file   • Food insecurity - inability: Not on file   • Transportation needs - medical: Not on file   • Transportation needs - non-medical: Not on file   Occupational History     Comment: [employer]   Tobacco Use   • Smoking status: Former Smoker     Last attempt to quit: 1980     Years since quittin.0   • Smokeless tobacco: Never Used   • Tobacco comment: unable to assess   Substance and Sexual Activity   • Alcohol use: No   • Drug use: No   • Sexual activity: Defer   Other Topics Concern   • Not on file   Social History Narrative   • Not on file       ALLERGIES  Codeine; Latex; Other; Soma [carisoprodol]; and Sulfa antibiotics    REVIEW OF SYSTEMS  Review of Systems   Unable to perform ROS: Dementia   Musculoskeletal: Positive for myalgias (L hip).       PHYSICAL EXAM  ED Triage Vitals   Temp Heart Rate Resp BP SpO2   19 2351 19 2347 19 2347 19 2347 19 2347   97.6 °F (36.4 °C) 93 19 142/72 96 %      Temp src Heart  Rate Source Patient Position BP Location FiO2 (%)   01/02/19 2351 -- -- 01/02/19 2347 --   Oral   Right arm        Physical Exam   Constitutional: She appears distressed (mild respiratory).   Pt is a limited historian.   HENT:   Head: Normocephalic and atraumatic.   Eyes: EOM are normal. Pupils are equal, round, and reactive to light.   Neck: Normal range of motion. Neck supple.   Cardiovascular: Normal rate, regular rhythm and normal heart sounds.   Pulmonary/Chest: She is in respiratory distress (mild). She has wheezes (bilaterally). She has rales (bilaterally).   Abdominal: Soft. There is no tenderness. There is no rebound and no guarding.   Musculoskeletal: Normal range of motion. She exhibits edema (chronic BLE).        Left hip: She exhibits tenderness. She exhibits no deformity.        Cervical back: She exhibits no tenderness.   Neurological: She is alert. She has normal sensation and normal strength. She is disoriented.   Skin: Skin is warm and dry. No rash noted.   Psychiatric: Mood and affect normal.   Nursing note and vitals reviewed.    LAB RESULTS  Lab Results (last 24 hours)     Procedure Component Value Units Date/Time    CBC & Differential [[id number]] Collected:  01/03/19 0249    Specimen:  Blood Updated:  01/03/19 0310    Narrative:       The following orders were created for panel order CBC & Differential.  Procedure                               Abnormality         Status                     ---------                               -----------         ------                     CBC Auto Differential[[id number]]        Abnormal            Final result                 Please view results for these tests on the individual orders.    Comprehensive Metabolic Panel [[id number]]  (Abnormal) Collected:  01/03/19 0249    Specimen:  Blood Updated:  01/03/19 0323     Glucose 328 mg/dL      BUN 28 mg/dL      Creatinine 1.01 mg/dL      Sodium 132 mmol/L      Potassium 4.5 mmol/L      Chloride 96 mmol/L      CO2  24.3 mmol/L      Calcium 8.3 mg/dL      Total Protein 6.2 g/dL      Albumin 3.00 g/dL      ALT (SGPT) 19 U/L      AST (SGOT) 24 U/L      Alkaline Phosphatase 103 U/L      Total Bilirubin 0.7 mg/dL      eGFR Non African Amer 52 mL/min/1.73      Globulin 3.2 gm/dL      A/G Ratio 0.9 g/dL      BUN/Creatinine Ratio 27.7     Anion Gap 11.7 mmol/L     Narrative:       The MDRD GFR formula is only valid for adults with stable renal function between ages 18 and 70.    BNP [[id number]]  (Abnormal) Collected:  01/03/19 0249    Specimen:  Blood Updated:  01/03/19 0333     proBNP 32,556.0 pg/mL     Narrative:       Among patients with dyspnea, NT-proBNP is highly sensitive for the detection of acute congestive heart failure. In addition NT-proBNP of <300 pg/ml effectively rules out acute congestive heart failure with 99% negative predictive value.    CBC Auto Differential [[id number]]  (Abnormal) Collected:  01/03/19 0249    Specimen:  Blood Updated:  01/03/19 0310     WBC 10.85 10*3/mm3      RBC 4.10 10*6/mm3      Hemoglobin 10.8 g/dL      Hematocrit 35.5 %      MCV 86.6 fL      MCH 26.3 pg      MCHC 30.4 g/dL      RDW 17.3 %      RDW-SD 55.0 fl      MPV 9.6 fL      Platelets 308 10*3/mm3      Neutrophil % 74.1 %      Lymphocyte % 15.6 %      Monocyte % 10.0 %      Eosinophil % 0.2 %      Basophil % 0.1 %      Immature Grans % 0.6 %      Neutrophils, Absolute 8.05 10*3/mm3      Lymphocytes, Absolute 1.69 10*3/mm3      Monocytes, Absolute 1.08 10*3/mm3      Eosinophils, Absolute 0.02 10*3/mm3      Basophils, Absolute 0.01 10*3/mm3      Immature Grans, Absolute 0.07 10*3/mm3           I ordered the above labs and reviewed the results    RADIOLOGY  XR Chest 1 View   Final Result   Mild under aeration but without active disease by portable   imaging           This report was finalized on 1/3/2019 1:34 AM by Michael Tang M.D.          XR Hip With or Without Pelvis 2 - 3 View Left   Final Result   1. No acute osseous abnormality.        This report was finalized on 1/3/2019 1:04 AM by Michael Tang M.D.          CT Cervical Spine Without Contrast   Final Result   1. No acute fracture.   2. Left greater than right pleural effusions are incidentally noted       This report was finalized on 1/3/2019 12:45 AM by Michael Tang M.D.          CT Head Without Contrast   Final Result   1. No acute intracranial abnormality.                           This report was finalized on 1/3/2019 12:41 AM by Michael Tang M.D.               I ordered the above noted radiological studies. Interpreted by radiologist. Reviewed by me in PACS.     PROGRESS AND CONSULTS     0000 Ordered CT C-spine and CT head for further evaluation.    0006 Ordered XR L hip for further evaluation.    0106 Ordered CXR for further evaluation.    0107 Ordered CBC, BNP, and CMP for further evaluation. Ordered lasix.    0338 Rechecked with pt and discussed that there is no acute fractures or abnormalities in her CT's. Pt is likely SOA due to CHF. Plan to discharge and increase Lasix. Pt understands and agrees with the plan, all questions answered.    MEDICAL DECISION MAKING  Results were reviewed/discussed with the patient and they were also made aware of online access. Pt also made aware that some labs, such as cultures, will not be resulted during ER visit and follow up with PMD is necessary.     MDM  Number of Diagnoses or Management Options  Acute on chronic congestive heart failure, unspecified heart failure type (CMS/HCC):   Contusion of left hip, initial encounter:   Mild closed head injury, initial encounter:      Amount and/or Complexity of Data Reviewed  Clinical lab tests: ordered and reviewed (ProBNP= 32,556.0 (34,000 one month ago), creatinine= 1.01, WBC= 10.85, glucose= 328, hemoglobin= 10.8)  Tests in the radiology section of CPT®: reviewed and ordered (CT head is negative. CT C-spine and XR L hip shows no acute fractures. CXR shows mild under aeration, but no active  disease.)  Decide to obtain previous medical records or to obtain history from someone other than the patient: yes    Patient Progress  Patient progress: stable         DIAGNOSIS  Final diagnoses:   Contusion of left hip, initial encounter   Acute on chronic congestive heart failure, unspecified heart failure type (CMS/HCC)   Mild closed head injury, initial encounter       DISPOSITION  DISCHARGE    Patient discharged in stable condition.    Reviewed implications of results, diagnosis, meds, responsibility to follow up, warning signs and symptoms of possible worsening, potential complications and reasons to return to ER, including new or worsening sxs.    Patient/Family voiced understanding of above instructions.    Discussed plan for discharge, as there is no emergent indication for admission. Patient referred to primary care provider for BP management due to today's BP. Pt/family is agreeable and understands need for follow up and repeat testing.  Pt is aware that discharge does not mean that nothing is wrong but it indicates no emergency is present that requires admission and they must continue care with follow-up as given below or physician of their choice.     FOLLOW-UP  Tay Lynch MD  [address] [address]  [phone]               Medication List      Changed    furosemide 40 MG tablet  Commonly known as:  LASIX  Take 0.5 tablets by mouth 2 (Two) Times a Day.  What changed:  when to take this          Latest Documented Vital Signs:  As of 3:39 AM  BP- 151/86 HR- 91 Temp- 97.6 °F (36.4 °C) (Oral) O2 sat- 100%    --  Documentation assistance provided by dayday Kyle for Dr. Moy.  Information recorded by the scribe was done at my direction and has been verified and validated by me.     Aimee Kyle  01/03/19 8177       Nehemias Moy MD  01/07/19 1392

## 2019-01-03 NOTE — ED NOTES
C-collar removed by Dr. Moy after pt returned from CT. Pt now sitting up in bed and resting comfortably.     Carola Mistry, RN  01/03/19 0253

## 2019-01-03 NOTE — ED NOTES
"Pt yelling \"take this off, take this off\" and grabbing at her c-collar. Pt also on backboard from EMS. Pt yelling \"you better tell the doctor to hurry.\" Dr. Moy notified. Dr. Moy also notified of pt's unequal pupils. Pt oriented to self. States \"I was trying to poo and I fell.\" Pt has hx of dementia.     Carola Mistry, RN  01/03/19 0027    "

## 2019-01-03 NOTE — ED NOTES
[facility] called for transport back to [facility].  ETA 1.5hrs.     Kylie Braxton, RN  01/03/19 0404

## 2019-01-10 NOTE — ED NOTES
[facility] called for transport ETA 2015 at this time     Nathaly, Fide Rick, ALEXANDREA  01/10/19 2393

## 2019-01-10 NOTE — ED NOTES
EMS reports pt had unwitnessed fall 45 minutes ago, was found with garbled speech, confusion, right pupil constricted, left pupil dilated. EMS reports patient has a baseline of dementia but nursing home staff reports she is acting more confused than normal.     Jacklyn Toth, RN  01/10/19 4202

## 2019-01-10 NOTE — DISCHARGE INSTRUCTIONS
Take the medications as prescribed.  Return for fever increasing changes in mental status, vomiting, as needed.

## 2019-01-10 NOTE — ED PROVIDER NOTES
EMERGENCY DEPARTMENT ENCOUNTER    Room Number:  25/25  Date seen:  1/10/2019  Time seen: 1:40 PM  PCP: Tay Lynch MD    HPI:  Chief complaint: Fall  Context:Bernardo Cox is a 87 y.o. female who presents to the ED from NH via EMS with report of an unwitnessed fall 30-45 minutes PTA. NH staff reports increased confusion. They also state the pt was bleeding around an old scab on her head. When EMS arrived the pt was siting up in bed and eating. EMS also states pt had garbled speech and uneven pupils, the right pupil was constricted. Pt has h/o dementia and  confusion at baseline. Pt is on Plavix and 3L O2.     Onset: gradual  Location: fall at NH  Duration: 30-45min PTA  Timing: cosntant  Character: unwitnessed fall  Severity: moderate       ALLERGIES  Codeine; Latex; Other; Soma [carisoprodol]; and Sulfa antibiotics    PAST MEDICAL HISTORY  Active Ambulatory Problems     Diagnosis Date Noted   • Acute respiratory failure with hypoxia (CMS/[facility]) 08/08/2016   • Essential hypertension 08/12/2016   • Syncope and collapse 11/01/2017   • Bacteriuria 01/26/2018   • HCAP (healthcare-associated pneumonia) 03/22/2018   • Hypoglycemia 03/22/2018   • DM (diabetes mellitus) (CMS/[facility]) 03/22/2018   • Acute on chronic systolic CHF (congestive heart failure) (CMS/[facility]) 03/22/2018   • Toxic metabolic encephalopathy 03/23/2018   • Alzheimer disease 03/27/2018   • Pneumonia of right lower lobe due to infectious organism (CMS/[facility]) 04/15/2018   • Acute hypoxemic respiratory failure (CMS/[facility]) 04/15/2018   • CHF (congestive heart failure), NYHA class I, acute on chronic, diastolic (CMS/[facility]) 04/15/2018   • Dehydration 04/18/2018   • Elevated troponin 11/21/2018   • Nonrheumatic mitral valve insufficiency 11/21/2018   • Edema of both legs 11/21/2018   • Dementia with behavioral disturbance 12/04/2018   • Complicated UTI (urinary tract infection) 12/04/2018     Resolved Ambulatory Problems     Diagnosis Date Noted   • No Resolved Ambulatory  Problems     Past Medical History:   Diagnosis Date   • Alzheimer disease    • Atherosclerotic heart disease    • Bronchitis    • CHF (congestive heart failure) (CMS/[facility])    • COPD (chronic obstructive pulmonary disease) (CMS/[facility])    • Dementia    • Depression    • Diabetes mellitus (CMS/[facility])    • GERD (gastroesophageal reflux disease)    • HTN (hypertension)    • Hypokalemia    • Insomnia    • Metabolic encephalopathy    • Neuropathy    • OA (osteoarthritis)    • Pneumonia    • Renal disorder    • TIA (transient ischemic attack)    • Upper respiratory infection        PAST SURGICAL HISTORY  Past Surgical History:   Procedure Laterality Date   • CARDIAC SURGERY         FAMILY HISTORY  History reviewed. No pertinent family history.    SOCIAL HISTORY  Social History     Socioeconomic History   • Marital status:      Spouse name: Not on file   • Number of children: 3   • Years of education: Not on file   • Highest education level: Not on file   Social Needs   • Financial resource strain: Not on file   • Food insecurity - worry: Not on file   • Food insecurity - inability: Not on file   • Transportation needs - medical: Not on file   • Transportation needs - non-medical: Not on file   Occupational History     Comment: [employer]   Tobacco Use   • Smoking status: Former Smoker     Last attempt to quit: 1980     Years since quittin.0   • Smokeless tobacco: Never Used   • Tobacco comment: unable to assess   Substance and Sexual Activity   • Alcohol use: No   • Drug use: No   • Sexual activity: Defer   Other Topics Concern   • Not on file   Social History Narrative   • Not on file       REVIEW OF SYSTEMS  Review of Systems   Unable to perform ROS: Dementia   Constitutional:        Fall   Eyes:        Uneven pupils   Skin: Positive for wound (bleeding from head scab).   Neurological: Positive for speech difficulty (garbled speech).   Psychiatric/Behavioral: Positive for confusion (increased from basline).        PHYSICAL EXAM  ED Triage Vitals [01/10/19 1339]   Temp Heart Rate Resp BP SpO2   -- 101 18 140/97 90 %      Temp src Heart Rate Source Patient Position BP Location FiO2 (%)   -- -- -- -- --     Physical Exam   Constitutional: She is well-developed, well-nourished, and in no distress. No distress.   Pt appears frail and elderly    HENT:   Head: Normocephalic and atraumatic.   Right Ear: External ear normal. No hemotympanum.   Left Ear: External ear normal. No hemotympanum.   Nose: Nose normal. No nasal septal hematoma. No epistaxis.   No facial deformity  Healing wound to superior scalp and resolving bruising to R frontal forehead  No sign of dental injury    Eyes: Conjunctivae and EOM are normal.   L pupil dilated to 4mm, R pupil 2mm, both round and reactive to light   Neck: Normal range of motion.   Cardiovascular: Normal rate and regular rhythm.   Pulmonary/Chest: Effort normal and breath sounds normal. She has no wheezes. She has no rhonchi. She has no rales. She exhibits no tenderness.   Abdominal: Soft. She exhibits no distension. There is no tenderness.   Musculoskeletal: Normal range of motion. She exhibits edema (BLE).        Cervical back: She exhibits no tenderness.   No acute injury to extremities, large amount of resolving ecchymosis to right upper arm and proximal forearm   Weeping wounds to anterior aspect of LLE  Pt performs FROM on both legs, they are non tender   Neurological: She is alert. She has normal strength. She displays no weakness, facial symmetry and normal speech.   Pt confused, only follows some commands  5/5 strength in BUE and BLE  MARCOS   Skin: Skin is warm and dry.   Psychiatric: Affect normal.   Nursing note and vitals reviewed.      LAB RESULTS  Recent Results (from the past 24 hour(s))   Comprehensive Metabolic Panel    Collection Time: 01/10/19  1:56 PM   Result Value Ref Range    Glucose 275 (H) 65 - 99 mg/dL    BUN 32 (H) 8 - 23 mg/dL    Creatinine 1.01 (H) 0.57 - 1.00  mg/dL    Sodium 142 136 - 145 mmol/L    Potassium 5.1 3.5 - 5.2 mmol/L    Chloride 101 98 - 107 mmol/L    CO2 23.2 22.0 - 29.0 mmol/L    Calcium 9.5 8.6 - 10.5 mg/dL    Total Protein 7.1 6.0 - 8.5 g/dL    Albumin 2.90 (L) 3.50 - 5.20 g/dL    ALT (SGPT) 15 1 - 33 U/L    AST (SGOT) 29 1 - 32 U/L    Alkaline Phosphatase 115 39 - 117 U/L    Total Bilirubin 1.0 0.1 - 1.2 mg/dL    eGFR Non African Amer 52 (L) >60 mL/min/1.73    Globulin 4.2 gm/dL    A/G Ratio 0.7 g/dL    BUN/Creatinine Ratio 31.7 (H) 7.0 - 25.0    Anion Gap 17.8 mmol/L   Troponin    Collection Time: 01/10/19  1:56 PM   Result Value Ref Range    Troponin T 0.015 0.000 - 0.030 ng/mL   CBC Auto Differential    Collection Time: 01/10/19  1:56 PM   Result Value Ref Range    WBC 9.67 4.50 - 10.70 10*3/mm3    RBC 4.36 3.90 - 5.20 10*6/mm3    Hemoglobin 11.3 (L) 11.9 - 15.5 g/dL    Hematocrit 39.1 35.6 - 45.5 %    MCV 89.7 80.5 - 98.2 fL    MCH 25.9 (L) 26.9 - 32.0 pg    MCHC 28.9 (L) 32.4 - 36.3 g/dL    RDW 17.3 (H) 11.7 - 13.0 %    RDW-SD 57.1 (H) 37.0 - 54.0 fl    MPV 10.3 6.0 - 12.0 fL    Platelets 349 140 - 500 10*3/mm3    Neutrophil % 76.6 (H) 42.7 - 76.0 %    Lymphocyte % 15.3 (L) 19.6 - 45.3 %    Monocyte % 7.8 5.0 - 12.0 %    Eosinophil % 0.2 (L) 0.3 - 6.2 %    Basophil % 0.1 0.0 - 1.5 %    Immature Grans % 0.5 0.0 - 0.5 %    Neutrophils, Absolute 7.41 1.90 - 8.10 10*3/mm3    Lymphocytes, Absolute 1.48 0.90 - 4.80 10*3/mm3    Monocytes, Absolute 0.75 0.20 - 1.20 10*3/mm3    Eosinophils, Absolute 0.02 0.00 - 0.70 10*3/mm3    Basophils, Absolute 0.01 0.00 - 0.20 10*3/mm3    Immature Grans, Absolute 0.05 (H) 0.00 - 0.03 10*3/mm3   Urinalysis With Microscopic If Indicated (No Culture) - Urine, Catheter    Collection Time: 01/10/19  2:01 PM   Result Value Ref Range    Color, UA Dark Yellow (A) Yellow, Straw    Appearance, UA Cloudy (A) Clear    pH, UA <=5.0 5.0 - 8.0    Specific Gravity, UA 1.024 1.005 - 1.030    Glucose, UA Negative Negative    Ketones, UA  15 mg/dL (1+) (A) Negative    Bilirubin, UA Negative Negative    Blood, UA Trace (A) Negative    Protein,  mg/dL (2+) (A) Negative    Leuk Esterase, UA Moderate (2+) (A) Negative    Nitrite, UA Positive (A) Negative    Urobilinogen, UA 1.0 E.U./dL 0.2 - 1.0 E.U./dL   Urinalysis, Microscopic Only - Urine, Catheter    Collection Time: 01/10/19  2:01 PM   Result Value Ref Range    RBC, UA 3-5 (A) None Seen, 0-2 /HPF    WBC, UA Too Numerous to Count (A) None Seen, 0-2 /HPF    Bacteria, UA 4+ (A) None Seen /HPF    Squamous Epithelial Cells, UA 3-6 (A) None Seen, 0-2 /HPF    Hyaline Casts, UA 3-6 None Seen /LPF    WBC Casts 3-6 None Seen /LPF    Methodology Manual Light Microscopy        I ordered the above labs and reviewed the results    RADIOLOGY  CT Head Without Contrast   FINDINGS: A noncontrasted CT examination of the brain was performed. There is diffuse atrophy. Moderate vascular calcification is noted. Confluent areas of decreased attenuation appreciated involving the white matter cerebral hemispheres bilaterally, nonspecific but suggesting extensive small vessel ischemic disease. There is no evidence of  fracture.     No focal area of decreased attenuation to suggest acute infarction is identified. Further evaluation could be performed with a MRI examination of the brain as indicated.      XR Chest 1 View   Final Result   No acute abnormality is evident.       This report was finalized on 1/10/2019 2:33 PM by Dr. Rosas Espinosa M.D.              I ordered the above noted radiological studies and reviewed the images on the PACS system.  Spoke with Dr. Bunch regarding CT/MRI scan results    MEDICATIONS GIVEN IN ER  Medications   cefTRIAXone (ROCEPHIN) IVPB 1 g (not administered)   lactated ringers bolus 500 mL (not administered)       EKG  Interpreted by ED Physician Dr. Tom    PROCEDURES  Procedures      PROGRESS AND CONSULTS  1:50 PM  During pt's initial evaluation she became increasing agitated and  "had clear, belligerent speech directed towards me and the staff upon starting her IV. She resisted any movement of her extremities very strongly and attempted to kick me with her right leg. She is alert but disoriented.     3:02 PM  Dr. Bunch, radiology, reports CT head is negative acute.     3:13 PM  Rechecked pt who is resting in NAD. Pt's speech remains clear. Informed pt of UTI and negative head CT.     3:25 PM  Urine culture and Rocephin ordered UTI.   Reviewed pt's history and workup with Dr. Tom.  After a bedside evaluation; Dr Tom agrees with the plan of care. At this time we feel comfortable with discharge with treatment for her UTI.       Disposition vitals:  BP (!) 144/113   Pulse 98   Temp 97.2 °F (36.2 °C) (Tympanic)   Resp 20   Ht 165.1 cm (65\")   Wt 67.4 kg (148 lb 8 oz)   SpO2 100%   BMI 24.71 kg/m²       DIAGNOSIS  Final diagnoses:   Urinary tract infection without hematuria, site unspecified   Altered mental status, unspecified altered mental status type   Fall at nursing home, initial encounter   Injury of head, initial encounter     DISCHARGE    Patient discharged in stable condition.    Reviewed implications of results, diagnosis, meds, responsibility to follow up, warning signs and symptoms of possible worsening, potential complications and reasons to return to ER.    Patient/Family voiced understanding of above instructions.    Discussed plan for discharge, as there is no emergent indication for admission. Patient referred to primary care provider for BP management due to today's BP. Pt/family is agreeable and understands need for follow up and repeat testing.  Pt is aware that discharge does not mean that nothing is wrong but it indicates no emergency is present that requires admission and they must continue care with follow-up as given below or physician of their choice.     FOLLOW-UP  Sanjay Butcher MD  [address] [facility]  [address] [place], [place] [id number] [phone]    In 2 " days           Medication List      New Prescriptions    cephalexin 500 MG capsule  Commonly known as:  KEFLEX  Take 1 capsule by mouth 2 (Two) Times a Day for 5 days.        Changed    insulin glargine 100 UNIT/ML injection  Commonly known as:  LANTUS  Inject 12 Units under the skin into the appropriate area as directed   Daily.  What changed:  when to take this        ASK your doctor about these medications    metOLazone 5 MG tablet  Commonly known as:  ZAROXOLYN  Ask about: Should I take this medication?            Documentation assistance provided by dayday Moore for Coty Cameron PA-C.  Information recorded by the scribe was done at my direction and has been verified and validated by me.           Claudia Moore  01/10/19 9466       Coty Cameron PA  01/15/19 1022

## 2019-01-11 NOTE — ED NOTES
SEGUNDO called for update, at this time pt next on the list for pickup     Nathaly, Fide Rick RN  01/10/19 2035

## 2019-01-11 NOTE — ED NOTES
Pt continues to be more agitated, reassured but unable to calm patient. Waiting for EMS at [facility] to,reanna Andersen, Fide Rick RN  01/10/19 1923

## 2019-01-15 PROBLEM — J96.01 ACUTE RESPIRATORY FAILURE WITH HYPOXIA AND HYPERCAPNIA (HCC): Status: ACTIVE | Noted: 2019-01-01

## 2019-01-15 PROBLEM — J96.02 ACUTE RESPIRATORY FAILURE WITH HYPOXIA AND HYPERCAPNIA (HCC): Status: ACTIVE | Noted: 2019-01-01

## 2019-01-17 PROBLEM — Z51.5 ADMISSION FOR HOSPICE CARE: Status: ACTIVE | Noted: 2019-01-01

## 2019-01-17 NOTE — H&P
HISTORY AND PHYSICAL   [facility], [facility]      2019    Patient Identification:    Name: Bernardo Cox  Age: 87 y.o.  Sex: female  :  1931  MRN: [id number]                       Primary Care Physician: Tay Lynch MD    Chief Complaint:      Hospice care      History of Present Illness:     Bernardo Cox  is a  83-year-old female resident at nursing home, she has multiple medical problems, she has advanced dementia, congestive heart failure, multiple admissions for congestive heart failure exacerbation as well as aspiration pneumonia and acute respiratory failure.  This is her third admission for the same problem which is pneumonia and CHF exacerbation during the last 2 months.  She was sent to ER this time because she was found to be unresponsive and in severe respiratory distress. In the ER patient was on nonrebreather mask and her saturation was 95%.  The ER evaluation shows bilateral lobar pneumonia, exacerbation of CHF, acute respiratory failure.  The patient had to be placed on a BiPAP, on arrival to the floor, patient respiratory status became worse.  I have a long conversation with patient's son who is her POA, I explained to him regarding the multiple admissions that the patient has had as well as her current quality of life as well as her poor prognosis. Patient's POA requested patient to be DNR. I also explained to him regarding the benefits of palliative care/comfort measures only.  Patient's POA requested for patient to be transferred to our palliative care unit and for patient to be placed under our palliative care orders, so she ca be comfortable and go in peace.  Patient's CODE STATUS has been changed to DO NOT RESUSCITATE, comfort measures only, she will be transferred to our palliative care unit and will be placed on our palliative care service orders.  I will follow the patient in the unit as well.     Today, she has been comfortable under palliative care/comfort  measures medications. A consultation with hospice was done to see if she qualified for HSB. She did. She was discharged and then readmitted under HSB.      Under hospice scattered bed, patient will be continued under palliative care orders.  Patient has remained comfortable.            Past Medical History:  Past Medical History:   Diagnosis Date   • Alzheimer disease    • Atherosclerotic heart disease    • Bronchitis    • CHF (congestive heart failure) (CMS/[facility])     EF = 25%   • COPD (chronic obstructive pulmonary disease) (CMS/[facility])    • Dementia    • Depression    • Diabetes mellitus (CMS/[facility])    • GERD (gastroesophageal reflux disease)    • HTN (hypertension)    • Hypokalemia    • Insomnia    • Metabolic encephalopathy    • Neuropathy    • OA (osteoarthritis)    • Pneumonia    • Renal disorder     stage three   • TIA (transient ischemic attack)    • Upper respiratory infection      Past Surgical History:  Past Surgical History:   Procedure Laterality Date   • CARDIAC SURGERY        Home Meds:  Medications Prior to Admission   Medication Sig Dispense Refill Last Dose   • acetaminophen (TYLENOL) 500 MG tablet Take 1,000 mg by mouth Every 6 (Six) Hours As Needed for Mild Pain .   Past Week at Unknown time   • budesonide (PULMICORT) 0.5 MG/2ML nebulizer solution Take 0.5 mg by nebulization 2 (Two) Times a Day.   Past Month at Unknown time   • carvedilol (COREG) 25 MG tablet Take 25 mg by mouth 2 (two) times a day with meals. Hold for SBP <100   2018 at Unknown time   • [] cephalexin (KEFLEX) 500 MG capsule Take 1 capsule by mouth 2 (Two) Times a Day for 5 days. 10 capsule 0    • clopidogrel (PLAVIX) 75 MG tablet Take 75 mg by mouth daily.   2018 at Unknown time   • Divalproex Sodium (DEPAKOTE SPRINKLE) 125 MG capsule Take 1 capsule by mouth Every 12 (Twelve) Hours. 60 capsule 3    • docusate sodium (COLACE) 250 MG capsule Take 250 mg by mouth Daily.   2018 at Unknown time   • Emollient (AQUAPHOR  EX) Apply 1 application topically Daily. To bilateral lower exremities      • escitalopram (LEXAPRO) 10 MG tablet Take 1 tablet by mouth Daily. 30 tablet 3    • furosemide (LASIX) 40 MG tablet Take 0.5 tablets by mouth 2 (Two) Times a Day. 30 tablet 0    • glucagon (GLUCAGON EMERGENCY) 1 MG injection Inject 1 mg under the skin 1 (One) Time As Needed for Low Blood Sugar.   Past Month at Unknown time   • insulin aspart (novoLOG) 100 UNIT/ML injection Inject  under the skin into the appropriate area as directed 3 (Three) Times a Day Before Meals. 200-299= 3 units  300-399= 5 units  400-499= 7 units  >500= 9 units   12/4/2018 at Unknown time   • insulin glargine (LANTUS) 100 UNIT/ML injection Inject 12 Units under the skin into the appropriate area as directed Daily. (Patient taking differently: Inject 12 Units under the skin into the appropriate area as directed Every Night.) 240 Units 0    • ipratropium-albuterol (DUO-NEB) 0.5-2.5 mg/mL nebulizer Take 3 mL by nebulization 4 (Four) Times a Day.   Past Month at Unknown time   • LORazepam (ATIVAN) 0.5 MG tablet Take 1 tablet by mouth At Night As Needed for Anxiety. 30 tablet 0    • melatonin 1 MG tablet Take 4 mg by mouth Every Night.      • mirtazapine (REMERON SOL-TAB) 15 MG disintegrating tablet Take 1 tablet by mouth Every Night. 30 tablet 3    • mupirocin (BACTROBAN) 2 % ointment Apply 1 application topically to the appropriate area as directed Daily. To bilateral lower extremities      • nitroglycerin (NITROSTAT) 0.4 MG SL tablet Place 0.4 mg under the tongue Every 5 (Five) Minutes As Needed for Chest Pain. Take no more than 3 doses in 15 minutes.   Past Month at Unknown time   • polyethylene glycol (MIRALAX) packet Take 17 g by mouth Daily.   12/3/2018 at Unknown time   • potassium chloride ER (K-TAB) 20 MEQ tablet controlled-release ER tablet Take 20 mEq by mouth Daily.   12/4/2018 at Unknown time   • rivastigmine (EXELON) 4.6 MG/24HR patch Place 1 patch on the  skin as directed by provider Daily. 30 each 6        Allergies:  Allergies   Allergen Reactions   • Codeine    • Latex    • Other      Plastic tape, canned fish   • Soma [Carisoprodol]    • Sulfa Antibiotics      Immunizations:  Immunization History   Administered Date(s) Administered   • flucelvax quad pfs =>4 YRS 2018     Social History:   Social History     Social History Narrative   • Not on file     Social History     Tobacco Use   • Smoking status: Former Smoker     Last attempt to quit: 1980     Years since quittin.0   • Smokeless tobacco: Never Used   • Tobacco comment: unable to assess   Substance Use Topics   • Alcohol use: No     Family History:  No family history on file.     Review of Systems  See history of present illness and past medical history.    Hospice care    Objective:    Exam:    tMax 24 hrs: Temp (24hrs), Av.2 °F (36.2 °C), Min:96.7 °F (35.9 °C), Max:97.6 °F (36.4 °C)    Vitals Ranges:   Temp:  [96.7 °F (35.9 °C)-97.6 °F (36.4 °C)] 96.7 °F (35.9 °C)  Heart Rate:  [77-82] 82  Resp:  [14-18] 14  BP: (111)/(56) 111/56    Pulse 82   Temp 96.7 °F (35.9 °C) (Oral)   Resp 14   SpO2 100%        General: Unresponsive, however, she is comfortabe.  Neck: Supple, symmetrical, trachea midline, no adenopathy;              thyroid:  no enlargement/tenderness/nodules;              no carotid bruit or JVD  Cardiovascular: Tachycardic, tachypneic.    Chest wall: No tenderness or deformity  Pulmonary: Very diminished breath sounds.  Tachypneic.  Rhonchi and wheezing bilaterally.    Abdominal: Soft. Soft, non-tender, bowel sounds active all four quadrants,     no masses, no hepatomegaly, no splenomegaly.   Extremities: Normal, atraumatic, no cyanosis or edema  Pulses: 2 + symmetric all extremities  Neurological: Unresponsive, no gross focal sensorimotor deficit.    Skin: Skin color, texture, normal. Turgor is decreased. No rashes or lesions             Data Review:    Results from last 7 days    Lab Units 01/15/19  0923   WBC 10*3/mm3 5.12   HEMOGLOBIN g/dL 11.1*   HEMATOCRIT % 38.3   PLATELETS 10*3/mm3 209       Results from last 7 days   Lab Units 01/15/19  0923   SODIUM mmol/L 140   POTASSIUM mmol/L 4.8   CHLORIDE mmol/L 102   CO2 mmol/L 28.3   BUN mg/dL 29*   CREATININE mg/dL 1.05*   CALCIUM mg/dL 8.7   BILIRUBIN mg/dL 0.5   ALK PHOS U/L 125*   ALT (SGPT) U/L 15   AST (SGOT) U/L 16   GLUCOSE mg/dL 374*     Results from last 7 days   Lab Units 01/15/19  0923   INR  1.18*             Lab Results   Lab Value Date/Time    TROPONINT <0.010 01/15/2019 0923    TROPONINT 0.015 01/10/2019 1356    TROPONINT 0.012 12/04/2018 0941    TROPONINT 0.034 (H) 11/19/2018 1741    TROPONINT <0.010 04/15/2018 0151    TROPONINT <0.010 03/26/2018 0401    TROPONINT <0.010 03/23/2018 0613    TROPONINT 0.035 (H) 01/26/2018 1040    TROPONINT 0.023 11/03/2017 0510    TROPONINT 0.052 (H) 11/02/2017 0302    TROPONINT 0.054 (H) 11/01/2017 2105    TROPONINT 0.084 (H) 11/01/2017 1320    TROPONINT 0.017 07/04/2017 1653    TROPONINT <0.010 08/07/2016 2158    TROPONINT <0.01 01/19/2016 1405       Brief Urine Lab Results  (Last result in the past 365 days)      Color   Clarity   Blood   Leuk Est   Nitrite   Protein   CREAT   Urine HCG        01/15/19 1005 Yellow Clear Negative Small (1+) Negative Negative                Imaging Results (all)     None          Assessment:      Acute respiratory failure with hypoxia (CMS/HCC)    HCAP (healthcare-associated pneumonia)    Admission for hospice care    Dementia with behavioral disturbance    Alzheimer disease          Plan:    Inpatient admission to [facility]  Patient will be continued under palliative care orders.  She is comfortable        Sanjay Butcher MD  1/17/2019  2:04 PM

## 2019-01-17 NOTE — CONSULTS
Spiritual Care:  Patient asleep. Spoke with family friend for 40 yrs. Friend seems to be grieving normally, he appreciated the visit. Friend also mentioned pt's sons live out of state and she hasn't seen them in a while. Will follow up regularly.  terra Wynn
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
## 2019-01-17 NOTE — PLAN OF CARE
Problem: Fall Risk (Adult)  Goal: Absence of Fall  Outcome: Ongoing (interventions implemented as appropriate)      Problem: Dying Patient, Actively (Adult)  Goal: Comfort/Pain Control  Outcome: Ongoing (interventions implemented as appropriate)    Goal: Peace/Preservation of Dignity During the Dying Process  Outcome: Ongoing (interventions implemented as appropriate)      Problem: Palliative Care (Adult)  Goal: Maximized Comfort  Outcome: Ongoing (interventions implemented as appropriate)    Goal: Enhanced Quality of Life  Outcome: Ongoing (interventions implemented as appropriate)      Problem: Patient Care Overview  Goal: Plan of Care Review  Outcome: Ongoing (interventions implemented as appropriate)   01/17/19 0435   Coping/Psychosocial   Plan of Care Reviewed With patient   Plan of Care Review   Progress declining   OTHER   Outcome Summary pt was pre med prior to turns, q 4 hours, slept comfortable through out the night, will continue to monitor and keep comfortable      Goal: Individualization and Mutuality  Outcome: Ongoing (interventions implemented as appropriate)   01/17/19 0435   Individualization   Patient Specific Goals (Include Timeframe) to keep comfortable    Patient Specific Interventions Premed prior to turns, q 4 turns

## 2019-01-17 NOTE — PLAN OF CARE
Problem: Fall Risk (Adult)  Goal: Identify Related Risk Factors and Signs and Symptoms  Outcome: Outcome(s) achieved Date Met: 01/17/19    Goal: Absence of Fall  Outcome: Ongoing (interventions implemented as appropriate)      Problem: Dying Patient, Actively (Adult)  Goal: Comfort/Pain Control  Outcome: Ongoing (interventions implemented as appropriate)    Goal: Peace/Preservation of Dignity During the Dying Process  Outcome: Ongoing (interventions implemented as appropriate)      Problem: Palliative Care (Adult)  Goal: Maximized Comfort  Outcome: Ongoing (interventions implemented as appropriate)    Goal: Enhanced Quality of Life  Outcome: Ongoing (interventions implemented as appropriate)      Problem: Patient Care Overview  Goal: Plan of Care Review  Outcome: Ongoing (interventions implemented as appropriate)   01/17/19 9920   Coping/Psychosocial   Plan of Care Reviewed With patient;family   Plan of Care Review   Progress declining   OTHER   Outcome Summary Pt continuing to decline. Friend at bedside. Premedicated prior to repositioning. Son en route from [place]. Should arrive this evening. Aware of pt's decline. Will continue to monitor.     Goal: Individualization and Mutuality  Outcome: Ongoing (interventions implemented as appropriate)    Goal: Discharge Needs Assessment  Outcome: Ongoing (interventions implemented as appropriate)    Goal: Interprofessional Rounds/Family Conf  Outcome: Ongoing (interventions implemented as appropriate)

## 2019-01-18 NOTE — PROGRESS NOTES
DAILY PROGRESS NOTE  [facility], Psychiatric      2019  Patient Identification:  Name: Bernrado Cox  Age: 87 y.o.  Sex: female  :  1931  MRN: [id number]         Primary Care Physician: Tay Lynch MD    Subjective:  Interval History:    Patient is comfortable, she is having shallow respiration this morning.  Some upper airways secretions but treated accordingly per protocol.  On palliative care orders.    Objective:    Intake/Output:    Intake/Output Summary (Last 24 hours) at 2019 0829  Last data filed at 2019 0422  Gross per 24 hour   Intake 1 ml   Output 960 ml   Net -959 ml         Exam:    /76 (BP Location: Left arm, Patient Position: Lying)   Pulse 108   Temp (!) 101.2 °F (38.4 °C) (Oral)   Resp 20   SpO2 97%     General: Unresponsive, however, she is comfortabe.  Neck: Supple, symmetrical, trachea midline, no adenopathy;              thyroid:  no enlargement/tenderness/nodules;              no carotid bruit or JVD  Cardiovascular: Tachycardic, tachypneic.    Chest wall: No tenderness or deformity  Pulmonary: Very diminished breath sounds.  Tachypneic.  Rhonchi and wheezing bilaterally.    Abdominal: Soft. Soft, non-tender, bowel sounds active all four quadrants,     no masses, no hepatomegaly, no splenomegaly.   Extremities: Normal, atraumatic, no cyanosis or edema  Pulses: 2 + symmetric all extremities  Neurological: Unresponsive, no gross focal sensorimotor deficit.    Skin: Skin color, texture, normal. Turgor is decreased. No rashes or lesions                    Assessment:        Acute respiratory failure with hypoxia (CMS/HCC)    HCAP (healthcare-associated pneumonia)    Admission for hospice care    Dementia with behavioral disturbance    Alzheimer disease  Hospice care    Patient Active Problem List   Diagnosis Code   • Acute respiratory failure with hypoxia (CMS/HCC) J96.01   • Essential hypertension I10   • Syncope and  collapse R55   • Bacteriuria R82.71   • HCAP (healthcare-associated pneumonia) J18.9   • Hypoglycemia E16.2   • DM (diabetes mellitus) (CMS/HCC) E11.9   • Acute on chronic systolic CHF (congestive heart failure) (CMS/HCC) I50.23   • Toxic metabolic encephalopathy G92   • Alzheimer disease G30.9, F02.80   • Pneumonia of right lower lobe due to infectious organism (CMS/HCC) J18.1   • Acute hypoxemic respiratory failure (CMS/HCC) J96.01   • CHF (congestive heart failure), NYHA class I, acute on chronic, diastolic (CMS/HCC) I50.33   • Dehydration E86.0   • Elevated troponin R74.8   • Nonrheumatic mitral valve insufficiency I34.0   • Edema of both legs R60.0   • Dementia with behavioral disturbance F03.91   • Complicated UTI (urinary tract infection) N39.0   • Acute respiratory failure with hypoxia and hypercapnia (CMS/[facility]) J96.01, J96.02   • Admission for hospice care Z51.5       Plan:    Patient is comfortable  Will continue palliative care/comfort measures orders only          Sanjay Butcher MD  1/18/2019  8:29 AM

## 2019-01-18 NOTE — PLAN OF CARE
Problem: Fall Risk (Adult)  Goal: Absence of Fall  Outcome: Ongoing (interventions implemented as appropriate)      Problem: Dying Patient, Actively (Adult)  Goal: Peace/Preservation of Dignity During the Dying Process  Outcome: Ongoing (interventions implemented as appropriate)      Problem: Patient Care Overview  Goal: Plan of Care Review  Outcome: Ongoing (interventions implemented as appropriate)   01/18/19 0414   Coping/Psychosocial   Plan of Care Reviewed With son   Plan of Care Review   Progress declining   OTHER   Outcome Summary Little change in pt condition-son arrived from [place]-education given regarding PO intake, IV fluids -medication and s/s of dying. He had questions about LOC with medications- 0100 dose of PRN meds held with no change in awareness. Another son is due in from [place] today. Will continue to monitor and offer comfort and support per palliative POC.       Problem: Pain, Acute (Adult)  Goal: Identify Related Risk Factors and Signs and Symptoms  Outcome: Ongoing (interventions implemented as appropriate)    Goal: Acceptable Pain Control/Comfort Level  Outcome: Ongoing (interventions implemented as appropriate)

## 2019-01-18 NOTE — NURSING NOTE
[facility] Visit Report    Bernardo Cox  [id number]  1/18/2019    Admission R/T [facility] Dx: Yes    Reason for [facility] Admission: Senile degeneration of brain/ pneumonia    Symptom  Management: Pain/Dyspnea    Nursing/Medication Recommendations:Monitor for signs/symptoms of condition change and nonverbal pain/dyspnea and medicate as directed.    Psychosocial Issues and Recommendations:    Spiritual Concerns and Recommendations:    [facility] Discharge Plans:  None at this time.  Pt continues to show signs of decline and is a 10% pps and is nonverbal and nonresponsive and needs frequent IV Meds Morphine 4 mg, Lorazepam IV and Robinul IV for management of pain/dyspnea.  Pt is in active dying stages and is unsafe for transfer.  Pt meets criteria for GIP and [facility] to follow daily.    Review of Visit :  Daily inpatient visit:  Pt is 10% pps and is nonverbal and nonresponsive with shallowed nonlabored respirations.  Pt requires frequent dosing of IV Morphine 2 mg, Lorazepam IV and Robinul with pt now needing increased Morphine to 4 mg IV for better management of pain/dyspnea.  Pt has diminished uop that is tea colored and very warm to touch.  RN placed call to OBDULIO dumont vu of active dying process and spoke with Nurse Hawk about signs/symptoms of active dying process who vu.  Pt meets GIP criteria and [facility] to follow daily.        Chris Kendrick RN

## 2019-01-19 NOTE — PLAN OF CARE
Problem: Fall Risk (Adult)  Goal: Absence of Fall  Outcome: Ongoing (interventions implemented as appropriate)      Problem: Dying Patient, Actively (Adult)  Goal: Comfort/Pain Control  Outcome: Ongoing (interventions implemented as appropriate)    Goal: Peace/Preservation of Dignity During the Dying Process  Outcome: Ongoing (interventions implemented as appropriate)      Problem: Patient Care Overview  Goal: Plan of Care Review  Outcome: Ongoing (interventions implemented as appropriate)   01/18/19 1810 01/18/19 2116 01/19/19 0518   Coping/Psychosocial   Plan of Care Reviewed With --  son --    Plan of Care Review   Progress declining --  --    OTHER   Outcome Summary --  --  Pt rested well with family at bedside. Medicated prior to Q4 turns. New IV placed. Continue comfort care.      Goal: Individualization and Mutuality  Outcome: Ongoing (interventions implemented as appropriate)    Goal: Discharge Needs Assessment  Outcome: Ongoing (interventions implemented as appropriate)      Problem: Pain, Acute (Adult)  Goal: Identify Related Risk Factors and Signs and Symptoms  Outcome: Ongoing (interventions implemented as appropriate)    Goal: Acceptable Pain Control/Comfort Level  Outcome: Ongoing (interventions implemented as appropriate)      Problem: Skin Injury Risk (Adult)  Goal: Skin Health and Integrity  Outcome: Ongoing (interventions implemented as appropriate)

## 2019-01-19 NOTE — PROGRESS NOTES
DAILY PROGRESS NOTE  [facility], [facility]      2019  Patient Identification:  Name: Bernardo Cox  Age: 87 y.o.  Sex: female  :  1931  MRN: [id number]         Primary Care Physician: Tay Lynch MD    Subjective:  Interval History:    Patient is having shallow respiration, tachypnea, she is on icing.  Still with considerably airway secretions.  On palliative care orders    Objective:    Intake/Output:    Intake/Output Summary (Last 24 hours) at 2019 1021  Last data filed at 2019 0900  Gross per 24 hour   Intake 0 ml   Output 700 ml   Net -700 ml         Exam:    /77 (BP Location: Left arm, Patient Position: Lying)   Pulse 113   Temp (!) 104.3 °F (40.2 °C) (Axillary)   Resp (!) 36   SpO2 93%     General: Unresponsive, however, she is comfortabe.  Neck: Supple, symmetrical, trachea midline, no adenopathy;              thyroid:  no enlargement/tenderness/nodules;              no carotid bruit or JVD  Cardiovascular: Tachycardic, tachypneic.    Chest wall: No tenderness or deformity  Pulmonary: Very diminished breath sounds.  Tachypneic.  Rhonchi and wheezing bilaterally.    Abdominal: Soft. Soft, non-tender, bowel sounds active all four quadrants,     no masses, no hepatomegaly, no splenomegaly.   Extremities: Normal, atraumatic, no cyanosis or edema  Pulses: 2 + symmetric all extremities  Neurological: Unresponsive, no gross focal sensorimotor deficit.    Skin: Skin color, texture, normal. Turgor is decreased. No rashes or lesions                    Assessment:        Acute respiratory failure with hypoxia (CMS/HCC)    HCAP (healthcare-associated pneumonia)    Admission for hospice care    Dementia with behavioral disturbance    Alzheimer disease  Hospice care    Patient Active Problem List   Diagnosis Code   • Acute respiratory failure with hypoxia (CMS/HCC) J96.01   • Essential hypertension I10   • Syncope and collapse R55   • Bacteriuria  R82.71   • HCAP (healthcare-associated pneumonia) J18.9   • Hypoglycemia E16.2   • DM (diabetes mellitus) (CMS/HCC) E11.9   • Acute on chronic systolic CHF (congestive heart failure) (CMS/HCC) I50.23   • Toxic metabolic encephalopathy G92   • Alzheimer disease G30.9, F02.80   • Pneumonia of right lower lobe due to infectious organism (CMS/HCC) J18.1   • Acute hypoxemic respiratory failure (CMS/HCC) J96.01   • CHF (congestive heart failure), NYHA class I, acute on chronic, diastolic (CMS/HCC) I50.33   • Dehydration E86.0   • Elevated troponin R74.8   • Nonrheumatic mitral valve insufficiency I34.0   • Edema of both legs R60.0   • Dementia with behavioral disturbance F03.91   • Complicated UTI (urinary tract infection) N39.0   • Acute respiratory failure with hypoxia and hypercapnia (CMS/[facility]) J96.01, J96.02   • Admission for hospice care Z51.5       Plan:    Patient is comfortable  Actively dying  Will continue palliative care/comfort measures orders only          Sanjay Butcher MD  1/19/2019  10:21 AM

## 2019-01-19 NOTE — PLAN OF CARE
Problem: Fall Risk (Adult)  Goal: Absence of Fall  Outcome: Ongoing (interventions implemented as appropriate)      Problem: Dying Patient, Actively (Adult)  Goal: Comfort/Pain Control  Outcome: Ongoing (interventions implemented as appropriate)    Goal: Peace/Preservation of Dignity During the Dying Process  Outcome: Ongoing (interventions implemented as appropriate)      Problem: Patient Care Overview  Goal: Plan of Care Review  Outcome: Ongoing (interventions implemented as appropriate)   01/18/19 1810   Coping/Psychosocial   Plan of Care Reviewed With patient   Plan of Care Review   Progress declining   OTHER   Outcome Summary Patient minimally responsive. She was medicated prior to turns for symptom management (morphine dosage increased for breathing). Family at bedside and inquisitive about care. Will continue to monitor per palliative goals of care.      Goal: Individualization and Mutuality  Outcome: Ongoing (interventions implemented as appropriate)    Goal: Discharge Needs Assessment  Outcome: Ongoing (interventions implemented as appropriate)    Goal: Interprofessional Rounds/Family Conf  Outcome: Ongoing (interventions implemented as appropriate)      Problem: Pain, Acute (Adult)  Goal: Identify Related Risk Factors and Signs and Symptoms  Outcome: Ongoing (interventions implemented as appropriate)    Goal: Acceptable Pain Control/Comfort Level  Outcome: Ongoing (interventions implemented as appropriate)      Problem: Skin Injury Risk (Adult)  Goal: Identify Related Risk Factors and Signs and Symptoms  Outcome: Outcome(s) achieved Date Met: 01/18/19    Goal: Skin Health and Integrity  Outcome: Ongoing (interventions implemented as appropriate)

## 2019-01-20 LAB — BACTERIA SPEC AEROBE CULT: NORMAL

## 2019-01-21 NOTE — PROGRESS NOTES
Case Management Discharge Note    Final Note: The patient  on 19 @12:30. JEMAL Chapin RN, CCP.     Destination - Selection Complete      Service Provider Request Status Selected Services Address Phone Number Fax Number    [facility] [address] [id number] [phone]      Durable Medical Equipment      No service has been selected for the patient.      Dialysis/Infusion      No service has been selected for the patient.      Home Medical Care      No service has been selected for the patient.      Community Resources      No service has been selected for the patient.             Final Discharge Disposition Code: 41 -  in medical facility

## 2019-01-23 NOTE — DISCHARGE SUMMARY
PHYSICIAN DISCHARGE SUMMARY  [facility], [facility]      Patient Identification:    Name: Bernardo Cox  Age: 87 y.o.  Sex: female  :  1931  MRN: [id number]    Primary Care Physician: Tay Lynch MD    Admit date: 2019    Discharge date and time:2019    Discharged Condition:     Discharge Diagnoses:    Acute respiratory failure with hypoxia (CMS/LTAC, located within [facility])    HCAP (healthcare-associated pneumonia)    Admission for hospice care    Dementia with behavioral disturbance    Alzheimer disease    Patient Active Problem List   Diagnosis Code   • Acute respiratory failure with hypoxia (CMS/LTAC, located within [facility]) J96.01   • Essential hypertension I10   • Syncope and collapse R55   • Bacteriuria R82.71   • HCAP (healthcare-associated pneumonia) J18.9   • Hypoglycemia E16.2   • DM (diabetes mellitus) (CMS/LTAC, located within [facility]) E11.9   • Acute on chronic systolic CHF (congestive heart failure) (CMS/LTAC, located within [facility]) I50.23   • Toxic metabolic encephalopathy G92   • Alzheimer disease G30.9, F02.80   • Pneumonia of right lower lobe due to infectious organism (CMS/LTAC, located within [facility]) J18.1   • Acute hypoxemic respiratory failure (CMS/LTAC, located within [facility]) J96.01   • CHF (congestive heart failure), NYHA class I, acute on chronic, diastolic (CMS/LTAC, located within [facility]) I50.33   • Dehydration E86.0   • Elevated troponin R74.8   • Nonrheumatic mitral valve insufficiency I34.0   • Edema of both legs R60.0   • Dementia with behavioral disturbance F03.91   • Complicated UTI (urinary tract infection) N39.0   • Acute respiratory failure with hypoxia and hypercapnia (CMS/LTAC, located within [facility]) J96.01, J96.02   • Admission for hospice care Z51.5          Hospital Course: Bernardo Cox  is a 83-year-old female resident at nursing home, she has multiple medical problems, she has advanced dementia, congestive heart failure, multiple admissions for congestive heart failure exacerbation as well as aspiration pneumonia and acute respiratory failure.  This is her third admission for the same problem which  is pneumonia and CHF exacerbation during the last 2 months.  She was sent to ER this time because she was found to be unresponsive and in severe respiratory distress. In the ER patient was on nonrebreather mask and her saturation was 95%.  The ER evaluation shows bilateral lobar pneumonia, exacerbation of CHF, acute respiratory failure.  The patient had to be placed on a BiPAP, on arrival to the floor, patient respiratory status became worse.  I have a long conversation with patient's son who is her POA, I explained to him regarding the multiple admissions that the patient has had as well as her current quality of life as well as her poor prognosis. Patient's POA requested patient to be DNR. I also explained to him regarding the benefits of palliative care/comfort measures only.  Patient's POA requested for patient to be transferred to our palliative care unit and for patient to be placed under our palliative care orders, so she ca be comfortable and go in peace.  Patient's CODE STATUS has been changed to DO NOT RESUSCITATE, comfort measures only, she was transferred to our palliative care unit and was placed on our palliative care service orders. Today, she has been comfortable under palliative care/comfort measures medications. Patient qualified for HSB and was discharged and readmitted as a HSB. Then she continued to deteriorate and eventually  on 2019 at 1230 hours.            Signed:  Sanjay Butcher MD

## 2020-06-19 NOTE — THERAPY EVALUATION
-- DO NOT REPLY / DO NOT REPLY ALL --  -- Message is from the [facility]--    COVID-19 Universal Screening: Negative    Patient is requesting a medication refill - medication is on active medication list    Patient is currently OUT of the requested medication.    Was Medication Pended?  Yes.    Rx Name and Dose: [facility] PURELA[facility] powder    Duration: 90 days    Pharmacy  [id number] [address] [place] [address]    Patient confirmed the above pharmacy as correct?  Yes    Caller Information       Type Contact Phone    06/19/2020 09:38 AM Phone (Incoming) osaury (Father) [phone]            Turnaround time given to caller:   \"This message will be sent to [state Provider's name]. The clinical team will fulfill your request as soon as they review your message.\"   Acute Care - Physical Therapy Initial Evaluation  [facility]     Patient Name: Bernardo Cox  : 1931  MRN: [id number]  Today's Date: 2017   Onset of Illness/Injury or Date of Surgery Date: 17            Admit Date: 2017     Visit Dx:    ICD-10-CM ICD-9-CM   1. Syncope and collapse R55 780.2   2. Acute UTI N39.0 599.0   3. Elevated troponin R74.8 790.6   4. Abnormal EKG R94.31 794.31     Patient Active Problem List   Diagnosis   • Acute respiratory failure with hypoxia   • Essential hypertension   • Syncope and collapse     Past Medical History:   Diagnosis Date   • Alzheimer disease    • Atherosclerotic heart disease    • Bronchitis    • CHF (congestive heart failure)     EF = 25%   • COPD (chronic obstructive pulmonary disease)    • Dementia    • Depression    • Diabetes mellitus    • GERD (gastroesophageal reflux disease)    • HTN (hypertension)    • Hypokalemia    • Insomnia    • Metabolic encephalopathy    • Neuropathy    • OA (osteoarthritis)    • Pneumonia    • Renal disorder     stage three   • TIA (transient ischemic attack)    • Upper respiratory infection      Past Surgical History:   Procedure Laterality Date   • CARDIAC SURGERY            PT ASSESSMENT (last 72 hours)      PT Evaluation       17 1321 17 1011    Rehab Evaluation    Document Type evaluation  -AR     Subjective Information agree to therapy;complains of;pain  -AR     Evaluation Not Performed  patient/family declined evaluation   Despte repeated encouragement and education, pt refused to get up with PT. She did allow PT to examine painful R ankle, but refused further evaluation at this time. PT will check back at a later time. RN aware.  -CH    Patient Effort, Rehab Treatment adequate  -AR     General Information    Patient Profile Review yes  -AR     Onset of Illness/Injury or Date of Surgery Date 17  -AR     Pertinent History Of Current Problem dementia from JORGE w/ R ankle pain  -AR      Precautions/Limitations fall precautions  -AR     Prior Level of Function --   unable to obtain  -AR     Equipment Currently Used at Home wheelchair  -AR     Barriers to Rehab none identified  -AR     Living Environment    Living Environment Comment resident at [facility]  -AR     Clinical Impression    Patient/Family Goals Statement wants to go home  -AR     Criteria for Skilled Therapeutic Interventions Met yes  -AR     Pathology/Pathophysiology Noted (Describe Specifically for Each System) musculoskeletal  -AR     Impairments Found (describe specific impairments) aerobic capacity/endurance;gait, locomotion, and balance  -AR     Rehab Potential fair, will monitor progress closely  -AR     Pain Assessment    Pain Assessment FLACC  -AR     FLACC (Face, Legs, Activity, Crying, Consolability)    Pain Rating: FLACC (Activity) - Face 1  -AR     Pain Rating: FLACC (Activity) - Legs 1  -AR     Pain Rating: FLACC (Activity) 0  -AR     Pain Rating: FLACC (Activity) - Cry 0  -AR     Pain Rating: FLACC (Activity) - Consolability 0  -AR     Score: FLACC (Activity) 2  -AR     Cognitive Assessment/Intervention    Current Cognitive/Communication Assessment impaired  -AR     Orientation Status oriented to;place  -AR     Follows Commands/Answers Questions 50% of the time;able to follow single-step instructions;needs cueing;needs increased time;needs repetition  -AR     Personal Safety severe impairment  -AR     Personal Safety Interventions fall prevention program maintained;gait belt;muscle strengthening facilitated  -AR     ROM (Range of Motion)    General ROM --   B LE WFL  -AR     MMT (Manual Muscle Testing)    General MMT Assessment --   unable to perform MMT; 2/5 during mobility  -AR     Bed Mobility, Assessment/Treatment    Bed Mobility, Assistive Device bed rails;head of bed elevated  -AR     Bed Mob, Supine to Sit, [place] maximum assist (25% patient effort)  -AR     Bed Mob, Sit to Supine, [place] maximum assist (25%  patient effort)  -AR     Bed Mobility, Comment sitting balance w/ close stand by - contact assist  -AR     Transfer Assessment/Treatment    Transfers, Sit-Stand [place] unable to perform;not appropriate to assess  -AR     Transfer, Comment and pt declined  -AR     Therapy Exercises    Bilateral Lower Extremities LAQ;5 reps  -AR     Positioning and Restraints    Pre-Treatment Position in bed  -AR     Post Treatment Position bed  -AR     In Bed notified nsg;supine;call light within reach;encouraged to call for assist;exit alarm on  -AR       11/01/17 1900 11/01/17 6197    General Information    Equipment Currently Used at Home  walker, rolling  -RA    Living Environment    Lives With facility resident  -RA     Living Arrangements extended care facility  -RA     Home Accessibility no concerns  -RA     Stair Railings at Home none  -RA     Type of Financial/Environmental Concern none  -RA     Transportation Available family or friend will provide  -RA       User Key  (r) = Recorded By, (t) = Taken By, (c) = Cosigned By    Initials Name Provider Type    JAIME Poe, PT Physical Therapist    RA Calli Toth, RN Registered Nurse    BRIANNA Flores PT Physical Therapist          Physical Therapy Education     Title: PT OT SLP Therapies (Active)     Topic: Physical Therapy (Active)     Point: Mobility training (Active)    Learning Progress Summary    Learner Readiness Method Response Comment Documented by Status   Patient Acceptance E NR  AR 11/02/17 1333 Active               Point: Home exercise program (Active)    Learning Progress Summary    Learner Readiness Method Response Comment Documented by Status   Patient Acceptance E NR  AR 11/02/17 1333 Active               Point: Body mechanics (Active)    Learning Progress Summary    Learner Readiness Method Response Comment Documented by Status   Patient Acceptance E NR  AR 11/02/17 1333 Active               Point: Precautions (Active)    Learning  Progress Summary    Learner Readiness Method Response Comment Documented by Status   Patient Acceptance E NR  AR 11/02/17 1333 Active                      User Key     Initials Effective Dates Name Provider Type Discipline    AR 06/27/16 -  Sonya Flores PT Physical Therapist PT                PT Recommendation and Plan  Anticipated Discharge Disposition: skilled nursing facility, extended care facility  Planned Therapy Interventions: balance training, bed mobility training, gait training, home exercise program, patient/family education, ROM (Range of Motion), stair training, strengthening  PT Frequency: 2-3 times/wk  Plan of Care Review  Plan Of Care Reviewed With: patient  Outcome Summary/Follow up Plan: Pt admitted from ECF on 11/1 w/ syncope + collapse; w/ h/o dementia and reporting some R ankle pain during mobility.  Able to sit edge of bed w/ maximal assist, declined further activity.  Recommend [facility].            IP PT Goals       11/02/17 1333          Bed Mobility PT LTG    Bed Mobility PT LTG, Date Established 11/02/17  -AR      Bed Mobility PT LTG, Time to Achieve 1 wk  -AR      Bed Mobility PT LTG, Activity Type supine to sit/sit to supine  -AR      Bed Mobility PT LTG, Josephine Level moderate assist (50% patient effort)  -AR      Transfer Training PT LTG    Transfer Training PT LTG, Date Established 11/02/17  -AR      Transfer Training PT LTG, Time to Achieve 1 wk  -AR      Transfer Training PT LTG, Activity Type bed to chair /chair to bed  -AR      Transfer Training PT LTG, Josephine Level moderate assist (50% patient effort)  -AR        User Key  (r) = Recorded By, (t) = Taken By, (c) = Cosigned By    Initials Name Provider Type    BRIANNA Flores PT Physical Therapist                Outcome Measures       11/02/17 1300          How much help from another person do you currently need...    Turning from your back to your side while in flat bed without using bedrails? 2  -AR       Moving from lying on back to sitting on the side of a flat bed without bedrails? 2  -AR      Moving to and from a bed to a chair (including a wheelchair)? 1  -AR      Standing up from a chair using your arms (e.g., wheelchair, bedside chair)? 1  -AR      Climbing 3-5 steps with a railing? 1  -AR      To walk in hospital room? 1  -AR      AM-PAC 6 Clicks Score 8  -AR      Functional Assessment    Outcome Measure Options AM-PAC 6 Clicks Basic Mobility (PT)  -AR        User Key  (r) = Recorded By, (t) = Taken By, (c) = Cosigned By    Initials Name Provider Type    AR Sonya Flores PT Physical Therapist           Time Calculation:         PT Charges       11/02/17 1335 11/02/17 1013 11/02/17 1011    Time Calculation    Start Time 1311  -AR 1000  -CH     Stop Time 1335  -AR 1008  -     Time Calculation (min) 24 min  -AR 8 min  -CH     PT Non-Billable Time (min)  8 min  -     PT Received On 11/02/17  -AR      PT - Next Appointment 11/06/17  -AR  11/02/17  -    PT Goal Re-Cert Due Date 11/09/17  -AR        User Key  (r) = Recorded By, (t) = Taken By, (c) = Cosigned By    Initials Name Provider Type     Jayleen Poe, PT Physical Therapist    BRIANNA Flores PT Physical Therapist          Therapy Charges for Today     Code Description Service Date Service Provider Modifiers Qty    66100164266 HC PT EVAL LOW COMPLEXITY 2 11/2/2017 Sonya Flores, PT GP 1    18579235168 HC PT THER PROC EA 15 MIN 11/2/2017 Sonya Flores PT GP 1          PT G-Codes  Outcome Measure Options: AM-PAC 6 Clicks Basic Mobility (PT)      Sonya Flores PT  11/2/2017

## 2024-05-13 NOTE — ED PROVIDER NOTES
Pt is a 87 y.o. female who presents to the ED complaining of unwitnessed fall that occurred PTA. Pt denies memory of the fall. NH reports that pt was drowsy and referred to ED for further evaluation. ED staff noted that pt was drowsy but became completely awake and alert when placing IV. When asked about urinary sx, pt complains of dysuria. Pt denies any pain currently.        On exam,  HENT: healing bruise to right forehead, oropharynx is dry  Cardiovascular: RRR, no murmur  Pulmonary: CTAB  Abdomen: normal active bowel sounds, soft, mild suprapubic tenderness  Musculoskeletal: C-spine is non-tender to palpitation      Labs (WBC, UA-Too numerous, Bacteria, UA-4+) and imaging (CXR/CT head-negative) reviewed.     Plan: Treat pt's UTI      MD ATTESTATION NOTE    The CLARISSA and I have discussed this patient's history, physical exam, and treatment plan.  I have reviewed the documentation and personally had a face to face interaction with the patient. I affirm the documentation and agree with the treatment and plan.  The attached note describes my personal findings.      Documentation assistance provided by dayday Matos for . Information recorded by the scribe was done at my direction and has been verified and validated by me.             Tanya Matos  01/10/19 2251       Roosevelt Tom MD  01/10/19 3138     (Plavix) or warfarin (Coumadin)     Description of the Test   You will be asked to lie on your left side. You will have monitors tracking your breathing, heart rate, and blood oxygen levels.  You will be given supplemental oxygen to breathe through your nose.  A mouthpiece will be positioned to help keep your mouth open.  Your throat may be sprayed with a numbing medicine. You will be given a sedative through an IV to help you relax during the test.  During the test, a small suction tube will be used to clear saliva and fluids from your mouth. The endoscope will be lubricated and placed in your mouth. You will be asked to try to swallow it. Then, it will be carefully and slowly advanced down your throat. It will be passed through your esophagus and into your stomach and intestine.  While the endoscope is being advanced, your doctor will view the images on the screen. Air will be passed through the endoscope into your digestive tract. This will be done to smooth the normal folds in the tissues, allowing your doctor to view the tissue more easily. Tiny tools may be passed through the endoscope in order to take biopsies or do other tests.     After Test   After the test, you will be observed for an hour. Then, you will be allowed to go home.   When you return home after the test, do the following to help ensure a smooth recovery:   Rest when you get home.   Ask your doctor if you can resume your normal diet. In most cases, you will be able to.   Sedatives can slow your reaction time. Do not drive or use machinery for the rest of the day.   Avoid alcohol for the rest of the day.   Be sure to follow your doctor's instructions .     How Long Will It Take?   Usually about 10-15 minutes     Will It Hurt?   Most people do not feel anything during the test and will not remember the test.  After the test, your throat may be sore and you may feel bloated.     Results   This test gives your doctor information about the health of

## 2024-10-01 NOTE — PROGRESS NOTES
ATTEMPTED TO INSTRUCT PT ON FLUTTER AND CPT VEST. PT REFUSED TO TAKE TREATMENT OR PARTICIPATE WITH THE FLUTTER AND CPT. MULTIPLE ATTEMPTS WERE MADE TO ENCOURAGE PT TO DO THERAPIES. PT. CONTINUED TO REFUSE AND TOLD ME TO QUIT TALKING TO HER. PLEASE ADVISE.   97.7